# Patient Record
Sex: MALE | Race: WHITE | NOT HISPANIC OR LATINO | Employment: UNEMPLOYED | URBAN - METROPOLITAN AREA
[De-identification: names, ages, dates, MRNs, and addresses within clinical notes are randomized per-mention and may not be internally consistent; named-entity substitution may affect disease eponyms.]

---

## 2017-01-09 ENCOUNTER — ANTICOAGULATION THERAPY VISIT (OUTPATIENT)
Dept: NURSING | Facility: CLINIC | Age: 67
End: 2017-01-09
Payer: COMMERCIAL

## 2017-01-09 DIAGNOSIS — I48.91 ATRIAL FIBRILLATION, UNSPECIFIED TYPE (H): ICD-10-CM

## 2017-01-09 DIAGNOSIS — Z79.01 LONG-TERM (CURRENT) USE OF ANTICOAGULANTS: Primary | ICD-10-CM

## 2017-01-09 LAB — INR POINT OF CARE: 2.5 (ref 0.86–1.14)

## 2017-01-09 PROCEDURE — 85610 PROTHROMBIN TIME: CPT | Mod: QW

## 2017-01-09 PROCEDURE — 36416 COLLJ CAPILLARY BLOOD SPEC: CPT

## 2017-01-09 PROCEDURE — 99207 ZZC NO CHARGE NURSE ONLY: CPT

## 2017-01-09 NOTE — PROGRESS NOTES
ANTICOAGULATION FOLLOW-UP CLINIC VISIT    Patient Name:  Markie Shepard  Date:  1/9/2017  Contact Type:  Face to Face    SUBJECTIVE:     Patient Findings     Positives No Problem Findings        Patient received poor eye prognosis (no glasses suitable for eye state and laser YAG capsulotomy procedure was not very helpful). Plan is to continue with eye injections to aid in retina longevity.      OBJECTIVE    INR PROTIME   Date Value Ref Range Status   01/09/2017 2.5* 0.86 - 1.14 Final       ASSESSMENT / PLAN  INR assessment THER    Recheck INR In: 6 WEEKS    INR Location Clinic      Anticoagulation Summary as of 1/9/2017     INR goal 2.0-3.0   Selected INR 2.5 (1/9/2017)   Maintenance plan 7.5 mg (5 mg x 1.5) on Mon, Fri; 5 mg (5 mg x 1) all other days   Full instructions 7.5 mg on Mon, Fri; 5 mg all other days   Weekly total 40 mg   No change documented Chelsie Encinas RN   Plan last modified Chelsie Encinas RN (3/21/2016)   Next INR check 2/20/2017   Priority INR   Target end date     Indications   Long-term (current) use of anticoagulants [Z79.01] [Z79.01]  Atrial fibrillation (H) [I48.91]         Anticoagulation Episode Summary     INR check location     Preferred lab     Send INR reminders to [facility]    Comments       Anticoagulation Care Providers     Provider Role Specialty Phone number    Mali Patel MD [facility] Internal Medicine [phone]            See the Encounter Report to view Anticoagulation Flowsheet and Dosing Calendar (Go to Encounters tab in chart review, and find the Anticoagulation Therapy Visit)    Patient aware if signs of clotting (pain, tenderness, swelling, color change in leg or arm, SOB) and bleeding occur (blood in stool, urine, large bruising, bleeding gums, nosebleeds) to have INR check sooner. If sx severe report to ER or concerned for stroke call 911. If general questions or concerns arise, call clinic.    Chelsie Encinas RN

## 2017-02-17 ENCOUNTER — DOCUMENTATION ONLY (OUTPATIENT)
Dept: VASCULAR SURGERY | Facility: CLINIC | Age: 67
End: 2017-02-17

## 2017-02-20 ENCOUNTER — ANTICOAGULATION THERAPY VISIT (OUTPATIENT)
Dept: NURSING | Facility: CLINIC | Age: 67
End: 2017-02-20
Payer: COMMERCIAL

## 2017-02-20 DIAGNOSIS — I48.91 ATRIAL FIBRILLATION, UNSPECIFIED TYPE (H): ICD-10-CM

## 2017-02-20 DIAGNOSIS — Z79.01 LONG-TERM (CURRENT) USE OF ANTICOAGULANTS: ICD-10-CM

## 2017-02-20 LAB — INR POINT OF CARE: 2.2 (ref 0.86–1.14)

## 2017-02-20 PROCEDURE — 36416 COLLJ CAPILLARY BLOOD SPEC: CPT

## 2017-02-20 PROCEDURE — 85610 PROTHROMBIN TIME: CPT | Mod: QW

## 2017-02-20 PROCEDURE — 99207 ZZC NO CHARGE NURSE ONLY: CPT

## 2017-02-20 NOTE — PROGRESS NOTES
ANTICOAGULATION FOLLOW-UP CLINIC VISIT    Patient Name:  Markie Shepard  Date:  2/20/2017  Contact Type:  Face to Face    SUBJECTIVE:     Patient Findings     Positives Change in diet/appetite (Chinese buffet last week, increase in greens. )           OBJECTIVE    INR Protime   Date Value Ref Range Status   02/20/2017 2.2 (A) 0.86 - 1.14 Final       ASSESSMENT / PLAN  No question data found.  Anticoagulation Summary as of 2/20/2017     INR goal 2.0-3.0   Today's INR 2.2   Maintenance plan 7.5 mg (5 mg x 1.5) on Mon, Fri; 5 mg (5 mg x 1) all other days   Full instructions 7.5 mg on Mon, Fri; 5 mg all other days   Weekly total 40 mg   No change documented Chelsie Encinas RN   Plan last modified Chelsie Encinas RN (3/21/2016)   Next INR check 4/3/2017   Priority INR   Target end date     Indications   Long-term (current) use of anticoagulants [Z79.01] [Z79.01]  Atrial fibrillation (H) [I48.91]         Anticoagulation Episode Summary     INR check location     Preferred lab     Send INR reminders to [facility]    Comments       Anticoagulation Care Providers     Provider Role Specialty Phone number    Mali Patel MD [facility] Internal Medicine [phone]            See the Encounter Report to view Anticoagulation Flowsheet and Dosing Calendar (Go to Encounters tab in chart review, and find the Anticoagulation Therapy Visit)    Patient aware if signs of clotting (pain, tenderness, swelling, color change in leg or arm, SOB) and bleeding occur (blood in stool, urine, large bruising, bleeding gums, nosebleeds) to have INR check sooner. If sx severe report to ER or concerned for stroke call 911. If general questions or concerns arise, call clinic.    Chelsie Encinas RN

## 2017-03-23 DIAGNOSIS — J41.0 SIMPLE CHRONIC BRONCHITIS (H): ICD-10-CM

## 2017-03-23 RX ORDER — ALBUTEROL SULFATE 90 UG/1
2 AEROSOL, METERED RESPIRATORY (INHALATION) EVERY 4 HOURS PRN
Qty: 1 INHALER | Refills: 11 | Status: CANCELLED | OUTPATIENT
Start: 2017-03-23

## 2017-03-23 RX ORDER — LEVALBUTEROL TARTRATE 45 UG/1
2 AEROSOL, METERED ORAL EVERY 4 HOURS PRN
Qty: 1 INHALER | Refills: 11 | Status: SHIPPED | OUTPATIENT
Start: 2017-03-23 | End: 2017-10-03

## 2017-03-23 NOTE — TELEPHONE ENCOUNTER
Insurance will not cover albuterol, but will Xopenex.  Jorge notified, she prefers xopenex actually , as pt has heart condition.  Med changed. PA team notified to stop PA in process.     Naye Allen RN  March 23, 2017 12:05 PM

## 2017-04-03 ENCOUNTER — ANTICOAGULATION THERAPY VISIT (OUTPATIENT)
Dept: NURSING | Facility: CLINIC | Age: 67
End: 2017-04-03
Payer: COMMERCIAL

## 2017-04-03 ENCOUNTER — TELEPHONE (OUTPATIENT)
Dept: CARDIOLOGY | Facility: CLINIC | Age: 67
End: 2017-04-03

## 2017-04-03 DIAGNOSIS — I48.91 ATRIAL FIBRILLATION, UNSPECIFIED TYPE (H): ICD-10-CM

## 2017-04-03 DIAGNOSIS — Z79.01 LONG-TERM (CURRENT) USE OF ANTICOAGULANTS: ICD-10-CM

## 2017-04-03 LAB — INR POINT OF CARE: 2.5 (ref 0.86–1.14)

## 2017-04-03 PROCEDURE — 36416 COLLJ CAPILLARY BLOOD SPEC: CPT

## 2017-04-03 PROCEDURE — 99207 ZZC NO CHARGE NURSE ONLY: CPT

## 2017-04-03 PROCEDURE — 85610 PROTHROMBIN TIME: CPT | Mod: QW

## 2017-04-03 NOTE — TELEPHONE ENCOUNTER
"Called pt to follow-up.  Per Dr. Rucker' OV note from 10/3/16:     \"We will contact him by phone in 6 months to touch base regarding his weight activity, smoking and for symptom evaluation at that time. \"    Pt. States he continues to use a stair stepper at home daily for activity however he has not lost any weight since last OV.  He does get outside everyday for a few minutes, but admits that he is limited by his loss of vision and the weather.  He is hopeful now that the weather is nicer he will get out of the house more often.  He continues to smoke 1/2ppd and has not made attemtps to quit. The SOB remains when he bends over but he states that he can go significant periods of time without experiencing SOB. He has no concerning symptoms at this time such as chest pain or heaviness. Congratulated him and encouraged him to continue using his stair stepper daily and aim for weight loss. Also encouraged him to contact his PCP if he is ready to stop smoking and requiring any resources for this.     Reviewed that pt. Will be due for annual OV in October, he is agreeable to seeing us at that time.  Encouraged him to reach out to us in the meantime for any questions or concerns.   "

## 2017-04-03 NOTE — PROGRESS NOTES
ANTICOAGULATION FOLLOW-UP CLINIC VISIT    Patient Name:  Markie Shepard  Date:  4/3/2017  Contact Type:  Face to Face    SUBJECTIVE:     Patient Findings     Positives No Problem Findings           OBJECTIVE    INR Protime   Date Value Ref Range Status   04/03/2017 2.5 (A) 0.86 - 1.14 Final       ASSESSMENT / PLAN  INR assessment THER    Recheck INR In: 6 WEEKS    INR Location Clinic      Anticoagulation Summary as of 4/3/2017     INR goal 2.0-3.0   Today's INR 2.5   Maintenance plan 7.5 mg (5 mg x 1.5) on Mon, Fri; 5 mg (5 mg x 1) all other days   Full instructions 7.5 mg on Mon, Fri; 5 mg all other days   Weekly total 40 mg   No change documented Chelsie Encinas RN   Plan last modified Chelsie Encinas RN (3/21/2016)   Next INR check 5/15/2017   Priority INR   Target end date     Indications   Long-term (current) use of anticoagulants [Z79.01] [Z79.01]  Atrial fibrillation (H) [I48.91]         Anticoagulation Episode Summary     INR check location     Preferred lab     Send INR reminders to [facility]    Comments PREFERS MARKIE!      Anticoagulation Care Providers     Provider Role Specialty Phone number    Mali Patel MD [facility] Internal Medicine [phone]            See the Encounter Report to view Anticoagulation Flowsheet and Dosing Calendar (Go to Encounters tab in chart review, and find the Anticoagulation Therapy Visit)    Patient aware if signs of clotting (pain, tenderness, swelling, color change in leg or arm, SOB) and bleeding occur (blood in stool, urine, large bruising, bleeding gums, nosebleeds) to have INR check sooner. If sx severe report to ER or concerned for stroke call 911. If general questions or concerns arise, call clinic.    Chelsie Encinas RN

## 2017-04-03 NOTE — TELEPHONE ENCOUNTER
----- Message from Christina Betancur RN sent at 4/3/2017  8:35 AM CDT -----      ----- Message -----     From: Christina Betancur RN     Sent: 4/3/2017       To: Christina Betancur RN    Call pt in April 2017-check weight and symptoms per Dr. Barr

## 2017-05-01 ENCOUNTER — ANTICOAGULATION THERAPY VISIT (OUTPATIENT)
Dept: NURSING | Facility: CLINIC | Age: 67
End: 2017-05-01
Payer: COMMERCIAL

## 2017-05-01 DIAGNOSIS — Z72.0 CURRENT TOBACCO USE: ICD-10-CM

## 2017-05-01 DIAGNOSIS — I48.91 ATRIAL FIBRILLATION, UNSPECIFIED TYPE (H): ICD-10-CM

## 2017-05-01 DIAGNOSIS — Z79.01 LONG-TERM (CURRENT) USE OF ANTICOAGULANTS: ICD-10-CM

## 2017-05-01 DIAGNOSIS — I48.20 CHRONIC ATRIAL FIBRILLATION (H): ICD-10-CM

## 2017-05-01 LAB — INR PPP: 2.7 (ref 0.86–1.14)

## 2017-05-01 PROCEDURE — 36415 COLL VENOUS BLD VENIPUNCTURE: CPT | Performed by: FAMILY MEDICINE

## 2017-05-01 PROCEDURE — 99207 ZZC NO CHARGE NURSE ONLY: CPT | Performed by: FAMILY MEDICINE

## 2017-05-01 PROCEDURE — 85610 PROTHROMBIN TIME: CPT | Performed by: FAMILY MEDICINE

## 2017-05-01 NOTE — PROGRESS NOTES
ANTICOAGULATION FOLLOW-UP CLINIC VISIT    Patient Name:  Nilson Shepard  Date:  5/1/2017  Contact Type:  Telephone    SUBJECTIVE:     Patient Findings     Positives Change in diet/appetite (Patient is eating A LOT less than normal d/t tooth abscess. Patient advised to keep up on his daily salad intake as best he can so INR does not continue to climb pre-procedure. ), Dental/Other procedures (Planned dental extraction for Thursday 5/4. INR needs to be between 2-2.5.), Inflammation (Abscess tooth on lower left side. ), Antibiotic use or infection (Amoxicillin 500 mg TID started on Sunday 4/30. 10 day course.)    Comments Last three INR results to be faxed to Dr. Deni Plascencia's office #[phone]. ACC team will do this Wednesday after INR is checked and confirmed between 2-2.5.             OBJECTIVE    INR   Date Value Ref Range Status   05/01/2017 2.70 (H) 0.86 - 1.14 Final       ASSESSMENT / PLAN  INR assessment THER    Recheck INR In: 2 DAYS    INR Location Clinic      Anticoagulation Summary as of 5/1/2017     INR goal 2.0-3.0   Today's INR 2.70   Maintenance plan 7.5 mg (5 mg x 1.5) on Mon, Fri; 5 mg (5 mg x 1) all other days   Full instructions 5/1: 2.5 mg; Otherwise 7.5 mg on Mon, Fri; 5 mg all other days   Weekly total 40 mg   Plan last modified Chelsie Encinas RN (3/21/2016)   Next INR check 5/3/2017   Priority INR   Target end date     Indications   Long-term (current) use of anticoagulants [Z79.01] [Z79.01]  Atrial fibrillation (H) [I48.91]         Anticoagulation Episode Summary     INR check location     Preferred lab     Send INR reminders to [facility]    Comments PREFERS NILSON!      Anticoagulation Care Providers     Provider Role Specialty Phone number    Mali Patel MD [facility] Internal Medicine [phone]            See the Encounter Report to view Anticoagulation Flowsheet and Dosing Calendar (Go to Encounters tab in chart review, and find the Anticoagulation  Therapy Visit)    Patient notified that interruptions to ACC therapy need to be advised by PCP, therefore, plan advised today will be routed to provider for review. Dose can always be made up tomorrow if Dr. Patel is not in agreement.     Patient advised to take 2.5 mg only today given INR threshold for upcoming procedure (12% reduction).  Patient instructed to eat greens the best he can until procedure as well. Patient agreed to rechecking INR on Wednesday to ensure in target range for procedure.     Patient aware if signs of clotting (pain, tenderness, swelling, color change in leg or arm, SOB) and bleeding occur (blood in stool, urine, large bruising, bleeding gums, nosebleeds) to have INR check sooner. If sx severe report to ER or concerned for stroke call 911. If general questions or concerns arise, call clinic.    Chelsie Encinas RN

## 2017-05-01 NOTE — Clinical Note
Please review progress note and document whether or not you agree or disagree with plan. Patient needs INR between 2-2.5 by Thursday. Thanks!

## 2017-05-03 ENCOUNTER — ANTICOAGULATION THERAPY VISIT (OUTPATIENT)
Dept: NURSING | Facility: CLINIC | Age: 67
End: 2017-05-03
Payer: COMMERCIAL

## 2017-05-03 DIAGNOSIS — I48.91 ATRIAL FIBRILLATION, UNSPECIFIED TYPE (H): ICD-10-CM

## 2017-05-03 DIAGNOSIS — Z79.01 LONG-TERM (CURRENT) USE OF ANTICOAGULANTS: ICD-10-CM

## 2017-05-03 LAB — INR POINT OF CARE: 2.5 (ref 0.86–1.14)

## 2017-05-03 PROCEDURE — 99207 ZZC NO CHARGE NURSE ONLY: CPT

## 2017-05-03 PROCEDURE — 36416 COLLJ CAPILLARY BLOOD SPEC: CPT

## 2017-05-03 PROCEDURE — 85610 PROTHROMBIN TIME: CPT | Mod: QW

## 2017-05-03 NOTE — PROGRESS NOTES
ANTICOAGULATION FOLLOW-UP CLINIC VISIT    Patient Name:  Markie Shepard  Date:  5/3/2017  Contact Type:  Face to Face    SUBJECTIVE:     Patient Findings     Positives Dental/Other procedures (Oral surgery on 5/4 (abscess tooth)), Other complaints (Pain from abcess tooth.), Inflammation (tooth abcess.), Antibiotic use or infection (Patient on Amoxicillin until 5/10.)           OBJECTIVE    INR Protime   Date Value Ref Range Status   05/03/2017 2.5 (A) 0.86 - 1.14 Final       ASSESSMENT / PLAN  INR assessment THER    Recheck INR In: 2 WEEKS    INR Location Clinic      Anticoagulation Summary as of 5/3/2017     INR goal 2.0-3.0   Today's INR 2.5   Maintenance plan 7.5 mg (5 mg x 1.5) on Mon, Fri; 5 mg (5 mg x 1) all other days   Full instructions 5/3: 2.5 mg; 5/5: 5 mg; Otherwise 7.5 mg on Mon, Fri; 5 mg all other days   Weekly total 40 mg   Plan last modified Chelsie Encinas RN (3/21/2016)   Next INR check 5/15/2017   Priority INR   Target end date     Indications   Long-term (current) use of anticoagulants [Z79.01] [Z79.01]  Atrial fibrillation (H) [I48.91]         Anticoagulation Episode Summary     INR check location     Preferred lab     Send INR reminders to [facility]    Comments PREFERS MARKIE!      Anticoagulation Care Providers     Provider Role Specialty Phone number    Mali Patel MD [facility] Internal Medicine [phone]            See the Encounter Report to view Anticoagulation Flowsheet and Dosing Calendar (Go to Encounters tab in chart review, and find the Anticoagulation Therapy Visit)    Patient to decrease today's dose by 2.5 mg and to eat greens tonight to ensure INR in range for oral surgery tomorrow. Patient anticipates diet reduction while recovering. Therefore, patient advised to resume 5 mg through the weekend given expected changes to diet, then resume maintenance dose on Monday 5/8.  ACC team will f/u Friday to discuss post procedure plan.     Patient was  advised to return 5/8 to reassure INR is stable. Patient refused and was in agreement to 5/15 only. Patient instructed on bleeding risk and to call with any signs or symptoms.     Patient aware if signs of clotting (pain, tenderness, swelling, color change in leg or arm, SOB) and bleeding occur (blood in stool, urine, large bruising, bleeding gums, nosebleeds) to have INR check sooner. If sx severe report to ER or concerned for stroke call 911. If general questions or concerns arise, call clinic.       Dolly Grier RN (training with Chelsie Encinas RN)

## 2017-05-03 NOTE — MR AVS SNAPSHOT
Markie MERLE Shepard   5/3/2017 12:00 PM   Anticoagulation Therapy Visit    Description:  67 year old male   Provider:   INR CLINIC   Department:   Nurse           INR as of 5/3/2017     Today's INR 2.5      Anticoagulation Summary as of 5/3/2017     INR goal 2.0-3.0   Today's INR 2.5   Full instructions 5/3: 2.5 mg; 5/5: 5 mg; Otherwise 7.5 mg on Mon, Fri; 5 mg all other days   Next INR check 5/15/2017    Indications   Long-term (current) use of anticoagulants [Z79.01] [Z79.01]  Atrial fibrillation (H) [I48.91]         Your next Anticoagulation Clinic appointment(s)     May 15, 2017 10:45 AM CDT   Anticoagulation Visit with  INR CLINIC   [facility]    [address] [place] [id number] [phone]              Contact Numbers     [facility]  Please call [phone] to cancel and/or reschedule your appointment   Please call [phone] with any problems or questions regarding your therapy.        May 2017 Details    Sun Mon Tue Wed Thu Fri Sat      1               2               3      2.5 mg   See details      4      5 mg         5      5 mg         6      5 mg           7      5 mg         8      7.5 mg         9      5 mg         10      5 mg         11      5 mg         12      7.5 mg         13      5 mg           14      5 mg         15            16               17               18               19               20                 21               22               23               24               25               26               27                 28               29               30               31                   Date Details   05/03 This INR check       Date of next INR:  5/15/2017         How to take your warfarin dose     To take:  2.5 mg Take 0.5 of a 5 mg tablet.    To take:  5 mg Take 1 of the 5 mg tablets.    To take:  7.5 mg Take 1.5 of the 5 mg tablets.

## 2017-05-04 LAB — INR PPP: 2.2

## 2017-05-05 NOTE — PROGRESS NOTES
UPDATE:Patient f/u post-procedure and reported no issued with dental work.  INR 2.2 on 5/4 at dentist. Patient given Vicodin for pain control, rates at 2/10. Patient reports resuming normal diet, except carrots as they will be too hard to chew for a while. Patient plans to resume his consistent intake of salads and coleslaw. With all this information and no bleeding post-procedure, patient advised to resume 7.5 mg tonight instead of only 5 mg. Patient verbalized understanding. Chelsie Encinas, FILOMENAN, RN

## 2017-05-15 ENCOUNTER — ANTICOAGULATION THERAPY VISIT (OUTPATIENT)
Dept: NURSING | Facility: CLINIC | Age: 67
End: 2017-05-15
Payer: COMMERCIAL

## 2017-05-15 DIAGNOSIS — I48.91 ATRIAL FIBRILLATION, UNSPECIFIED TYPE (H): ICD-10-CM

## 2017-05-15 DIAGNOSIS — Z79.01 LONG-TERM (CURRENT) USE OF ANTICOAGULANTS: ICD-10-CM

## 2017-05-15 LAB — INR POINT OF CARE: 2.5 (ref 0.86–1.14)

## 2017-05-15 PROCEDURE — 36416 COLLJ CAPILLARY BLOOD SPEC: CPT

## 2017-05-15 PROCEDURE — 85610 PROTHROMBIN TIME: CPT | Mod: QW

## 2017-05-15 PROCEDURE — 99207 ZZC NO CHARGE NURSE ONLY: CPT

## 2017-05-15 NOTE — PROGRESS NOTES
ANTICOAGULATION FOLLOW-UP CLINIC VISIT    Patient Name:  Markie Shepard  Date:  5/15/2017  Contact Type:  Face to Face    SUBJECTIVE:     Patient Findings     Positives Change in diet/appetite (Briefly after tooth removal, patient's diet included only soft foods. Now back eating at baseline. Continues to try to eat better and portion control.), Activity level change (Patient is starting to workout on his stair stepper (1 mile). )           OBJECTIVE    INR Protime   Date Value Ref Range Status   05/15/2017 2.5 (A) 0.86 - 1.14 Final       ASSESSMENT / PLAN  INR assessment THER    Recheck INR In: 6 WEEKS    INR Location Clinic      Anticoagulation Summary as of 5/15/2017     INR goal 2.0-3.0   Today's INR 2.5   Maintenance plan 7.5 mg (5 mg x 1.5) on Mon, Fri; 5 mg (5 mg x 1) all other days   Full instructions 7.5 mg on Mon, Fri; 5 mg all other days   Weekly total 40 mg   No change documented Chelsie Encinas RN   Plan last modified Chelsie Encinas RN (3/21/2016)   Next INR check 6/26/2017   Priority INR   Target end date     Indications   Long-term (current) use of anticoagulants [Z79.01] [Z79.01]  Atrial fibrillation (H) [I48.91]         Anticoagulation Episode Summary     INR check location     Preferred lab     Send INR reminders to [facility]    Comments PREFERS MARKIE!      Anticoagulation Care Providers     Provider Role Specialty Phone number    Mali Patel MD [facility] Internal Medicine [phone]            See the Encounter Report to view Anticoagulation Flowsheet and Dosing Calendar (Go to Encounters tab in chart review, and find the Anticoagulation Therapy Visit)    Patient advised to return in one month. Patient refused and agreed to 6 weeks.     Patient made aware if signs of clotting (pain, tenderness, swelling, or color change in any extremity) AND/OR bleeding occur (nosebleeds, bleeding gums, bruising, or blood in stool or urine) to notify provider & seek medical  attention. If severe symptoms develop, such as major bleeding, chest pain, shortness of breath, fall, trauma or s/s of stroke, patient to call 911 immediately.     With any general questions or concerns, patient to contact primary office or FNA ([phone]) if outside of clinic hours.     Chelsie Encinas RN

## 2017-06-26 ENCOUNTER — ANTICOAGULATION THERAPY VISIT (OUTPATIENT)
Dept: NURSING | Facility: CLINIC | Age: 67
End: 2017-06-26
Payer: COMMERCIAL

## 2017-06-26 DIAGNOSIS — Z79.01 LONG-TERM (CURRENT) USE OF ANTICOAGULANTS: ICD-10-CM

## 2017-06-26 DIAGNOSIS — I48.91 ATRIAL FIBRILLATION, UNSPECIFIED TYPE (H): ICD-10-CM

## 2017-06-26 LAB — INR POINT OF CARE: 1.6 (ref 0.86–1.14)

## 2017-06-26 PROCEDURE — 36416 COLLJ CAPILLARY BLOOD SPEC: CPT

## 2017-06-26 PROCEDURE — 99207 ZZC NO CHARGE NURSE ONLY: CPT

## 2017-06-26 PROCEDURE — 85610 PROTHROMBIN TIME: CPT | Mod: QW

## 2017-06-26 NOTE — PROGRESS NOTES
ANTICOAGULATION FOLLOW-UP CLINIC VISIT    Patient Name:  Markie Shepard  Date:  6/26/2017  Contact Type:  Face to Face    SUBJECTIVE:     Patient Findings     Positives Change in diet/appetite (Spinach and stir velasquez over the last two days, which is a slight increase in greens for patient.), Activity level change (Using a resistent band along with walking. No major changes to level.), Missed doses (Suspected. Patient does not believe he missed any doses.)           OBJECTIVE    INR Protime   Date Value Ref Range Status   06/26/2017 1.6 (A) 0.86 - 1.14 Final       ASSESSMENT / PLAN  INR assessment SUB    Recheck INR In: 4 WEEKS    INR Location Clinic      Anticoagulation Summary as of 6/26/2017     INR goal 2.0-3.0   Today's INR 1.6!   Maintenance plan 7.5 mg (5 mg x 1.5) on Mon, Fri; 5 mg (5 mg x 1) all other days   Full instructions 6/26: 10 mg; 6/27: 7.5 mg; Otherwise 7.5 mg on Mon, Fri; 5 mg all other days   Weekly total 40 mg   Plan last modified Chelsie Encinas, RN (3/21/2016)   Next INR check 7/24/2017   Priority INR   Target end date     Indications   Long-term (current) use of anticoagulants [Z79.01] [Z79.01]  Atrial fibrillation (H) [I48.91]         Anticoagulation Episode Summary     INR check location     Preferred lab     Send INR reminders to [facility]    Comments PREFERS TUSHAR      Anticoagulation Care Providers     Provider Role Specialty Phone number    Mali Patel MD [facility] Internal Medicine [phone]            See the Encounter Report to view Anticoagulation Flowsheet and Dosing Calendar (Go to Encounters tab in chart review, and find the Anticoagulation Therapy Visit)    Per protocol, patient advised to take 10 mg today and then 7.5 mg tomorrow to account for sub-therapeutic INR level (12% increase in weekly dose). Patient instructed to hold green intake today and tomorrow as well. Recheck within 1-2 weeks to ensure stability. Patient refused, but agreed to  recheck INR in one month. Reviewed all s/s of clotting and stroke to be watchful for and what to do in the event they occur.    Patient made aware if signs of clotting (pain, tenderness, swelling, or color change in any extremity) AND/OR bleeding occur (nosebleeds, bleeding gums, bruising, or blood in stool or urine) to notify provider & seek medical attention. If severe symptoms develop, such as major bleeding, chest pain, shortness of breath, fall, trauma or s/s of stroke, patient to call 911 immediately.     Chelsie Encinas RN

## 2017-07-24 ENCOUNTER — ANTICOAGULATION THERAPY VISIT (OUTPATIENT)
Dept: NURSING | Facility: CLINIC | Age: 67
End: 2017-07-24
Payer: COMMERCIAL

## 2017-07-24 DIAGNOSIS — Z79.01 LONG-TERM (CURRENT) USE OF ANTICOAGULANTS: ICD-10-CM

## 2017-07-24 DIAGNOSIS — I48.91 ATRIAL FIBRILLATION, UNSPECIFIED TYPE (H): ICD-10-CM

## 2017-07-24 LAB — INR POINT OF CARE: 2.3 (ref 0.86–1.14)

## 2017-07-24 PROCEDURE — 36416 COLLJ CAPILLARY BLOOD SPEC: CPT

## 2017-07-24 PROCEDURE — 85610 PROTHROMBIN TIME: CPT | Mod: QW

## 2017-07-24 PROCEDURE — 99207 ZZC NO CHARGE NURSE ONLY: CPT

## 2017-07-24 NOTE — PROGRESS NOTES
ANTICOAGULATION FOLLOW-UP CLINIC VISIT    Patient Name:  Markie Shepard  Date:  7/24/2017  Contact Type:  Face to Face    SUBJECTIVE:     Patient Findings     Positives No Problem Findings           OBJECTIVE    INR Protime   Date Value Ref Range Status   07/24/2017 2.3 (A) 0.86 - 1.14 Final       ASSESSMENT / PLAN  INR assessment THER    Recheck INR In: 5 WEEKS    INR Location Clinic      Anticoagulation Summary as of 7/24/2017     INR goal 2.0-3.0   Today's INR 2.3   Maintenance plan 7.5 mg (5 mg x 1.5) on Mon, Fri; 5 mg (5 mg x 1) all other days   Full instructions 7.5 mg on Mon, Fri; 5 mg all other days   Weekly total 40 mg   No change documented Chelsie Encinas RN   Plan last modified Chelsie Encinas RN (3/21/2016)   Next INR check 8/28/2017   Priority INR   Target end date     Indications   Long-term (current) use of anticoagulants [Z79.01] [Z79.01]  Atrial fibrillation (H) [I48.91]         Anticoagulation Episode Summary     INR check location     Preferred lab     Send INR reminders to [facility]    Comments PREFERS MARKIE!      Anticoagulation Care Providers     Provider Role Specialty Phone number    Mali Patel MD [facility] Internal Medicine [phone]            See the Encounter Report to view Anticoagulation Flowsheet and Dosing Calendar (Go to Encounters tab in chart review, and find the Anticoagulation Therapy Visit)    Patient made aware if signs of clotting (pain, tenderness, swelling, or color change in any extremity) AND/OR bleeding occur (nosebleeds, bleeding gums, bruising, or blood in stool or urine) to notify provider & seek medical attention. If severe symptoms develop, such as major bleeding, chest pain, shortness of breath, fall, trauma or s/s of stroke, patient to call 911 immediately.       Chelsie Encinas RN

## 2017-08-28 ENCOUNTER — ANTICOAGULATION THERAPY VISIT (OUTPATIENT)
Dept: NURSING | Facility: CLINIC | Age: 67
End: 2017-08-28
Payer: COMMERCIAL

## 2017-08-28 DIAGNOSIS — I48.91 ATRIAL FIBRILLATION, UNSPECIFIED TYPE (H): ICD-10-CM

## 2017-08-28 DIAGNOSIS — Z79.01 LONG-TERM (CURRENT) USE OF ANTICOAGULANTS: ICD-10-CM

## 2017-08-28 LAB — INR POINT OF CARE: 2.2 (ref 0.86–1.14)

## 2017-08-28 PROCEDURE — 36416 COLLJ CAPILLARY BLOOD SPEC: CPT

## 2017-08-28 PROCEDURE — 85610 PROTHROMBIN TIME: CPT | Mod: QW

## 2017-08-28 PROCEDURE — 99207 ZZC NO CHARGE NURSE ONLY: CPT

## 2017-08-28 NOTE — PROGRESS NOTES
ANTICOAGULATION FOLLOW-UP CLINIC VISIT    Patient Name:  Markie Shepard  Date:  8/28/2017  Contact Type:  Face to Face    SUBJECTIVE:     Patient Findings     Positives Change in diet/appetite (Pt reports having more greens in the last few weeks.)           OBJECTIVE    INR Protime   Date Value Ref Range Status   08/28/2017 2.2 (A) 0.86 - 1.14 Final       ASSESSMENT / PLAN  INR assessment THER    Recheck INR In: 6 WEEKS    INR Location Clinic      Anticoagulation Summary as of 8/28/2017     INR goal 2.0-3.0   Today's INR 2.2   Maintenance plan 7.5 mg (5 mg x 1.5) on Mon, Fri; 5 mg (5 mg x 1) all other days   Full instructions 7.5 mg on Mon, Fri; 5 mg all other days   Weekly total 40 mg   No change documented Chelsie Encinas RN   Plan last modified Chelsie Encinas RN (3/21/2016)   Next INR check 10/9/2017   Priority INR   Target end date     Indications   Long-term (current) use of anticoagulants [Z79.01] [Z79.01]  Atrial fibrillation (H) [I48.91]         Anticoagulation Episode Summary     INR check location     Preferred lab     Send INR reminders to [facility]    Comments PREFERS MARKIE!      Anticoagulation Care Providers     Provider Role Specialty Phone number    Mali Patel MD [facility] Internal Medicine [phone]            See the Encounter Report to view Anticoagulation Flowsheet and Dosing Calendar (Go to Encounters tab in chart review, and find the Anticoagulation Therapy Visit)    Patient made aware if signs of clotting (pain, tenderness, swelling, or color change in any extremity) AND/OR bleeding occur (nosebleeds, bleeding gums, bruising, or blood in stool or urine) to notify provider & seek medical attention. If severe symptoms develop, such as major bleeding, chest pain, shortness of breath, fall, trauma or s/s of stroke, patient to call 911 immediately.     Chelsie Encinas RN

## 2017-10-03 ENCOUNTER — OFFICE VISIT (OUTPATIENT)
Dept: CARDIOLOGY | Facility: CLINIC | Age: 67
End: 2017-10-03
Attending: INTERNAL MEDICINE
Payer: COMMERCIAL

## 2017-10-03 VITALS
HEIGHT: 71 IN | HEART RATE: 76 BPM | BODY MASS INDEX: 44.1 KG/M2 | SYSTOLIC BLOOD PRESSURE: 146 MMHG | DIASTOLIC BLOOD PRESSURE: 86 MMHG | WEIGHT: 315 LBS

## 2017-10-03 DIAGNOSIS — I48.91 ATRIAL FIBRILLATION, UNSPECIFIED TYPE (H): ICD-10-CM

## 2017-10-03 LAB
ALBUMIN SERPL-MCNC: 3.7 G/DL (ref 3.4–5)
ALP SERPL-CCNC: 97 U/L (ref 40–150)
ALT SERPL W P-5'-P-CCNC: 30 U/L (ref 0–70)
ANION GAP SERPL CALCULATED.3IONS-SCNC: 8 MMOL/L (ref 3–14)
AST SERPL W P-5'-P-CCNC: 20 U/L (ref 0–45)
BILIRUB SERPL-MCNC: 0.8 MG/DL (ref 0.2–1.3)
BUN SERPL-MCNC: 14 MG/DL (ref 7–30)
CALCIUM SERPL-MCNC: 9.8 MG/DL (ref 8.5–10.1)
CHLORIDE SERPL-SCNC: 105 MMOL/L (ref 94–109)
CHOLEST SERPL-MCNC: 143 MG/DL
CO2 SERPL-SCNC: 25 MMOL/L (ref 20–32)
CREAT SERPL-MCNC: 0.82 MG/DL (ref 0.66–1.25)
ERYTHROCYTE [DISTWIDTH] IN BLOOD BY AUTOMATED COUNT: 13.5 % (ref 10–15)
GFR SERPL CREATININE-BSD FRML MDRD: >90 ML/MIN/1.7M2
GLUCOSE SERPL-MCNC: 111 MG/DL (ref 70–99)
HCT VFR BLD AUTO: 50.8 % (ref 40–53)
HDLC SERPL-MCNC: 39 MG/DL
HGB BLD-MCNC: 17.6 G/DL (ref 13.3–17.7)
LDLC SERPL CALC-MCNC: 72 MG/DL
MCH RBC QN AUTO: 33.4 PG (ref 26.5–33)
MCHC RBC AUTO-ENTMCNC: 34.6 G/DL (ref 31.5–36.5)
MCV RBC AUTO: 96 FL (ref 78–100)
NONHDLC SERPL-MCNC: 104 MG/DL
PLATELET # BLD AUTO: 179 10E9/L (ref 150–450)
POTASSIUM SERPL-SCNC: 4.2 MMOL/L (ref 3.4–5.3)
PROT SERPL-MCNC: 7.4 G/DL (ref 6.8–8.8)
RBC # BLD AUTO: 5.27 10E12/L (ref 4.4–5.9)
SODIUM SERPL-SCNC: 138 MMOL/L (ref 133–144)
TRIGL SERPL-MCNC: 162 MG/DL
TSH SERPL DL<=0.005 MIU/L-ACNC: 1.27 MU/L (ref 0.4–4)
WBC # BLD AUTO: 12 10E9/L (ref 4–11)

## 2017-10-03 PROCEDURE — 80053 COMPREHEN METABOLIC PANEL: CPT | Performed by: INTERNAL MEDICINE

## 2017-10-03 PROCEDURE — 99214 OFFICE O/P EST MOD 30 MIN: CPT | Performed by: INTERNAL MEDICINE

## 2017-10-03 PROCEDURE — 84443 ASSAY THYROID STIM HORMONE: CPT | Performed by: INTERNAL MEDICINE

## 2017-10-03 PROCEDURE — 85027 COMPLETE CBC AUTOMATED: CPT | Performed by: INTERNAL MEDICINE

## 2017-10-03 PROCEDURE — 36415 COLL VENOUS BLD VENIPUNCTURE: CPT | Performed by: INTERNAL MEDICINE

## 2017-10-03 PROCEDURE — 80061 LIPID PANEL: CPT | Performed by: INTERNAL MEDICINE

## 2017-10-03 RX ORDER — ALBUTEROL SULFATE 90 UG/1
2 AEROSOL, METERED RESPIRATORY (INHALATION) EVERY 6 HOURS
COMMUNITY
End: 2017-11-28

## 2017-10-03 NOTE — LETTER
"10/3/2017    Mali Patel MD  [address] [facility]  [facility] [address]    RE: Markie BLOOM Devyn       Dear Colleague,    I had the pleasure of seeing Markie Shepard in the [facility].    REASON FOR VISIT:  Followup visit.        Mr. Shepard is a pleasant, 67-year-old gentleman who comes in routine followup.  He has morbid obesity with a BMI, unfortunately, of 52; obstructive coronary artery disease with RCA occlusion on prior cardiac catheterization; chronic atrial fibrillation, well rate controlled and on systemic anticoagulation; sleep apnea and polycythemia felt to be secondary.  He does have a CPAP device.  He, unfortunately, does have a substantial tobacco abuse history, continuing to smoke.  He has significant visual loss, which limits his efforts at lifestyle modification.  He had a normalized ejection fraction in 2015 after a previous echo in 2013 demonstrated an ischemic cardiomyopathy.      Mr. Shepard denies chest pain, shortness of breath, lightheadedness, presyncope, syncope, PND, orthopnea, palpitations or pedal edema.  He states that he is doing exercises daily on his stepper as well as a resistance band in the evening and stretching.  He states he has \"minor aches and pains,\" which go away.      He has a bit of pedal edema today, but has not taken his Lasix, which he takes on Sunday, Tuesdays and Fridays.  This is because it is difficult because of the profound diuresis that he has with it.      He has not been able to get in to see his Primary physician, Dr. Patel, and cannot do so until December.  He has not had his yearly labs because of this.      Outpatient Encounter Prescriptions as of 10/3/2017   Medication Sig Dispense Refill     albuterol (PROAIR HFA/PROVENTIL HFA/VENTOLIN HFA) 108 (90 BASE) MCG/ACT Inhaler Inhale 2 puffs into the lungs every 6 hours       fluticasone (FLONASE) 50 MCG/ACT nasal spray Spray 2 sprays into both nostrils daily 3 Bottle 3 "     atenolol (TENORMIN) 100 MG tablet Take 1 tablet (100 mg) by mouth daily along with 50 mg tablet for total of 150 mg daily. 90 tablet 3     FLUoxetine (PROZAC) 40 MG capsule Take 1 capsule (40 mg) by mouth daily 90 capsule 3     warfarin (COUMADIN) 5 MG tablet Take 7.5 mg on Mondays and Fridays, and 5 mg all other days, or as directed by Coumadin nurse 100 tablet 3     furosemide (LASIX) 20 MG tablet Take one half tablet by mouth up to 3 times a week as needed for leg edema. 36 tablet 3     amLODIPine (NORVASC) 10 MG tablet Take 1 tablet (10 mg) by mouth daily 90 tablet 3     atenolol (TENORMIN) 50 MG tablet Take 1 tablet (50 mg) by mouth daily Along with 100 mg tablet for total of 150 mg daily 90 tablet 3     losartan (COZAAR) 100 MG tablet Take 1 tablet (100 mg) by mouth daily Hold rx in pharmacy until pt calls for Rx 90 tablet 3     simvastatin (ZOCOR) 20 MG tablet Take 1 tablet (20 mg) by mouth At Bedtime Hold rx in pharmacy until pt calls for Rx (Patient taking differently: Take 20 mg by mouth At Bedtime ) 90 tablet 3     traZODone (DESYREL) 100 MG tablet Take 1 tablet (100 mg) by mouth nightly as needed Pt due for visit for further refills 90 tablet 3     acetaminophen (TYLENOL) 500 MG tablet Take 500-1,000 mg by mouth every 6 hours as needed for mild pain       oxymetazoline (AFRIN 12 HOUR) 0.05 % nasal spray Spray 2 sprays into both nostrils At Bedtime       omega-3 fatty acids (FISH OIL) 1200 MG capsule Take 1 capsule by mouth daily       CENTRUM SILVER OR TABS ONE DAILY 3 MONTHS 1 YEAR     [DISCONTINUED] levalbuterol (XOPENEX HFA) 45 MCG/ACT Inhaler Inhale 2 puffs into the lungs every 4 hours as needed for shortness of breath / dyspnea or wheezing 1 Inhaler 11     No facility-administered encounter medications on file as of 10/3/2017.      ASSESSMENT AND PLAN:  A pleasant, 67-year-old gentleman with coronary artery disease and multiple comorbidities as outlined above.  He has no cardiorespiratory  complaints.  We will go ahead and follow up labs at this point and continue him on his current medical regimen.  He will see Dr. Patel later this year for risk-factor modification.      His blood pressure is elevated at today's measurement, but he states that normally it is in the range of 120s/70s, and he does check it regularly.      Of note, to put in context of his labs, he has eaten only a little bit today, including a banana, some blueberries and yogurt as well as 2 coffees.  We will check lipids nonetheless.      Total time is 30 minutes, 25 in coordination of care and counseling.     Again, thank you for allowing me to participate in the care of your patient.      Sincerely,    Elena Rucker MD     [facility]

## 2017-10-03 NOTE — MR AVS SNAPSHOT
After Visit Summary   10/3/2017    Markie Shepard    MRN: [id number]           Patient Information     Date Of Birth          1950        Visit Information        Provider Department      10/3/2017 1:45 PM Elena Rucker MD [facility]        Today's Diagnoses     Atrial fibrillation, unspecified type (H)           Follow-ups after your visit        Additional Services     CARDIOLOGY EVAL ADULT REFERRAL                 Your next 10 appointments already scheduled     Oct 03, 2017  2:40 PM CDT   LAB with [facility]   [facility] ([facility])    [id number]  [facility] [id number] [phone]           Patient must bring picture ID. Patient should be prepared to give a urine specimen  Please do not eat 10-12 hours before your appointment if you are coming in fasting for labs on lipids, cholesterol, or glucose (sugar). Pregnant women should follow their Care Team instructions. Water with medications is okay. Do not drink coffee or other fluids. If you have concerns about taking  your medications, please ask at office or if scheduling via [employer], send a message by clicking on Secure Messaging, Message Your Care Team.            Oct 09, 2017 10:45 AM CDT   [facility]  [facility]    [facility] [address] [place] [id number] [phone]            Dec 18, 2017  1:40 PM CST   PHYSICAL with Mali Patel MD   [facility] ([facility])    [address], [address]  [facility] [address]   [phone]              Future tests that were ordered for you today     Open Future Orders        Priority Expected Expires Ordered    CARDIOLOGY EVAL ADULT REFERRAL Routine 10/3/2018 10/3/2018 10/3/2017            Who to contact     If you have questions or need  "follow up information about today's clinic visit or your schedule please contact [facility] [place] directly at [phone].  Normal or non-critical lab and imaging results will be communicated to you by MyChart, letter or phone within 4 business days after the clinic has received the results. If you do not hear from us within 7 days, please contact the clinic through [employer] or phone. If you have a critical or abnormal lab result, we will notify you by phone as soon as possible.  Submit refill requests through "[employer] or call your pharmacy and they will forward the refill request to us. Please allow 3 business days for your refill to be completed.          Additional Information About Your Visit        [employer] Information     "[employer] lets you send messages to your doctor, view your test results, renew your prescriptions, schedule appointments and more. To sign up, go to [email or web address] . Click on \"Log in\" on the left side of the screen, which will take you to the Welcome page. Then click on \"Sign up Now\" on the right side of the page.     You will be asked to enter the access code listed below, as well as some personal information. Please follow the directions to create your username and password.     Your access code is: [email or web address]  Expires: 2018  2:29 PM     Your access code will  in 90 days. If you need help or a new code, please call your [place] clinic or [phone].        Care EveryWhere ID     This is your Care EveryWhere ID. This could be used by other organizations to access your [place] medical records  [id number]        Your Vitals Were     Pulse Height BMI (Body Mass Index)             76 1.803 m (5' 11\") 52.02 kg/m2          Blood Pressure from Last 3 Encounters:   10/03/17 146/86   16 112/68   10/17/16 137/86    Weight from Last 3 Encounters:   10/03/17 (!) 169.2 kg (373 lb)   16 (!) 167.2 kg (368 lb 8 oz)   10/17/16 (!) 166.9 " kg (368 lb)              We Performed the Following     CARDIOLOGY EVAL ADULT REFERRAL          Today's Medication Changes          These changes are accurate as of: 10/3/17  2:29 PM.  If you have any questions, ask your nurse or doctor.               These medicines have changed or have updated prescriptions.        Dose/Directions    simvastatin 20 MG tablet   Commonly known as:  ZOCOR   This may have changed:  additional instructions   Used for:  Hyperlipidemia LDL goal <70, Essential hypertension with goal blood pressure less than 140/90        Dose:  20 mg   Take 1 tablet (20 mg) by mouth At Bedtime Hold rx in pharmacy until pt calls for Rx   Quantity:  90 tablet   Refills:  3         Stop taking these medicines if you haven't already. Please contact your care team if you have questions.     levalbuterol 45 MCG/ACT Inhaler   Commonly known as:  XOPENEX HFA   Stopped by:  Elena Rucker MD                    Primary Care Provider Office Phone # Fax #    Mali Marie Patel [phone]       [address] [place] [id number]        Equal Access to Services     [facility]: Hadii aad ku hadasho Soomaali, waaxda luqadaha, qaybta kaalmada adeegyada, waxay idiin hayaan jose gentile . So [facility] [phone].    ATENCIÓN: Si habla español, tiene a storm disposición servicios gratuitos de asistencia lingüística. [facility] [phone].    We comply with applicable federal civil rights laws and [place] laws. We do not discriminate on the basis of race, color, national origin, age, disability, sex, sexual orientation, or gender identity.            Thank you!     Thank you for choosing [facility] [place]  for your care. Our goal is always to provide you with excellent care. Hearing back from our patients is one way we can continue to improve our services. Please take a few minutes to complete the written survey that you may receive in the mail  after your visit with us. Thank you!             Your Updated Medication List - Protect others around you: Learn how to safely use, store and throw away your medicines at www.disposemymeds.org.          This list is accurate as of: 10/3/17  2:29 PM.  Always use your most recent med list.                   Brand Name Dispense Instructions for use Diagnosis    acetaminophen 500 MG tablet    TYLENOL     Take 500-1,000 mg by mouth every 6 hours as needed for mild pain        AFRIN 12 HOUR 0.05 % spray   Generic drug:  oxymetazoline      Spray 2 sprays into both nostrils At Bedtime        albuterol 108 (90 BASE) MCG/ACT Inhaler    PROAIR HFA/PROVENTIL HFA/VENTOLIN HFA     Inhale 2 puffs into the lungs every 6 hours        amLODIPine 10 MG tablet    NORVASC    90 tablet    Take 1 tablet (10 mg) by mouth daily    Essential hypertension with goal blood pressure less than 140/90, Hyperlipidemia LDL goal <70       * atenolol 100 MG tablet    TENORMIN    90 tablet    Take 1 tablet (100 mg) by mouth daily along with 50 mg tablet for total of 150 mg daily.    Essential hypertension with goal blood pressure less than 140/90       * atenolol 50 MG tablet    TENORMIN    90 tablet    Take 1 tablet (50 mg) by mouth daily Along with 100 mg tablet for total of 150 mg daily    Essential hypertension with goal blood pressure less than 140/90, Hyperlipidemia LDL goal <70       CENTRUM SILVER per tablet     3 MONTHS    ONE DAILY        FLUoxetine 40 MG capsule    PROzac    90 capsule    Take 1 capsule (40 mg) by mouth daily    Major depression, chronic       fluticasone 50 MCG/ACT spray    FLONASE    3 Bottle    Spray 2 sprays into both nostrils daily    Other sinusitis, unspecified chronicity       furosemide 20 MG tablet    LASIX    36 tablet    Take one half tablet by mouth up to 3 times a week as needed for leg edema.    Edema of lower extremity, unspecified laterality       losartan 100 MG tablet    COZAAR    90 tablet    Take 1  tablet (100 mg) by mouth daily Hold rx in pharmacy until pt calls for Rx    Essential hypertension with goal blood pressure less than 140/90, Hyperlipidemia LDL goal <70       omega-3 fatty acids 1200 MG capsule      Take 1 capsule by mouth daily        simvastatin 20 MG tablet    ZOCOR    90 tablet    Take 1 tablet (20 mg) by mouth At Bedtime Hold rx in pharmacy until pt calls for Rx    Hyperlipidemia LDL goal <70, Essential hypertension with goal blood pressure less than 140/90       traZODone 100 MG tablet    DESYREL    90 tablet    Take 1 tablet (100 mg) by mouth nightly as needed Pt due for visit for further refills    Insomnia, unspecified type       warfarin 5 MG tablet    COUMADIN    100 tablet    Take 7.5 mg on Mondays and Fridays, and 5 mg all other days, or as directed by Coumadin nurse    Atrial fibrillation, unspecified type (H)       * Notice:  This list has 2 medication(s) that are the same as other medications prescribed for you. Read the directions carefully, and ask your doctor or other care provider to review them with you.

## 2017-10-03 NOTE — PROGRESS NOTES
DIAGNOSES:      Encounter Diagnosis   Name Primary?     Atrial fibrillation, unspecified type (H)          HPI:  See [id number]        MEDICATIONS:    Current Outpatient Prescriptions   Medication Sig Dispense Refill     albuterol (PROAIR HFA/PROVENTIL HFA/VENTOLIN HFA) 108 (90 BASE) MCG/ACT Inhaler Inhale 2 puffs into the lungs every 6 hours       fluticasone (FLONASE) 50 MCG/ACT nasal spray Spray 2 sprays into both nostrils daily 3 Bottle 3     atenolol (TENORMIN) 100 MG tablet Take 1 tablet (100 mg) by mouth daily along with 50 mg tablet for total of 150 mg daily. 90 tablet 3     FLUoxetine (PROZAC) 40 MG capsule Take 1 capsule (40 mg) by mouth daily 90 capsule 3     warfarin (COUMADIN) 5 MG tablet Take 7.5 mg on Mondays and Fridays, and 5 mg all other days, or as directed by Coumadin nurse 100 tablet 3     furosemide (LASIX) 20 MG tablet Take one half tablet by mouth up to 3 times a week as needed for leg edema. 36 tablet 3     amLODIPine (NORVASC) 10 MG tablet Take 1 tablet (10 mg) by mouth daily 90 tablet 3     atenolol (TENORMIN) 50 MG tablet Take 1 tablet (50 mg) by mouth daily Along with 100 mg tablet for total of 150 mg daily 90 tablet 3     losartan (COZAAR) 100 MG tablet Take 1 tablet (100 mg) by mouth daily Hold rx in pharmacy until pt calls for Rx 90 tablet 3     simvastatin (ZOCOR) 20 MG tablet Take 1 tablet (20 mg) by mouth At Bedtime Hold rx in pharmacy until pt calls for Rx (Patient taking differently: Take 20 mg by mouth At Bedtime ) 90 tablet 3     traZODone (DESYREL) 100 MG tablet Take 1 tablet (100 mg) by mouth nightly as needed Pt due for visit for further refills 90 tablet 3     acetaminophen (TYLENOL) 500 MG tablet Take 500-1,000 mg by mouth every 6 hours as needed for mild pain       oxymetazoline (AFRIN 12 HOUR) 0.05 % nasal spray Spray 2 sprays into both nostrils At Bedtime       omega-3 fatty acids (FISH OIL) 1200 MG capsule Take 1 capsule by mouth daily       CENTRUM SILVER OR TABS  ONE DAILY 3 MONTHS 1 YEAR         ALLERGIES     Allergies   Allergen Reactions     Ambien Other (See Comments)     Parasomnia with sleep walking, injuries, delusions.  May have been in DTs at the same time.     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 On Cellcept, steroid taper     Coronary atherosclerosis of unspecified type of vessel, native or graft      Diabetes mellitus type 2 in obese (H)      Essential hypertension, benign      Mixed hyperlipidemia 10/5/2007     Polycythemia      Sleep apnea      Tobacco use disorder 10/5/2007    Smoker - 1/2 ppd. Started at 16 years     Total retinal detachment of left eye 2009/2010       PAST SURGICAL HISTORY:  Past Surgical History:   Procedure Laterality Date     C NONSPECIFIC PROCEDURE  1987    Right Epididymis Removed     C OPEN RX ANKLE DISLOCATN+FIXATN  12/2008    Right Ankle     CARDIAC CATHERIZATION  7/03/2012    totally occluded RCA w/ mild left coronary disease     HEART CATH CORONARY ANGIOGRAM AND RIGHT HEART CATH  7/2012    RCA occlusion, IMI, no stent     PHACOEMULSIFICATION CLEAR CORNEA WITH STANDARD INTRAOCULAR LENS IMPLANT Right 6/4/2015    Procedure: PHACOEMULSIFICATION CLEAR CORNEA WITH STANDARD INTRAOCULAR LENS IMPLANT;  Surgeon: Tal Trinidad MD;  Location: [facility]     RETINAL REATTACHMENT  11/09, 7/10    Left     TONSILLECTOMY         FAMILY HISTORY:  Family History   Problem Relation Age of Onset     Arthritis Mother      Circulatory Mother      varicose veins     Eye Disorder Mother      detached retina     Thyroid Disease Mother      C.A.D. Father      Quadruple bypass     GASTROINTESTINAL DISEASE Father      diverticulitis     Alzheimer Disease Father      demntia     Eye Disorder  Father      cataract     Alcohol/Drug Sister      alcohol     Depression Sister      post partum depression     Eye Disorder Brother      detached retina     Neurologic Disorder Sister      brain aneurysm     Thyroid Disease Sister        SOCIAL HISTORY:  Social History     Social History     Marital status:      Spouse name: N/A     Number of children: 2     Years of education: 19     Occupational History     actor Unemployed      [employer]           Social History Main Topics     Smoking status: Current Every Day Smoker     Packs/day: 0.50     Years: 46.00     Types: Cigarettes     Smokeless tobacco: Never Used      Comment: 1/2 ppd      Alcohol use No      Comment: quit drinking heavily 2013     Drug use: No     Sexual activity: No     Other Topics Concern     Caffeine Concern Yes     Special Diet No     Exercise Not Asked     1 miles on stepper      Social History Narrative    Balanced Diet - Yes    Osteoporosis Preventative measures-  Dairy servings per day: 1-3    Regular Exercise -  Yes Describe walk every day    Dental Exam up - YES - Date: 1/2/07    Eye Exam - YES - Date: 2 yrs    Self Testicular Exam -  One testicle removed yrs ago due to fallDo you have any concerns about STD's -  No    Abuse: Current or Past (Physical, Sexual or Emotional)- No    Do you feel safe in your environment - Yes    Guns stored in the home - No    Sunscreen used - Yes    Seatbelts used - Yes    Lipids - ?    Glucose -  ?    Colon Cancer Screening - No    Hemoccults - hemorrhoids    PSA - NO    Digital Rectal Exam - YES - Date: 1993    Immunizations reviewed and up to date - No    Tory NAILS           Review of Systems:  Skin:  Negative     Eyes:    glasses  ENT:  Negative    Respiratory:  Positive for sleep apnea;CPAP;dyspnea on exertion  Cardiovascular:    edema;Positive for;fatigue  Gastroenterology: Negative for    Genitourinary:  Positive for urinary frequency  Musculoskeletal:  Positive for  "joint pain  Neurologic:  Positive for    Psychiatric:  Negative for    Heme/Lymph/Imm:  Positive for allergies  Endocrine:  Negative for      Physical Exam:  Vitals: /86  Pulse 76  Ht 1.803 m (5' 11\")  Wt (!) 169.2 kg (373 lb)  BMI 52.02 kg/m2    Constitutional:  cooperative, alert and oriented, well developed, well nourished, in no acute distress morbidly obese      Skin:  warm and dry to the touch, no apparent skin lesions or masses noted        Head:  normocephalic, no masses or lesions        Eyes:  conjunctivae and lids unremarkable;sclera white;EOMS intact   glass eye OS    ENT:  no pallor or cyanosis        Neck:  carotid pulses are full and equal bilaterally;no carotid bruit   JVP cannot be well visualized    Chest:  normal breath sounds, clear to auscultation, normal A-P diameter, normal symmetry, normal respiratory excursion, no use of accessory muscles   prolonged expiratory phase    Cardiac: regular rhythm, normal S1/S2, no S3 or S4, apical impulse not displaced, no murmurs, gallops or rubs             distant heart sounds    Abdomen:  abdomen soft;BS normoactive        Vascular: not assessed this visit                                        Extremities and Back:  no deformities, clubbing, cyanosis, erythema observed   bilateral LE edema;pitting;1+          Neurological:  affect appropriate, oriented to time, person and place;no gross motor deficits          ASSESSMENT AND PLAN:    See dictation    ORDERS AT TODAY'S VISIT:    Orders Placed This Encounter   Procedures     TSH with free T4 reflex     Comprehensive metabolic panel     CBC with platelets     Lipid Profile     CARDIOLOGY EVAL ADULT REFERRAL           CC  Elena Rucker MD  [address]  [place] [address]            "

## 2017-10-03 NOTE — PROGRESS NOTES
"REASON FOR VISIT:  Followup visit.        HISTORY OF PRESENT ILLNESS:  Mr. Shepard is a pleasant, 67-year-old gentleman who comes in routine followup.  He has morbid obesity with a BMI, unfortunately, of 52; obstructive coronary artery disease with RCA occlusion on prior cardiac catheterization; chronic atrial fibrillation, well rate controlled and on systemic anticoagulation; sleep apnea and polycythemia felt to be secondary.  He does have a CPAP device.  He, unfortunately, does have a substantial tobacco abuse history, continuing to smoke.  He has significant visual loss, which limits his efforts at lifestyle modification.  He had a normalized ejection fraction in 2015 after a previous echo in 2013 demonstrated an ischemic cardiomyopathy.      Mr. Shepard denies chest pain, shortness of breath, lightheadedness, presyncope, syncope, PND, orthopnea, palpitations or pedal edema.  He states that he is doing exercises daily on his stepper as well as a resistance band in the evening and stretching.  He states he has \"minor aches and pains,\" which go away.      He has a bit of pedal edema today, but has not taken his Lasix, which he takes on Sunday, Tuesdays and Fridays.  This is because it is difficult because of the profound diuresis that he has with it.      He has not been able to get in to see his Primary physician, Dr. Patel, and cannot do so until December.  He has not had his yearly labs because of this.      ASSESSMENT AND PLAN:  A pleasant, 67-year-old gentleman with coronary artery disease and multiple comorbidities as outlined above.  He has no cardiorespiratory complaints.  We will go ahead and follow up labs at this point and continue him on his current medical regimen.  He will see Dr. Patel later this year for risk-factor modification.      His blood pressure is elevated at today's measurement, but he states that normally it is in the range of 120s/70s, and he does check it regularly.      Of note, to " put in context of his labs, he has eaten only a little bit today, including a banana, some blueberries and yogurt as well as 2 coffees.  We will check lipids nonetheless.      Total time is 30 minutes, 25 in coordination of care and counseling.      cc:   Mali Patel MD   [address] [address]         JENNYFER [place]ARC[place] MD             D: 10/03/2017 14:24   T: 10/03/2017 14:51   MT: [email or web address]      Name:     NILSON CASTILLO   MRN:      [id number]        Account:      [id number]   :      1950           Service Date: 10/03/2017      Document: [id number]

## 2017-10-09 ENCOUNTER — ANTICOAGULATION THERAPY VISIT (OUTPATIENT)
Dept: NURSING | Facility: CLINIC | Age: 67
End: 2017-10-09
Payer: COMMERCIAL

## 2017-10-09 DIAGNOSIS — Z79.01 LONG-TERM (CURRENT) USE OF ANTICOAGULANTS: ICD-10-CM

## 2017-10-09 DIAGNOSIS — I48.91 ATRIAL FIBRILLATION, UNSPECIFIED TYPE (H): ICD-10-CM

## 2017-10-09 LAB — INR POINT OF CARE: 2.8 (ref 0.86–1.14)

## 2017-10-09 PROCEDURE — 99207 ZZC NO CHARGE NURSE ONLY: CPT

## 2017-10-09 PROCEDURE — 36416 COLLJ CAPILLARY BLOOD SPEC: CPT

## 2017-10-09 PROCEDURE — 85610 PROTHROMBIN TIME: CPT | Mod: QW

## 2017-10-09 NOTE — PROGRESS NOTES
ANTICOAGULATION FOLLOW-UP CLINIC VISIT    Patient Name:  Markie Shepard  Date:  10/9/2017  Contact Type:  Face to Face    SUBJECTIVE:     Patient Findings     Positives No Problem Findings           OBJECTIVE    INR Protime   Date Value Ref Range Status   10/09/2017 2.8 (A) 0.86 - 1.14 Final       ASSESSMENT / PLAN  INR assessment THER    Recheck INR In: 6 WEEKS    INR Location Clinic      Anticoagulation Summary as of 10/9/2017     INR goal 2.0-3.0   Today's INR 2.8   Maintenance plan 7.5 mg (5 mg x 1.5) on Mon, Fri; 5 mg (5 mg x 1) all other days   Full instructions 7.5 mg on Mon, Fri; 5 mg all other days   Weekly total 40 mg   No change documented Chelsie Encinas RN   Plan last modified Chelsie Encinas RN (3/21/2016)   Next INR check 11/20/2017   Priority INR   Target end date     Indications   Long-term (current) use of anticoagulants [Z79.01] [Z79.01]  Atrial fibrillation (H) [I48.91]         Anticoagulation Episode Summary     INR check location     Preferred lab     Send INR reminders to [facility]    Comments PREFERS MARKIE!      Anticoagulation Care Providers     Provider Role Specialty Phone number    Mali Patel MD [facility] [phone]            See the Encounter Report to view Anticoagulation Flowsheet and Dosing Calendar (Go to Encounters tab in chart review, and find the Anticoagulation Therapy Visit)    Patient made aware if signs of clotting (pain, tenderness, swelling, or color change in any extremity) AND/OR bleeding occur (nosebleeds, bleeding gums, bruising, or blood in stool or urine) to notify provider & seek medical attention. If severe symptoms develop, such as major bleeding, chest pain, shortness of breath, fall, trauma or s/s of stroke, patient to call 911 immediately.      Chelsie Encinas RN

## 2017-11-07 DIAGNOSIS — I10 ESSENTIAL HYPERTENSION WITH GOAL BLOOD PRESSURE LESS THAN 140/90: ICD-10-CM

## 2017-11-07 DIAGNOSIS — E78.5 HYPERLIPIDEMIA LDL GOAL <70: ICD-10-CM

## 2017-11-07 DIAGNOSIS — G47.00 INSOMNIA, UNSPECIFIED TYPE: ICD-10-CM

## 2017-11-07 DIAGNOSIS — I48.91 ATRIAL FIBRILLATION, UNSPECIFIED TYPE (H): ICD-10-CM

## 2017-11-09 RX ORDER — LOSARTAN POTASSIUM 100 MG/1
100 TABLET ORAL DAILY
Qty: 90 TABLET | Refills: 0 | Status: SHIPPED | OUTPATIENT
Start: 2017-11-09 | End: 2017-12-18

## 2017-11-09 RX ORDER — SIMVASTATIN 20 MG
20 TABLET ORAL AT BEDTIME
Qty: 90 TABLET | Refills: 0 | Status: SHIPPED | OUTPATIENT
Start: 2017-11-09 | End: 2017-12-18

## 2017-11-09 RX ORDER — TRAZODONE HYDROCHLORIDE 100 MG/1
100 TABLET ORAL
Qty: 90 TABLET | Refills: 0 | Status: SHIPPED | OUTPATIENT
Start: 2017-11-09 | End: 2017-12-18

## 2017-11-09 RX ORDER — WARFARIN SODIUM 5 MG/1
TABLET ORAL
Qty: 100 TABLET | Refills: 0 | Status: SHIPPED | OUTPATIENT
Start: 2017-11-09 | End: 2017-12-18

## 2017-11-16 DIAGNOSIS — E78.5 HYPERLIPIDEMIA LDL GOAL <70: ICD-10-CM

## 2017-11-16 DIAGNOSIS — I10 ESSENTIAL HYPERTENSION WITH GOAL BLOOD PRESSURE LESS THAN 140/90: ICD-10-CM

## 2017-11-16 DIAGNOSIS — F32.9 MAJOR DEPRESSION, CHRONIC: ICD-10-CM

## 2017-11-17 RX ORDER — ATENOLOL 50 MG/1
50 TABLET ORAL DAILY
Qty: 30 TABLET | Refills: 0 | Status: SHIPPED | OUTPATIENT
Start: 2017-11-17 | End: 2017-12-18

## 2017-11-17 RX ORDER — ATENOLOL 100 MG/1
100 TABLET ORAL DAILY
Qty: 30 TABLET | Refills: 0 | Status: SHIPPED | OUTPATIENT
Start: 2017-11-17 | End: 2017-12-18

## 2017-11-17 RX ORDER — FLUOXETINE 40 MG/1
40 CAPSULE ORAL DAILY
Qty: 30 CAPSULE | Refills: 0 | Status: SHIPPED | OUTPATIENT
Start: 2017-11-17 | End: 2017-12-18

## 2017-11-17 RX ORDER — AMLODIPINE BESYLATE 10 MG/1
10 TABLET ORAL DAILY
Qty: 30 TABLET | Refills: 0 | Status: SHIPPED | OUTPATIENT
Start: 2017-11-17 | End: 2017-12-18

## 2017-11-17 NOTE — TELEPHONE ENCOUNTER
Medication is being filled for 1 time refill only due to:  Patient needs to be seen because it has been more than one year since last visit.   Also needs updated PHQ9 assessment

## 2017-11-28 DIAGNOSIS — R06.02 SOB (SHORTNESS OF BREATH): ICD-10-CM

## 2017-11-28 DIAGNOSIS — R60.9 EDEMA: Primary | ICD-10-CM

## 2017-11-29 RX ORDER — FUROSEMIDE 20 MG
TABLET ORAL
Qty: 36 TABLET | Refills: 0 | Status: SHIPPED | OUTPATIENT
Start: 2017-11-29 | End: 2017-12-18

## 2017-11-29 RX ORDER — ALBUTEROL SULFATE 90 UG/1
2 AEROSOL, METERED RESPIRATORY (INHALATION) EVERY 6 HOURS
Qty: 1 INHALER | Refills: 0 | Status: SHIPPED | OUTPATIENT
Start: 2017-11-29 | End: 2017-12-18

## 2017-11-29 NOTE — TELEPHONE ENCOUNTER
Medication is being filled for 1 time refill only due to:  Patient needs to be seen because BP check - due for visit in Dec.

## 2017-12-12 NOTE — PROGRESS NOTES
"Markie Shepard is here for a general check up.  He is not fasting. He is up to date on eye exams and dental visits. Wears seat belt.--yes Wears bike helmet--na.  Concerns today:    HCM  Markie is a 68 yo male here for a check up . He is legally blind and follows with a retinal specialist. He is up to date on colonoscopy (done 2016).  He had negative Hep C screening. . He is up to date on vaccinations.     Overweight, obese  Markie eats  3x per week, but otherwise eats salads and sandwiches. Limits some greens due to Warfarin use. No longer purchasing sweets for the past 2 wks and he has lost 5 pounds since October.     Coronary Artery disease/ Afib  He is on life long anticoagulation. He just met with his cardiologist.  He has obstructive coronary artery disease with RCA occlusion on prior cardiac catheterization; chronic atrial fibrillation, well rate controlled and on systemic anticoagulation; sleep apnea and polycythemia felt to be secondary.  He does have a CPAP device.  He, unfortunately, does have a substantial tobacco abuse history, continuing to smoke.   He had a normalized ejection fraction in 2015 after a previous echo in 2013 demonstrated an ischemic cardiomyopathy.     Mr. Shepard denies chest pain, shortness of breath, lightheadedness, presyncope, syncope, PND, orthopnea, palpitations or pedal edema.  He states that he is doing exercises daily on his stepper as well as a resistance band in the evening and stretching.  He states he has \"minor aches and pains,\" which go away. He denies PND / orthopnea. Sleeps flat in bed, on his right side. He has chronic lower extremity edema and uses furosemide 3x per week.     Tob: 1/2 ppd,  not interested in quitting, no chest pain or fluttering.     Smoker  Markie smokes 1/2 ppd. He has some occasional SOB. He uses albuterol inhaler 2 puffs once or twice daily. No current URI symptoms.     Glaucoma  He follows with eye doctor, following every 3 mos, left eye vsion is " "gone so injection of right eye    CPAP at night, consistent, 7-8 hr a night  Jan 8, new equipment, sleep clinic at the [facility]    Advance directive: on file  Hearing concerns: no issues  Fall Risk: using a cane, no falls  Independent at home: yes, zoom master to see bills  Family (brother and sister in law) will write checks  Grandson takes him grocery shopping  Safe : yes  Memory concerns: \"slipping\", can't remember names of actors.   No dangers    Six Item Cognitive Impairment Test   (6CIT):      What year is it?                               Correct - 0 points    What month is it?                               Correct - 0 points      Give the patient an address to remember with five components:   Jurgen Zhou ( first and last name - 2 components)   [address]  (number and name of street - 2 components)   [place] ( city - 1 component)      About what time is it (within the hour)? Correct - 0 points    Count backwards from 20 to 1:   Correct - 0 points    Say the months of the year in reverse: Correct - 0 points    Repeat the address phrase:   Correct - 0 points    Total 6CIT Score:      0/28    Interpretation: The 6CIT uses an inverse score and questions are weighted to produce a total out of 28. Scores of 0-7 are considered normal and 8 or more significant.    Advantages The test has high sensitivity without compromising specificity even in mild dementia. It is easy to translate linguistically and culturally.  Disadvantages The main disadvantage is in the scoring and weighting of the test, which is initially confusing, however computer models have simplified this greatly.    Probability Statistics: At the 7/8 cut off: Overall figures sensitivity 90% specificity 100%, in mild dementia sensitivity = 78% , specificity = 100%    Copyright 2000 [facility], [facility], [place]. Courtesy of Dr. Maik Turner        Health Maintenance   Topic Date Due     OP ANNUAL INR REFERRAL  03/27/2016     PHQ-9 " Q3 MONTHS  01/17/2017     FALL RISK ASSESSMENT  10/17/2017     PNEUMOCOCCAL (2 of 2 - PPSV23) 08/23/2018     LIPID MONITORING Q1 YEAR  10/03/2018     DEPRESSION ACTION PLAN Q1 YR  12/18/2018     TETANUS IMMUNIZATION (SYSTEM ASSIGNED)  09/17/2020     ADVANCE DIRECTIVE PLANNING Q5 YRS  10/17/2021     COLON CANCER SCREEN (SYSTEM ASSIGNED)  10/17/2026     INFLUENZA VACCINE (SYSTEM ASSIGNED)  Completed     AORTIC ANEURYSM SCREENING (SYSTEM ASSIGNED)  Completed     HEPATITIS C SCREENING  Completed         Patient Active Problem List   Diagnosis     CAD (coronary artery disease)     Hyperlipidemia LDL goal <70     Smoker     Hypertension     Atrial fibrillation (H)     Obstructive sleep apnea     Mild major depression (H)     Thiamine deficiency: possible wernicke     Low back pain     Mixed hyperlipidemia     Essential hypertension, benign     Coronary atherosclerosis     Long-term (current) use of anticoagulants [Z79.01]       Past Surgical History:   Procedure Laterality Date     C NONSPECIFIC PROCEDURE  1987    Right Epididymis Removed     C OPEN RX ANKLE DISLOCATN+FIXATN  12/2008    Right Ankle     CARDIAC CATHERIZATION  7/03/2012    totally occluded RCA w/ mild left coronary disease     HEART CATH CORONARY ANGIOGRAM AND RIGHT HEART CATH  7/2012    RCA occlusion, IMI, no stent     PHACOEMULSIFICATION CLEAR CORNEA WITH STANDARD INTRAOCULAR LENS IMPLANT Right 6/4/2015    Procedure: PHACOEMULSIFICATION CLEAR CORNEA WITH STANDARD INTRAOCULAR LENS IMPLANT;  Surgeon: Tal Trinidad MD;  Location:  [facility]     RETINAL REATTACHMENT  11/09, 7/10    Left     TONSILLECTOMY         Family History   Problem Relation Age of Onset     Arthritis Mother      Circulatory Mother      varicose veins     Eye Disorder Mother      detached retina     Thyroid Disease Mother      C.A.D. Father      Quadruple bypass     GASTROINTESTINAL DISEASE Father      diverticulitis     Alzheimer Disease Father      demntia     Eye Disorder Father       cataract     Alcohol/Drug Sister      alcohol     Depression Sister      post partum depression     Eye Disorder Brother      detached retina     Neurologic Disorder Sister      brain aneurysm     Thyroid Disease Sister        Social  Single, no partner  Son  2015, drug and alcohol, 3 children  Daughter in [place]      HABITS:  Tob: 1/2 ppd  ETOH: none  Calcium: skim milk 2 gal per week  Caffeine: 2 per day  Exercise: stepper 15 min in am,  resistance band in the evening.     MALE ROS  Partner: none, he is   ED: none  Contraception: na  STD concerns: none  BPH: no symptoms      Current Outpatient Prescriptions   Medication Sig Dispense Refill     furosemide (LASIX) 20 MG tablet Take one half tablet by mouth up to 3 times a week as needed for leg edema. 36 tablet 0     albuterol (PROAIR HFA/PROVENTIL HFA/VENTOLIN HFA) 108 (90 BASE) MCG/ACT Inhaler Inhale 2 puffs into the lungs every 6 hours 1 Inhaler 0     FLUoxetine (PROZAC) 40 MG capsule Take 1 capsule (40 mg) by mouth daily Needs visit and PHQ9 for further refills 30 capsule 0     atenolol (TENORMIN) 50 MG tablet Take 1 tablet (50 mg) by mouth daily Along with 100 mg tablet for total of 150 mg daily 30 tablet 0     atenolol (TENORMIN) 100 MG tablet Take 1 tablet (100 mg) by mouth daily along with 50 mg tablet for total of 150 mg daily. 30 tablet 0     amLODIPine (NORVASC) 10 MG tablet Take 1 tablet (10 mg) by mouth daily Needs visit for further refills 30 tablet 0     warfarin (COUMADIN) 5 MG tablet Take 7.5 mg on  and , and 5 mg all other days, or as directed by Coumadin nurse 100 tablet 0     traZODone (DESYREL) 100 MG tablet Take 1 tablet (100 mg) by mouth nightly as needed Pt due for visit for further refills 90 tablet 0     simvastatin (ZOCOR) 20 MG tablet Take 1 tablet (20 mg) by mouth At Bedtime Hold rx in pharmacy until pt calls for Rx 90 tablet 0     losartan (COZAAR) 100 MG tablet Take 1 tablet (100 mg) by mouth daily Hold rx in  pharmacy until pt calls for Rx 90 tablet 0     fluticasone (FLONASE) 50 MCG/ACT nasal spray Spray 2 sprays into both nostrils daily 3 Bottle 3     acetaminophen (TYLENOL) 500 MG tablet Take 500-1,000 mg by mouth every 6 hours as needed for mild pain       oxymetazoline (AFRIN 12 HOUR) 0.05 % nasal spray Spray 2 sprays into both nostrils At Bedtime       omega-3 fatty acids (FISH OIL) 1200 MG capsule Take 1 capsule by mouth daily       CENTRUM SILVER OR TABS ONE DAILY 3 MONTHS 1 YEAR     Allergies   Allergen Reactions     Ambien Other (See Comments)     Parasomnia with sleep walking, injuries, delusions.  May have been in DTs at the same time.     Lisinopril Cough     Pt had cough     Seasonal Allergies          ROS  CONSTITUTIONAL:NEGATIVE for fever, chills, Overweight with 5 pounds weight loss in 2 months.   INTEGUMENTARY/SKIN: NEGATIVE for worrisome rashes, moles or lesions. Some skin tags at axillae. Dry patch of scaly itchy skin at right forearm, near elbow.   EYES: legally blind in his left eye, right eye with glaucoma  ENT/MOUTH: NEGATIVE for ear, mouth and throat problems  RESP:NEGATIVE for significant cough or SOB, hx of sleep apnea, uses CPAP  CV: NEGATIVE for chest pain, palpitations, LENZ, orthopnea, PND  . Positive chronic Afib and previous CAD. Chronic  peripheral edema  GI: NEGATIVE for nausea, abdominal pain, heartburn, or change in bowel habits  :NEGATIVE for frequency, dysuria, or hematuria  MUSCULOSKELETAL: positive right hip pain, posterior with walking, declines ortho referral  NEURO: NEGATIVE for weakness, dizziness or paresthesias  ENDOCRINE: NEGATIVE for polyuria/dipsia,  temperature intolerance, skin/hair changes  HEME/ALLERGY/IMMUNE: NEGATIVE for bleeding problems  PSYCHIATRIC: NEGATIVE for changes in mood or affect, on  Fluoxetine, 40mg daily doing well, no current counseling.    PHQ-9 SCORE 12/18/2017   Total Score -   Total Score 6     YAAKOV-7 SCORE 4/9/2014 12/18/2017   Total Score 3 -  "  Total Score - 2     EXAM  /81 (BP Location: Left arm, Patient Position: Sitting, Cuff Size: Adult Large)  Pulse 64  Temp 98.2  F (36.8  C) (Oral)  Ht 5' 10\" (177.8 cm)  Wt (!) 368 lb 8 oz (167.2 kg)  SpO2 98%  BMI 52.87 kg/m2    GENERAL APPEARANCE: Alert, pleasant, NAD, Overweight  EYES: PERRL, EOMI, conjunctiva clear  HENT: TM normal bilaterally. Nose and mouth without lesions  NECK: no adenopathy, thyroid normal to palpation  RESP: lungs clear to auscultation bilaterally  Axillae: no palpable axillary masses or adenopathy, skin tags are present , noninflamed  CV: Irreg Irreg, S1 S2, no murmur, no carotid bruits  ABDOMEN: obese, soft, nontender, without HSM or masses. Bowel sounds normal  : not examined  Rectal exam: deferred  MS: Mild tenderness with palpation over right gluteal m groups, no pain over bony T or LS spinous processes  SKIN: no suspicious lesions or rashes, scattered hemangiomas on abdomen. Dry scaly patch on right forearm  NEURO: Normal strength and tone, sensory exam grossly normal, DTR normoreflexive in upper and lower extremities  PSYCH: mentation appears normal. and affect normal/bright.  EXT: +2 peripheral edema, at ankles, +1 on shins, hemosiderin pigmentation bilaterally    Assessment:  (Z00.00) Routine general medical examination at a health care facility  (primary encounter diagnosis)  Comment: 66 yo male with complex cardiovascular history, stable. He is up to date on colonoscopy, and up to date on his vaccines.   Plan: INR ([facility])      Anticipatory guidance given today regarding diet, exercise, calcium intake.      (R06.02) SOB (shortness of breath)  Comment: he is an active smoker, uses albuterol MDI  Plan: albuterol (PROAIR HFA/PROVENTIL HFA/VENTOLIN         HFA) 108 (90 BASE) MCG/ACT Inhaler        Refilled med prn, he declines smoking cessation    (F32.9) Major depression, chronic  Comment: mood is stable  Plan: FLUoxetine (PROZAC) 40 MG capsule        Refill is " given    (I10) Essential hypertension with goal blood pressure less than 140/90  Comment: doing very well  Plan: atenolol (TENORMIN) 50 MG tablet, atenolol         (TENORMIN) 100 MG tablet, amLODIPine (NORVASC)         10 MG tablet, simvastatin (ZOCOR) 20 MG tablet,        losartan (COZAAR) 100 MG tablet        Refilled medication    (E78.5) Hyperlipidemia LDL goal <70  Comment: recently lipid profile showed he was in target range  Plan: atenolol (TENORMIN) 50 MG tablet, amLODIPine         (NORVASC) 10 MG tablet, simvastatin (ZOCOR) 20         MG tablet, losartan (COZAAR) 100 MG tablet        Refilled medication    (I48.91) Atrial fibrillation, unspecified type (H)  Comment: chronic, on lifelong coumadin  Plan: warfarin (COUMADIN) 5 MG tablet        Refilled med    (G47.00) Insomnia, unspecified type  Comment: he is in need of medication refill  Plan: traZODone (DESYREL) 100 MG tablet        Refill given    (J32.9) Other sinusitis, unspecified chronicity  Comment: seasonal allergies  Plan: fluticasone (FLONASE) 50 MCG/ACT spray        Refilled med    (R60.0) Bilateral leg edema  Comment: chronic issue  Plan: refilled furosemide for 3x per week use    Mali Patel MD  Internal Medicine/Pediatrics

## 2017-12-18 ENCOUNTER — OFFICE VISIT (OUTPATIENT)
Dept: FAMILY MEDICINE | Facility: CLINIC | Age: 67
End: 2017-12-18
Payer: COMMERCIAL

## 2017-12-18 VITALS
OXYGEN SATURATION: 98 % | HEIGHT: 70 IN | DIASTOLIC BLOOD PRESSURE: 81 MMHG | BODY MASS INDEX: 45.1 KG/M2 | HEART RATE: 64 BPM | WEIGHT: 315 LBS | TEMPERATURE: 98.2 F | SYSTOLIC BLOOD PRESSURE: 114 MMHG

## 2017-12-18 DIAGNOSIS — Z00.00 ROUTINE GENERAL MEDICAL EXAMINATION AT A HEALTH CARE FACILITY: Primary | ICD-10-CM

## 2017-12-18 DIAGNOSIS — I48.91 ATRIAL FIBRILLATION, UNSPECIFIED TYPE (H): ICD-10-CM

## 2017-12-18 DIAGNOSIS — R06.02 SOB (SHORTNESS OF BREATH): ICD-10-CM

## 2017-12-18 DIAGNOSIS — R60.0 BILATERAL LEG EDEMA: ICD-10-CM

## 2017-12-18 DIAGNOSIS — E78.5 HYPERLIPIDEMIA LDL GOAL <70: ICD-10-CM

## 2017-12-18 DIAGNOSIS — G47.00 INSOMNIA, UNSPECIFIED TYPE: ICD-10-CM

## 2017-12-18 DIAGNOSIS — F32.9 MAJOR DEPRESSION, CHRONIC: ICD-10-CM

## 2017-12-18 DIAGNOSIS — J32.9 OTHER SINUSITIS, UNSPECIFIED CHRONICITY: ICD-10-CM

## 2017-12-18 DIAGNOSIS — I10 ESSENTIAL HYPERTENSION WITH GOAL BLOOD PRESSURE LESS THAN 140/90: ICD-10-CM

## 2017-12-18 LAB — INR PPP: 2.3

## 2017-12-18 RX ORDER — ALBUTEROL SULFATE 90 UG/1
2 AEROSOL, METERED RESPIRATORY (INHALATION) EVERY 6 HOURS
Qty: 3 INHALER | Refills: 3 | Status: SHIPPED | OUTPATIENT
Start: 2017-12-18 | End: 2019-02-04

## 2017-12-18 RX ORDER — TRAZODONE HYDROCHLORIDE 100 MG/1
100 TABLET ORAL
Qty: 90 TABLET | Refills: 3 | Status: SHIPPED | OUTPATIENT
Start: 2017-12-18 | End: 2019-02-04

## 2017-12-18 RX ORDER — WARFARIN SODIUM 5 MG/1
TABLET ORAL
Qty: 100 TABLET | Refills: 3 | Status: SHIPPED | OUTPATIENT
Start: 2017-12-18 | End: 2017-12-19

## 2017-12-18 RX ORDER — LOSARTAN POTASSIUM 100 MG/1
100 TABLET ORAL DAILY
Qty: 90 TABLET | Refills: 3 | Status: SHIPPED | OUTPATIENT
Start: 2017-12-18 | End: 2019-01-22

## 2017-12-18 RX ORDER — ATENOLOL 100 MG/1
100 TABLET ORAL DAILY
Qty: 90 TABLET | Refills: 3 | Status: SHIPPED | OUTPATIENT
Start: 2017-12-18 | End: 2018-12-17

## 2017-12-18 RX ORDER — ATENOLOL 50 MG/1
50 TABLET ORAL DAILY
Qty: 90 TABLET | Refills: 3 | Status: SHIPPED | OUTPATIENT
Start: 2017-12-18 | End: 2017-12-19

## 2017-12-18 RX ORDER — AMLODIPINE BESYLATE 10 MG/1
10 TABLET ORAL DAILY
Qty: 90 TABLET | Refills: 3 | Status: SHIPPED | OUTPATIENT
Start: 2017-12-18 | End: 2018-12-14

## 2017-12-18 RX ORDER — FLUOXETINE 40 MG/1
40 CAPSULE ORAL DAILY
Qty: 90 CAPSULE | Refills: 3 | Status: SHIPPED | OUTPATIENT
Start: 2017-12-18 | End: 2018-12-14

## 2017-12-18 RX ORDER — FLUTICASONE PROPIONATE 50 MCG
2 SPRAY, SUSPENSION (ML) NASAL DAILY
Qty: 3 BOTTLE | Refills: 3 | Status: SHIPPED | OUTPATIENT
Start: 2017-12-18 | End: 2019-02-25

## 2017-12-18 RX ORDER — FUROSEMIDE 20 MG
TABLET ORAL
Qty: 36 TABLET | Refills: 3 | Status: SHIPPED | OUTPATIENT
Start: 2017-12-18 | End: 2017-12-19

## 2017-12-18 RX ORDER — SIMVASTATIN 20 MG
20 TABLET ORAL AT BEDTIME
Qty: 90 TABLET | Refills: 3 | Status: SHIPPED | OUTPATIENT
Start: 2017-12-18 | End: 2019-01-22

## 2017-12-18 ASSESSMENT — PATIENT HEALTH QUESTIONNAIRE - PHQ9
5. POOR APPETITE OR OVEREATING: NOT AT ALL
SUM OF ALL RESPONSES TO PHQ QUESTIONS 1-9: 6

## 2017-12-18 ASSESSMENT — ANXIETY QUESTIONNAIRES
GAD7 TOTAL SCORE: 2
5. BEING SO RESTLESS THAT IT IS HARD TO SIT STILL: NOT AT ALL
3. WORRYING TOO MUCH ABOUT DIFFERENT THINGS: NOT AT ALL
7. FEELING AFRAID AS IF SOMETHING AWFUL MIGHT HAPPEN: NOT AT ALL
IF YOU CHECKED OFF ANY PROBLEMS ON THIS QUESTIONNAIRE, HOW DIFFICULT HAVE THESE PROBLEMS MADE IT FOR YOU TO DO YOUR WORK, TAKE CARE OF THINGS AT HOME, OR GET ALONG WITH OTHER PEOPLE: SOMEWHAT DIFFICULT
6. BECOMING EASILY ANNOYED OR IRRITABLE: SEVERAL DAYS
1. FEELING NERVOUS, ANXIOUS, OR ON EDGE: SEVERAL DAYS
2. NOT BEING ABLE TO STOP OR CONTROL WORRYING: NOT AT ALL

## 2017-12-18 ASSESSMENT — PAIN SCALES - GENERAL: PAINLEVEL: MILD PAIN (3)

## 2017-12-18 NOTE — MR AVS SNAPSHOT
After Visit Summary   12/18/2017    Markie Shepard    MRN: [id number]           Patient Information     Date Of Birth          1950        Visit Information        Provider Department      12/18/2017 1:40 PM Mali Patel MD [facility]        Today's Diagnoses     Routine general medical examination at a health care facility    -  1    Depression, unspecified depression type        Edema        SOB (shortness of breath)        Major depression, chronic        Essential hypertension with goal blood pressure less than 140/90        Hyperlipidemia LDL goal <70        Atrial fibrillation, unspecified type (H)        Insomnia, unspecified type        Other sinusitis, unspecified chronicity          Care Instructions      Preventive Health Recommendations:   Male Ages 65 and over    Yearly exam:             See your health care provider every year in order to  o   Review health changes.   o   Discuss preventive care.    o   Review your medicines if your doctor has prescribed any.    Talk with your health care provider about whether you should have a test to screen for prostate cancer (PSA).    Every 3 years, have a diabetes test (fasting glucose). If you are at risk for diabetes, you should have this test more often.    Every 5 years, have a cholesterol test. Have this test more often if you are at risk for high cholesterol or heart disease.     Every 10 years, have a colonoscopy. Or, have a yearly FIT test (stool test). These exams will check for colon cancer.    Talk to with your health care provider about screening for Abdominal Aortic Aneurysm if you have a family history of AAA or have a history of smoking.    Shots:     Get a flu shot each year.     Get a tetanus shot every 10 years.     Talk to your doctor about your pneumonia vaccines. There are now two you should receive - Pneumovax (PPSV 23) and Prevnar (PCV 13).     Talk to your doctor about a shingles vaccine.     Talk to  "your doctor about the hepatitis B vaccine.  Nutrition:     Eat at least 5 servings of fruits and vegetables each day.     Eat whole-grain bread, whole-wheat pasta and brown rice instead of white grains and rice.     Talk to your provider about Calcium and Vitamin D.   Lifestyle    Exercise for at least 150 minutes a week (30 minutes a day, 5 days a week). This will help you control your weight and prevent disease.     Limit alcohol to one drink per day.     No smoking.     Wear sunscreen to prevent skin cancer.     See your dentist every six months for an exam and cleaning.     See your eye doctor every 1 to 2 years to screen for conditions such as glaucoma, macular degeneration, cataracts, etc           Follow-ups after your visit        Your next 10 appointments already scheduled     Jan 08, 2018  2:00 PM CST   New Sleep Patient with Val Jim MD   [facility] Study ([facility])    [address] [place] [id number] [phone]              Who to contact     Please call your clinic at [phone] to:    Ask questions about your health    Make or cancel appointments    Discuss your medicines    Learn about your test results    Speak to your doctor   If you have compliments or concerns about an experience at your clinic, or if you wish to file a complaint, please contact [facility] Patient Relations at [phone] or email us at [email or web address]         Additional Information About Your Visit        Care EveryWhere ID     This is your Care EveryWhere ID. This could be used by other organizations to access your [facility] medical records  [id number]        Your Vitals Were     Pulse Temperature Height Pulse Oximetry BMI (Body Mass Index)       64 98.2  F (36.8  C) (Oral) 5' 10\" (177.8 cm) 98% 52.87 kg/m2        Blood Pressure from Last 3 Encounters:   12/18/17 114/81   10/03/17 " 146/86   12/12/16 112/68    Weight from Last 3 Encounters:   12/18/17 (!) 368 lb 8 oz (167.2 kg)   10/03/17 (!) 373 lb (169.2 kg)   12/12/16 (!) 368 lb 8 oz (167.2 kg)              Today, you had the following     No orders found for display       Primary Care Provider Office Phone # Fax #    Mali Patel [phone]       [address] [place] [id number]        Equal Access to Services     [facility]: Hadii sky keen hadasho Soprateek, waaxda luqadaha, qaybta kaalmada dorian, radha gentile . So [facility] [phone].    ATENCIÓN: Si habla español, tiene a storm disposición servicios gratuitos de asistencia lingüística. [facility] [phone].    We comply with applicable federal civil rights laws and [place] laws. We do not discriminate on the basis of race, color, national origin, age, disability, sex, sexual orientation, or gender identity.            Thank you!     Thank you for choosing [facility]  for your care. Our goal is always to provide you with excellent care. Hearing back from our patients is one way we can continue to improve our services. Please take a few minutes to complete the written survey that you may receive in the mail after your visit with us. Thank you!             Your Updated Medication List - Protect others around you: Learn how to safely use, store and throw away your medicines at www.disposemymeds.org.          This list is accurate as of: 12/18/17  2:43 PM.  Always use your most recent med list.                   Brand Name Dispense Instructions for use Diagnosis    acetaminophen 500 MG tablet    TYLENOL     Take 500-1,000 mg by mouth every 6 hours as needed for mild pain        AFRIN 12 HOUR 0.05 % spray   Generic drug:  oxymetazoline      Spray 2 sprays into both nostrils At Bedtime        albuterol 108 (90 BASE) MCG/ACT Inhaler    PROAIR HFA/PROVENTIL HFA/VENTOLIN HFA    1 Inhaler    Inhale 2 puffs into the lungs every  6 hours    SOB (shortness of breath)       amLODIPine 10 MG tablet    NORVASC    30 tablet    Take 1 tablet (10 mg) by mouth daily Needs visit for further refills    Essential hypertension with goal blood pressure less than 140/90, Hyperlipidemia LDL goal <70       * atenolol 50 MG tablet    TENORMIN    30 tablet    Take 1 tablet (50 mg) by mouth daily Along with 100 mg tablet for total of 150 mg daily    Essential hypertension with goal blood pressure less than 140/90, Hyperlipidemia LDL goal <70       * atenolol 100 MG tablet    TENORMIN    30 tablet    Take 1 tablet (100 mg) by mouth daily along with 50 mg tablet for total of 150 mg daily.    Essential hypertension with goal blood pressure less than 140/90       CENTRUM SILVER per tablet     3 MONTHS    ONE DAILY        FLUoxetine 40 MG capsule    PROzac    30 capsule    Take 1 capsule (40 mg) by mouth daily Needs visit and PHQ9 for further refills    Major depression, chronic       fluticasone 50 MCG/ACT spray    FLONASE    3 Bottle    Spray 2 sprays into both nostrils daily    Other sinusitis, unspecified chronicity       furosemide 20 MG tablet    LASIX    36 tablet    Take one half tablet by mouth up to 3 times a week as needed for leg edema.    Edema       losartan 100 MG tablet    COZAAR    90 tablet    Take 1 tablet (100 mg) by mouth daily Hold rx in pharmacy until pt calls for Rx    Essential hypertension with goal blood pressure less than 140/90, Hyperlipidemia LDL goal <70       omega-3 fatty acids 1200 MG capsule      Take 1 capsule by mouth daily        simvastatin 20 MG tablet    ZOCOR    90 tablet    Take 1 tablet (20 mg) by mouth At Bedtime Hold rx in pharmacy until pt calls for Rx    Hyperlipidemia LDL goal <70, Essential hypertension with goal blood pressure less than 140/90       traZODone 100 MG tablet    DESYREL    90 tablet    Take 1 tablet (100 mg) by mouth nightly as needed Pt due for visit for further refills    Insomnia, unspecified type        warfarin 5 MG tablet    COUMADIN    100 tablet    Take 7.5 mg on Mondays and Fridays, and 5 mg all other days, or as directed by Coumadin nurse    Atrial fibrillation, unspecified type (H)       * Notice:  This list has 2 medication(s) that are the same as other medications prescribed for you. Read the directions carefully, and ask your doctor or other care provider to review them with you.

## 2017-12-18 NOTE — NURSING NOTE
"67 year old  Chief Complaint   Patient presents with     Physical     Annual physical and labs.patient is not fasting       Blood pressure 114/81, pulse 64, temperature 98.2  F (36.8  C), temperature source Oral, height 5' 10\" (177.8 cm), weight (!) 368 lb 8 oz (167.2 kg), SpO2 98 %. Body mass index is 52.87 kg/(m^2).  Patient Active Problem List   Diagnosis     CAD (coronary artery disease)     Hyperlipidemia LDL goal <70     Smoker     Hypertension     Atrial fibrillation (H)     Obstructive sleep apnea     Mild major depression (H)     Thiamine deficiency: possible wernicke     Low back pain     Mixed hyperlipidemia     Essential hypertension, benign     Coronary atherosclerosis     Long-term (current) use of anticoagulants [Z79.01]       Wt Readings from Last 2 Encounters:   12/18/17 (!) 368 lb 8 oz (167.2 kg)   10/03/17 (!) 373 lb (169.2 kg)     BP Readings from Last 3 Encounters:   12/18/17 114/81   10/03/17 146/86   12/12/16 112/68         Current Outpatient Prescriptions   Medication     furosemide (LASIX) 20 MG tablet     albuterol (PROAIR HFA/PROVENTIL HFA/VENTOLIN HFA) 108 (90 BASE) MCG/ACT Inhaler     FLUoxetine (PROZAC) 40 MG capsule     atenolol (TENORMIN) 50 MG tablet     atenolol (TENORMIN) 100 MG tablet     amLODIPine (NORVASC) 10 MG tablet     warfarin (COUMADIN) 5 MG tablet     traZODone (DESYREL) 100 MG tablet     simvastatin (ZOCOR) 20 MG tablet     losartan (COZAAR) 100 MG tablet     fluticasone (FLONASE) 50 MCG/ACT nasal spray     acetaminophen (TYLENOL) 500 MG tablet     oxymetazoline (AFRIN 12 HOUR) 0.05 % nasal spray     omega-3 fatty acids (FISH OIL) 1200 MG capsule     CENTRUM SILVER OR TABS     No current facility-administered medications for this visit.        Social History   Substance Use Topics     Smoking status: Current Every Day Smoker     Packs/day: 0.50     Years: 46.00     Types: Cigarettes     Smokeless tobacco: Never Used      Comment: 1/2 ppd      Alcohol use No      " Comment: quit drinking heavily 2013       Health Maintenance Due   Topic Date Due     OP ANNUAL INR REFERRAL  03/27/2016     PHQ-9 Q3 MONTHS  01/17/2017     FALL RISK ASSESSMENT  10/17/2017       No results found for: GARRISON [email or web address]  December 18, 2017 1:45 PM

## 2017-12-18 NOTE — LETTER
My Depression Action Plan  Name: Markie Shepard   Date of Birth 1950  Date: 12/12/2017    My doctor: Mali Patel   My clinic: [address]  [facility] [address]  [phone]          GREEN    ZONE   Good Control    What it looks like:     Things are going generally well. You have normal up s and down s. You may even feel depressed from time to time, but bad moods usually last less than a day.   What you need to do:  1. Continue to care for yourself (see self care plan)  2. Check your depression survival kit and update it as needed  3. Follow your physician s recommendations including any medication.  4. Do not stop taking medication unless you consult with your physician first.           YELLOW         ZONE Getting Worse    What it looks like:     Depression is starting to interfere with your life.     It may be hard to get out of bed; you may be starting to isolate yourself from others.    Symptoms of depression are starting to last most all day and this has happened for several days.     You may have suicidal thoughts but they are not constant.   What you need to do:     1. Call your care team, your response to treatment will improve if you keep your care team informed of your progress. Yellow periods are signs an adjustment may need to be made.     2. Continue your self-care, even if you have to fake it!    3. Talk to someone in your support network    4. Open up your depression survival kit           RED    ZONE Medical Alert - Get Help    What it looks like:     Depression is seriously interfering with your life.     You may experience these or other symptoms: You can t get out of bed most days, can t work or engage in other necessary activities, you have trouble taking care of basic hygiene, or basic responsibilities, thoughts of suicide or death that will not go away, self-injurious behavior.     What you need to  do:  1. Call your care team and request a same-day appointment. If they are not available (weekends or after hours) call your local crisis line, emergency room or 911.      Electronically signed by: Elina Hilliard, December 12, 2017    Depression Self Care Plan / Survival Kit    Self-Care for Depression  Here s the deal. Your body and mind are really not as separate as most people think.  What you do and think affects how you feel and how you feel influences what you do and think. This means if you do things that people who feel good do, it will help you feel better.  Sometimes this is all it takes.  There is also a place for medication and therapy depending on how severe your depression is, so be sure to consult with your medical provider and/ or Behavioral Health Consultant if your symptoms are worsening or not improving.     In order to better manage my stress, I will:    Exercise  Get some form of exercise, every day. This will help reduce pain and release endorphins, the  feel good  chemicals in your brain. This is almost as good as taking antidepressants!  This is not the same as joining a gym and then never going! (they count on that by the way ) It can be as simple as just going for a walk or doing some gardening, anything that will get you moving.      Hygiene   Maintain good hygiene (Get out of bed in the morning, Make your bed, Brush your teeth, Take a shower, and Get dressed like you were going to work, even if you are unemployed).  If your clothes don't fit try to get ones that do.    Diet  I will strive to eat foods that are good for me, drink plenty of water, and avoid excessive sugar, caffeine, alcohol, and other mood-altering substances.  Some foods that are helpful in depression are: complex carbohydrates, B vitamins, flaxseed, fish or fish oil, fresh fruits and vegetables.    Psychotherapy  I agree to participate in Individual Therapy (if recommended).    Medication  If prescribed medications,  I agree to take them.  Missing doses can result in serious side effects.  I understand that drinking alcohol, or other illicit drug use, may cause potential side effects.  I will not stop my medication abruptly without first discussing it with my provider.    Staying Connected With Others  I will stay in touch with my friends, family members, and my primary care provider/team.    Use your imagination  Be creative.  We all have a creative side; it doesn t matter if it s oil painting, sand castles, or mud pies! This will also kick up the endorphins.    Witness Beauty  (AKA stop and smell the roses) Take a look outside, even in mid-winter. Notice colors, textures. Watch the squirrels and birds.     Service to others  Be of service to others.  There is always someone else in need.  By helping others we can  get out of ourselves  and remember the really important things.  This also provides opportunities for practicing all the other parts of the program.    Humor  Laugh and be silly!  Adjust your TV habits for less news and crime-drama and more comedy.    Control your stress  Try breathing deep, massage therapy, biofeedback, and meditation. Find time to relax each day.     My support system    Clinic Contact:  Phone number:    Contact 1:  Phone number:    Contact 2:  Phone number:    Gnosticism/:  Phone number:    Therapist:  Phone number:    Local crisis center:    Phone number:    Other community support:  Phone number:

## 2017-12-19 DIAGNOSIS — I48.91 ATRIAL FIBRILLATION, UNSPECIFIED TYPE (H): ICD-10-CM

## 2017-12-19 DIAGNOSIS — E78.5 HYPERLIPIDEMIA LDL GOAL <70: ICD-10-CM

## 2017-12-19 DIAGNOSIS — R60.0 BILATERAL LEG EDEMA: ICD-10-CM

## 2017-12-19 DIAGNOSIS — I10 ESSENTIAL HYPERTENSION WITH GOAL BLOOD PRESSURE LESS THAN 140/90: ICD-10-CM

## 2017-12-19 RX ORDER — WARFARIN SODIUM 5 MG/1
TABLET ORAL
Qty: 100 TABLET | Refills: 3 | Status: SHIPPED | OUTPATIENT
Start: 2017-12-19 | End: 2019-01-22

## 2017-12-19 RX ORDER — ATENOLOL 50 MG/1
50 TABLET ORAL DAILY
Qty: 90 TABLET | Refills: 3 | Status: SHIPPED | OUTPATIENT
Start: 2017-12-19 | End: 2018-12-14

## 2017-12-19 RX ORDER — FUROSEMIDE 20 MG
TABLET ORAL
Qty: 36 TABLET | Refills: 3 | Status: SHIPPED | OUTPATIENT
Start: 2017-12-19 | End: 2019-02-04

## 2017-12-19 ASSESSMENT — ANXIETY QUESTIONNAIRES: GAD7 TOTAL SCORE: 2

## 2018-01-08 ENCOUNTER — OFFICE VISIT (OUTPATIENT)
Dept: SLEEP MEDICINE | Facility: CLINIC | Age: 68
End: 2018-01-08
Payer: COMMERCIAL

## 2018-01-08 VITALS
HEIGHT: 70 IN | HEART RATE: 81 BPM | OXYGEN SATURATION: 94 % | BODY MASS INDEX: 45.1 KG/M2 | WEIGHT: 315 LBS | SYSTOLIC BLOOD PRESSURE: 126 MMHG | RESPIRATION RATE: 18 BRPM | DIASTOLIC BLOOD PRESSURE: 81 MMHG

## 2018-01-08 DIAGNOSIS — G47.33 OSA ON CPAP: Primary | ICD-10-CM

## 2018-01-08 PROCEDURE — 99204 OFFICE O/P NEW MOD 45 MIN: CPT | Performed by: INTERNAL MEDICINE

## 2018-01-08 NOTE — NURSING NOTE
"No chief complaint on file.      Initial /81  Pulse 81  Resp 18  Ht 1.778 m (5' 10\")  Wt (!) 168.7 kg (372 lb)  SpO2 94%  BMI 53.38 kg/m2 Estimated body mass index is 53.38 kg/(m^2) as calculated from the following:    Height as of this encounter: 1.778 m (5' 10\").    Weight as of this encounter: 168.7 kg (372 lb).  Medication Reconciliation: complete   Dolly Jack [facility] ~[place]      "

## 2018-01-08 NOTE — PROGRESS NOTES
"Sleep Consultation Note:    Date on this visit: 1/8/2018    Markie Shepard is Self referred for a sleep consultation seeking new supplies and an updated prescription for CPAP device    Primary Physician: Mali Patel     CC:  \"new supplies and an updated prescription for CPAP device\".    Markie Shepard 67 year old with PMH of obstructive sleep apnea who presents to the sleep clinic today seeking new supplies and an updated prescription for CPAP device.  He underwent an overnight diagnostic sleep study on September 18, 2012, at [facility].  At that time he weighed 308 pounds and was noted to have an AHI of 8.4/h and during that sleep study REM sleep was not observed.  He underwent a repeat sleep study a month later, October 2012 which was an all-night titration using CPAP device and was prescribed CPAP at pressure setting of 9 cm of water.    Sleep Disordered Breathing  Markie has been using the same CPAP device which he obtained following the sleep study in 2012.  He has not replaced the CPAP supplies in a long time.  He reports using the CPAP device regularly during sleep.  He uses a fullface mask that does not fit well, he has been having air leaks and dry mouth.  Even the straps have gotten looser.  He has also noticed that the water chamber gets completely empty almost every night.  He sleeps alone hence there is not anyone to report whether or not he snores with the CPAP device.  He denies any concerns about air hunger or awakenings due to choking/gasping for air while using the CPAP device.  He reports feeling comfortable with the current pressure settings on the device.  He denies morning headaches.  He reports almost always feeling refreshed upon awakening in the morning and denies daytime sleepiness/fatigue.  Markie has gained 64 pounds since he underwent the sleep study in 2012.  Compliance download could not be retrieved  today.  His CPAP is set at fixed pressure " setting at 9 cm of water.    Sleep Schedule/Sleep Complaints  He does not complain of difficulty with falling asleep.  During weekdays and weekends, Markie goes to bed at 1-2AM, and it usually takes 10 minutes to fall asleep. He wakes up at 830-930 AM, with an alarm.    Markie does not complain of restlessness feelings in the legs.  He does not complain of spontaneous leg movements/jerks in the middle of the night.    Patient does not read or eat or watch TV or use electronics in bed.    Patient does not have a regular bed partner.    He does not complain of difficulty with staying asleep.  He wakes up once during the night.  Night time awakenings occurs due to use bathroom.  After awakening, he is able to fall back asleep after 5-10 minutes.    Patient is a night person.  He would naturally to go to sleep at 1-2 AM and wake up at 8:30-9:30 AM.    Sleep Behaviors  He denies any cataplexy, sleep paralysis, sleep hallucinations.    He denies any night time behaviors - sleep walking, sleep talking, sleep eating.    He does not complain acting out dreams.      Daytime Functioning  Markie naps once a week for 1.5 hours, and does feel better after waking up from the nap.  He does not doze off during the day.    He is legally blind and does not drive.    Patient's [place] Sleepiness score 2/24.    Social History  Markie is not currently working.  Lives alone.  He does drink 2 large cups of coffee in the morning sometimes with lunch when he goes out but never has caffeine past 3 PM. Last caffeine intake is not within 6 hours of bed time.  He has not had alcohol > 4 years.  Patient is a smoker for almost 52 years.  He was previously smoking 1- 1 1/2 packs per day and for the past 6-8 years , he has reduced to 1/2 pack per day but is not ready and willing to quit smoking.    Patient does  not use drugs.    Allergies:    Allergies   Allergen Reactions     Ambien Other (See Comments)     Parasomnia with sleep walking, injuries,  delusions.  May have been in DTs at the same time.     Lisinopril Cough     Pt had cough     Seasonal Allergies        Medications:    Current Outpatient Prescriptions   Medication Sig Dispense Refill     atenolol (TENORMIN) 50 MG tablet Take 1 tablet (50 mg) by mouth daily Along with 100 mg tablet for total of 150 mg daily. Hold rx in pharmacy until pt calls for Rx 90 tablet 3     furosemide (LASIX) 20 MG tablet Take one half tablet by mouth up to 3 times a week as needed for leg edema. Hold rx in pharmacy until pt calls for Rx 36 tablet 3     warfarin (COUMADIN) 5 MG tablet Take 7.5 mg on Mondays and Fridays, and 5 mg all other days, or as directed by Coumadin nurse. Hold rx in pharmacy until pt calls for Rx 100 tablet 3     albuterol (PROAIR HFA/PROVENTIL HFA/VENTOLIN HFA) 108 (90 BASE) MCG/ACT Inhaler Inhale 2 puffs into the lungs every 6 hours Hold rx in pharmacy until pt calls for Rx 3 Inhaler 3     FLUoxetine (PROZAC) 40 MG capsule Take 1 capsule (40 mg) by mouth daily Hold rx in pharmacy until pt calls for Rx 90 capsule 3     atenolol (TENORMIN) 100 MG tablet Take 1 tablet (100 mg) by mouth daily along with 50 mg tablet for total of 150 mg daily. 90 tablet 3     amLODIPine (NORVASC) 10 MG tablet Take 1 tablet (10 mg) by mouth daily Hold rx in pharmacy until pt calls for Rx 90 tablet 3     traZODone (DESYREL) 100 MG tablet Take 1 tablet (100 mg) by mouth nightly as needed Hold rx in pharmacy until pt calls for Rx 90 tablet 3     simvastatin (ZOCOR) 20 MG tablet Take 1 tablet (20 mg) by mouth At Bedtime Hold rx in pharmacy until pt calls for Rx 90 tablet 3     losartan (COZAAR) 100 MG tablet Take 1 tablet (100 mg) by mouth daily Hold rx in pharmacy until pt calls for Rx 90 tablet 3     fluticasone (FLONASE) 50 MCG/ACT spray Spray 2 sprays into both nostrils daily Hold rx in pharmacy until pt calls for Rx 3 Bottle 3     acetaminophen (TYLENOL) 500 MG tablet Take 500-1,000 mg by mouth every 6 hours as needed for  mild pain       oxymetazoline (AFRIN 12 HOUR) 0.05 % nasal spray Spray 2 sprays into both nostrils At Bedtime       omega-3 fatty acids (FISH OIL) 1200 MG capsule Take 1 capsule by mouth daily       CENTRUM SILVER OR TABS ONE DAILY 3 MONTHS 1 YEAR       Problem List:  Patient Active Problem List    Diagnosis Date Noted     Long-term (current) use of anticoagulants [Z79.01] 03/21/2016     Priority: Medium     Essential hypertension, benign 04/21/2015     Priority: Medium     Coronary atherosclerosis 04/21/2015     Priority: Medium     Problem list name updated by automated process. Provider to review       Low back pain 09/24/2013     Priority: Medium     Thiamine deficiency: possible wernicke 08/06/2013     Priority: Medium     Mild major depression (H) 06/18/2013     Priority: Medium     Obstructive sleep apnea 11/18/2012     Priority: Medium     Wears CPAP, has associated polycythemia  Problem list name updated by automated process. Provider to review       Smoker 09/26/2012     Priority: Medium     1/2 ppd       Hypertension 09/26/2012     Priority: Medium     Atrial fibrillation (H) 07/26/2012     Priority: Medium     Started on coumadin, goes to [facility] clinic for INR checks       CAD (coronary artery disease) 07/03/2012     Priority: Medium     Inferior MI, RCA totally occluded on angiogram 7/3/2012  EF 40-45% on angiogram  Medically managed       Hyperlipidemia LDL goal <70 07/03/2012     Priority: Medium     Mixed hyperlipidemia 10/05/2007     Priority: Medium        Past Medical/Surgical History:  Past Medical History:   Diagnosis Date     Adjustment disorder with depressed mood 6/17/12     ALCOHOL ABUSE-UNSPEC 10/5/2007     Atrial fibrillation (H)      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 On Cellcept, steroid taper     Coronary atherosclerosis of unspecified type of vessel, native or graft      Diabetes mellitus type 2 in obese (H)      Essential hypertension, benign       Mixed hyperlipidemia 10/5/2007     Polycythemia      Sleep apnea      Tobacco use disorder 10/5/2007    Smoker - 1/2 ppd. Started at 16 years     Total retinal detachment of left eye 2009/2010     Past Surgical History:   Procedure Laterality Date     C NONSPECIFIC PROCEDURE  1987    Right Epididymis Removed     C OPEN RX ANKLE DISLOCATN+FIXATN  12/2008    Right Ankle     CARDIAC CATHERIZATION  7/03/2012    totally occluded RCA w/ mild left coronary disease     HEART CATH CORONARY ANGIOGRAM AND RIGHT HEART CATH  7/2012    RCA occlusion, IMI, no stent     PHACOEMULSIFICATION CLEAR CORNEA WITH STANDARD INTRAOCULAR LENS IMPLANT Right 6/4/2015    Procedure: PHACOEMULSIFICATION CLEAR CORNEA WITH STANDARD INTRAOCULAR LENS IMPLANT;  Surgeon: Tal Trinidad MD;  Location:  [facility]     RETINAL REATTACHMENT  11/09, 7/10    Left     TONSILLECTOMY         Social History:  Social History     Social History     Marital status:      Spouse name: N/A     Number of children: 2     Years of education: 19     Occupational History     actor Unemployed      [employer]           Social History Main Topics     Smoking status: Current Every Day Smoker     Packs/day: 0.50     Years: 46.00     Types: Cigarettes     Smokeless tobacco: Never Used      Comment: 1/2 ppd      Alcohol use No      Comment: quit drinking heavily 2013     Drug use: No     Sexual activity: No     Other Topics Concern     Caffeine Concern Yes     Special Diet No     Exercise Not Asked     1 miles on stepper      Social History Narrative    Balanced Diet - Yes    Osteoporosis Preventative measures-  Dairy servings per day: 1-3    Regular Exercise -  Yes Describe walk every day    Dental Exam up - YES - Date: 1/2/07    Eye Exam - YES - Date: 2 yrs    Self Testicular Exam -  One testicle removed yrs ago due to fallDo you have any concerns about STD's -  No    Abuse: Current or Past (Physical, Sexual or Emotional)- No    Do you feel safe in your  "environment - Yes    Guns stored in the home - No    Sunscreen used - Yes    Seatbelts used - Yes    Lipids - ?    Glucose -  ?    Colon Cancer Screening - No    Hemoccults - hemorrhoids    PSA - NO    Digital Rectal Exam - YES - Date: 1993    Immunizations reviewed and up to date - No    Tory RN           Family History:  Family History   Problem Relation Age of Onset     Arthritis Mother      Circulatory Mother      varicose veins     Eye Disorder Mother      detached retina     Thyroid Disease Mother      C.A.D. Father      Quadruple bypass     GASTROINTESTINAL DISEASE Father      diverticulitis     Alzheimer Disease Father      demntia     Eye Disorder Father      cataract     Alcohol/Drug Sister      alcohol     Depression Sister      post partum depression     Eye Disorder Brother      detached retina     Neurologic Disorder Sister      brain aneurysm     Thyroid Disease Sister    Family history pertinent to sleep disorders: unknown    Review of Systems:  The 14 point ROS was completed. In addition to symptoms listed under HPI, and the symptoms listed below, the rest of the ROS is negative:  Legally blind but still has some vision in his right eye and follows up with ophthalmology regularly, hayfever; swelling of the legs and feet ; high blood pressure; shortness of breath with severe exertion; productive cough sometimes; increased frequency of urination; occasional depression and anxiety but not currently.    Physical Examination:  Vitals: /81  Pulse 81  Resp 18  Ht 1.778 m (5' 10\")  Wt (!) 168.7 kg (372 lb)  SpO2 94%  BMI 53.38 kg/m2  BMI= Body mass index is 53.38 kg/(m^2).         [place] Total Score 1/8/2018   Total score - [place] 2       General: No apparent distress, appropriately groomed  Head: Normocephalic, atraumatic  Nose: Slightly hypertrophied inferior nasal turbinate in the right nostril but both nares patent. No exudate/erythema.  Mild septal deviation noted.  Mouth: " Oropharynx and tongue:  pink and moist  Orophraynx: Opening is narrowed,           Mallampati Class: IV           Tonsillar Stage: 0 (surgically removed)  Neck: Supple, Circumference: 19.5 inches  Cardiac: Regular rate and rhythm  Chest: Symmetric air movement, lungs clear to auscultation bilaterally  GI: Abdomen soft, non-tender, non-distended  Extremities: Bilateral LE  edema noted  Skin: Warm, dry, intact  Psych: Mood pleasant, affect congruent  Neuro/psych:Mental status: Awake, alert, attentive, oriented. No focal neurological deficit      Impression/Plan:    Previously diagnosed obstructive sleep apnea with medical comorbidities including HTN, atrial fibrillation, CAD, hyperlipidemia.  (Mild EMORY  based on the PSG that was obtained in 9/2012, but REM sleep was not observed during the study which may have underestimated the overall severity of the sleep apnea).   Patient reports good  compliance with his CPAP device and continues to report positive benefits from the CPAP treatment.  He denies fatigue or excessive daytime sleepiness or air hunger.  Prescription was provided for renewal of all the CPAP supplies.  Patient wants the supplies mailed to his home address.  Patient was not interested in getting his CPAP device replaced.  Since there has been significant weight gain since his polysomnography in 2012, recommended obtaining an overnight oximetry with the CPAP device at the current pressure setting at 9 cm of water after he gets  new CPAP supplies along with a parallel compliance download. Will request [facility] to provide pt with loaner CPAP device since we could not retrieve the DL today. Will request staff at the [facility]  to arrange for a home visit in order to facilitate the oximetry.  The results of the overnight oximetry will be communicated with him via virtual visit in 1 week after the completion of the test.  And if the compliance download shows increased AHI and/ oximetry is abnormal, we will  consider  "obtaining an overnight sleep study for further evaluation.   Diagnosis and treatment for EMORY have been discussed. Complications of untreated EMORY have also been discussed.    Patient  has been encouraged to quit smoking.    Encouraged weight loss, healthy diet, and exercise.    Patient was strongly advised to avoid driving, operating any heavy machinery or other hazardous situations while drowsy or sleepy.  Patient was counseled on the importance of driving while alert, to pull over if drowsy, or nap before getting into the vehicle if sleepy.      The above note was dictated using voice recognition software. Although reviewed after completion, some word and grammatical error may remain . Please contact the author for any clarifications.    \"I spent a total of 45  minutes face to face with Markie Shepard during today's office visit. Over 50% of this time was spent counseling the patient and  coordinating care regarding sleep apnea, oximetry,.\"       Val Jim MD   of Medicine,  Division of Pulmonary/Sleep Medicine  [facility].              "

## 2018-01-08 NOTE — PATIENT INSTRUCTIONS
Your BMI is Body mass index is 53.38 kg/(m^2).  Weight management is a personal decision.  If you are interested in exploring weight loss strategies, the following discussion covers the approaches that may be successful. Body mass index (BMI) is one way to tell whether you are at a healthy weight, overweight, or obese. It measures your weight in relation to your height.  A BMI of 18.5 to 24.9 is in the healthy range. A person with a BMI of 25 to 29.9 is considered overweight, and someone with a BMI of 30 or greater is considered obese. More than two-thirds of American adults are considered overweight or obese.  Being overweight or obese increases the risk for further weight gain. Excess weight may lead to heart disease and diabetes.  Creating and following plans for healthy eating and physical activity may help you improve your health.  Weight control is part of healthy lifestyle and includes exercise, emotional health, and healthy eating habits. Careful eating habits lifelong are the mainstay of weight control. Though there are significant health benefits from weight loss, long-term weight loss with diet alone may be very difficult to achieve- studies show long-term success with dietary management in less than 10% of people. Attaining a healthy weight may be especially difficult to achieve in those with severe obesity. In some cases, medications, devices and surgical management might be considered.  What can you do?  If you are overweight or obese and are interested in methods for weight loss, you should discuss this with your provider.     Consider reducing daily calorie intake by 500 calories.     Keep a food journal.     Avoiding skipping meals, consider cutting portions instead.    Diet combined with exercise helps maintain muscle while optimizing fat loss. Strength training is particularly important for building and maintaining muscle mass. Exercise helps reduce stress, increase energy, and improves fitness.  Increasing exercise without diet control, however, may not burn enough calories to loose weight.       Start walking three days a week 10-20 minutes at a time    Work towards walking thirty minutes five days a week     Eventually, increase the speed of your walking for 1-2 minutes at time    In addition, we recommend that you review healthy lifestyles and methods for weight loss available through the National Institutes of Health patient information sites:  http://win.niddk.nih.gov/publications/index.htm    And look into health and wellness programs that may be available through your health insurance provider, employer, local community center, or [employer]drigo club.    Weight management plan: Patient was referred to their PCP to discuss a diet and exercise plan.     Your blood pressure was checked while you were in clinic today.  Please read the guidelines below about what these numbers mean and what you should do about them.  Your systolic blood pressure is the top number.  This is the pressure when the heart is pumping.  Your diastolic blood pressure is the bottom number.  This is the pressure in between beats.  If your systolic blood pressure is less than 120 and your diastolic blood pressure is less than 80, then your blood pressure is normal. There is nothing more that you need to do about it  If your systolic blood pressure is 120-139 or your diastolic blood pressure is 80-89, your blood pressure may be higher than it should be.  You should have your blood pressure re-checked within a year by a primary care provider.  If your systolic blood pressure is 140 or greater or your diastolic blood pressure is 90 or greater, you may have high blood pressure.  High blood pressure is treatable, but if left untreated over time it can put you at risk for heart attack, stroke, or kidney failure.  You should have your blood pressure re-checked by a primary care provider within the next four weeks if your blood pressure is 140/90 or  higher today.

## 2018-01-08 NOTE — MR AVS SNAPSHOT
After Visit Summary   1/8/2018    Markie Shepard    MRN: [id number]           Patient Information     Date Of Birth          1950        Visit Information        Provider Department      1/8/2018 2:00 PM Val Jim MD [facility], Sleep Study        Today's Diagnoses     EMORY on CPAP    -  1      Care Instructions    Your BMI is Body mass index is 53.38 kg/(m^2).  Weight management is a personal decision.  If you are interested in exploring weight loss strategies, the following discussion covers the approaches that may be successful. Body mass index (BMI) is one way to tell whether you are at a healthy weight, overweight, or obese. It measures your weight in relation to your height.  A BMI of 18.5 to 24.9 is in the healthy range. A person with a BMI of 25 to 29.9 is considered overweight, and someone with a BMI of 30 or greater is considered obese. More than two-thirds of American adults are considered overweight or obese.  Being overweight or obese increases the risk for further weight gain. Excess weight may lead to heart disease and diabetes.  Creating and following plans for healthy eating and physical activity may help you improve your health.  Weight control is part of healthy lifestyle and includes exercise, emotional health, and healthy eating habits. Careful eating habits lifelong are the mainstay of weight control. Though there are significant health benefits from weight loss, long-term weight loss with diet alone may be very difficult to achieve- studies show long-term success with dietary management in less than 10% of people. Attaining a healthy weight may be especially difficult to achieve in those with severe obesity. In some cases, medications, devices and surgical management might be considered.  What can you do?  If you are overweight or obese and are interested in methods for weight loss, you should discuss this with your provider.     Consider  reducing daily calorie intake by 500 calories.     Keep a food journal.     Avoiding skipping meals, consider cutting portions instead.    Diet combined with exercise helps maintain muscle while optimizing fat loss. Strength training is particularly important for building and maintaining muscle mass. Exercise helps reduce stress, increase energy, and improves fitness. Increasing exercise without diet control, however, may not burn enough calories to loose weight.       Start walking three days a week 10-20 minutes at a time    Work towards walking thirty minutes five days a week     Eventually, increase the speed of your walking for 1-2 minutes at time    In addition, we recommend that you review healthy lifestyles and methods for weight loss available through the National Institutes of Health patient information sites:  http://win.niddk.nih.gov/publications/index.htm    And look into health and wellness programs that may be available through your health insurance provider, employer, local community center, or [employer] club.    Weight management plan: Patient was referred to their PCP to discuss a diet and exercise plan.     Your blood pressure was checked while you were in clinic today.  Please read the guidelines below about what these numbers mean and what you should do about them.  Your systolic blood pressure is the top number.  This is the pressure when the heart is pumping.  Your diastolic blood pressure is the bottom number.  This is the pressure in between beats.  If your systolic blood pressure is less than 120 and your diastolic blood pressure is less than 80, then your blood pressure is normal. There is nothing more that you need to do about it  If your systolic blood pressure is 120-139 or your diastolic blood pressure is 80-89, your blood pressure may be higher than it should be.  You should have your blood pressure re-checked within a year by a primary care provider.  If your systolic blood pressure is  140 or greater or your diastolic blood pressure is 90 or greater, you may have high blood pressure.  High blood pressure is treatable, but if left untreated over time it can put you at risk for heart attack, stroke, or kidney failure.  You should have your blood pressure re-checked by a primary care provider within the next four weeks if your blood pressure is 140/90 or higher today.                Follow-ups after your visit        Follow-up notes from your care team     Return in about 1 week (around 1/15/2018).      Your next 10 appointments already scheduled     Jan 29, 2018 11:00 AM CST   Anticoagulation Visit with  INR CLINIC   [facility] ([facility])    [address] [place] [id number] [phone]              Future tests that were ordered for you today     Open Future Orders        Priority Expected Expires Ordered    Overnight oximetry study Routine  7/7/2018 1/8/2018            Who to contact     If you have questions or need follow up information about today's clinic visit or your schedule please contact [facility] directly at [phone].  Normal or non-critical lab and imaging results will be communicated to you by MyChart, letter or phone within 4 business days after the clinic has received the results. If you do not hear from us within 7 days, please contact the clinic through MyChart or phone. If you have a critical or abnormal lab result, we will notify you by phone as soon as possible.  Submit refill requests through [employer] or call your pharmacy and they will forward the refill request to us. Please allow 3 business days for your refill to be completed.          Additional Information About Your Visit        Care EveryWhere ID     This is your Care EveryWhere ID. This could be used by other organizations to access your [facility] medical records  [id number]        Your Vitals Were     Pulse Respirations Height Pulse Oximetry  "BMI (Body Mass Index)       81 18 1.778 m (5' 10\") 94% 53.38 kg/m2        Blood Pressure from Last 3 Encounters:   01/08/18 126/81   12/18/17 114/81   10/03/17 146/86    Weight from Last 3 Encounters:   01/08/18 (!) 168.7 kg (372 lb)   12/18/17 (!) 167.2 kg (368 lb 8 oz)   10/03/17 (!) 169.2 kg (373 lb)              We Performed the Following     Comprehensive DME        Primary Care Provider Office Phone # Fax #    Mali Marie Patel [phone]       [address] [place] [address]        Equal Access to Services     BOY MAR : Cosme Cochran, waaxda luqadaha, qaybta kaalmada adeshinyada, radha gentile . So [facility] [phone].    ATENCIÓN: Si habla español, tiene a storm disposición servicios gratuitos de asistencia lingüística. [facility] [phone].    We comply with applicable federal civil rights laws and [place] laws. We do not discriminate on the basis of race, color, national origin, age, disability, sex, sexual orientation, or gender identity.            Thank you!     Thank you for choosing [facility]  for your care. Our goal is always to provide you with excellent care. Hearing back from our patients is one way we can continue to improve our services. Please take a few minutes to complete the written survey that you may receive in the mail after your visit with us. Thank you!             Your Updated Medication List - Protect others around you: Learn how to safely use, store and throw away your medicines at www.disposemymeds.org.          This list is accurate as of: 1/8/18  3:24 PM.  Always use your most recent med list.                   Brand Name Dispense Instructions for use Diagnosis    acetaminophen 500 MG tablet    TYLENOL     Take 500-1,000 mg by mouth every 6 hours as needed for mild pain        AFRIN 12 HOUR 0.05 % spray   Generic drug:  oxymetazoline      Spray 2 sprays into both nostrils At Bedtime     "    albuterol 108 (90 BASE) MCG/ACT Inhaler    PROAIR HFA/PROVENTIL HFA/VENTOLIN HFA    3 Inhaler    Inhale 2 puffs into the lungs every 6 hours Hold rx in pharmacy until pt calls for Rx    SOB (shortness of breath)       amLODIPine 10 MG tablet    NORVASC    90 tablet    Take 1 tablet (10 mg) by mouth daily Hold rx in pharmacy until pt calls for Rx    Essential hypertension with goal blood pressure less than 140/90, Hyperlipidemia LDL goal <70       * atenolol 100 MG tablet    TENORMIN    90 tablet    Take 1 tablet (100 mg) by mouth daily along with 50 mg tablet for total of 150 mg daily.    Essential hypertension with goal blood pressure less than 140/90       * atenolol 50 MG tablet    TENORMIN    90 tablet    Take 1 tablet (50 mg) by mouth daily Along with 100 mg tablet for total of 150 mg daily. Hold rx in pharmacy until pt calls for Rx    Essential hypertension with goal blood pressure less than 140/90, Hyperlipidemia LDL goal <70       CENTRUM SILVER per tablet     3 MONTHS    ONE DAILY        FLUoxetine 40 MG capsule    PROzac    90 capsule    Take 1 capsule (40 mg) by mouth daily Hold rx in pharmacy until pt calls for Rx    Major depression, chronic       fluticasone 50 MCG/ACT spray    FLONASE    3 Bottle    Spray 2 sprays into both nostrils daily Hold rx in pharmacy until pt calls for Rx    Other sinusitis, unspecified chronicity       furosemide 20 MG tablet    LASIX    36 tablet    Take one half tablet by mouth up to 3 times a week as needed for leg edema. Hold rx in pharmacy until pt calls for Rx    Bilateral leg edema       losartan 100 MG tablet    COZAAR    90 tablet    Take 1 tablet (100 mg) by mouth daily Hold rx in pharmacy until pt calls for Rx    Essential hypertension with goal blood pressure less than 140/90, Hyperlipidemia LDL goal <70       omega-3 fatty acids 1200 MG capsule      Take 1 capsule by mouth daily        simvastatin 20 MG tablet    ZOCOR    90 tablet    Take 1 tablet (20 mg) by  mouth At Bedtime Hold rx in pharmacy until pt calls for Rx    Hyperlipidemia LDL goal <70, Essential hypertension with goal blood pressure less than 140/90       traZODone 100 MG tablet    DESYREL    90 tablet    Take 1 tablet (100 mg) by mouth nightly as needed Hold rx in pharmacy until pt calls for Rx    Insomnia, unspecified type       warfarin 5 MG tablet    COUMADIN    100 tablet    Take 7.5 mg on Mondays and Fridays, and 5 mg all other days, or as directed by Coumadin nurse. Hold rx in pharmacy until pt calls for Rx    Atrial fibrillation, unspecified type (H)       * Notice:  This list has 2 medication(s) that are the same as other medications prescribed for you. Read the directions carefully, and ask your doctor or other care provider to review them with you.

## 2018-01-10 ENCOUNTER — TELEPHONE (OUTPATIENT)
Dept: SLEEP MEDICINE | Facility: CLINIC | Age: 68
End: 2018-01-10

## 2018-01-10 NOTE — TELEPHONE ENCOUNTER
Scheduled DANIEL device delivery for Tuesday 1/16/18 between 10 am - 12 pm. Pt has placed order for new PAP supplies. Will perform test once Pt receives his new supplies.

## 2018-01-17 ENCOUNTER — DOCUMENTATION ONLY (OUTPATIENT)
Dept: SLEEP MEDICINE | Facility: CLINIC | Age: 68
End: 2018-01-17

## 2018-01-18 ENCOUNTER — DOCUMENTATION ONLY (OUTPATIENT)
Dept: SLEEP MEDICINE | Facility: CLINIC | Age: 68
End: 2018-01-18
Payer: COMMERCIAL

## 2018-01-18 DIAGNOSIS — G47.33 OSA ON CPAP: ICD-10-CM

## 2018-01-18 NOTE — TELEPHONE ENCOUNTER
DANIEL device returned to [facility] and downloaded on 1/17/18. Results faxed to Dr Champagne at [facility].

## 2018-01-18 NOTE — PROGRESS NOTES
Results received from [facility] for overnight oximetry that was picked up on 01/16/2018.     Results labeled and scanned into chart.     Copy given to provider for review. Encounter also forwarded to provider.     Recording Date: 01/17/2018    Duration: 7:33:24    Note: Completed on Current PAP Settings.

## 2018-01-29 ENCOUNTER — ANTICOAGULATION THERAPY VISIT (OUTPATIENT)
Dept: NURSING | Facility: CLINIC | Age: 68
End: 2018-01-29
Payer: COMMERCIAL

## 2018-01-29 DIAGNOSIS — I48.91 ATRIAL FIBRILLATION, UNSPECIFIED TYPE (H): ICD-10-CM

## 2018-01-29 DIAGNOSIS — Z79.01 LONG-TERM (CURRENT) USE OF ANTICOAGULANTS: ICD-10-CM

## 2018-01-29 LAB — INR POINT OF CARE: 2.3 (ref 0.86–1.14)

## 2018-01-29 PROCEDURE — 99207 ZZC NO CHARGE NURSE ONLY: CPT

## 2018-01-29 PROCEDURE — 85610 PROTHROMBIN TIME: CPT | Mod: QW

## 2018-01-29 PROCEDURE — 36416 COLLJ CAPILLARY BLOOD SPEC: CPT

## 2018-01-29 NOTE — MR AVS SNAPSHOT
Markie MERLE Shepard   1/29/2018 11:00 AM   Anticoagulation Therapy Visit    Description:  67 year old male   Provider:   INR CLINIC   Department:   Nurse           INR as of 1/29/2018     Today's INR 2.3      Anticoagulation Summary as of 1/29/2018     INR goal 2.0-3.0   Today's INR 2.3   Full instructions 7.5 mg on Mon, Fri; 5 mg all other days   Next INR check 3/12/2018    Indications   Long-term (current) use of anticoagulants [Z79.01] [Z79.01]  Atrial fibrillation (H) [I48.91]         Your next Anticoagulation Clinic appointment(s)     Mar 12, 2018 10:45 AM CDT   Anticoagulation Visit with  INR CLINIC   [facility]    [address] [place] [address] [phone]              Contact Numbers     [facility]  Please call [phone] to cancel and/or reschedule your appointment   Please call [phone] with any problems or questions regarding your therapy.        January 2018 Details    Sun Mon Tue Wed Thu Fri Sat      1               2               3               4               5               6                 7               8               9               10               11               12               13                 14               15               16               17               18               19               20                 21               22               23               24               25               26               27                 28               29      7.5 mg   See details      30      5 mg         31      5 mg             Date Details   01/29 This INR check               How to take your warfarin dose     To take:  5 mg Take 1 of the 5 mg tablets.    To take:  7.5 mg Take 1.5 of the 5 mg tablets.           February 2018 Details    Sun Mon Tue Wed Thu Fri Sat         1      5 mg         2      7.5 mg         3      5 mg           4      5 mg         5      7.5 mg         6      5 mg         7      5  mg         8      5 mg         9      7.5 mg         10      5 mg           11      5 mg         12      7.5 mg         13      5 mg         14      5 mg         15      5 mg         16      7.5 mg         17      5 mg           18      5 mg         19      7.5 mg         20      5 mg         21      5 mg         22      5 mg         23      7.5 mg         24      5 mg           25      5 mg         26      7.5 mg         27      5 mg         28      5 mg             Date Details   No additional details            How to take your warfarin dose     To take:  5 mg Take 1 of the 5 mg tablets.    To take:  7.5 mg Take 1.5 of the 5 mg tablets.           March 2018 Details    Sun Mon Tue Wed Thu Fri Sat         1      5 mg         2      7.5 mg         3      5 mg           4      5 mg         5      7.5 mg         6      5 mg         7      5 mg         8      5 mg         9      7.5 mg         10      5 mg           11      5 mg         12            13               14               15               16               17                 18               19               20               21               22               23               24                 25               26               27               28               29               30               31                Date Details   No additional details    Date of next INR:  3/12/2018         How to take your warfarin dose     To take:  5 mg Take 1 of the 5 mg tablets.    To take:  7.5 mg Take 1.5 of the 5 mg tablets.

## 2018-03-12 ENCOUNTER — ANTICOAGULATION THERAPY VISIT (OUTPATIENT)
Dept: NURSING | Facility: CLINIC | Age: 68
End: 2018-03-12
Payer: COMMERCIAL

## 2018-03-12 DIAGNOSIS — Z79.01 LONG-TERM (CURRENT) USE OF ANTICOAGULANTS: ICD-10-CM

## 2018-03-12 DIAGNOSIS — I48.91 ATRIAL FIBRILLATION, UNSPECIFIED TYPE (H): ICD-10-CM

## 2018-03-12 LAB — INR POINT OF CARE: 2.6 (ref 0.86–1.14)

## 2018-03-12 PROCEDURE — 36416 COLLJ CAPILLARY BLOOD SPEC: CPT

## 2018-03-12 PROCEDURE — 85610 PROTHROMBIN TIME: CPT | Mod: QW

## 2018-03-12 PROCEDURE — 99207 ZZC NO CHARGE NURSE ONLY: CPT

## 2018-03-12 NOTE — MR AVS SNAPSHOT
Markie BLOOM Devyn   3/12/2018 10:45 AM   Anticoagulation Therapy Visit    Description:  68 year old male   Provider:   INR CLINIC   Department:   Nurse           INR as of 3/12/2018     Today's INR 2.6      Anticoagulation Summary as of 3/12/2018     INR goal 2.0-3.0   Today's INR 2.6   Full instructions 7.5 mg on Mon, Fri; 5 mg all other days   Next INR check 4/23/2018    Indications   Long-term (current) use of anticoagulants [Z79.01] [Z79.01]  Atrial fibrillation (H) [I48.91]         Your next Anticoagulation Clinic appointment(s)     Apr 23, 2018 10:45 AM CDT   Anticoagulation Visit with  INR CLINIC   [facility] ([facility])    [address] [place] [id number] [phone]              Contact Numbers     [facility]  Please call [phone] to cancel and/or reschedule your appointment   Please call [phone] with any problems or questions regarding your therapy.        March 2018 Details    Sun Mon Tue Wed Thu Fri Sat         1               2               3                 4               5               6               7               8               9               10                 11               12      7.5 mg   See details      13      5 mg         14      5 mg         15      5 mg         16      7.5 mg         17      5 mg           18      5 mg         19      7.5 mg         20      5 mg         21      5 mg         22      5 mg         23      7.5 mg         24      5 mg           25      5 mg         26      7.5 mg         27      5 mg         28      5 mg         29      5 mg         30      7.5 mg         31      5 mg          Date Details   03/12 This INR check               How to take your warfarin dose     To take:  5 mg Take 1 of the 5 mg tablets.    To take:  7.5 mg Take 1.5 of the 5 mg tablets.           April 2018 Details    Sun Mon Tue Wed Thu Fri Sat     1      5 mg         2      7.5 mg         3      5 mg         4       5 mg         5      5 mg         6      7.5 mg         7      5 mg           8      5 mg         9      7.5 mg         10      5 mg         11      5 mg         12      5 mg         13      7.5 mg         14      5 mg           15      5 mg         16      7.5 mg         17      5 mg         18      5 mg         19      5 mg         20      7.5 mg         21      5 mg           22      5 mg         23            24               25               26               27               28                 29               30                     Date Details   No additional details    Date of next INR:  4/23/2018         How to take your warfarin dose     To take:  5 mg Take 1 of the 5 mg tablets.    To take:  7.5 mg Take 1.5 of the 5 mg tablets.

## 2018-03-12 NOTE — PROGRESS NOTES
ANTICOAGULATION FOLLOW-UP CLINIC VISIT    Patient Name:  Markie Shepard  Date:  3/12/2018  Contact Type:  Face to Face    SUBJECTIVE:     Patient Findings     Positives No Problem Findings           OBJECTIVE    INR Protime   Date Value Ref Range Status   03/12/2018 2.6 (A) 0.86 - 1.14 Final       ASSESSMENT / PLAN  INR assessment THER    Recheck INR In: 6 WEEKS    INR Location Clinic      Anticoagulation Summary as of 3/12/2018     INR goal 2.0-3.0   Today's INR 2.6   Maintenance plan 7.5 mg (5 mg x 1.5) on Mon, Fri; 5 mg (5 mg x 1) all other days   Full instructions 7.5 mg on Mon, Fri; 5 mg all other days   Weekly total 40 mg   No change documented Chelsie Encinas RN   Plan last modified Chelsie Encinas RN (3/21/2016)   Next INR check 4/23/2018   Priority INR   Target end date     Indications   Long-term (current) use of anticoagulants [Z79.01] [Z79.01]  Atrial fibrillation (H) [I48.91]         Anticoagulation Episode Summary     INR check location     Preferred lab     Send INR reminders to [facility]    Comments PREFERS MARKIE!      Anticoagulation Care Providers     Provider Role Specialty Phone number    Mali Patel MD [facility] [phone]            See the Encounter Report to view Anticoagulation Flowsheet and Dosing Calendar (Go to Encounters tab in chart review, and find the Anticoagulation Therapy Visit)    Patient made aware if signs of clotting (pain, tenderness, swelling, or color change in any extremity) AND/OR bleeding occur (nosebleeds, bleeding gums, bruising, or blood in stool or urine) to notify provider & seek medical attention. If severe symptoms develop, such as major bleeding, chest pain, shortness of breath, fall, trauma or s/s of stroke, patient to call 911 immediately.       Chelsie Encinas RN

## 2018-04-23 ENCOUNTER — ANTICOAGULATION THERAPY VISIT (OUTPATIENT)
Dept: NURSING | Facility: CLINIC | Age: 68
End: 2018-04-23
Payer: COMMERCIAL

## 2018-04-23 ENCOUNTER — TELEPHONE (OUTPATIENT)
Dept: NURSING | Facility: CLINIC | Age: 68
End: 2018-04-23

## 2018-04-23 DIAGNOSIS — I48.91 ATRIAL FIBRILLATION, UNSPECIFIED TYPE (H): ICD-10-CM

## 2018-04-23 DIAGNOSIS — Z79.01 LONG-TERM (CURRENT) USE OF ANTICOAGULANTS: ICD-10-CM

## 2018-04-23 DIAGNOSIS — I48.91 ATRIAL FIBRILLATION, UNSPECIFIED TYPE (H): Primary | ICD-10-CM

## 2018-04-23 LAB — INR POINT OF CARE: 2.5 (ref 0.86–1.14)

## 2018-04-23 PROCEDURE — 85610 PROTHROMBIN TIME: CPT | Mod: QW

## 2018-04-23 PROCEDURE — 99207 ZZC NO CHARGE NURSE ONLY: CPT

## 2018-04-23 PROCEDURE — 36416 COLLJ CAPILLARY BLOOD SPEC: CPT

## 2018-04-23 NOTE — PROGRESS NOTES
ANTICOAGULATION FOLLOW-UP CLINIC VISIT    Patient Name:  Markie Shepard  Date:  4/23/2018  Contact Type:  Face to Face    SUBJECTIVE:     Patient Findings     Positives No Problem Findings           OBJECTIVE    INR Protime   Date Value Ref Range Status   04/23/2018 2.5 (A) 0.86 - 1.14 Final       ASSESSMENT / PLAN  INR assessment THER    Recheck INR In: 6 WEEKS    INR Location Clinic      Anticoagulation Summary as of 4/23/2018     INR goal 2.0-3.0   Today's INR 2.5   Maintenance plan 7.5 mg (5 mg x 1.5) on Mon, Fri; 5 mg (5 mg x 1) all other days   Full instructions 7.5 mg on Mon, Fri; 5 mg all other days   Weekly total 40 mg   No change documented Chelsie Encinas RN   Plan last modified Chelsie Encinas RN (3/21/2016)   Next INR check 6/4/2018   Priority INR   Target end date     Indications   Long-term (current) use of anticoagulants [Z79.01] [Z79.01]  Atrial fibrillation (H) [I48.91]         Anticoagulation Episode Summary     INR check location     Preferred lab     Send INR reminders to [facility]    Comments PREFERS MARKIE!      Anticoagulation Care Providers     Provider Role Specialty Phone number    Mali Patel MD [facility] Internal Medicine [phone]            See the Encounter Report to view Anticoagulation Flowsheet and Dosing Calendar (Go to Encounters tab in chart review, and find the Anticoagulation Therapy Visit)    Patient made aware if signs of clotting (pain, tenderness, swelling, or color change in any extremity) AND/OR bleeding occur (nosebleeds, bleeding gums, bruising, or blood in stool or urine) to notify provider & seek medical attention. If severe symptoms develop, such as major bleeding, chest pain, shortness of breath, fall, trauma or s/s of stroke, patient to call 911 immediately.       Chelsie Encinas RN

## 2018-04-23 NOTE — MR AVS SNAPSHOT
Markie MERLE Shepard   4/23/2018 10:45 AM   Anticoagulation Therapy Visit    Description:  68 year old male   Provider:   INR CLINIC   Department:   Nurse           INR as of 4/23/2018     Today's INR 2.5      Anticoagulation Summary as of 4/23/2018     INR goal 2.0-3.0   Today's INR 2.5   Full instructions 7.5 mg on Mon, Fri; 5 mg all other days   Next INR check 6/4/2018    Indications   Long-term (current) use of anticoagulants [Z79.01] [Z79.01]  Atrial fibrillation (H) [I48.91]         Your next Anticoagulation Clinic appointment(s)     Jun 04, 2018 10:45 AM CDT   Anticoagulation Visit with  INR CLINIC   [facility] ([facility])    [address] [place] [id number] [phone]              Contact Numbers     [facility]  Please call [phone] to cancel and/or reschedule your appointment   Please call [phone] with any problems or questions regarding your therapy.        April 2018 Details    Sun Mon Tue Wed Thu Fri Sat     1               2               3               4               5               6               7                 8               9               10               11               12               13               14                 15               16               17               18               19               20               21                 22               23      7.5 mg   See details      24      5 mg         25      5 mg         26      5 mg         27      7.5 mg         28      5 mg           29      5 mg         30      7.5 mg               Date Details   04/23 This INR check               How to take your warfarin dose     To take:  5 mg Take 1 of the 5 mg tablets.    To take:  7.5 mg Take 1.5 of the 5 mg tablets.           May 2018 Details    Sun Mon Tue Wed Thu Fri Sat       1      5 mg         2      5 mg         3      5 mg         4      7.5 mg         5      5 mg           6      5 mg         7      7.5 mg          8      5 mg         9      5 mg         10      5 mg         11      7.5 mg         12      5 mg           13      5 mg         14      7.5 mg         15      5 mg         16      5 mg         17      5 mg         18      7.5 mg         19      5 mg           20      5 mg         21      7.5 mg         22      5 mg         23      5 mg         24      5 mg         25      7.5 mg         26      5 mg           27      5 mg         28      7.5 mg         29      5 mg         30      5 mg         31      5 mg            Date Details   No additional details            How to take your warfarin dose     To take:  5 mg Take 1 of the 5 mg tablets.    To take:  7.5 mg Take 1.5 of the 5 mg tablets.           June 2018 Details    Sun Mon Tue Wed Thu Fri Sat          1      7.5 mg         2      5 mg           3      5 mg         4            5               6               7               8               9                 10               11               12               13               14               15               16                 17               18               19               20               21               22               23                 24               25               26               27               28               29               30                Date Details   No additional details    Date of next INR:  6/4/2018         How to take your warfarin dose     To take:  5 mg Take 1 of the 5 mg tablets.    To take:  7.5 mg Take 1.5 of the 5 mg tablets.

## 2018-05-02 NOTE — TELEPHONE ENCOUNTER
Dr Patel,   Annual order for INR clinic is due. Please complete order or advise.   Goal has been 2-3 for Atrial Fibrillation. Compliance: Looks ok  INR: stable   Ok to close encounter when complete, unless changes to therapy are advised.    Order angel luis'd up. Please sign if agree.  Thanks! Dolly Grier RN

## 2018-06-04 ENCOUNTER — ANTICOAGULATION THERAPY VISIT (OUTPATIENT)
Dept: NURSING | Facility: CLINIC | Age: 68
End: 2018-06-04
Payer: COMMERCIAL

## 2018-06-04 DIAGNOSIS — Z79.01 LONG-TERM (CURRENT) USE OF ANTICOAGULANTS: ICD-10-CM

## 2018-06-04 DIAGNOSIS — I48.91 ATRIAL FIBRILLATION, UNSPECIFIED TYPE (H): ICD-10-CM

## 2018-06-04 LAB — INR POINT OF CARE: 2.3 (ref 0.86–1.14)

## 2018-06-04 PROCEDURE — 36416 COLLJ CAPILLARY BLOOD SPEC: CPT

## 2018-06-04 PROCEDURE — 85610 PROTHROMBIN TIME: CPT | Mod: QW

## 2018-06-04 PROCEDURE — 99207 ZZC NO CHARGE NURSE ONLY: CPT

## 2018-06-04 NOTE — MR AVS SNAPSHOT
Markie BLOOM Devyn   6/4/2018 10:45 AM   Anticoagulation Therapy Visit    Description:  68 year old male   Provider:   INR CLINIC   Department:   Nurse           INR as of 6/4/2018     Today's INR 2.3      Anticoagulation Summary as of 6/4/2018     INR goal 2.0-3.0   Today's INR 2.3   Full warfarin instructions 7.5 mg on Mon, Fri; 5 mg all other days   Next INR check 7/16/2018    Indications   Long-term (current) use of anticoagulants [Z79.01] [Z79.01]  Atrial fibrillation (H) [I48.91]         Your next Anticoagulation Clinic appointment(s)     Jul 16, 2018 10:45 AM CDT   Anticoagulation Visit with  INR CLINIC   [facility] ([facility])    [address] [place] [id number] [phone]              Contact Numbers     [facility]  Please call [phone] to cancel and/or reschedule your appointment   Please call [phone] with any problems or questions regarding your therapy.        June 2018 Details    Sun Mon Tue Wed Thu Fri Sat          1               2                 3               4      7.5 mg   See details      5      5 mg         6      5 mg         7      5 mg         8      7.5 mg         9      5 mg           10      5 mg         11      7.5 mg         12      5 mg         13      5 mg         14      5 mg         15      7.5 mg         16      5 mg           17      5 mg         18      7.5 mg         19      5 mg         20      5 mg         21      5 mg         22      7.5 mg         23      5 mg           24      5 mg         25      7.5 mg         26      5 mg         27      5 mg         28      5 mg         29      7.5 mg         30      5 mg          Date Details   06/04 This INR check               How to take your warfarin dose     To take:  5 mg Take 1 of the 5 mg tablets.    To take:  7.5 mg Take 1.5 of the 5 mg tablets.           July 2018 Details    Sun Mon Tue Wed Thu Fri Sat     1      5 mg         2      7.5 mg         3       5 mg         4      5 mg         5      5 mg         6      7.5 mg         7      5 mg           8      5 mg         9      7.5 mg         10      5 mg         11      5 mg         12      5 mg         13      7.5 mg         14      5 mg           15      5 mg         16            17               18               19               20               21                 22               23               24               25               26               27               28                 29               30               31                    Date Details   No additional details    Date of next INR:  7/16/2018         How to take your warfarin dose     To take:  5 mg Take 1 of the 5 mg tablets.    To take:  7.5 mg Take 1.5 of the 5 mg tablets.

## 2018-06-04 NOTE — PROGRESS NOTES
ANTICOAGULATION FOLLOW-UP CLINIC VISIT    Patient Name:  Markie Shepard  Date:  6/4/2018  Contact Type:  Face to Face    SUBJECTIVE:     Patient Findings     Positives No Problem Findings           OBJECTIVE    INR Protime   Date Value Ref Range Status   06/04/2018 2.3 (A) 0.86 - 1.14 Final       ASSESSMENT / PLAN  INR assessment THER    Recheck INR In: 6 WEEKS    INR Location Clinic      Anticoagulation Summary as of 6/4/2018     INR goal 2.0-3.0   Today's INR 2.3   Warfarin maintenance plan 7.5 mg (5 mg x 1.5) on Mon, Fri; 5 mg (5 mg x 1) all other days   Full warfarin instructions 7.5 mg on Mon, Fri; 5 mg all other days   Weekly warfarin total 40 mg   No change documented Chelsie Encinas RN   Plan last modified Chelsie Encinas RN (3/21/2016)   Next INR check 7/16/2018   Priority INR   Target end date     Indications   Long-term (current) use of anticoagulants [Z79.01] [Z79.01]  Atrial fibrillation (H) [I48.91]         Anticoagulation Episode Summary     INR check location     Preferred lab     Send INR reminders to [facility]    Comments PREFERS MARKIE!      Anticoagulation Care Providers     Provider Role Specialty Phone number    Mali Patel MD [facility] Internal Medicine [phone]            See the Encounter Report to view Anticoagulation Flowsheet and Dosing Calendar (Go to Encounters tab in chart review, and find the Anticoagulation Therapy Visit)    Patient made aware if signs of clotting (pain, tenderness, swelling, or color change in any extremity) AND/OR bleeding occur (nosebleeds, bleeding gums, bruising, or blood in stool or urine) to notify provider & seek medical attention. If severe symptoms develop, such as major bleeding, chest pain, shortness of breath, fall, trauma or s/s of stroke, patient to call 911 immediately.       Chelsie Encinas RN

## 2018-07-16 ENCOUNTER — ANTICOAGULATION THERAPY VISIT (OUTPATIENT)
Dept: NURSING | Facility: CLINIC | Age: 68
End: 2018-07-16
Payer: COMMERCIAL

## 2018-07-16 DIAGNOSIS — I48.91 ATRIAL FIBRILLATION, UNSPECIFIED TYPE (H): ICD-10-CM

## 2018-07-16 DIAGNOSIS — Z79.01 LONG-TERM (CURRENT) USE OF ANTICOAGULANTS: ICD-10-CM

## 2018-07-16 LAB — INR POINT OF CARE: 2.5 (ref 0.86–1.14)

## 2018-07-16 PROCEDURE — 85610 PROTHROMBIN TIME: CPT | Mod: QW

## 2018-07-16 PROCEDURE — 36416 COLLJ CAPILLARY BLOOD SPEC: CPT

## 2018-07-16 PROCEDURE — 99207 ZZC NO CHARGE NURSE ONLY: CPT

## 2018-07-16 NOTE — PROGRESS NOTES
ANTICOAGULATION FOLLOW-UP CLINIC VISIT    Patient Name:  Markie Shepard  Date:  7/16/2018  Contact Type:  Face to Face    SUBJECTIVE:     Patient Findings     Positives No Problem Findings           OBJECTIVE    INR Protime   Date Value Ref Range Status   07/16/2018 2.5 (A) 0.86 - 1.14 Final       ASSESSMENT / PLAN  INR assessment THER    Recheck INR In: 6 WEEKS    INR Location Clinic      Anticoagulation Summary as of 7/16/2018     INR goal 2.0-3.0   Today's INR 2.5   Warfarin maintenance plan 7.5 mg (5 mg x 1.5) on Mon, Fri; 5 mg (5 mg x 1) all other days   Full warfarin instructions 7.5 mg on Mon, Fri; 5 mg all other days   Weekly warfarin total 40 mg   No change documented Chelsie Encinas RN   Plan last modified Chelsie Encinas RN (3/21/2016)   Next INR check 8/27/2018   Priority INR   Target end date     Indications   Long-term (current) use of anticoagulants [Z79.01] [Z79.01]  Atrial fibrillation (H) [I48.91]         Anticoagulation Episode Summary     INR check location     Preferred lab     Send INR reminders to [facility]    Comments PREFERS MARKIE!      Anticoagulation Care Providers     Provider Role Specialty Phone number    Mali Patel MD [facility] Internal Medicine [phone]            See the Encounter Report to view Anticoagulation Flowsheet and Dosing Calendar (Go to Encounters tab in chart review, and find the Anticoagulation Therapy Visit)    Patient made aware if signs of clotting (pain, tenderness, swelling, or color change in any extremity) AND/OR bleeding occur (nosebleeds, bleeding gums, bruising, or blood in stool or urine) to notify provider & seek medical attention. If severe symptoms develop, such as major bleeding, chest pain, shortness of breath, fall, trauma or s/s of stroke, patient to call 911 immediately.       Chelsie Encinas RN

## 2018-08-13 ENCOUNTER — TRANSFERRED RECORDS (OUTPATIENT)
Dept: HEALTH INFORMATION MANAGEMENT | Facility: CLINIC | Age: 68
End: 2018-08-13

## 2018-08-27 ENCOUNTER — ANTICOAGULATION THERAPY VISIT (OUTPATIENT)
Dept: NURSING | Facility: CLINIC | Age: 68
End: 2018-08-27
Payer: COMMERCIAL

## 2018-08-27 DIAGNOSIS — Z79.01 LONG-TERM (CURRENT) USE OF ANTICOAGULANTS: ICD-10-CM

## 2018-08-27 DIAGNOSIS — I48.91 ATRIAL FIBRILLATION, UNSPECIFIED TYPE (H): ICD-10-CM

## 2018-08-27 LAB — INR POINT OF CARE: 2.7 (ref 0.86–1.14)

## 2018-08-27 PROCEDURE — 36416 COLLJ CAPILLARY BLOOD SPEC: CPT

## 2018-08-27 PROCEDURE — 99207 ZZC NO CHARGE NURSE ONLY: CPT

## 2018-08-27 PROCEDURE — 85610 PROTHROMBIN TIME: CPT | Mod: QW

## 2018-08-27 NOTE — MR AVS SNAPSHOT
Markie BLOOM Devyn   8/27/2018 10:45 AM   Anticoagulation Therapy Visit    Description:  68 year old male   Provider:   INR CLINIC   Department:   Nurse           INR as of 8/27/2018     Today's INR 2.7      Anticoagulation Summary as of 8/27/2018     INR goal 2.0-3.0   Today's INR 2.7   Full warfarin instructions 7.5 mg on Mon, Fri; 5 mg all other days   Next INR check 10/8/2018    Indications   Long-term (current) use of anticoagulants [Z79.01] [Z79.01]  Atrial fibrillation (H) [I48.91]         Your next Anticoagulation Clinic appointment(s)     Oct 08, 2018 10:45 AM CDT   Anticoagulation Visit with  INR CLINIC   [facility] ([facility])    [address] [place] [address] [phone]              Contact Numbers     [facility]  Please call [phone] to cancel and/or reschedule your appointment   Please call [phone] with any problems or questions regarding your therapy.        August 2018 Details    Sun Mon Tue Wed Thu Fri Sat        1               2               3               4                 5               6               7               8               9               10               11                 12               13               14               15               16               17               18                 19               20               21               22               23               24               25                 26               27      7.5 mg   See details      28      5 mg         29      5 mg         30      5 mg         31      7.5 mg           Date Details   08/27 This INR check               How to take your warfarin dose     To take:  5 mg Take 1 of the 5 mg tablets.    To take:  7.5 mg Take 1.5 of the 5 mg tablets.           September 2018 Details    Sun Mon Tue Wed Thu Fri Sat           1      5 mg           2      5 mg         3      7.5 mg         4      5 mg         5      5 mg         6      5  mg         7      7.5 mg         8      5 mg           9      5 mg         10      7.5 mg         11      5 mg         12      5 mg         13      5 mg         14      7.5 mg         15      5 mg           16      5 mg         17      7.5 mg         18      5 mg         19      5 mg         20      5 mg         21      7.5 mg         22      5 mg           23      5 mg         24      7.5 mg         25      5 mg         26      5 mg         27      5 mg         28      7.5 mg         29      5 mg           30      5 mg                Date Details   No additional details            How to take your warfarin dose     To take:  5 mg Take 1 of the 5 mg tablets.    To take:  7.5 mg Take 1.5 of the 5 mg tablets.           October 2018 Details    Sun Mon Tue Wed Thu Fri Sat      1      7.5 mg         2      5 mg         3      5 mg         4      5 mg         5      7.5 mg         6      5 mg           7      5 mg         8            9               10               11               12               13                 14               15               16               17               18               19               20                 21               22               23               24               25               26               27                 28               29               30               31                   Date Details   No additional details    Date of next INR:  10/8/2018         How to take your warfarin dose     To take:  5 mg Take 1 of the 5 mg tablets.    To take:  7.5 mg Take 1.5 of the 5 mg tablets.

## 2018-08-27 NOTE — PROGRESS NOTES
ANTICOAGULATION FOLLOW-UP CLINIC VISIT    Patient Name:  Markie Shepard  Date:  8/27/2018  Contact Type:  Face to Face    SUBJECTIVE:     Patient Findings     Positives No Problem Findings           OBJECTIVE    INR Protime   Date Value Ref Range Status   08/27/2018 2.7 (A) 0.86 - 1.14 Final       ASSESSMENT / PLAN  INR assessment THER    Recheck INR In: 6 WEEKS    INR Location Clinic      Anticoagulation Summary as of 8/27/2018     INR goal 2.0-3.0   Today's INR 2.7   Warfarin maintenance plan 7.5 mg (5 mg x 1.5) on Mon, Fri; 5 mg (5 mg x 1) all other days   Full warfarin instructions 7.5 mg on Mon, Fri; 5 mg all other days   Weekly warfarin total 40 mg   No change documented Dolly Grier   Plan last modified Chelsie Encinas RN (3/21/2016)   Next INR check 10/8/2018   Priority INR   Target end date     Indications   Long-term (current) use of anticoagulants [Z79.01] [Z79.01]  Atrial fibrillation (H) [I48.91]         Anticoagulation Episode Summary     INR check location     Preferred lab     Send INR reminders to [facility]    Comments PREFERS TUSHAR      Anticoagulation Care Providers     Provider Role Specialty Phone number    Mali Patel MD [facility] Internal Medicine [phone]            See the Encounter Report to view Anticoagulation Flowsheet and Dosing Calendar (Go to Encounters tab in chart review, and find the Anticoagulation Therapy Visit)    Patient aware if signs of clotting (pain, tenderness, swelling, color change in leg or arm, SOB) and bleeding occur (blood in stool, urine, large bruising, bleeding gums, nosebleeds) to have INR check sooner. If sx severe report to ER or concerned for stroke call 911. If general questions or concerns arise, call clinic.         Dolly Grier RN

## 2018-10-08 ENCOUNTER — ANTICOAGULATION THERAPY VISIT (OUTPATIENT)
Dept: NURSING | Facility: CLINIC | Age: 68
End: 2018-10-08
Payer: COMMERCIAL

## 2018-10-08 DIAGNOSIS — I48.91 ATRIAL FIBRILLATION, UNSPECIFIED TYPE (H): ICD-10-CM

## 2018-10-08 LAB — INR POINT OF CARE: 2.3 (ref 0.86–1.14)

## 2018-10-08 PROCEDURE — 99207 ZZC NO CHARGE NURSE ONLY: CPT

## 2018-10-08 PROCEDURE — 36416 COLLJ CAPILLARY BLOOD SPEC: CPT

## 2018-10-08 PROCEDURE — 85610 PROTHROMBIN TIME: CPT | Mod: QW

## 2018-10-08 NOTE — MR AVS SNAPSHOT
Markie BLOOM Devyn   10/8/2018 10:45 AM   Anticoagulation Therapy Visit    Description:  68 year old male   Provider:   INR CLINIC   Department:   Nurse           INR as of 10/8/2018     Today's INR 2.3      Anticoagulation Summary as of 10/8/2018     INR goal 2.0-3.0   Today's INR 2.3   Full warfarin instructions 7.5 mg on Mon, Fri; 5 mg all other days   Next INR check 11/19/2018    Indications   Long-term (current) use of anticoagulants [Z79.01] [Z79.01]  Atrial fibrillation (H) [I48.91]         Your next Anticoagulation Clinic appointment(s)     Nov 19, 2018 10:45 AM CST   Anticoagulation Visit with  INR CLINIC   [facility] ([facility])    [address] [place] [id number] [phone]              Contact Numbers     [facility]  Please call [phone] to cancel and/or reschedule your appointment   Please call [phone] with any problems or questions regarding your therapy.        October 2018 Details    Sun Mon Tue Wed Thu Fri Sat      1               2               3               4               5               6                 7               8      7.5 mg   See details      9      5 mg         10      5 mg         11      5 mg         12      7.5 mg         13      5 mg           14      5 mg         15      7.5 mg         16      5 mg         17      5 mg         18      5 mg         19      7.5 mg         20      5 mg           21      5 mg         22      7.5 mg         23      5 mg         24      5 mg         25      5 mg         26      7.5 mg         27      5 mg           28      5 mg         29      7.5 mg         30      5 mg         31      5 mg             Date Details   10/08 This INR check               How to take your warfarin dose     To take:  5 mg Take 1 of the 5 mg tablets.    To take:  7.5 mg Take 1.5 of the 5 mg tablets.           November 2018 Details    Sun Mon Tue Wed Thu Fri Sat         1      5 mg         2       7.5 mg         3      5 mg           4      5 mg         5      7.5 mg         6      5 mg         7      5 mg         8      5 mg         9      7.5 mg         10      5 mg           11      5 mg         12      7.5 mg         13      5 mg         14      5 mg         15      5 mg         16      7.5 mg         17      5 mg           18      5 mg         19            20               21               22               23               24                 25               26               27               28               29               30                 Date Details   No additional details    Date of next INR:  11/19/2018         How to take your warfarin dose     To take:  5 mg Take 1 of the 5 mg tablets.    To take:  7.5 mg Take 1.5 of the 5 mg tablets.

## 2018-10-08 NOTE — PROGRESS NOTES
ANTICOAGULATION FOLLOW-UP CLINIC VISIT    Patient Name:  Markie Shepard  Date:  10/8/2018  Contact Type:  Face to Face    SUBJECTIVE:     Patient Findings     Positives No Problem Findings           OBJECTIVE    INR Protime   Date Value Ref Range Status   10/08/2018 2.3 (A) 0.86 - 1.14 Final       ASSESSMENT / PLAN  INR assessment THER    Recheck INR In: 6 WEEKS    INR Location Clinic      Anticoagulation Summary as of 10/8/2018     INR goal 2.0-3.0   Today's INR 2.3   Warfarin maintenance plan 7.5 mg (5 mg x 1.5) on Mon, Fri; 5 mg (5 mg x 1) all other days   Full warfarin instructions 7.5 mg on Mon, Fri; 5 mg all other days   Weekly warfarin total 40 mg   No change documented Chelsie Encinas RN   Plan last modified Chelsie Encinas RN (3/21/2016)   Next INR check 11/19/2018   Priority INR   Target end date     Indications   Long-term (current) use of anticoagulants [Z79.01] [Z79.01]  Atrial fibrillation (H) [I48.91]         Anticoagulation Episode Summary     INR check location     Preferred lab     Send INR reminders to [facility]    Comments PREFERS MARKIE!      Anticoagulation Care Providers     Provider Role Specialty Phone number    Mali Patel MD [facility] Internal Medicine [phone]            See the Encounter Report to view Anticoagulation Flowsheet and Dosing Calendar (Go to Encounters tab in chart review, and find the Anticoagulation Therapy Visit)    Patient made aware if signs of clotting (pain, tenderness, swelling, or color change in any extremity) AND/OR bleeding occur (nosebleeds, bleeding gums, bruising, or blood in stool or urine) to notify provider & seek medical attention. If severe symptoms develop, such as major bleeding, chest pain, shortness of breath, fall, trauma or s/s of stroke, patient to call 911 immediately.       Chelsie Encinas RN

## 2018-11-19 ENCOUNTER — ANTICOAGULATION THERAPY VISIT (OUTPATIENT)
Dept: NURSING | Facility: CLINIC | Age: 68
End: 2018-11-19
Payer: COMMERCIAL

## 2018-11-19 DIAGNOSIS — I48.91 ATRIAL FIBRILLATION, UNSPECIFIED TYPE (H): ICD-10-CM

## 2018-11-19 LAB — INR POINT OF CARE: 2.3 (ref 0.86–1.14)

## 2018-11-19 PROCEDURE — 85610 PROTHROMBIN TIME: CPT | Mod: QW

## 2018-11-19 PROCEDURE — 36416 COLLJ CAPILLARY BLOOD SPEC: CPT

## 2018-11-19 NOTE — PROGRESS NOTES
ANTICOAGULATION FOLLOW-UP CLINIC VISIT    Patient Name:  Markie Shepard  Date:  11/19/2018  Contact Type:  Face to Face    SUBJECTIVE:     Patient Findings     Positives No Problem Findings           OBJECTIVE    INR Protime   Date Value Ref Range Status   11/19/2018 2.3 (A) 0.86 - 1.14 Final       ASSESSMENT / PLAN  INR assessment THER    Recheck INR In: 6 WEEKS    INR Location Clinic      Anticoagulation Summary as of 11/19/2018     INR goal 2.0-3.0   Today's INR 2.3   Warfarin maintenance plan 7.5 mg (5 mg x 1.5) on Mon, Fri; 5 mg (5 mg x 1) all other days   Full warfarin instructions 7.5 mg on Mon, Fri; 5 mg all other days   Weekly warfarin total 40 mg   No change documented Chelsie Encinas RN   Plan last modified Chelsie Encinas RN (3/21/2016)   Next INR check 1/7/2019   Priority INR   Target end date     Indications   Long-term (current) use of anticoagulants [Z79.01] [Z79.01]  Atrial fibrillation (H) [I48.91]         Anticoagulation Episode Summary     INR check location     Preferred lab     Send INR reminders to [facility]    Comments PREFERS MARKIE!      Anticoagulation Care Providers     Provider Role Specialty Phone number    Mali Patel MD [facility] Internal Medicine [phone]            See the Encounter Report to view Anticoagulation Flowsheet and Dosing Calendar (Go to Encounters tab in chart review, and find the Anticoagulation Therapy Visit)    Patient made aware if signs of clotting (pain, tenderness, swelling, or color change in any extremity) AND/OR bleeding occur (nosebleeds, bleeding gums, bruising, or blood in stool or urine) to notify provider & seek medical attention. If severe symptoms develop, such as major bleeding, chest pain, shortness of breath, fall, trauma or s/s of stroke, patient to call 911 immediately.       Chelsie Encinas RN

## 2018-11-19 NOTE — MR AVS SNAPSHOT
Markie BLOOM Devyn   11/19/2018 10:45 AM   Anticoagulation Therapy Visit    Description:  68 year old male   Provider:   INR CLINIC   Department:   Nurse           INR as of 11/19/2018     Today's INR 2.3      Anticoagulation Summary as of 11/19/2018     INR goal 2.0-3.0   Today's INR 2.3   Full warfarin instructions 7.5 mg on Mon, Fri; 5 mg all other days   Next INR check 1/7/2019    Indications   Long-term (current) use of anticoagulants [Z79.01] [Z79.01]  Atrial fibrillation (H) [I48.91]         Your next Anticoagulation Clinic appointment(s)     Jan 07, 2019 10:45 AM CST   Anticoagulation Visit with  INR CLINIC   [facility]    [address] [place] [id number] [phone]              Contact Numbers     [facility]  Please call [phone] to cancel and/or reschedule your appointment   Please call [phone] with any problems or questions regarding your therapy.        November 2018 Details    Sun Mon Tue Wed Thu Fri Sat         1               2               3                 4               5               6               7               8               9               10                 11               12               13               14               15               16               17                 18               19      7.5 mg   See details      20      5 mg         21      5 mg         22      5 mg         23      7.5 mg         24      5 mg           25      5 mg         26      7.5 mg         27      5 mg         28      5 mg         29      5 mg         30      7.5 mg           Date Details   11/19 This INR check               How to take your warfarin dose     To take:  5 mg Take 1 of the 5 mg tablets.    To take:  7.5 mg Take 1.5 of the 5 mg tablets.           December 2018 Details    Sun Mon Tue Wed Thu Fri Sat           1      5 mg           2      5 mg         3      7.5 mg         4      5 mg         5      5 mg          6      5 mg         7      7.5 mg         8      5 mg           9      5 mg         10      7.5 mg         11      5 mg         12      5 mg         13      5 mg         14      7.5 mg         15      5 mg           16      5 mg         17      7.5 mg         18      5 mg         19      5 mg         20      5 mg         21      7.5 mg         22      5 mg           23      5 mg         24      7.5 mg         25      5 mg         26      5 mg         27      5 mg         28      7.5 mg         29      5 mg           30      5 mg         31      7.5 mg               Date Details   No additional details            How to take your warfarin dose     To take:  5 mg Take 1 of the 5 mg tablets.    To take:  7.5 mg Take 1.5 of the 5 mg tablets.           January 2019 Details    Sun Mon Tue Wed Thu Fri Sat       1      5 mg         2      5 mg         3      5 mg         4      7.5 mg         5      5 mg           6      5 mg         7            8               9               10               11               12                 13               14               15               16               17               18               19                 20               21               22               23               24               25               26                 27               28               29               30               31                  Date Details   No additional details    Date of next INR:  1/7/2019         How to take your warfarin dose     To take:  5 mg Take 1 of the 5 mg tablets.    To take:  7.5 mg Take 1.5 of the 5 mg tablets.

## 2018-12-14 DIAGNOSIS — I10 ESSENTIAL HYPERTENSION WITH GOAL BLOOD PRESSURE LESS THAN 140/90: ICD-10-CM

## 2018-12-14 DIAGNOSIS — F32.9 MAJOR DEPRESSION, CHRONIC: ICD-10-CM

## 2018-12-14 DIAGNOSIS — E78.5 HYPERLIPIDEMIA LDL GOAL <70: ICD-10-CM

## 2018-12-14 RX ORDER — AMLODIPINE BESYLATE 10 MG/1
10 TABLET ORAL DAILY
Qty: 90 TABLET | Refills: 0 | Status: SHIPPED | OUTPATIENT
Start: 2018-12-14 | End: 2019-02-11

## 2018-12-14 RX ORDER — FLUOXETINE 40 MG/1
40 CAPSULE ORAL DAILY
Qty: 90 CAPSULE | Refills: 0 | Status: SHIPPED | OUTPATIENT
Start: 2018-12-14 | End: 2019-02-11

## 2018-12-14 RX ORDER — ATENOLOL 50 MG/1
50 TABLET ORAL DAILY
Qty: 90 TABLET | Refills: 0 | Status: SHIPPED | OUTPATIENT
Start: 2018-12-14 | End: 2019-02-11

## 2018-12-14 NOTE — TELEPHONE ENCOUNTER
Last office visit was on 12/18/2017, next appointment is scheduled for 02/11/2019 with Dr. Patel.    Medication is being filled for 1 time refill only due to:  Patient needs labs bmp. Future labs ordered yes. Patient needs to be seen because needs phq9 and will be a year since seen this month  . Has future appt in 2 months     Naye Allen RN  December 14, 2018 8:32 PM

## 2018-12-17 DIAGNOSIS — I10 ESSENTIAL HYPERTENSION WITH GOAL BLOOD PRESSURE LESS THAN 140/90: ICD-10-CM

## 2018-12-17 RX ORDER — ATENOLOL 100 MG/1
100 TABLET ORAL DAILY
Qty: 90 TABLET | Refills: 0 | Status: SHIPPED | OUTPATIENT
Start: 2018-12-17 | End: 2019-02-11

## 2018-12-17 NOTE — TELEPHONE ENCOUNTER
Last office visit was on 12/18/2017, next visit is scheduled for 02/11/2019 with Dr. Patel.    Prescription approved per [facility] Refill Protocol.   Pt to keep upcoming appt for further refills, as will be due for visit annual in one month's time.   just giving one refill. To get to appt     Naye Allen RN  December 17, 2018 8:48 PM      '

## 2019-01-07 ENCOUNTER — ANTICOAGULATION THERAPY VISIT (OUTPATIENT)
Dept: NURSING | Facility: CLINIC | Age: 69
End: 2019-01-07
Payer: COMMERCIAL

## 2019-01-07 DIAGNOSIS — I48.91 ATRIAL FIBRILLATION, UNSPECIFIED TYPE (H): ICD-10-CM

## 2019-01-07 LAB — INR POINT OF CARE: 2.1 (ref 0.86–1.14)

## 2019-01-07 PROCEDURE — 36416 COLLJ CAPILLARY BLOOD SPEC: CPT

## 2019-01-07 PROCEDURE — 99207 ZZC NO CHARGE NURSE ONLY: CPT

## 2019-01-07 PROCEDURE — 85610 PROTHROMBIN TIME: CPT | Mod: QW

## 2019-01-07 NOTE — PROGRESS NOTES
ANTICOAGULATION FOLLOW-UP CLINIC VISIT    Patient Name:  Markie Shepard  Date:  2019  Contact Type:  Face to Face    SUBJECTIVE:     Patient Findings     Positives:   Change in diet/appetite (Increase in vitamin K, as pt has started replacing coleslaw for fries when dining out. )           OBJECTIVE    INR Protime   Date Value Ref Range Status   2019 2.1 (A) 0.86 - 1.14 Final       ASSESSMENT / PLAN  INR assessment THER    Recheck INR In: 4 WEEKS    INR Location Clinic      Anticoagulation Summary  As of 2019    INR goal:   2.0-3.0   TTR:   95.0 % (2.8 y)   INR used for dosin.1 (2019)   Warfarin maintenance plan:   7.5 mg (5 mg x 1.5) every Mon, Fri; 5 mg (5 mg x 1) all other days   Full warfarin instructions:   7.5 mg every Mon, Fri; 5 mg all other days   Weekly warfarin total:   40 mg   No change documented:   Chelsie Encinas RN   Plan last modified:   Chelsie Encinas RN (3/21/2016)   Next INR check:   2019   Priority:   INR   Target end date:       Indications    Long-term (current) use of anticoagulants [Z79.01] [Z79.01]  Atrial fibrillation (H) [I48.91]             Anticoagulation Episode Summary     INR check location:       Preferred lab:       Send INR reminders to:   [facility]    Comments:   PREFERS PETER!      Anticoagulation Care Providers     Provider Role Specialty Phone number    Mali Patel MD [facility] Internal Medicine [phone]            See the Encounter Report to view Anticoagulation Flowsheet and Dosing Calendar (Go to Encounters tab in chart review, and find the Anticoagulation Therapy Visit)    Given diet change, writer advised patient to trial increasing weekly dose by 2.5 mg every Wednesday and returning in two weeks. Patient refused to be seen that soon, but compromised on 4 weeks. Plan discussed further and agreed that patient would ONLY increase Wednesday's dose if diet includes more vitamin K (aka patient dines out more that  week). Encouraged patient to trial new dose of 40 mg one week prior to being seen on 2/4, so that ACC can assess if higher dose is safe.     Patient made aware if signs of clotting (pain, tenderness, swelling, or color change in any extremity) AND/OR bleeding occur (nosebleeds, bleeding gums, bruising, or blood in stool or urine) to notify provider & seek medical attention. If severe symptoms develop, such as major bleeding, chest pain, shortness of breath, fall, trauma or s/s of stroke, patient to call 911 immediately.     Dosage adjustment made based on physician directed care plan.    Chelsie Encinas RN

## 2019-01-22 DIAGNOSIS — I10 ESSENTIAL HYPERTENSION WITH GOAL BLOOD PRESSURE LESS THAN 140/90: ICD-10-CM

## 2019-01-22 DIAGNOSIS — E78.5 HYPERLIPIDEMIA LDL GOAL <70: ICD-10-CM

## 2019-01-22 DIAGNOSIS — I48.91 ATRIAL FIBRILLATION, UNSPECIFIED TYPE (H): ICD-10-CM

## 2019-01-22 RX ORDER — WARFARIN SODIUM 5 MG/1
TABLET ORAL
Qty: 100 TABLET | Refills: 0 | Status: SHIPPED | OUTPATIENT
Start: 2019-01-22 | End: 2019-02-11

## 2019-01-22 RX ORDER — SIMVASTATIN 20 MG
20 TABLET ORAL AT BEDTIME
Qty: 90 TABLET | Refills: 0 | Status: SHIPPED | OUTPATIENT
Start: 2019-01-22 | End: 2019-02-11

## 2019-01-22 RX ORDER — LOSARTAN POTASSIUM 100 MG/1
100 TABLET ORAL DAILY
Qty: 90 TABLET | Refills: 0 | Status: SHIPPED | OUTPATIENT
Start: 2019-01-22 | End: 2019-02-11

## 2019-01-22 NOTE — TELEPHONE ENCOUNTER
Last visit 12/18/17, future visit 2/11/19    Medication is being filled for 1 time refill only due to:  Patient needs labs BMP,lipidplus. Future labs ordered BMP, lipid plus. Patient needs to be seen because it has been more than one year since last visit.   Usha Do RN  [facility]

## 2019-02-04 ENCOUNTER — ANTICOAGULATION THERAPY VISIT (OUTPATIENT)
Dept: NURSING | Facility: CLINIC | Age: 69
End: 2019-02-04
Payer: COMMERCIAL

## 2019-02-04 DIAGNOSIS — I48.91 ATRIAL FIBRILLATION, UNSPECIFIED TYPE (H): ICD-10-CM

## 2019-02-04 DIAGNOSIS — G47.00 INSOMNIA, UNSPECIFIED TYPE: ICD-10-CM

## 2019-02-04 DIAGNOSIS — R60.0 BILATERAL LEG EDEMA: ICD-10-CM

## 2019-02-04 DIAGNOSIS — R06.02 SOB (SHORTNESS OF BREATH): ICD-10-CM

## 2019-02-04 LAB — INR POINT OF CARE: 2.5 (ref 0.86–1.14)

## 2019-02-04 PROCEDURE — 36416 COLLJ CAPILLARY BLOOD SPEC: CPT

## 2019-02-04 PROCEDURE — 99207 ZZC NO CHARGE NURSE ONLY: CPT

## 2019-02-04 PROCEDURE — 85610 PROTHROMBIN TIME: CPT | Mod: QW

## 2019-02-04 RX ORDER — FUROSEMIDE 20 MG
TABLET ORAL
Qty: 36 TABLET | Refills: 0 | Status: SHIPPED | OUTPATIENT
Start: 2019-02-04 | End: 2019-02-11

## 2019-02-04 RX ORDER — TRAZODONE HYDROCHLORIDE 100 MG/1
100 TABLET ORAL
Qty: 90 TABLET | Refills: 0 | Status: SHIPPED | OUTPATIENT
Start: 2019-02-04 | End: 2019-02-11

## 2019-02-04 RX ORDER — ALBUTEROL SULFATE 90 UG/1
2 AEROSOL, METERED RESPIRATORY (INHALATION) EVERY 6 HOURS
Qty: 3 INHALER | Refills: 0 | Status: SHIPPED | OUTPATIENT
Start: 2019-02-04 | End: 2019-02-11

## 2019-02-04 NOTE — PROGRESS NOTES
ANTICOAGULATION FOLLOW-UP CLINIC VISIT    Patient Name:  Markie Shepard  Date:  2019  Contact Type:  Face to Face    SUBJECTIVE:     Patient Findings     Positives:   Extra doses (Pt took 7.5 mg last Wednesday as advised at previous appt. ), Change in diet/appetite (Increase in consistent greens. )           OBJECTIVE    INR Protime   Date Value Ref Range Status   2019 2.5 (A) 0.86 - 1.14 Final       ASSESSMENT / PLAN  INR assessment THER    Recheck INR In: 4 WEEKS    INR Location Clinic      Anticoagulation Summary  As of 2019    INR goal:   2.0-3.0   TTR:   95.1 % (2.8 y)   INR used for dosin.5 (2019)   Warfarin maintenance plan:   7.5 mg (5 mg x 1.5) every Mon, Wed, Fri; 5 mg (5 mg x 1) all other days   Full warfarin instructions:   7.5 mg every Mon, Wed, Fri; 5 mg all other days   Weekly warfarin total:   42.5 mg   Plan last modified:   Chelsie Encinas RN (2019)   Next INR check:   3/4/2019   Priority:   INR   Target end date:       Indications    Long-term (current) use of anticoagulants [Z79.01] [Z79.01]  Atrial fibrillation (H) [I48.91]             Anticoagulation Episode Summary     INR check location:       Preferred lab:       Send INR reminders to:   [facility]    Comments:   PREFERS PETER!      Anticoagulation Care Providers     Provider Role Specialty Phone number    Mali Patel MD [facility] Internal Medicine [phone]            See the Encounter Report to view Anticoagulation Flowsheet and Dosing Calendar (Go to Encounters tab in chart review, and find the Anticoagulation Therapy Visit)    Writer advised patient to continue new weekly dose of 42.5 mg and recheck in 3-4 weeks to ensure stability.     Patient made aware if signs of clotting (pain, tenderness, swelling, or color change in any extremity) AND/OR bleeding occur (nosebleeds, bleeding gums, bruising, or blood in stool or urine) to notify provider & seek medical attention. If severe  symptoms develop, such as major bleeding, chest pain, shortness of breath, fall, trauma or s/s of stroke, patient to call 911 immediately.       Chelsie Encinas RN

## 2019-02-04 NOTE — TELEPHONE ENCOUNTER
Last time prescribed:   Trazodone 12/18/17 , 90 tabs/caps x 3 refills  Ventolin 12/1817 , 3 inhalers/tabs/caps x 3 refills  Furosemide 12/19/17 , 36 tabs/caps x 3 refills  Last office visit: 12/18/17  Next appointment: 2/11/19    Medication is being filled for 1 time refill only due to:  Patient needs labs BMP - order already in chart. Patient needs to be seen because it has been more than one year since last visit.   Usha Do RN  [facility]

## 2019-02-11 ENCOUNTER — OFFICE VISIT (OUTPATIENT)
Dept: FAMILY MEDICINE | Facility: CLINIC | Age: 69
End: 2019-02-11
Payer: COMMERCIAL

## 2019-02-11 VITALS
SYSTOLIC BLOOD PRESSURE: 128 MMHG | TEMPERATURE: 98.5 F | OXYGEN SATURATION: 93 % | WEIGHT: 315 LBS | BODY MASS INDEX: 50.79 KG/M2 | HEART RATE: 77 BPM | DIASTOLIC BLOOD PRESSURE: 90 MMHG

## 2019-02-11 DIAGNOSIS — R60.0 BILATERAL LEG EDEMA: ICD-10-CM

## 2019-02-11 DIAGNOSIS — E78.5 HYPERLIPIDEMIA LDL GOAL <70: ICD-10-CM

## 2019-02-11 DIAGNOSIS — I48.91 ATRIAL FIBRILLATION, UNSPECIFIED TYPE (H): ICD-10-CM

## 2019-02-11 DIAGNOSIS — I10 ESSENTIAL HYPERTENSION WITH GOAL BLOOD PRESSURE LESS THAN 140/90: ICD-10-CM

## 2019-02-11 DIAGNOSIS — F32.9 MAJOR DEPRESSION, CHRONIC: ICD-10-CM

## 2019-02-11 DIAGNOSIS — I25.10 CORONARY ARTERY DISEASE INVOLVING NATIVE HEART WITHOUT ANGINA PECTORIS, UNSPECIFIED VESSEL OR LESION TYPE: ICD-10-CM

## 2019-02-11 DIAGNOSIS — R73.09 ELEVATED GLUCOSE: ICD-10-CM

## 2019-02-11 DIAGNOSIS — Z00.00 ANNUAL PHYSICAL EXAM: Primary | ICD-10-CM

## 2019-02-11 DIAGNOSIS — R06.02 SOB (SHORTNESS OF BREATH): ICD-10-CM

## 2019-02-11 DIAGNOSIS — Z23 NEED FOR PNEUMOCOCCAL VACCINATION: ICD-10-CM

## 2019-02-11 DIAGNOSIS — G47.00 INSOMNIA, UNSPECIFIED TYPE: ICD-10-CM

## 2019-02-11 DIAGNOSIS — Z12.5 SCREENING FOR PROSTATE CANCER: ICD-10-CM

## 2019-02-11 LAB
ALBUMIN SERPL-MCNC: 3.4 G/DL (ref 3.2–4.5)
ALBUMIN SERPL-MCNC: 3.7 G/DL (ref 3.4–5)
ALP SERPL-CCNC: 102 U/L (ref 40–150)
ALP SERPL-CCNC: 105 U/L (ref 40–150)
ALT SERPL W P-5'-P-CCNC: 33 U/L (ref 0–70)
ALT SERPL-CCNC: 32 U/L (ref 0–70)
ANION GAP SERPL CALCULATED.3IONS-SCNC: 8 MMOL/L (ref 3–14)
AST SERPL W P-5'-P-CCNC: 23 U/L (ref 0–45)
AST SERPL-CCNC: 34 U/L (ref 0–55)
BILIRUB SERPL-MCNC: 0.8 MG/DL (ref 0.2–1.3)
BILIRUB SERPL-MCNC: 1.1 MG/DL (ref 0.2–1.3)
BUN SERPL-MCNC: 16 MG/DL (ref 7–30)
BUN SERPL-MCNC: 18 MG/DL (ref 7–30)
CALCIUM SERPL-MCNC: 9.3 MG/DL (ref 8.5–10.1)
CALCIUM SERPL-MCNC: 9.6 MG/DL (ref 8.5–10.4)
CHLORIDE SERPL-SCNC: 105 MMOL/L (ref 94–109)
CHLORIDE SERPLBLD-SCNC: 106 MMOL/L (ref 94–109)
CHOLEST SERPL-MCNC: 137 MG/DL
CO2 SERPL-SCNC: 27 MMOL/L (ref 20–32)
CO2 SERPL-SCNC: 29 MMOL/L (ref 20–32)
CREAT SERPL-MCNC: 0.9 MG/DL (ref 0.66–1.25)
CREAT SERPL-MCNC: 1 MG/DL (ref 0.8–1.5)
EGFR CALCULATED (BLACK REFERENCE): 95.9
EGFR CALCULATED (NON BLACK REFERENCE): 79.2
ERYTHROCYTE [DISTWIDTH] IN BLOOD BY AUTOMATED COUNT: 13.2 %
GFR SERPL CREATININE-BSD FRML MDRD: 88 ML/MIN/{1.73_M2}
GLUCOSE SERPL-MCNC: 113 MG/DL (ref 70–99)
GLUCOSE SERPL-MCNC: 128 MG/DL (ref 60–109)
HBA1C MFR BLD: 6.1 % (ref 4.1–5.7)
HCT VFR BLD AUTO: 52.4 % (ref 40–53)
HDLC SERPL-MCNC: 40 MG/DL
HEMOGLOBIN: 17.4 G/DL (ref 13.3–17.7)
LDLC SERPL CALC-MCNC: 64 MG/DL
MCH RBC QN AUTO: 32.1 PG (ref 26.5–35)
MCHC RBC AUTO-ENTMCNC: 33.2 G/DL (ref 32–36)
MCV RBC AUTO: 96.7 FL (ref 78–100)
NONHDLC SERPL-MCNC: 97 MG/DL
PLATELET # BLD AUTO: 184 K/UL (ref 150–450)
POTASSIUM SERPL-SCNC: 4.5 MMOL/L (ref 3.4–5.3)
POTASSIUM SERPL-SCNC: 4.6 MMOL/L (ref 3.4–5.3)
PROT SERPL-MCNC: 7.2 G/DL (ref 6.8–8.8)
PROT SERPL-MCNC: 7.4 G/DL (ref 6.8–8.8)
PSA SERPL-ACNC: 0.77 UG/L (ref 0–4)
RBC # BLD AUTO: 5.42 M/UL (ref 4.4–5.9)
SODIUM SERPL-SCNC: 139 MMOL/L (ref 133–144)
SODIUM SERPL-SCNC: 150 MMOL/L (ref 136–145)
TRIGL SERPL-MCNC: 165 MG/DL
WBC # BLD AUTO: 10.9 K/UL (ref 4–11)

## 2019-02-11 RX ORDER — LOSARTAN POTASSIUM 100 MG/1
100 TABLET ORAL DAILY
Qty: 90 TABLET | Refills: 3 | Status: SHIPPED | OUTPATIENT
Start: 2019-02-11 | End: 2020-02-17

## 2019-02-11 RX ORDER — TRAZODONE HYDROCHLORIDE 100 MG/1
100 TABLET ORAL
Qty: 90 TABLET | Refills: 3 | Status: SHIPPED | OUTPATIENT
Start: 2019-02-11 | End: 2020-02-18

## 2019-02-11 RX ORDER — ATENOLOL 50 MG/1
50 TABLET ORAL DAILY
Qty: 90 TABLET | Refills: 3 | Status: SHIPPED | OUTPATIENT
Start: 2019-02-11 | End: 2020-02-17

## 2019-02-11 RX ORDER — WARFARIN SODIUM 5 MG/1
TABLET ORAL
Qty: 100 TABLET | Refills: 3 | Status: SHIPPED | OUTPATIENT
Start: 2019-02-11 | End: 2020-02-17

## 2019-02-11 RX ORDER — ATENOLOL 100 MG/1
100 TABLET ORAL DAILY
Qty: 90 TABLET | Refills: 3 | Status: SHIPPED | OUTPATIENT
Start: 2019-02-11 | End: 2020-02-17

## 2019-02-11 RX ORDER — FUROSEMIDE 20 MG
TABLET ORAL
Qty: 10 TABLET | Refills: 3 | Status: SHIPPED | OUTPATIENT
Start: 2019-02-11 | End: 2020-01-29

## 2019-02-11 RX ORDER — SIMVASTATIN 20 MG
20 TABLET ORAL AT BEDTIME
Qty: 90 TABLET | Refills: 3 | Status: SHIPPED | OUTPATIENT
Start: 2019-02-11 | End: 2020-02-18

## 2019-02-11 RX ORDER — AMLODIPINE BESYLATE 10 MG/1
10 TABLET ORAL DAILY
Qty: 90 TABLET | Refills: 3 | Status: SHIPPED | OUTPATIENT
Start: 2019-02-11 | End: 2020-02-17

## 2019-02-11 RX ORDER — FLUOXETINE 40 MG/1
40 CAPSULE ORAL DAILY
Qty: 90 CAPSULE | Refills: 3 | Status: SHIPPED | OUTPATIENT
Start: 2019-02-11 | End: 2020-02-17

## 2019-02-11 RX ORDER — ALBUTEROL SULFATE 90 UG/1
2 AEROSOL, METERED RESPIRATORY (INHALATION) EVERY 6 HOURS
Qty: 1 INHALER | Refills: 11 | Status: SHIPPED | OUTPATIENT
Start: 2019-02-11 | End: 2020-02-17

## 2019-02-11 NOTE — TELEPHONE ENCOUNTER
Pt was in appt today with Dr. Patel, and she agrees to all of the following refills for pt for a year.   MD notes pt only using  About 3 tabs of lasix per month.   Naye Allen RN  February 11, 2019 1:46 PM

## 2019-02-11 NOTE — PROGRESS NOTES
"Markie Shepard is here for a general check up.  He is not fasting. He is up to date on eye exams and dental visits. Wears seat belt.--yes Wears bike helmet--na.  Concerns today:    HCM  Markie is a 69 yo male who is here for check up. He is up to date on colonoscopy. Is due for Pneumovax 23 vaccine and PSA level.     Diet: Markie is overweight. He has made concerted effort to lose 20 pounds in the past year. 20 min a day of stepping. elimitated butter, sierra, ketchup, cut down on sweets. Eats meat/fish    Advance directive: not on file  Hearing concerns: none  Fall Risk: none  Independent at home: yes  Safe : yes  Memory concerns: no concerns    COGNITIVE SCREEN  1) Repeat 3 items (Banana, Sunrise, Chair)    2) Animal count: name as many animals that you can in 15 seconds:  13  3) 3 item recall: Recalls 3 objects  Results: 3 items recalled: No COGNITIVE IMPAIRMENT    Mini-CogTM Copyright VALERIE Church. Licensed by the author for use in [facility]; reprinted with permission ([email or web address]). All rights reserved.      Legally blind   Markie is legally blind. He has no vision in the left eye but sees an ophthalmologist for Ilyea injections of his right eye every 3-4 months.     COPD  Markie smokes 1/2 ppd since age 16.  He uses Albuterol inhaler once or twice daily with good relief. He uses a CPAP at night and awakens with \"lots of phlegm\". Albuterol opens his airways and allows him to cough up mucous.   Triggers for coughing/ tightness are exposure to cold air, exercise. He declines referral for screening CT of his chest to check for nodules, lung cancer.    Afib, ASCVD  Markie has history of Afib and ASCVD. He has obstructive coronary artery disease with RCA occlusion on prior cardiac catheterization; chronic atrial fibrillation, well rate controlled and on systemic anticoagulation; sleep apnea and polycythemia felt to be secondary.  He does have a CPAP device.  He, unfortunately, does have a substantial tobacco " abuse history, continuing to smoke.   He had a normalized ejection fraction in 2015 after a previous echo in 2013 demonstrated an ischemic cardiomyopathy. He is doing very well. No PND or orthopnea. No palpitations or chest pain. He is on chronic anticoagulation.     Depression  Markie is on Prozac 40mg daily. He is doing well with that and wishes to continue this medication. Gave up drinking Etoh about 5 yr ago. No current counseling but he has a very good support system.       Health Maintenance   Topic Date Due     ZOSTER IMMUNIZATION (1 of 2) 03/11/2001     OP ANNUAL INR REFERRAL  03/27/2016     PHQ-9 Q3 MONTHS  03/18/2018     PNEUMOCOCCAL IMMUNIZATION 65+ LOW/MEDIUM RISK (2 of 2 - PPSV23) 08/23/2018     INFLUENZA VACCINE (1) 09/01/2018     LIPID MONITORING Q1 YEAR  10/03/2018     FALL RISK ASSESSMENT  12/18/2018     DTAP/TDAP/TD IMMUNIZATION (2 - Td) 09/17/2020     ADVANCE DIRECTIVE PLANNING Q5 YRS  10/17/2021     COLON CANCER SCREEN (SYSTEM ASSIGNED)  10/17/2026     DEPRESSION ACTION PLAN  Completed     AORTIC ANEURYSM SCREENING (SYSTEM ASSIGNED)  Completed     HEPATITIS C SCREENING  Completed     IPV IMMUNIZATION  Aged Out     MENINGITIS IMMUNIZATION  Aged Out         Patient Active Problem List   Diagnosis     CAD (coronary artery disease)     Hyperlipidemia LDL goal <70     Smoker     Hypertension     Atrial fibrillation (H)     Obstructive sleep apnea     Mild major depression (H)     Thiamine deficiency: possible wernicke     Low back pain     Mixed hyperlipidemia     Essential hypertension, benign     Coronary atherosclerosis     Long-term (current) use of anticoagulants [Z79.01]       Past Surgical History:   Procedure Laterality Date     C NONSPECIFIC PROCEDURE  1987    Right Epididymis Removed     C OPEN RX ANKLE DISLOCATN+FIXATN  12/2008    Right Ankle     CARDIAC CATHERIZATION  7/03/2012    totally occluded RCA w/ mild left coronary disease     HEART CATH CORONARY ANGIOGRAM AND RIGHT HEART CATH   2012    RCA occlusion, IMI, no stent     PHACOEMULSIFICATION CLEAR CORNEA WITH STANDARD INTRAOCULAR LENS IMPLANT Right 2015    Procedure: PHACOEMULSIFICATION CLEAR CORNEA WITH STANDARD INTRAOCULAR LENS IMPLANT;  Surgeon: Tal Trinidad MD;  Location:  [facility]     RETINAL REATTACHMENT  , 7/10    Left     TONSILLECTOMY         Family History   Problem Relation Age of Onset     Arthritis Mother      Circulatory Mother         varicose veins     Eye Disorder Mother         detached retina     Thyroid Disease Mother      C.A.D. Father         Quadruple bypass     Gastrointestinal Disease Father         diverticulitis     Alzheimer Disease Father         demntia     Eye Disorder Father         cataract     Alcohol/Drug Sister         alcohol     Depression Sister         post partum depression     Eye Disorder Brother         detached retina     Neurologic Disorder Sister         brain aneurysm     Thyroid Disease Sister        Social  Single, no partner  Son  2015, drug and alcohol, 3 children  Daughter in [place]        HABITS:  Tob: 1/2 ppd since age 16 yo, declines screening CT scan for lung cancer  ETOH: none, stopped 5 yr ago  Calcium: skim milk 2 gal per week  Caffeine: 2 per day  Exercise: stepper 15 min in am,  resistance band in the evening.      MALE ROS  Partner: none, he is   ED: none  Contraception: na  STD concerns: none  BPH: no symptoms    Current Outpatient Medications   Medication Sig Dispense Refill     acetaminophen (TYLENOL) 500 MG tablet Take 500-1,000 mg by mouth every 6 hours as needed for mild pain       albuterol (PROAIR HFA/PROVENTIL HFA/VENTOLIN HFA) 108 (90 Base) MCG/ACT inhaler Inhale 2 puffs into the lungs every 6 hours 3 Inhaler 0     amLODIPine (NORVASC) 10 MG tablet Take 1 tablet (10 mg) by mouth daily Needs visit and labs for further refills 90 tablet 0     atenolol (TENORMIN) 100 MG tablet Take 1 tablet (100 mg) by mouth daily along with 50 mg tablet for total of  150 mg daily. 90 tablet 0     atenolol (TENORMIN) 50 MG tablet Take 1 tablet (50 mg) by mouth daily Along with 100 mg tablet for total of 150 mg daily.  Due for visit for further refills. 90 tablet 0     CENTRUM SILVER OR TABS ONE DAILY 3 MONTHS 1 YEAR     FLUoxetine (PROZAC) 40 MG capsule Take 1 capsule (40 mg) by mouth daily Needs visit and PHQ9 for further refills. 90 capsule 0     fluticasone (FLONASE) 50 MCG/ACT spray Spray 2 sprays into both nostrils daily Hold rx in pharmacy until pt calls for Rx 3 Bottle 3     furosemide (LASIX) 20 MG tablet Take one half tablet by mouth up to 3 times a week as needed for leg edema. 36 tablet 0     losartan (COZAAR) 100 MG tablet Take 1 tablet (100 mg) by mouth daily Hold rx in pharmacy until pt calls for Rx 90 tablet 0     omega-3 fatty acids (FISH OIL) 1200 MG capsule Take 1 capsule by mouth daily       oxymetazoline (AFRIN 12 HOUR) 0.05 % nasal spray Spray 2 sprays into both nostrils At Bedtime       simvastatin (ZOCOR) 20 MG tablet Take 1 tablet (20 mg) by mouth At Bedtime Hold rx in pharmacy until pt calls for Rx 90 tablet 0     traZODone (DESYREL) 100 MG tablet Take 1 tablet (100 mg) by mouth nightly as needed 90 tablet 0     warfarin (COUMADIN) 5 MG tablet Take 7.5 mg on Mondays and Fridays, and 5 mg all other days, or as directed by Coumadin nurse. Hold rx in pharmacy until pt calls for Rx 100 tablet 0     Allergies   Allergen Reactions     Ambien Other (See Comments)     Parasomnia with sleep walking, injuries, delusions.  May have been in DTs at the same time.     Lisinopril Cough     Pt had cough     Seasonal Allergies          ROS  CONSTITUTIONAL:NEGATIVE for fever, chills, Positive concerted 20 pound weight loss in the past year.   INTEGUMENTARY/SKIN: NEGATIVE for worrisome rashes, moles or lesions  EYES: NEGATIVE for vision changes or irritation  Completely blind left eye, legally blind in right  ENT/MOUTH: NEGATIVE for ear, mouth and throat  problems  RESP:NEGATIVE for significant cough or SOB hx of sleep apnea, uses CPAP  CV: NEGATIVE for chest pain, palpitations, LENZ, orthopnea, PND.   GI: NEGATIVE for nausea, abdominal pain, heartburn, or change in bowel habits  :NEGATIVE for frequency, dysuria, or hematuria. Mild BPH symptoms.   MUSCULOSKELETAL:NEGATIVE for significant arthralgias or myalgia  NEURO: NEGATIVE for weakness, dizziness or paresthesias  ENDOCRINE: NEGATIVE for polyuria/dipsia,  temperature intolerance, skin/hair changes  HEME/ALLERGY/IMMUNE: NEGATIVE for bleeding problems, on chronic anticoagulation  PSYCHIATRIC: NEGATIVE for changes in mood or affect, doing well with fluoxetine    EXAM  /90   Pulse 77   Temp 98.5  F (36.9  C) (Oral)   Wt (!) 160.6 kg (354 lb)   SpO2 93%   BMI 50.79 kg/m    GENERAL APPEARANCE: Alert, pleasant, NAD, Morbidly obese  EYES: PERRL, EOMI, conjunctiva clear. HENT: TM normal bilaterally. Nose and mouth without lesions  NECK: no adenopathy, thyroid normal to palpation  RESP: lungs clear to auscultation bilaterally,   Axillae: no palpable axillary masses or adenopathy  CV: Irreg iregular rate and rhythm,  no murmur, no carotid bruits  ABDOMEN: obese, soft, nontender, without HSM or masses. Bowel sounds normal  : not examined  MS: extremities normal- no gross deformities noted, no tender, hot or swollen joints.    SKIN: no suspicious lesions or rashes  NEURO: Normal strength and tone, sensory exam grossly normal, DTR normoreflexive in upper and lower extremities  PSYCH: mentation appears normal. and affect normal/bright.  EXT: no peripheral edema, pedal pulses palpable, +1 pitting edema    Assessment:  (Z00.00) Annual physical exam  (primary encounter diagnosis)  Comment: 67 yo male with legal blindness, hx of cardiovascular issues, smoker, obesity  Plan: CBC with Plt ([facility])        Anticipatory guidance given today regarding diet, exercise and calcium intake, safety. Continue with effort for weight  loss. Recommend smoking cessation.  He declines low dose CT of chest to screen for lung cancer.     (Z12.5) Screening for prostate cancer  Comment: PSA remains low  PSA   Date Value Ref Range Status   02/11/2019 0.77 0 - 4 ug/L Final     Comment:     Assay Method:  Chemiluminescence using Siemens Vista analyzer   Plan: Prostate spec antigen screen            (Z23) Need for pneumococcal vaccination  Comment: due  Plan: Pneumococcal vaccine 23 valent PPSV23          (Pneumovax) [95226], ADMIN: Vaccine, Initial         (61748)        given    (I25.10) Coronary artery disease involving native heart without angina pectoris, unspecified vessel or lesion type  Comment: no current chest pain or palpitations.   Plan: Lipid Profile, Comprehensive Metabolic Panel         ([place]), Comprehensive metabolic panel        Continue current medication. Recommend smoking cessation, he is not ready to quit    (R73.09) Elevated glucose  Comment: Prediabetic  Lab Results   Component Value Date    A1C 6.1 02/11/2019    A1C 5.8 10/17/2016    A1C 5.9 03/18/2015    A1C 5.7 09/17/2014    A1C 5.7 04/09/2014     Plan: Hemoglobin A1c ([place])        Continue effort at weight loss, exercise.    Mali Patel MD  Internal Medicine/Pediatrics

## 2019-02-11 NOTE — NURSING NOTE
68 year old  Chief Complaint   Patient presents with     Physical     no concerns       Blood pressure 128/90, pulse 77, temperature 98.5  F (36.9  C), temperature source Oral, weight (!) 160.6 kg (354 lb), SpO2 93 %. Body mass index is 50.79 kg/m .  Patient Active Problem List   Diagnosis     CAD (coronary artery disease)     Hyperlipidemia LDL goal <70     Smoker     Hypertension     Atrial fibrillation (H)     Obstructive sleep apnea     Mild major depression (H)     Thiamine deficiency: possible wernicke     Low back pain     Mixed hyperlipidemia     Essential hypertension, benign     Coronary atherosclerosis     Long-term (current) use of anticoagulants [Z79.01]       Wt Readings from Last 2 Encounters:   02/11/19 (!) 160.6 kg (354 lb)   01/08/18 (!) 168.7 kg (372 lb)     BP Readings from Last 3 Encounters:   02/11/19 128/90   01/08/18 126/81   12/18/17 114/81         Current Outpatient Medications   Medication     acetaminophen (TYLENOL) 500 MG tablet     albuterol (PROAIR HFA/PROVENTIL HFA/VENTOLIN HFA) 108 (90 Base) MCG/ACT inhaler     amLODIPine (NORVASC) 10 MG tablet     atenolol (TENORMIN) 100 MG tablet     atenolol (TENORMIN) 50 MG tablet     CENTRUM SILVER OR TABS     FLUoxetine (PROZAC) 40 MG capsule     fluticasone (FLONASE) 50 MCG/ACT spray     furosemide (LASIX) 20 MG tablet     losartan (COZAAR) 100 MG tablet     omega-3 fatty acids (FISH OIL) 1200 MG capsule     oxymetazoline (AFRIN 12 HOUR) 0.05 % nasal spray     simvastatin (ZOCOR) 20 MG tablet     traZODone (DESYREL) 100 MG tablet     warfarin (COUMADIN) 5 MG tablet     No current facility-administered medications for this visit.        Social History     Tobacco Use     Smoking status: Current Every Day Smoker     Packs/day: 0.50     Years: 46.00     Pack years: 23.00     Types: Cigarettes     Smokeless tobacco: Never Used     Tobacco comment: 1/2 ppd    Substance Use Topics     Alcohol use: No     Comment: quit drinking heavily 2013     Drug  use: No       Health Maintenance Due   Topic Date Due     ZOSTER IMMUNIZATION (1 of 2) 03/11/2001     OP ANNUAL INR REFERRAL  03/27/2016     PHQ-9 Q3 MONTHS  03/18/2018     PNEUMOCOCCAL IMMUNIZATION 65+ LOW/MEDIUM RISK (2 of 2 - PPSV23) 08/23/2018     LIPID MONITORING Q1 YEAR  10/03/2018     FALL RISK ASSESSMENT  12/18/2018       No results found for: PAP      February 11, 2019 12:54 PM    Screening Questionnaire for Adult Immunization    Are you sick today?   No   Do you have allergies to medications, food, a vaccine component or latex?   No   Have you ever had a serious reaction after receiving a vaccination?   No   Do you have a long-term health problem with heart disease, lung disease, asthma, kidney disease, metabolic disease (e.g. diabetes), anemia, or other blood disorder?   No   Do you have cancer, leukemia, HIV/AIDS, or any other immune system problem?   No   In the past 3 months, have you taken medications that affect  your immune system, such as prednisone, other steroids, or anticancer drugs; drugs for the treatment of rheumatoid arthritis, Crohn s disease, or psoriasis; or have you had radiation treatments?   No   Have you had a seizure, or a brain or other nervous system problem?   No   During the past year, have you received a transfusion of blood or blood     products, or been given immune (gamma) globulin or antiviral drug?   No   For women: Are you pregnant or is there a chance you could become        pregnant during the next month?   No   Have you received any vaccinations in the past 4 weeks?   No     Immunization questionnaire answers were all negative.        Per orders of Dr. Patel, injection of Pneumococcal 23 given by Darlene Thomason. Patient instructed to remain in clinic for 15 minutes afterwards, and to report any adverse reaction to me immediately.       Screening performed by Darlene Thomason on 2/11/2019 at 1:47 PM.

## 2019-02-11 NOTE — LETTER
[facility]  [address]  [facility] [address]  Phone: [phone]  Fax: [phone]       Date: February 12, 2019      Markie Shepard  [address]  [place] [address]        Dear Markie,    Enclosed are your test results.    Your glucose is slightly elevated but you were not fasting. Your kidney and liver tests are normal.     Your test for diabetes, the A1c is stable at 6.1%. This is prediabetes. Continue your efforts at exercise and weight loss.    Your total and LDL cholesterol levels are in target range. I have refilled your medications for a year.    Your PSA level is low, no concerns for prostate cancer.    Please contact me for any concerns or questions.    Sincerely,    Mali Patel MD  Internal Medicine/Pediatrics    Resulted Orders   Prostate spec antigen screen   Result Value Ref Range    PSA 0.77 0 - 4 ug/L      Comment:      Assay Method:  Chemiluminescence using Siemens Vista analyzer   CBC with Plt ([facility])   Result Value Ref Range    WBC 10.9 4.0 - 11.0 K/uL    RBC 5.42 4.40 - 5.90 M/uL    Hemoglobin 17.4 13.3 - 17.7 g/dL    Hematocrit 52.4 40.0 - 53.0 %    MCV 96.7 78.0 - 100.0 fL    MCH 32.1 26.5 - 35.0 pg    MCHC 33.2 32.0 - 36.0 g/dL    Platelets 184.0 150.0 - 450.0 K/uL    RDW 13.2 %   Lipid Profile   Result Value Ref Range    Cholesterol 137 <200 mg/dL    Triglycerides 165 (H) <150 mg/dL      Comment:      Borderline high:  150-199 mg/dl  High:             200-499 mg/dl  Very high:       >499 mg/dl      HDL Cholesterol 40 >39 mg/dL    LDL Cholesterol Calculated 64 <100 mg/dL      Comment:      Desirable:       <100 mg/dl    Non HDL Cholesterol 97 <130 mg/dL   Comprehensive Metabolic Panel ([place])   Result Value Ref Range    Glucose 128.0 (H) 60.0 - 109.0 mg/dL    Urea Nitrogen 16.0 7.0 - 30.0 mg/dL    Calcium 9.6 8.5 - 10.4 mg/dL    Creatinine 1.0 0.8 - 1.5 mg/dL    eGFR Calculated (Non  Black Reference) 79.2 >60.0    eGFR Calculated (Black Reference) 95.9 >60.0    Sodium 150.0 (H) 136.0 - 145.0 mmol/L    Potassium 4.5 3.4 - 5.3 mmol/L    Chloride 106.0 94.0 - 109.0 mmol/L    Carbon Dioxide 29.0 20.0 - 32.0 mmol/L    Albumin 3.4 3.2 - 4.5 g/dL    Alkaline Phosphatase 105.0 40.0 - 150.0 U/L    ALT 32.0 0.0 - 70.0 U/L    AST 34.0 0.0 - 55.0 U/L    Bilirubin Total 1.1 0.2 - 1.3 mg/dL    Protein Total 7.2 6.8 - 8.8 g/dL   Hemoglobin A1c ([place])   Result Value Ref Range    Hemoglobin A1C 6.1 (H) 4.1 - 5.7 %   Comprehensive metabolic panel   Result Value Ref Range    Sodium 139 133 - 144 mmol/L    Potassium 4.6 3.4 - 5.3 mmol/L    Chloride 105 94 - 109 mmol/L    Carbon Dioxide 27 20 - 32 mmol/L    Anion Gap 8 3 - 14 mmol/L    Glucose 113 (H) 70 - 99 mg/dL    Urea Nitrogen 18 7 - 30 mg/dL    Creatinine 0.90 0.66 - 1.25 mg/dL    GFR Estimate 88 >60 mL/min/[1.73_m2]      Comment:      Non  GFR Calc  Starting 12/18/2018, serum creatinine based estimated GFR (eGFR) will be   calculated using the Chronic Kidney Disease Epidemiology Collaboration   (CKD-EPI) equation.      GFR Estimate If Black >90 >60 mL/min/[1.73_m2]      Comment:       GFR Calc  Starting 12/18/2018, serum creatinine based estimated GFR (eGFR) will be   calculated using the Chronic Kidney Disease Epidemiology Collaboration   (CKD-EPI) equation.      Calcium 9.3 8.5 - 10.1 mg/dL    Bilirubin Total 0.8 0.2 - 1.3 mg/dL    Albumin 3.7 3.4 - 5.0 g/dL    Protein Total 7.4 6.8 - 8.8 g/dL    Alkaline Phosphatase 102 40 - 150 U/L    ALT 33 0 - 70 U/L    AST 23 0 - 45 U/L

## 2019-02-25 DIAGNOSIS — J32.9 OTHER SINUSITIS, UNSPECIFIED CHRONICITY: ICD-10-CM

## 2019-02-25 RX ORDER — FLUTICASONE PROPIONATE 50 MCG
2 SPRAY, SUSPENSION (ML) NASAL DAILY
Qty: 3 BOTTLE | Refills: 3 | Status: SHIPPED | OUTPATIENT
Start: 2019-02-25 | End: 2020-04-14

## 2019-02-25 NOTE — TELEPHONE ENCOUNTER
Last Office Visit: 2/11/19  [place] [facility] [place]: none  Medication last refilled: 12/18/17 with 1 year supply    Pres[place] per [facility] [place].  Christina Mckeon RN  02/25/19  4:22 PM

## 2019-03-04 ENCOUNTER — ANTICOAGULATION THERAPY VISIT (OUTPATIENT)
Dept: NURSING | Facility: CLINIC | Age: 69
End: 2019-03-04
Payer: COMMERCIAL

## 2019-03-04 DIAGNOSIS — I48.91 ATRIAL FIBRILLATION, UNSPECIFIED TYPE (H): ICD-10-CM

## 2019-03-04 LAB — INR POINT OF CARE: 2.5 (ref 0.86–1.14)

## 2019-03-04 PROCEDURE — 36416 COLLJ CAPILLARY BLOOD SPEC: CPT

## 2019-03-04 PROCEDURE — 85610 PROTHROMBIN TIME: CPT | Mod: QW

## 2019-03-04 NOTE — PROGRESS NOTES
ANTICOAGULATION FOLLOW-UP CLINIC VISIT    Patient Name:  Markie Shepard  Date:  3/4/2019  Contact Type:  Face to Face    SUBJECTIVE:     Patient Findings     Positives:   Change in diet/appetite (No change in greens, however, pt had been eating quite a bit of organic granola. Pt plans to no longer eat this granola. ), Other complaints (Sleeping patterns have changed, pt is no longer able to stay asleep after bathroom break (about 6 hours into sleep). Takes Trazodone. Naps are helping. F/u with PCP. ), Inflammation (Erratic bowels, intermittent diarrhea. Likely d/t granola (flaxseed? ingredients?). Writer encouraged pt to try a probiotic, declined, plans to continue w/ daily yogurt and banana. )           OBJECTIVE    INR Protime   Date Value Ref Range Status   2019 2.5 (A) 0.86 - 1.14 Final       ASSESSMENT / PLAN  INR assessment THER    Recheck INR In: 6 WEEKS    INR Location Clinic      Anticoagulation Summary  As of 3/4/2019    INR goal:   2.0-3.0   TTR:   95.2 % (2.9 y)   INR used for dosin.5 (3/4/2019)   Warfarin maintenance plan:   7.5 mg (5 mg x 1.5) every Mon, Wed, Fri; 5 mg (5 mg x 1) all other days   Full warfarin instructions:   7.5 mg every Mon, Wed, Fri; 5 mg all other days   Weekly warfarin total:   42.5 mg   No change documented:   Chelsie Encinas RN   Plan last modified:   Chelsie Encinas RN (2019)   Next INR check:   4/15/2019   Priority:   INR   Target end date:       Indications    Long-term (current) use of anticoagulants [Z79.01] [Z79.01]  Atrial fibrillation (H) [I48.91]             Anticoagulation Episode Summary     INR check location:       Preferred lab:       Send INR reminders to:   [facility]    Comments:   PREFERS PETER!      Anticoagulation Care Providers     Provider Role Specialty Phone number    Mali Patel MD [facility] Internal Medicine [phone]            See the Encounter Report to view Anticoagulation Flowsheet and Dosing  Calendar (Go to Encounters tab in chart review, and find the Anticoagulation Therapy Visit)    Patient made aware if signs of clotting (pain, tenderness, swelling, or color change in any extremity) AND/OR bleeding occur (nosebleeds, bleeding gums, bruising, or blood in stool or urine) to notify provider & seek medical attention. If severe symptoms develop, such as major bleeding, chest pain, shortness of breath, fall, trauma or s/s of stroke, patient to call 911 immediately.     Dosage adjustment made based on physician directed care plan.    Chelsie Encinas RN

## 2019-04-15 ENCOUNTER — ANTICOAGULATION THERAPY VISIT (OUTPATIENT)
Dept: NURSING | Facility: CLINIC | Age: 69
End: 2019-04-15
Payer: COMMERCIAL

## 2019-04-15 DIAGNOSIS — I48.91 ATRIAL FIBRILLATION, UNSPECIFIED TYPE (H): ICD-10-CM

## 2019-04-15 LAB — INR POINT OF CARE: 2.6 (ref 0.86–1.14)

## 2019-04-15 PROCEDURE — 36416 COLLJ CAPILLARY BLOOD SPEC: CPT

## 2019-04-15 PROCEDURE — 99207 ZZC NO CHARGE NURSE ONLY: CPT

## 2019-04-15 PROCEDURE — 85610 PROTHROMBIN TIME: CPT | Mod: QW

## 2019-04-15 NOTE — PROGRESS NOTES
"ANTICOAGULATION FOLLOW-UP CLINIC VISIT    Patient Name:  Markie Shepard  Date:  4/15/2019  Contact Type:  Face to Face    SUBJECTIVE:     Patient Findings     Positives:   Change in health (Tooth infection. ), Change in medications (10 day course of Amoxicillin started . Per up-to-date, \"Penicillins may enhance the anticoagulant effect of Vitamin K Antagonists.\")    Comments:   Patient reports improved sleeping habits d/t the use of a leg pillow.            OBJECTIVE    INR Protime   Date Value Ref Range Status   04/15/2019 2.6 (A) 0.86 - 1.14 Final       ASSESSMENT / PLAN  INR assessment THER    Recheck INR In: 5 WEEKS    INR Location Clinic      Anticoagulation Summary  As of 4/15/2019    INR goal:   2.0-3.0   TTR:   95.4 % (3 y)   INR used for dosin.6 (4/15/2019)   Warfarin maintenance plan:   7.5 mg (5 mg x 1.5) every Mon, Wed, Fri; 5 mg (5 mg x 1) all other days   Full warfarin instructions:   7.5 mg every Mon, Wed, Fri; 5 mg all other days   Weekly warfarin total:   42.5 mg   No change documented:   Chelsie Encinas RN   Plan last modified:   Chelsie Encinas RN (2019)   Next INR check:   2019   Priority:   INR   Target end date:       Indications    Long-term (current) use of anticoagulants [Z79.01] [Z79.01]  Atrial fibrillation (H) [I48.91]             Anticoagulation Episode Summary     INR check location:       Preferred lab:       Send INR reminders to:   [facility]    Comments:   PREFERS PETER!      Anticoagulation Care Providers     Provider Role Specialty Phone number    Mali Patel MD [facility] Internal Medicine [phone]            See the Encounter Report to view Anticoagulation Flowsheet and Dosing Calendar (Go to Encounters tab in chart review, and find the Anticoagulation Therapy Visit)    Writer advised patient to increase greens during abx use and monitor closely for any s/s of bleeding.     Patient made aware if signs of clotting (pain, " tenderness, swelling, or color change in any extremity) AND/OR bleeding occur (nosebleeds, bleeding gums, bruising, or blood in stool or urine) to notify provider & seek medical attention. If severe symptoms develop, such as major bleeding, chest pain, shortness of breath, fall, trauma or s/s of stroke, patient to call 911 immediately.     Dosage adjustment made based on physician directed care plan.    Chelsie Encinas RN

## 2019-04-16 DIAGNOSIS — G47.33 OBSTRUCTIVE SLEEP APNEA (ADULT) (PEDIATRIC): Primary | ICD-10-CM

## 2019-05-20 ENCOUNTER — ANTICOAGULATION THERAPY VISIT (OUTPATIENT)
Dept: NURSING | Facility: CLINIC | Age: 69
End: 2019-05-20
Payer: COMMERCIAL

## 2019-05-20 DIAGNOSIS — I48.91 ATRIAL FIBRILLATION, UNSPECIFIED TYPE (H): ICD-10-CM

## 2019-05-20 LAB — INR POINT OF CARE: 2.1 (ref 0.86–1.14)

## 2019-05-20 PROCEDURE — 99207 ZZC NO CHARGE NURSE ONLY: CPT

## 2019-05-20 PROCEDURE — 36416 COLLJ CAPILLARY BLOOD SPEC: CPT

## 2019-05-20 PROCEDURE — 85610 PROTHROMBIN TIME: CPT | Mod: QW

## 2019-05-20 NOTE — PROGRESS NOTES
ANTICOAGULATION FOLLOW-UP CLINIC VISIT    Patient Name:  Markie Shepard  Date:  2019  Contact Type:  Face to Face    SUBJECTIVE:  Patient Findings     Comments:   The patient was assessed for diet, medication, and activity level changes, missed or extra doses, bruising or bleeding, with no problem findings.          OBJECTIVE    INR Protime   Date Value Ref Range Status   2019 2.1 (A) 0.86 - 1.14 Final       ASSESSMENT / PLAN  INR assessment THER    Recheck INR In: 6 WEEKS    INR Location Clinic      Anticoagulation Summary  As of 2019    INR goal:   2.0-3.0   TTR:   95.6 % (3.1 y)   INR used for dosin.1 (2019)   Warfarin maintenance plan:   7.5 mg (5 mg x 1.5) every Mon, Wed, Fri; 5 mg (5 mg x 1) all other days   Full warfarin instructions:   7.5 mg every Mon, Wed, Fri; 5 mg all other days   Weekly warfarin total:   42.5 mg   No change documented:   Chelsie Encinas RN   Plan last modified:   Chelsie Encinas RN (2019)   Next INR check:   2019   Priority:   INR   Target end date:       Indications    Long-term (current) use of anticoagulants [Z79.01] [Z79.01]  Atrial fibrillation (H) [I48.91]             Anticoagulation Episode Summary     INR check location:       Preferred lab:       Send INR reminders to:   [facility]    Comments:   PREFERS PETER!      Anticoagulation Care Providers     Provider Role Specialty Phone number    Mali Patel MD [facility] Internal Medicine [phone]            See the Encounter Report to view Anticoagulation Flowsheet and Dosing Calendar (Go to Encounters tab in chart review, and find the Anticoagulation Therapy Visit)    Patient made aware if signs of clotting (pain, tenderness, swelling, or color change in any extremity) AND/OR bleeding occur (nosebleeds, bleeding gums, bruising, or blood in stool or urine) to notify provider & seek medical attention. If severe symptoms develop, such as major bleeding, chest pain,  shortness of breath, fall, trauma or s/s of stroke, patient to call 911 immediately.     Dosage adjustment made based on physician directed care plan.    Chelsie Encinas RN

## 2019-07-01 ENCOUNTER — ANTICOAGULATION THERAPY VISIT (OUTPATIENT)
Dept: NURSING | Facility: CLINIC | Age: 69
End: 2019-07-01
Payer: COMMERCIAL

## 2019-07-01 DIAGNOSIS — I48.91 ATRIAL FIBRILLATION, UNSPECIFIED TYPE (H): ICD-10-CM

## 2019-07-01 DIAGNOSIS — Z79.01 LONG TERM CURRENT USE OF ANTICOAGULANT THERAPY: ICD-10-CM

## 2019-07-01 LAB — INR POINT OF CARE: 2.1 (ref 0.86–1.14)

## 2019-07-01 PROCEDURE — 99207 ZZC NO CHARGE NURSE ONLY: CPT

## 2019-07-01 PROCEDURE — 36416 COLLJ CAPILLARY BLOOD SPEC: CPT

## 2019-07-01 PROCEDURE — 85610 PROTHROMBIN TIME: CPT | Mod: QW

## 2019-07-01 NOTE — PROGRESS NOTES
ANTICOAGULATION FOLLOW-UP CLINIC VISIT    Patient Name:  Markie Shepard  Date:  2019  Contact Type:  Face to Face    SUBJECTIVE:  Patient Findings     Positives:   Missed doses (?? Patient is unsure if he missed a dose in the last week. )           OBJECTIVE    INR Protime   Date Value Ref Range Status   2019 2.1 (A) 0.86 - 1.14 Final       ASSESSMENT / PLAN  INR assessment THER    Recheck INR In: 6 WEEKS    INR Location Clinic      Anticoagulation Summary  As of 2019    INR goal:   2.0-3.0   TTR:   95.7 % (3.3 y)   INR used for dosin.1 (2019)   Warfarin maintenance plan:   7.5 mg (5 mg x 1.5) every Mon, Wed, Fri; 5 mg (5 mg x 1) all other days   Full warfarin instructions:   7.5 mg every Mon, Wed, Fri; 5 mg all other days   Weekly warfarin total:   42.5 mg   No change documented:   Chelsie Encinas RN   Plan last modified:   Chelsie Encinas RN (2019)   Next INR check:   2019   Priority:   INR   Target end date:       Indications    Long-term (current) use of anticoagulants [Z79.01] [Z79.01]  Atrial fibrillation (H) [I48.91]             Anticoagulation Episode Summary     INR check location:       Preferred lab:       Send INR reminders to:   [facility]    Comments:   PREFERS PETER!      Anticoagulation Care Providers     Provider Role Specialty Phone number    Mali Patel MD [facility] Internal Medicine [phone]            See the Encounter Report to view Anticoagulation Flowsheet and Dosing Calendar (Go to Encounters tab in chart review, and find the Anticoagulation Therapy Visit)    Patient made aware if signs of clotting (pain, tenderness, swelling, or color change in any extremity) AND/OR bleeding occur (nosebleeds, bleeding gums, bruising, or blood in stool or urine) to notify provider & seek medical attention. If severe symptoms develop, such as major bleeding, chest pain, shortness of breath, fall, trauma or s/s of stroke, patient to call 911  immediately.     Dosage adjustment made based on physician directed care plan.    Chelsie Encinas RN

## 2019-07-26 ENCOUNTER — TELEPHONE (OUTPATIENT)
Dept: FAMILY MEDICINE | Facility: CLINIC | Age: 69
End: 2019-07-26

## 2019-07-26 NOTE — TELEPHONE ENCOUNTER
Panel Management Review      Patient has the following on his problem list:   Hypertension   Last three blood pressure readings:  BP Readings from Last 3 Encounters:   02/11/19 128/90   01/08/18 126/81   12/18/17 114/81     Blood pressure: MONITOR    HTN Guidelines:  Less than 140/90      Composite cancer screening  Chart review shows that this patient is due/due soon for the following None  Summary:    Patient is due/failing the following:   BP CHECK    Action needed:   bp follow up when in INR    Type of outreach:    Phone, spoke to patient.  pt state he have not seen Dr. Man, have his own PCP and his BP is undercontrol.     Questions for provider review:    None                                                                                                                                    Viji Cast MA

## 2019-08-12 ENCOUNTER — TELEPHONE (OUTPATIENT)
Dept: FAMILY MEDICINE | Facility: CLINIC | Age: 69
End: 2019-08-12

## 2019-08-12 ENCOUNTER — ANTICOAGULATION THERAPY VISIT (OUTPATIENT)
Dept: NURSING | Facility: CLINIC | Age: 69
End: 2019-08-12
Payer: COMMERCIAL

## 2019-08-12 DIAGNOSIS — Z79.01 LONG TERM CURRENT USE OF ANTICOAGULANT THERAPY: ICD-10-CM

## 2019-08-12 DIAGNOSIS — I48.91 ATRIAL FIBRILLATION, UNSPECIFIED TYPE (H): ICD-10-CM

## 2019-08-12 DIAGNOSIS — I48.91 ATRIAL FIBRILLATION, UNSPECIFIED TYPE (H): Primary | ICD-10-CM

## 2019-08-12 LAB — INR POINT OF CARE: 2.5 (ref 0.86–1.14)

## 2019-08-12 PROCEDURE — 99207 ZZC NO CHARGE NURSE ONLY: CPT

## 2019-08-12 PROCEDURE — 85610 PROTHROMBIN TIME: CPT | Mod: QW

## 2019-08-12 PROCEDURE — 36416 COLLJ CAPILLARY BLOOD SPEC: CPT

## 2019-08-12 NOTE — TELEPHONE ENCOUNTER
Dr Patel,   Annual order for INR clinic is due. Please complete order or advise.   Goal has been 2-3 for Afib. Compliance: Great. INR: Stable.  Ok to close encounter when complete, unless changes to therapy are advised.  Chelsie Encinas, BSN, RN

## 2019-08-12 NOTE — PROGRESS NOTES
ANTICOAGULATION FOLLOW-UP CLINIC VISIT    Patient Name:  Markie Shepard  Date:  2019  Contact Type:  Face to Face    SUBJECTIVE:  Patient Findings     Comments:   The patient was assessed for diet, medication, and activity level changes, missed or extra doses, bruising or bleeding, with no problem findings.          OBJECTIVE    INR Protime   Date Value Ref Range Status   2019 2.5 (A) 0.86 - 1.14 Final       ASSESSMENT / PLAN  INR assessment THER    Recheck INR In: 6 WEEKS    INR Location Clinic      Anticoagulation Summary  As of 2019    INR goal:   2.0-3.0   TTR:   95.9 % (3.4 y)   INR used for dosin.5 (2019)   Warfarin maintenance plan:   7.5 mg (5 mg x 1.5) every Mon, Wed, Fri; 5 mg (5 mg x 1) all other days   Full warfarin instructions:   7.5 mg every Mon, Wed, Fri; 5 mg all other days   Weekly warfarin total:   42.5 mg   No change documented:   Chelsie Encinas RN   Plan last modified:   Chelsie Encinas RN (2019)   Next INR check:   2019   Priority:   INR   Target end date:       Indications    Long-term (current) use of anticoagulants [Z79.01] [Z79.01]  Atrial fibrillation (H) [I48.91]             Anticoagulation Episode Summary     INR check location:       Preferred lab:       Send INR reminders to:   [facility]    Comments:   PREFERS PETER!      Anticoagulation Care Providers     Provider Role Specialty Phone number    Mali Patel MD [facility] Internal Medicine [phone]            See the Encounter Report to view Anticoagulation Flowsheet and Dosing Calendar (Go to Encounters tab in chart review, and find the Anticoagulation Therapy Visit)    Patient made aware if signs of clotting (pain, tenderness, swelling, or color change in any extremity) AND/OR bleeding occur (nosebleeds, bleeding gums, bruising, or blood in stool or urine) to notify provider & seek medical attention. If severe symptoms develop, such as major bleeding, chest pain,  shortness of breath, fall, trauma or s/s of stroke, patient to call 911 immediately.     Dosage adjustment made based on physician directed care plan.    Chelsie Encinas RN

## 2019-08-13 NOTE — TELEPHONE ENCOUNTER
Please complete yearly INR orders for Markie   Goal INR is 2-3 for A fib   Thanks     Mali     Order placed. Christina Mckeon RN

## 2019-09-23 ENCOUNTER — ANTICOAGULATION THERAPY VISIT (OUTPATIENT)
Dept: NURSING | Facility: CLINIC | Age: 69
End: 2019-09-23
Payer: COMMERCIAL

## 2019-09-23 DIAGNOSIS — Z79.01 LONG TERM CURRENT USE OF ANTICOAGULANT THERAPY: ICD-10-CM

## 2019-09-23 DIAGNOSIS — I48.91 ATRIAL FIBRILLATION, UNSPECIFIED TYPE (H): ICD-10-CM

## 2019-09-23 LAB — INR POINT OF CARE: 2.5 (ref 0.86–1.14)

## 2019-09-23 PROCEDURE — 36416 COLLJ CAPILLARY BLOOD SPEC: CPT

## 2019-09-23 PROCEDURE — 99207 ZZC NO CHARGE NURSE ONLY: CPT

## 2019-09-23 PROCEDURE — 85610 PROTHROMBIN TIME: CPT | Mod: QW

## 2019-09-23 NOTE — PROGRESS NOTES
ANTICOAGULATION FOLLOW-UP CLINIC VISIT    Patient Name:  Markie Shepard  Date:  2019  Contact Type:  Face to Face    SUBJECTIVE:  Patient Findings     Comments:   The patient was assessed for diet, medication, and activity level changes, missed or extra doses, bruising or bleeding, with no problem findings.           OBJECTIVE    INR Protime   Date Value Ref Range Status   2019 2.5 (A) 0.86 - 1.14 Final       ASSESSMENT / PLAN  INR assessment THER    Recheck INR In: 6 WEEKS    INR Location Clinic      Anticoagulation Summary  As of 2019    INR goal:   2.0-3.0   TTR:   96.0 % (3.5 y)   INR used for dosin.5 (2019)   Warfarin maintenance plan:   7.5 mg (5 mg x 1.5) every Mon, Wed, Fri; 5 mg (5 mg x 1) all other days   Full warfarin instructions:   7.5 mg every Mon, Wed, Fri; 5 mg all other days   Weekly warfarin total:   42.5 mg   No change documented:   Chelsie Encinas RN   Plan last modified:   Chelsie Encinas RN (2019)   Next INR check:   2019   Priority:   INR   Target end date:       Indications    Long-term (current) use of anticoagulants [Z79.01] [Z79.01]  Atrial fibrillation (H) [I48.91]             Anticoagulation Episode Summary     INR check location:       Preferred lab:       Send INR reminders to:   [facility]    Comments:   PREFERS PETER!      Anticoagulation Care Providers     Provider Role Specialty Phone number    Mali Patel MD [facility] Internal Medicine [phone]            See the Encounter Report to view Anticoagulation Flowsheet and Dosing Calendar (Go to Encounters tab in chart review, and find the Anticoagulation Therapy Visit)    Patient made aware if signs of clotting (pain, tenderness, swelling, or color change in any extremity) AND/OR bleeding occur (nosebleeds, bleeding gums, bruising, or blood in stool or urine) to notify provider & seek medical attention. If severe symptoms develop, such as major bleeding, chest pain,  shortness of breath, fall, trauma or s/s of stroke, patient to call 911 immediately.     Dosage adjustment made based on physician directed care plan.    Chelsie Encinas RN

## 2019-11-04 ENCOUNTER — ANTICOAGULATION THERAPY VISIT (OUTPATIENT)
Dept: NURSING | Facility: CLINIC | Age: 69
End: 2019-11-04
Payer: COMMERCIAL

## 2019-11-04 DIAGNOSIS — I48.91 ATRIAL FIBRILLATION, UNSPECIFIED TYPE (H): ICD-10-CM

## 2019-11-04 DIAGNOSIS — Z79.01 LONG TERM CURRENT USE OF ANTICOAGULANT THERAPY: ICD-10-CM

## 2019-11-04 LAB — INR POINT OF CARE: 2.9 (ref 0.86–1.14)

## 2019-11-04 PROCEDURE — 36416 COLLJ CAPILLARY BLOOD SPEC: CPT

## 2019-11-04 PROCEDURE — 99207 ZZC NO CHARGE NURSE ONLY: CPT

## 2019-11-04 PROCEDURE — 85610 PROTHROMBIN TIME: CPT | Mod: QW

## 2019-11-04 NOTE — PROGRESS NOTES
ANTICOAGULATION FOLLOW-UP CLINIC VISIT    Patient Name:  Markie Shepard  Date:  2019  Contact Type:  Face to Face    SUBJECTIVE:  Patient Findings     Comments:   Patient stopped smoking. Using patch.        Clinical Outcomes     Comments:   Patient stopped smoking. Using patch.           OBJECTIVE    INR Protime   Date Value Ref Range Status   2019 2.9 (A) 0.86 - 1.14 Final       ASSESSMENT / PLAN  INR assessment THER    Recheck INR In: 6 WEEKS    INR Location Clinic      Anticoagulation Summary  As of 2019    INR goal:   2.0-3.0   TTR:   96.1 % (3.6 y)   INR used for dosin.9 (2019)   Warfarin maintenance plan:   7.5 mg (5 mg x 1.5) every Mon, Wed, Fri; 5 mg (5 mg x 1) all other days   Full warfarin instructions:   7.5 mg every Mon, Wed, Fri; 5 mg all other days   Weekly warfarin total:   42.5 mg   Plan last modified:   Chelsie Encinas RN (2019)   Next INR check:   2019   Priority:   INR   Target end date:       Indications    Long-term (current) use of anticoagulants [Z79.01] [Z79.01]  Atrial fibrillation (H) [I48.91]             Anticoagulation Episode Summary     INR check location:       Preferred lab:       Send INR reminders to:   DAYLIN NEIL    Comments:   PREFERS PETER!      Anticoagulation Care Providers     Provider Role Specialty Phone number    Mali Patel MD [facility] [phone]            See the Encounter Report to view Anticoagulation Flowsheet and Dosing Calendar (Go to Encounters tab in chart review, and find the Anticoagulation Therapy Visit)    Patient to incorporate a extra serving of greens into diet to help keep INR closer to 2.5-especially since patient stopped smoking.     Patient aware if signs of clotting (pain, tenderness, swelling, color change in leg or arm, SOB) and bleeding occur (blood in stool, urine, large bruising, bleeding gums, nosebleeds) to have INR check sooner. If sx severe report to ER or  concerned for stroke call 911. If general questions or concerns arise, call clinic.         Dolly Grier RN

## 2019-12-09 ENCOUNTER — TELEPHONE (OUTPATIENT)
Dept: FAMILY MEDICINE | Facility: CLINIC | Age: 69
End: 2019-12-09

## 2019-12-09 ASSESSMENT — ANXIETY QUESTIONNAIRES
6. BECOMING EASILY ANNOYED OR IRRITABLE: SEVERAL DAYS
7. FEELING AFRAID AS IF SOMETHING AWFUL MIGHT HAPPEN: NOT AT ALL
3. WORRYING TOO MUCH ABOUT DIFFERENT THINGS: NOT AT ALL
5. BEING SO RESTLESS THAT IT IS HARD TO SIT STILL: NOT AT ALL
GAD7 TOTAL SCORE: 1
1. FEELING NERVOUS, ANXIOUS, OR ON EDGE: NOT AT ALL
IF YOU CHECKED OFF ANY PROBLEMS ON THIS QUESTIONNAIRE, HOW DIFFICULT HAVE THESE PROBLEMS MADE IT FOR YOU TO DO YOUR WORK, TAKE CARE OF THINGS AT HOME, OR GET ALONG WITH OTHER PEOPLE: NOT DIFFICULT AT ALL
2. NOT BEING ABLE TO STOP OR CONTROL WORRYING: NOT AT ALL

## 2019-12-09 ASSESSMENT — PATIENT HEALTH QUESTIONNAIRE - PHQ9
SUM OF ALL RESPONSES TO PHQ QUESTIONS 1-9: 1
5. POOR APPETITE OR OVEREATING: NOT AT ALL

## 2019-12-09 NOTE — TELEPHONE ENCOUNTER
Panel Management Review      Patient has the following on his problem list: None      Composite cancer screening  Chart review shows that this patient is due/due soon for the following None  Summary:    Patient is due/failing the following:   Flu shot, BP CHECK and PHQ9    Action needed:   Patient needs nurse only appointment.    Type of outreach:    Phone, left message for patient to call back.     Questions for provider review:    None                                                                                                                                    Viji Cast MA       Chart routed to Care Team .

## 2019-12-09 NOTE — TELEPHONE ENCOUNTER
Panel Management Review      Patient has the following on his problem list:     Depression / Dysthymia review    Measure:  Needs PHQ-9 score of 4 or less during index window.  Administer PHQ-9 and if score is 5 or more, send encounter to provider for next steps.    5 - 7 month window range:     PHQ-9 SCORE 3/18/2015 12/18/2017 12/9/2019   PHQ-9 Total Score 3 - -   PHQ-9 Total Score - 6 1       If PHQ-9 recheck is 5 or more, route to provider for next steps.    Patient is due for:  PHQ9    Hypertension   Last three blood pressure readings:  BP Readings from Last 3 Encounters:   02/11/19 128/90   01/08/18 126/81   12/18/17 114/81     Blood pressure: FAILED    HTN Guidelines:  Less than 140/90      Composite cancer screening  Chart review shows that this patient is due/due soon for the following None  Summary:    Patient is due/failing the following:   BP CHECK and PHQ9    Action needed:   Patient needs to do PHQ9.    Type of outreach:    Phone, spoke to patient.  did phq-9 and pt declined bp check, states he goes check at his eye dr    Questions for provider review:    None                                                                                                                                    Sofia Finley [facility]       Chart routed to Care Team .

## 2019-12-10 ASSESSMENT — ANXIETY QUESTIONNAIRES: GAD7 TOTAL SCORE: 1

## 2019-12-16 ENCOUNTER — ANTICOAGULATION THERAPY VISIT (OUTPATIENT)
Dept: NURSING | Facility: CLINIC | Age: 69
End: 2019-12-16
Payer: COMMERCIAL

## 2019-12-16 VITALS — HEART RATE: 83 BPM | DIASTOLIC BLOOD PRESSURE: 83 MMHG | SYSTOLIC BLOOD PRESSURE: 126 MMHG

## 2019-12-16 DIAGNOSIS — Z79.01 LONG TERM CURRENT USE OF ANTICOAGULANT THERAPY: ICD-10-CM

## 2019-12-16 DIAGNOSIS — I48.91 ATRIAL FIBRILLATION, UNSPECIFIED TYPE (H): ICD-10-CM

## 2019-12-16 LAB — INR POINT OF CARE: 2.6 (ref 0.86–1.14)

## 2019-12-16 PROCEDURE — 36416 COLLJ CAPILLARY BLOOD SPEC: CPT

## 2019-12-16 PROCEDURE — 99207 ZZC NO CHARGE NURSE ONLY: CPT

## 2019-12-16 PROCEDURE — 85610 PROTHROMBIN TIME: CPT | Mod: QW

## 2020-01-07 ENCOUNTER — TELEPHONE (OUTPATIENT)
Dept: FAMILY MEDICINE | Facility: CLINIC | Age: 70
End: 2020-01-07

## 2020-01-07 NOTE — TELEPHONE ENCOUNTER
Called the patient and LVM - the albuterol inhaler is listed in the order so the pharmacy can fill any brand/type that is covered by his insurance. Advised he call his pharmacy and work with them with the current Rx that is in place. Congratulations on quitting smoking!    Patient quit smoking 9/29/19, chart updated to now show he is Former Smoker.     Christina Mckeon RN  01/07/20  12:33 PM

## 2020-01-07 NOTE — TELEPHONE ENCOUNTER
Health Call Center    Phone Message    May a detailed message be left on voicemail: yes    Reason for Call: Medication Question or concern regarding medication   Prescription Clarification  Name of Medication: ventolin   Prescribing Provider: Jorge   Pharmacy: ?   What on the order needs clarification? The pt spoke with [employer] who state Ventolin is no longer approved. The pt is asking for alternative options. Then he will call [employer] back to see if they are covered.   Also, the pt quit smoking 9.29.19. Thanks          Action Taken: Message routed to:  [place] [facility]: [place]

## 2020-01-27 ENCOUNTER — ANTICOAGULATION THERAPY VISIT (OUTPATIENT)
Dept: NURSING | Facility: CLINIC | Age: 70
End: 2020-01-27
Payer: COMMERCIAL

## 2020-01-27 ENCOUNTER — ALLIED HEALTH/NURSE VISIT (OUTPATIENT)
Dept: NURSING | Facility: CLINIC | Age: 70
End: 2020-01-27
Payer: COMMERCIAL

## 2020-01-27 VITALS — DIASTOLIC BLOOD PRESSURE: 82 MMHG | SYSTOLIC BLOOD PRESSURE: 130 MMHG | HEART RATE: 86 BPM | OXYGEN SATURATION: 95 %

## 2020-01-27 DIAGNOSIS — I48.91 ATRIAL FIBRILLATION, UNSPECIFIED TYPE (H): ICD-10-CM

## 2020-01-27 DIAGNOSIS — Z79.01 LONG TERM CURRENT USE OF ANTICOAGULANT THERAPY: ICD-10-CM

## 2020-01-27 DIAGNOSIS — Z01.30 BP CHECK: Primary | ICD-10-CM

## 2020-01-27 LAB — INR POINT OF CARE: 2.6 (ref 0.86–1.14)

## 2020-01-27 PROCEDURE — 99207 ZZC NO CHARGE NURSE ONLY: CPT

## 2020-01-27 PROCEDURE — 85610 PROTHROMBIN TIME: CPT | Mod: QW

## 2020-01-27 PROCEDURE — 36416 COLLJ CAPILLARY BLOOD SPEC: CPT

## 2020-01-27 NOTE — NURSING NOTE
Markie Shepard is a 69 year old patient who comes in today for a Blood Pressure check.  Initial BP:  /82 (BP Location: Left arm, Patient Position: Sitting, Cuff Size: Adult Large)   Pulse 86   SpO2 95%      86  Disposition: follow-up as previously indicated by provider    Wen Rodriguez CMA on 1/27/2020 at 11:00 AM

## 2020-01-27 NOTE — PROGRESS NOTES
ANTICOAGULATION FOLLOW-UP CLINIC VISIT    Patient Name:  Markie Shepard  Date:  2020  Contact Type:  Face to Face    SUBJECTIVE:  Patient Findings     Comments:   The patient was assessed for diet, medication, and activity level changes, missed or extra doses, bruising or bleeding, with no problem findings.             OBJECTIVE    INR Protime   Date Value Ref Range Status   2020 2.6 (A) 0.86 - 1.14 Final       ASSESSMENT / PLAN  INR assessment THER    Recheck INR In: 6 WEEKS    INR Location Clinic      Anticoagulation Summary  As of 2020    INR goal:   2.0-3.0   TTR:   100.0 % (1 y)   INR used for dosin.6 (2020)   Warfarin maintenance plan:   7.5 mg (5 mg x 1.5) every Mon, Wed, Fri; 5 mg (5 mg x 1) all other days   Full warfarin instructions:   7.5 mg every Mon, Wed, Fri; 5 mg all other days   Weekly warfarin total:   42.5 mg   No change documented:   Chelsie Encinas RN   Plan last modified:   Chelsie Encinas RN (2019)   Next INR check:   3/9/2020   Priority:   INR   Target end date:       Indications    Long-term (current) use of anticoagulants [Z79.01] [Z79.01]  Atrial fibrillation (H) [I48.91]             Anticoagulation Episode Summary     INR check location:       Preferred lab:       Send INR reminders to:   DAYLIN NEIL    Comments:   PREFERS PETER! Quit Smoking .      Anticoagulation Care Providers     Provider Role Specialty Phone number    Mali Patel MD [facility] [phone]            See the Encounter Report to view Anticoagulation Flowsheet and Dosing Calendar (Go to Encounters tab in chart review, and find the Anticoagulation Therapy Visit)    Patient made aware if signs of clotting (pain, tenderness, swelling, or color change in any extremity) AND/OR bleeding occur (nosebleeds, bleeding gums, bruising, or blood in stool or urine) to notify provider & seek medical attention. If severe symptoms develop, such as major  bleeding, chest pain, shortness of breath, fall, trauma or s/s of stroke, patient to call 911 immediately.     Dosage adjustment made based on physician directed care plan.    Chelsie Encinas RN

## 2020-01-29 DIAGNOSIS — R60.0 BILATERAL LEG EDEMA: ICD-10-CM

## 2020-01-29 RX ORDER — FUROSEMIDE 20 MG
TABLET ORAL
Qty: 10 TABLET | Refills: 0 | Status: SHIPPED | OUTPATIENT
Start: 2020-01-29 | End: 2020-02-17

## 2020-01-29 NOTE — TELEPHONE ENCOUNTER
Last time prescribed: 2/11/19 , 10 tabs x 3 refills  Last office visit: 2/11/19  Next appointment: No future appointments    Medication is being filled for 1 time refill only due to:  Patient needs to be seen because it has been more than one year since last visit.   Usha Do RN  [facility]

## 2020-02-10 ENCOUNTER — TRANSFERRED RECORDS (OUTPATIENT)
Dept: HEALTH INFORMATION MANAGEMENT | Facility: CLINIC | Age: 70
End: 2020-02-10

## 2020-02-17 ENCOUNTER — OFFICE VISIT (OUTPATIENT)
Dept: FAMILY MEDICINE | Facility: CLINIC | Age: 70
End: 2020-02-17
Payer: COMMERCIAL

## 2020-02-17 VITALS
HEIGHT: 70 IN | BODY MASS INDEX: 45.1 KG/M2 | DIASTOLIC BLOOD PRESSURE: 85 MMHG | WEIGHT: 315 LBS | HEART RATE: 85 BPM | TEMPERATURE: 98 F | RESPIRATION RATE: 17 BRPM | SYSTOLIC BLOOD PRESSURE: 139 MMHG | OXYGEN SATURATION: 93 %

## 2020-02-17 DIAGNOSIS — R06.02 SOB (SHORTNESS OF BREATH): ICD-10-CM

## 2020-02-17 DIAGNOSIS — I48.91 ATRIAL FIBRILLATION, UNSPECIFIED TYPE (H): ICD-10-CM

## 2020-02-17 DIAGNOSIS — E78.5 HYPERLIPIDEMIA LDL GOAL <70: ICD-10-CM

## 2020-02-17 DIAGNOSIS — R60.0 BILATERAL LEG EDEMA: ICD-10-CM

## 2020-02-17 DIAGNOSIS — Z23 NEED FOR TD VACCINE: ICD-10-CM

## 2020-02-17 DIAGNOSIS — I10 ESSENTIAL HYPERTENSION WITH GOAL BLOOD PRESSURE LESS THAN 140/90: ICD-10-CM

## 2020-02-17 DIAGNOSIS — F32.9 MAJOR DEPRESSION, CHRONIC: ICD-10-CM

## 2020-02-17 DIAGNOSIS — Z00.00 ENCOUNTER FOR PREVENTATIVE ADULT HEALTH CARE EXAMINATION: Primary | ICD-10-CM

## 2020-02-17 DIAGNOSIS — R73.03 PREDIABETES: ICD-10-CM

## 2020-02-17 LAB
ALBUMIN SERPL-MCNC: 4 G/DL (ref 3.2–4.5)
ALP SERPL-CCNC: 93 U/L (ref 40–150)
ALT SERPL-CCNC: 27 U/L (ref 0–70)
AST SERPL-CCNC: 34 U/L (ref 0–55)
BILIRUB SERPL-MCNC: 1 MG/DL (ref 0.2–1.3)
BUN SERPL-MCNC: 15 MG/DL (ref 7–30)
CALCIUM SERPL-MCNC: 10.1 MG/DL (ref 8.5–10.4)
CHLORIDE SERPLBLD-SCNC: 106 MMOL/L (ref 94–109)
CHOLEST SERPL-MCNC: 152 MG/DL (ref 0–200)
CHOLEST/HDLC SERPL: 2.8 {RATIO} (ref 0–5)
CO2 SERPL-SCNC: 27 MMOL/L (ref 20–32)
CREAT SERPL-MCNC: 0.8 MG/DL (ref 0.8–1.5)
EGFR CALCULATED (BLACK REFERENCE): 123.6
EGFR CALCULATED (NON BLACK REFERENCE): 102.2
ERYTHROCYTE [DISTWIDTH] IN BLOOD BY AUTOMATED COUNT: 13.3 %
FASTING SPECIMEN: NO
GLUCOSE SERPL-MCNC: 112 MG/DL (ref 60–99)
HBA1C MFR BLD: 6.1 % (ref 4.1–5.7)
HCT VFR BLD AUTO: 51.6 % (ref 40–53)
HDLC SERPL-MCNC: 55 MG/DL
HEMOGLOBIN: 16.7 G/DL (ref 13.3–17.7)
LDLC SERPL CALC-MCNC: 74 MG/DL (ref 0–129)
MCH RBC QN AUTO: 31.3 PG (ref 26.5–35)
MCHC RBC AUTO-ENTMCNC: 32.4 G/DL (ref 32–36)
MCV RBC AUTO: 96.8 FL (ref 78–100)
PLATELET # BLD AUTO: 176 K/UL (ref 150–450)
POTASSIUM SERPL-SCNC: 4.2 MMOL/L (ref 3.4–5.3)
PROT SERPL-MCNC: 7.3 G/DL (ref 6.8–8.8)
RBC # BLD AUTO: 5.33 M/UL (ref 4.4–5.9)
SODIUM SERPL-SCNC: 141 MMOL/L (ref 137.3–146.3)
TRIGL SERPL-MCNC: 116 MG/DL (ref 0–150)
VLDL-CHOLESTEROL: 23 (ref 7–32)
WBC # BLD AUTO: 10.5 K/UL (ref 4–11)

## 2020-02-17 RX ORDER — AMLODIPINE BESYLATE 10 MG/1
10 TABLET ORAL DAILY
Qty: 90 TABLET | Refills: 3 | Status: SHIPPED | OUTPATIENT
Start: 2020-02-17 | End: 2021-02-26

## 2020-02-17 RX ORDER — LOSARTAN POTASSIUM 100 MG/1
100 TABLET ORAL DAILY
Qty: 90 TABLET | Refills: 3 | Status: SHIPPED | OUTPATIENT
Start: 2020-02-17 | End: 2021-02-26

## 2020-02-17 RX ORDER — FUROSEMIDE 20 MG
TABLET ORAL
Qty: 36 TABLET | Refills: 3 | Status: SHIPPED | OUTPATIENT
Start: 2020-02-17 | End: 2021-02-26

## 2020-02-17 RX ORDER — WARFARIN SODIUM 5 MG/1
TABLET ORAL
Qty: 100 TABLET | Refills: 3 | Status: SHIPPED | OUTPATIENT
Start: 2020-02-17 | End: 2021-02-25

## 2020-02-17 RX ORDER — FLUOXETINE 40 MG/1
40 CAPSULE ORAL DAILY
Qty: 90 CAPSULE | Refills: 3 | Status: SHIPPED | OUTPATIENT
Start: 2020-02-17 | End: 2021-02-26

## 2020-02-17 RX ORDER — ATENOLOL 100 MG/1
100 TABLET ORAL DAILY
Qty: 90 TABLET | Refills: 3 | Status: SHIPPED | OUTPATIENT
Start: 2020-02-17 | End: 2021-02-26

## 2020-02-17 RX ORDER — ATENOLOL 50 MG/1
50 TABLET ORAL DAILY
Qty: 90 TABLET | Refills: 3 | Status: SHIPPED | OUTPATIENT
Start: 2020-02-17 | End: 2021-02-26

## 2020-02-17 ASSESSMENT — MIFFLIN-ST. JEOR: SCORE: 2412.6

## 2020-02-17 NOTE — NURSING NOTE
"69 year old  Chief Complaint   Patient presents with     Physical     pt needs medication refills       Blood pressure 139/85, pulse 85, temperature 98  F (36.7  C), temperature source Oral, resp. rate 17, height 1.778 m (5' 10\"), weight (!) 163.6 kg (360 lb 12 oz), SpO2 93 %. Body mass index is 51.76 kg/m .  Patient Active Problem List   Diagnosis     CAD (coronary artery disease)     Hyperlipidemia LDL goal <70     Smoker     Hypertension     Atrial fibrillation (H)     Obstructive sleep apnea     Mild major depression (H)     Thiamine deficiency: possible wernicke     Low back pain     Mixed hyperlipidemia     Essential hypertension, benign     Coronary atherosclerosis     Long-term (current) use of anticoagulants [Z79.01]       Wt Readings from Last 2 Encounters:   20 (!) 163.6 kg (360 lb 12 oz)   19 (!) 160.6 kg (354 lb)     BP Readings from Last 3 Encounters:   20 139/85   20 130/82   19 126/83         Current Outpatient Medications   Medication     acetaminophen (TYLENOL) 500 MG tablet     albuterol (PROAIR HFA/PROVENTIL HFA/VENTOLIN HFA) 108 (90 Base) MCG/ACT inhaler     amLODIPine (NORVASC) 10 MG tablet     atenolol (TENORMIN) 100 MG tablet     atenolol (TENORMIN) 50 MG tablet     FLUoxetine (PROZAC) 40 MG capsule     furosemide (LASIX) 20 MG tablet     losartan (COZAAR) 100 MG tablet     omega-3 fatty acids (FISH OIL) 1200 MG capsule     oxymetazoline (AFRIN 12 HOUR) 0.05 % nasal spray     simvastatin (ZOCOR) 20 MG tablet     traZODone (DESYREL) 100 MG tablet     warfarin (COUMADIN) 5 MG tablet     CENTRUM SILVER OR TABS     fluticasone (FLONASE) 50 MCG/ACT nasal spray     No current facility-administered medications for this visit.        Social History     Tobacco Use     Smoking status: Former Smoker     Packs/day: 0.50     Years: 46.00     Pack years: 23.00     Types: Cigarettes     Last attempt to quit: 2019     Years since quittin.3     Smokeless tobacco: Never " Used     Tobacco comment: 1/2 ppd    Substance Use Topics     Alcohol use: No     Comment: quit drinking heavily 2013     Drug use: No       Health Maintenance Due   Topic Date Due     OP ANNUAL INR REFERRAL  03/27/2016     LIPID  02/11/2020     FALL RISK ASSESSMENT  02/11/2020     ZOSTER IMMUNIZATION (2 of 2) 08/01/2019     MEDICARE ANNUAL WELLNESS VISIT  02/11/2020     PHQ-9  03/09/2020       No results found for: PAP      February 17, 2020 3:08 PM

## 2020-02-17 NOTE — LETTER
[facility]  [address]  [facility] [address]  Phone: [phone]  Fax: [phone]       Date: February 18, 2020      Markie Shepard  [address]  [place] [address]        Dear Markie,    Enclosed are you lab results.   Your cholesterol profile looks perfect, so no changes in your medication. However, remember to schedule an appointment with the cardiologist.    Your glucose was a bit elevated at 112. The A1c has not changed. You still have prediabetes. Do the best you can at cutting back on starchy foods and sweets.     Your blood count profile looks perfect, there is no anemia or other abnormal findings.    I'd like you to return in 6 months.    Sincerely,    Mali Patel MD  Internal Medicine/Pediatrics    Resulted Orders   Hemoglobin A1c ([place])   Result Value Ref Range    Hemoglobin A1C 6.1 (H) 4.1 - 5.7 %   Lipid Panel ([place])   Result Value Ref Range    FASTING SPECIMEN NO     Cholesterol 152.0 0.0 - 200.0    HDL Cholesterol 55.0 >40.0    Triglycerides 116.0 0.0 - 150.0    Cholesterol/HDL Ratio 2.8 0.0 - 5.0    LDL Cholesterol Direct 74.0 0.0 - 129.0    VLDL-Cholesterol 23.0 7.0 - 32.0   Comprehensive Metabolic Panel ([place])   Result Value Ref Range    Glucose 112.0 (H) 60.0 - 99.0 mg/dL    Urea Nitrogen 15.0 7.0 - 30.0 mg/dL    Calcium 10.1 8.5 - 10.4 mg/dL    Creatinine 0.8 0.8 - 1.5 mg/dL    eGFR Calculated (Non Black Reference) 102.2 >60.0    eGFR Calculated (Black Reference) 123.6 >60.0    Sodium 141.0 137.3 - 146.3 mmol/L    Potassium 4.2 3.4 - 5.3 mmol/L    Chloride 106.0 94.0 - 109.0 mmol/L    Carbon Dioxide 27.0 20.0 - 32.0 mmol/L    Albumin 4.0 3.2 - 4.5 g/dL    Alkaline Phosphatase 93.0 40.0 - 150.0 U/L    ALT 27.0 0.0 - 70.0 U/L    AST 34.0 0.0 - 55.0 U/L    Bilirubin Total 1.0 0.2 - 1.3 mg/dL    Protein Total 7.3 6.8 - 8.8 g/dL   CBC with Plt ([facility])   Result Value Ref Range     WBC 10.5 4.0 - 11.0 K/uL    RBC 5.33 4.40 - 5.90 M/uL    Hemoglobin 16.7 13.3 - 17.7 g/dL    Hematocrit 51.6 40.0 - 53.0 %    MCV 96.8 78.0 - 100.0 fL    MCH 31.3 26.5 - 35.0 pg    MCHC 32.4 32.0 - 36.0 g/dL    Platelets 176.0 150.0 - 450.0 K/uL    RDW 13.3 %

## 2020-02-17 NOTE — PROGRESS NOTES
"Markie Shepard is here for a general check up.  He is not fasting. He is up to date on eye exams and dental visits. Wears seat belt.--yes Wears bike helmet--n/a. Concerns today:    HCM  Markie is a 69 year old male here for a general check up. He is due for a tetanus vaccine, otherwise immunizations are up to date.     Diet: Markie is overweight. He is working on eating a healthier diet. He will go out to eat 3 times weekly. He is trying to cut back on his sugar.     Major depression, chronic  Markie is on fluoxetine 40 mg daily for his depression. Reports some mild panic. He feels he has the \"winter blues\" currently. He has a good support network with friends. No current counseling. Denies suicidal thoughts. Stopped drinking Etoh in 2013.     Atrial fibrillation, ASCVD  Markie has a history of atrial fibrillation and ASCVD. He has not been following with cardiology regularly. His last ECHO was in 2015. Denies palpitations or chest pain. Quit smoking, see below.    Former smoker   He recently quit smoking on 09/29/2019. He used nicotine patches and nicotine lozenges. He will occasionally get urges to smoke. He smoked for 53 years and most of those years he was smoking about 1 pack per day. He declines referral for screening CT of his chest to check for nodules, lung cancer. He is doing well.    Shortness of breath  He uses albuterol inhaler 2 puffs twice daily with relief. Reports waking up with phlegm but no fevers. No pleuritic pain.     Legally blind  Markie is legally blind and has no vision in his left eye. He receives Ilyea injections in his right eye every 4 months. He also has macular degeneration that is stable. He has been using artificial tears three times daily with improvement in his vision. His grandson helps with bringing groceries, chores and reading mail.     Advance directive: no  Hearing concerns: no  Fall Risk: He had one fall last year after stepping in a hole. He uses a cane which helps with his " balance  Independent at home: yes  Safe : yes  Memory concerns: No significant concerns.     Recalls 3 words easily  Vision limits drawing clock  No significant concerns about memory.     Health Maintenance   Topic Date Due     OP ANNUAL INR REFERRAL  03/27/2016     LIPID  02/11/2020     FALL RISK ASSESSMENT  02/11/2020     ZOSTER IMMUNIZATION (2 of 2) 08/01/2019     MEDICARE ANNUAL WELLNESS VISIT  02/11/2020     PHQ-9  03/09/2020     DTAP/TDAP/TD IMMUNIZATION (2 - Td) 09/17/2020     ADVANCE CARE PLANNING  10/17/2021     COLONOSCOPY  10/17/2026     HEPATITIS C SCREENING  Completed     DEPRESSION ACTION PLAN  Completed     INFLUENZA VACCINE  Completed     PNEUMOCOCCAL IMMUNIZATION 65+ LOW/MEDIUM RISK  Completed     AORTIC ANEURYSM SCREENING (SYSTEM ASSIGNED)  Completed     IPV IMMUNIZATION  Aged Out     MENINGITIS IMMUNIZATION  Aged Out         Patient Active Problem List   Diagnosis     CAD (coronary artery disease)     Hyperlipidemia LDL goal <70     Smoker     Hypertension     Atrial fibrillation (H)     Obstructive sleep apnea     Mild major depression (H)     Thiamine deficiency: possible wernicke     Low back pain     Mixed hyperlipidemia     Essential hypertension, benign     Coronary atherosclerosis     Long-term (current) use of anticoagulants [Z79.01]       Past Surgical History:   Procedure Laterality Date     C NONSPECIFIC PROCEDURE  1987    Right Epididymis Removed     C OPEN RX ANKLE DISLOCATN+FIXATN  12/2008    Right Ankle     CARDIAC CATHERIZATION  7/03/2012    totally occluded RCA w/ mild left coronary disease     HEART CATH CORONARY ANGIOGRAM AND RIGHT HEART CATH  7/2012    RCA occlusion, IMI, no stent     PHACOEMULSIFICATION CLEAR CORNEA WITH STANDARD INTRAOCULAR LENS IMPLANT Right 6/4/2015    Procedure: PHACOEMULSIFICATION CLEAR CORNEA WITH STANDARD INTRAOCULAR LENS IMPLANT;  Surgeon: Tal Trinidad MD;  Location:  [facility]     RETINAL REATTACHMENT  11/09, 7/10    Left     TONSILLECTOMY          Family History   Problem Relation Age of Onset     Arthritis Mother      Circulatory Mother         varicose veins     Eye Disorder Mother         detached retina     Thyroid Disease Mother      C.A.D. 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      MALE ROS  Partner: none, he is   ED: none  Contraception: na  STD concerns: none  BPH: no symptoms      Current Outpatient Medications   Medication Sig Dispense Refill     acetaminophen (TYLENOL) 500 MG tablet Take 500-1,000 mg by mouth every 6 hours as needed for mild pain       albuterol (PROAIR HFA/PROVENTIL HFA/VENTOLIN HFA) 108 (90 Base) MCG/ACT inhaler Inhale 2 puffs into the lungs every 6 hours 1 Inhaler 11     amLODIPine (NORVASC) 10 MG tablet Take 1 tablet (10 mg) by mouth daily 90 tablet 3     atenolol (TENORMIN) 100 MG tablet Take 1 tablet (100 mg) by mouth daily along with 50 mg tablet for total of 150 mg daily. 90 tablet 3     atenolol (TENORMIN) 50 MG tablet Take 1 tablet (50 mg) by mouth daily Along with 100 mg tablet for total of 150 mg daily. 90 tablet 3     FLUoxetine (PROZAC) 40 MG capsule Take 1 capsule (40 mg) by mouth daily 90 capsule 3     furosemide (LASIX) 20 MG tablet Take one half tablet by  mouth up to 3 times a week as needed for leg edema. Needs clinic appt for further refills 10 tablet 0     losartan (COZAAR) 100 MG tablet Take 1 tablet (100 mg) by mouth daily 90 tablet 3     omega-3 fatty acids (FISH OIL) 1200 MG capsule Take 1 capsule by mouth daily       oxymetazoline (AFRIN 12 HOUR) 0.05 % nasal spray Spray 2 sprays into both nostrils At Bedtime       simvastatin (ZOCOR) 20 MG tablet Take 1 tablet (20 mg) by mouth At Bedtime 90 tablet 3     traZODone (DESYREL) 100 MG tablet Take 1 tablet (100 mg) by mouth nightly as needed for sleep 90 tablet 3     warfarin (COUMADIN) 5 MG tablet Take 7.5 mg on Mondays and Fridays, and 5 mg all other days, or as directed by Coumadin nurse. 100 tablet 3     CENTRUM SILVER OR TABS ONE DAILY 3 MONTHS 1 YEAR     fluticasone (FLONASE) 50 MCG/ACT nasal spray Spray 2 sprays into both nostrils daily Hold rx in pharmacy until pt calls for Rx (Patient not taking: Reported on 2/17/2020) 3 Bottle 3     Allergies   Allergen Reactions     Ambien Other (See Comments)     Parasomnia with sleep walking, injuries, delusions.  May have been in DTs at the same time.     Lisinopril Cough     Pt had cough     Seasonal Allergies          ROS  CONSTITUTIONAL:NEGATIVE for fever, chills, change in weight  INTEGUMENTARY/SKIN: NEGATIVE for worrisome rashes, moles or lesions  EYES: NEGATIVE for vision changes or irritation Completely blind in left eye, legally blind in right  ENT/MOUTH: NEGATIVE for ear, mouth and throat problems  RESP:NEGATIVE for SOB POS for cough   CV: NEGATIVE for chest pain, palpitations, LENZ, orthopnea, PND  or peripheral edema  GI: NEGATIVE for nausea, abdominal pain, heartburn, or change in bowel habits  :NEGATIVE for frequency, dysuria, or hematuria  MUSCULOSKELETAL:NEGATIVE for significant arthralgias or myalgia POS for sharp pain in right arm that comes and goes  NEURO: NEGATIVE for weakness, dizziness or paresthesias  ENDOCRINE: NEGATIVE for polyuria/dipsia,   "temperature intolerance, skin/hair changes  HEME/ALLERGY/IMMUNE: NEGATIVE for bleeding problems  PSYCHIATRIC: NEGATIVE for changes in mood or affect History of depression    EXAM  /85   Pulse 85   Temp 98  F (36.7  C) (Oral)   Resp 17   Ht 1.778 m (5' 10\")   Wt (!) 163.6 kg (360 lb 12 oz)   SpO2 93%   BMI 51.76 kg/m     GENERAL APPEARANCE: Alert, pleasant, NAD, obese  EYES: PERRL, EOMI, conjunctiva clear. Anisocoria, left pupil much smaller than right..   HENT: TM normal bilaterally. Nose and mouth without lesions  NECK: no adenopathy, thyroid normal to palpation  RESP: lungs clear to auscultation bilaterally, no rhonchi, wheezes or rales   Axillae: no palpable axillary masses or adenopathy  CV: irregularly irregular rhythm,S1 S2, no murmur, no carotid bruits  ABDOMEN: soft, nontender, without HSM or masses. Bowel sounds normal  : not examined  Rectal exam: deferred  MS: extremities normal- no gross deformities noted, no tender, hot or swollen joints.    SKIN: no suspicious lesions or rashes  NEURO: Normal strength and tone, sensory exam grossly normal, DTR normoreflexive in upper and lower extremities  PSYCH: mentation appears normal. and affect normal/bright.  EXT: +1 peripheral edema, pedal pulses palpable    Assessment:  (Z00.00) Encounter for preventative adult health care examination  (primary encounter diagnosis)  Comment: 70 yo male with legal blindness, hx of cardiovascular issues, and obesity. Recently quit smoking on 09/29/2019.  Plan: Anticipatory guidance given today regarding diet, exercise, calcium intake. Continue with effort for weight loss. He declines low dose CT of chest to screen for lung cancer.     (I10) Essential hypertension with goal blood pressure less than 140/90  Comment: Blood pressure in target range  Plan: atenolol (TENORMIN) 100 MG tablet, amLODIPine         (NORVASC) 10 MG tablet, atenolol (TENORMIN) 50         MG tablet, losartan (COZAAR) 100 MG tablet        " Refilled medication, recommend he schedule appt with cardiology.    (E78.5) Hyperlipidemia LDL goal <70  Comment: doing well, not fasting  Plan: amLODIPine (NORVASC) 10 MG tablet, atenolol         (TENORMIN) 50 MG tablet, losartan (COZAAR) 100         MG tablet, Lipid Panel ([place]),         Comprehensive Metabolic Panel ([place])        Refilled medication    (F32.9) Major depression, chronic  Comment: mood has been stable  Plan: FLUoxetine (PROZAC) 40 MG capsule        Refilled medication, declines counseling resources    (R60.0) Bilateral leg edema  Comment: Currently using furosemide 20 mg, 3 times weekly  Plan: furosemide (LASIX) 20 MG tablet        Refilled medication    (I48.91) Atrial fibrillation, unspecified type (H)  Comment: rate controlled  Plan: warfarin ANTICOAGULANT (COUMADIN ANTICOAGULANT)        5 MG tablet, CBC with Plt ([facility])        Refilled coumadin    (Z23) Need for Td vaccine  Comment: Due for Td  Plan: TD (ADULT, 7+) PRESERVE FREE        Vaccine given today    (R06.02) SOB (shortness of breath)  Comment: Currently uses albuterol 2 puffs twice daily, needs a refill  Plan: albuterol (PROAIR RESPICLICK) 108 (90 Base)         MCG/ACT inhaler        Refilled medication    (R73.03) Prediabetes  Comment: Check A1c today  Plan: Hemoglobin A1c ([place])    RTC 6 months            Mali Patel MD  Internal Medicine/Pediatrics    I, Efe Justice, am serving as a scribe to document services personally performed by Dr. Mali Patel, based on data collection and the provider's statements to me. Dr. Patel has reviewed, edited and approved the above note.

## 2020-02-17 NOTE — NURSING NOTE
Prior to immunization administration, verified patients identity using patient s name and date of birth. Please see Immunization Activity for additional information.     Screening Questionnaire for Adult Immunization    Are you sick today?   No   Do you have allergies to medications, food, a vaccine component or latex?   No   Have you ever had a serious reaction after receiving a vaccination?   No   Do you have a long-term health problem with heart, lung, kidney, or metabolic disease (e.g., diabetes), asthma, a blood disorder, no spleen, complement component deficiency, a cochlear implant, or a spinal fluid leak?  Are you on long-term aspirin therapy?   No   Do you have cancer, leukemia, HIV/AIDS, or any other immune system problem?   No   Do you have a parent, brother, or sister with an immune system problem?   No   In the past 3 months, have you taken medications that affect  your immune system, such as prednisone, other steroids, or anticancer drugs; drugs for the treatment of rheumatoid arthritis, Crohn s disease, or psoriasis; or have you had radiation treatments?   No   Have you had a seizure, or a brain or other nervous system problem?   No   During the past year, have you received a transfusion of blood or blood    products, or been given immune (gamma) globulin or antiviral drug?   No   For women: Are you pregnant or is there a chance you could become       pregnant during the next month?   No   Have you received any vaccinations in the past 4 weeks?   No     Immunization questionnaire answers were all negative.        Given by Sera Rubio MA. Patient instructed to remain in clinic for 15 minutes afterwards, and to report any adverse reaction to me immediately.       Screening performed by Sera Rubio MA on 2/17/2020 at 4:09 PM.

## 2020-02-18 DIAGNOSIS — E78.5 HYPERLIPIDEMIA LDL GOAL <70: ICD-10-CM

## 2020-02-18 DIAGNOSIS — G47.00 INSOMNIA, UNSPECIFIED TYPE: ICD-10-CM

## 2020-02-18 DIAGNOSIS — I10 ESSENTIAL HYPERTENSION WITH GOAL BLOOD PRESSURE LESS THAN 140/90: ICD-10-CM

## 2020-02-18 PROBLEM — H57.02 ANISOCORIA: Status: ACTIVE | Noted: 2020-02-18

## 2020-02-18 RX ORDER — SIMVASTATIN 20 MG
20 TABLET ORAL AT BEDTIME
Qty: 90 TABLET | Refills: 3 | Status: SHIPPED | OUTPATIENT
Start: 2020-02-18 | End: 2021-02-26

## 2020-02-18 RX ORDER — TRAZODONE HYDROCHLORIDE 100 MG/1
100 TABLET ORAL
Qty: 90 TABLET | Refills: 3 | Status: SHIPPED | OUTPATIENT
Start: 2020-02-18 | End: 2021-02-26

## 2020-03-09 ENCOUNTER — ANTICOAGULATION THERAPY VISIT (OUTPATIENT)
Dept: NURSING | Facility: CLINIC | Age: 70
End: 2020-03-09
Payer: COMMERCIAL

## 2020-03-09 DIAGNOSIS — Z79.01 LONG TERM CURRENT USE OF ANTICOAGULANT THERAPY: ICD-10-CM

## 2020-03-09 DIAGNOSIS — I48.91 ATRIAL FIBRILLATION, UNSPECIFIED TYPE (H): ICD-10-CM

## 2020-03-09 LAB — INR POINT OF CARE: 2.8 (ref 0.86–1.14)

## 2020-03-09 PROCEDURE — 36416 COLLJ CAPILLARY BLOOD SPEC: CPT

## 2020-03-09 PROCEDURE — 99207 ZZC NO CHARGE NURSE ONLY: CPT

## 2020-03-09 PROCEDURE — 85610 PROTHROMBIN TIME: CPT | Mod: QW

## 2020-03-09 NOTE — PROGRESS NOTES
ANTICOAGULATION FOLLOW-UP CLINIC VISIT    Patient Name:  Markie Shepard  Date:  3/9/2020  Contact Type:  Face to Face    SUBJECTIVE:  Patient Findings     Comments:   The patient was assessed for diet, medication, and activity level changes, missed or extra doses, bruising or bleeding, with no problem findings.            OBJECTIVE    INR Protime   Date Value Ref Range Status   2020 2.8 (A) 0.86 - 1.14 Final       ASSESSMENT / PLAN  INR assessment THER    Recheck INR In: 6 WEEKS    INR Location Clinic      Anticoagulation Summary  As of 3/9/2020    INR goal:   2.0-3.0   TTR:   100.0 % (1 y)   INR used for dosin.8 (3/9/2020)   Warfarin maintenance plan:   7.5 mg (5 mg x 1.5) every Mon, Wed, Fri; 5 mg (5 mg x 1) all other days   Full warfarin instructions:   7.5 mg every Mon, Wed, Fri; 5 mg all other days   Weekly warfarin total:   42.5 mg   No change documented:   Chelsie Encinas RN   Plan last modified:   Chelsie Encinas RN (2019)   Next INR check:   2020   Priority:   INR   Target end date:       Indications    Long-term (current) use of anticoagulants [Z79.01] [Z79.01]  Atrial fibrillation (H) [I48.91]             Anticoagulation Episode Summary     INR check location:       Preferred lab:       Send INR reminders to:   DAYLIN NEIL    Comments:   PREFERS PETER! Quit Smoking .      Anticoagulation Care Providers     Provider Role Specialty Phone number    Mali Patel MD [facility] Internal Medicine [phone]            See the Encounter Report to view Anticoagulation Flowsheet and Dosing Calendar (Go to Encounters tab in chart review, and find the Anticoagulation Therapy Visit)    Patient made aware if signs of clotting (pain, tenderness, swelling, or color change in any extremity) AND/OR bleeding occur (nosebleeds, bleeding gums, bruising, or blood in stool or urine) to notify provider & seek medical attention. If severe symptoms develop, such as major  bleeding, chest pain, shortness of breath, fall, trauma or s/s of stroke, patient to call 911 immediately.     Dosage adjustment made based on physician directed care plan.    Chelsie Encinas RN

## 2020-03-27 DIAGNOSIS — Z79.01 LONG TERM CURRENT USE OF ANTICOAGULANT THERAPY: Primary | ICD-10-CM

## 2020-03-27 DIAGNOSIS — I48.91 ATRIAL FIBRILLATION, UNSPECIFIED TYPE (H): ICD-10-CM

## 2020-04-04 NOTE — PROCEDURES
Overnight oximetry was done on 1/17/18 with CPAP(valid recording 7.39hrs) . Baseline oxygen saturation is low at 89.8%, but there is no sleep associated hypoxemia  With time spent  with oxygen saturations below 88% of 0.5  minutes.   Recommended: to continue using the CPAP regularly during sleep and get regular equipment replacement as insurance provides..     Val Jim MD    of Medicine,   Division of Pulmonary/Sleep Medicine   [facility]

## 2020-04-13 ENCOUNTER — VIRTUAL VISIT (OUTPATIENT)
Dept: CARDIOLOGY | Facility: CLINIC | Age: 70
End: 2020-04-13
Payer: COMMERCIAL

## 2020-04-13 VITALS — HEIGHT: 70 IN | BODY MASS INDEX: 51.76 KG/M2

## 2020-04-13 DIAGNOSIS — I48.21 PERMANENT ATRIAL FIBRILLATION (H): Primary | ICD-10-CM

## 2020-04-13 PROCEDURE — 99213 OFFICE O/P EST LOW 20 MIN: CPT | Mod: 95 | Performed by: INTERNAL MEDICINE

## 2020-04-13 NOTE — PROGRESS NOTES
"Markie Shepard is a 70 year old male who is being evaluated via a billable telephone visit.      The patient has been notified of following:     \"This telephone visit will be conducted via a call between you and your physician/provider. We have found that certain health care needs can be provided without the need for a physical exam.  This service lets us provide the care you need with a short phone conversation.  If a prescription is necessary we can send it directly to your pharmacy.  If lab work is needed we can place an order for that and you can then stop by our lab to have the test done at a later time.    Telephone visits are billed at different rates depending on your insurance coverage. During this emergency period, for some insurers they may be billed the same as an in-person visit.  Please reach out to your insurance provider with any questions.    If during the course of the call the physician/provider feels a telephone visit is not appropriate, you will not be charged for this service.\"    Patient has given verbal consent for Telephone visit? Yes    How would you like to obtain your AVS? Mail a copy    Bp: N/A  Pulse: N/A  Wt: N/A    Elvira DURAN    Additional provider notes:     Very pleasant 70-year-old gentleman who I am following up today for chronic coronary artery disease and permanent atrial fibrillation.  His history is as below.  He used to follow-up with my former colleague Dr. Elena Hernandez    Mr. Shepard is a pleasant, 70-year-old gentleman who comes in routine followup.  He has morbid obesity with a BMI, unfortunately, of 52; obstructive coronary artery disease with RCA occlusion on prior cardiac catheterization; chronic atrial fibrillation, well rate controlled and on warfarin, sleep apnea and secondary polycythemia He does have a CPAP device.    He does have a multiyear pack history of smoking but I am glad to hear that he gave up on September 29, 2019. He is legally " "blind, which limits his efforts at lifestyle modification.  He had a normalized ejection fraction in 2015 after a previous echo in 2013 demonstrated an ischemic cardiomyopathy.     When he was seen by his primary physician several weeks ago he felt lethargic.  This symptom has since largely resolved.  He feels in his own words, \"pretty darn well\".  He has a stepper at home and exercises for 15 to 20 minutes each day . He denies worsening shortness of breath on exertion.  He does get out of breath when he bends over and I would agree with him that this is likely related to his obesity.  He is able to do his housework without any problems.  He goes out shopping with his grandson for groceries.  He has no PND orthopnea or worsening ankle swelling.  He denies chest pains and palpitations.  All medications are well-tolerated.  I reviewed his INRs and the quite steady.  He denies bleeding.  He is not able to measure his blood pressure at home as he is legally blind.  I am happy to hear however that he is able to watch television    Impression  1.  Permanent atrial fibrillation.  No symptoms, on warfarin with no bleeding  2.  Chronic coronary artery disease, known total occlusion of the right coronary artery.  No symptoms of angina.  3.  Morbid obesity, no significant change.  4.  COPD.  On inhalers  5.  Secondary polycythemia, most recent hemoglobin 16.7  6.  Diabetes mellitus, excellent control with most recent HbA1c of 6.1  7.  Blindness    He sounds quite stable from a cardiac point of view at this time.  I look forward to seeing him in person in 1 year's time.      Phone call duration: 17 minutes    DR SHANE POWELL MD    "

## 2020-04-20 ENCOUNTER — ANTICOAGULATION THERAPY VISIT (OUTPATIENT)
Dept: FAMILY MEDICINE | Facility: CLINIC | Age: 70
End: 2020-04-20

## 2020-04-20 DIAGNOSIS — Z79.01 LONG TERM CURRENT USE OF ANTICOAGULANT THERAPY: ICD-10-CM

## 2020-04-20 DIAGNOSIS — I48.21 PERMANENT ATRIAL FIBRILLATION (H): ICD-10-CM

## 2020-04-20 DIAGNOSIS — I48.91 ATRIAL FIBRILLATION, UNSPECIFIED TYPE (H): ICD-10-CM

## 2020-04-20 LAB
CAPILLARY BLOOD COLLECTION: NORMAL
INR PPP: 3.6 (ref 0.86–1.14)

## 2020-04-20 PROCEDURE — 36416 COLLJ CAPILLARY BLOOD SPEC: CPT | Performed by: INTERNAL MEDICINE

## 2020-04-20 PROCEDURE — 85610 PROTHROMBIN TIME: CPT | Performed by: INTERNAL MEDICINE

## 2020-04-20 NOTE — PROGRESS NOTES
ANTICOAGULATION FOLLOW-UP CLINIC VISIT    Patient Name:  Markie Shepard  Date:  4/20/2020  Contact Type:  Telephone/ Patient    SUBJECTIVE:  Patient Findings     Positives:   Extra doses (Pt is unsure, but believe he may have doubled up a recent dose. )    Comments:   The patient was assessed for diet, medication, and activity level changes, bruising or bleeding, with no problem findings.             OBJECTIVE    INR   Date Value Ref Range Status   04/20/2020 3.60 (H) 0.86 - 1.14 Final     Comment:     This test is intended for monitoring Coumadin therapy.  Results are not   accurate in patients with prolonged INR due to factor deficiency.         ASSESSMENT / PLAN  INR assessment SUPRA    Recheck INR In: 6 WEEKS    INR Location Clinic      Anticoagulation Summary  As of 4/20/2020    INR goal:   2.0-3.0   TTR:   91.3 % (1 y)   INR used for dosing:   3.60! (4/20/2020)   Warfarin maintenance plan:   7.5 mg (5 mg x 1.5) every Mon, Wed, Fri; 5 mg (5 mg x 1) all other days   Full warfarin instructions:   4/20: 5 mg; Otherwise 7.5 mg every Mon, Wed, Fri; 5 mg all other days   Weekly warfarin total:   42.5 mg   Plan last modified:   Chelsie Encinas RN (2/4/2019)   Next INR check:   6/1/2020   Priority:   INR   Target end date:       Indications    Long-term (current) use of anticoagulants [Z79.01] [Z79.01]  Atrial fibrillation (H) [I48.91]             Anticoagulation Episode Summary     INR check location:       Preferred lab:       Send INR reminders to:   DAYLIN NEIL    Comments:   PREFERS PETER! Quit Smoking 9/29.      Anticoagulation Care Providers     Provider Role Specialty Phone number    Mali Patel MD [facility] Internal Medicine [phone]            See the Encounter Report to view Anticoagulation Flowsheet and Dosing Calendar (Go to Encounters tab in chart review, and find the Anticoagulation Therapy Visit)    Per protocol, patient advised to decrease today's warfarin dose by 2.5 mg to  account for supra-therapeutic INR level. Patient instructed to increase green intake today and tomorrow as well. Recheck within 4 weeks to ensure INR stability. Patient preferred to recheck in 6 weeks d/t COVID-19, this is a reasonable request, patient will monitor closely for s/s of bleeding and call with any changes.     Patient made aware if signs of clotting (pain, tenderness, swelling, or color change in any extremity) AND/OR bleeding occur (nosebleeds, bleeding gums, bruising, or blood in stool or urine) to notify provider & seek medical attention. If severe symptoms develop, such as major bleeding, chest pain, shortness of breath, fall, trauma or s/s of stroke, patient to call 911 immediately.     Dosage adjustment made based on physician directed care plan.    Chelsie Encinas RN

## 2020-06-02 ENCOUNTER — TELEPHONE (OUTPATIENT)
Dept: FAMILY MEDICINE | Facility: CLINIC | Age: 70
End: 2020-06-02

## 2020-06-02 NOTE — TELEPHONE ENCOUNTER
Reception team, please contact patient to assist in r/s missed INR appt within a week or two. Of note, [place], please be sensitive and understanding if not able to commit at this time. Thanks! Chelsie Encinas, FILOMENAN, RN

## 2020-06-15 ENCOUNTER — ANTICOAGULATION THERAPY VISIT (OUTPATIENT)
Dept: FAMILY MEDICINE | Facility: CLINIC | Age: 70
End: 2020-06-15

## 2020-06-15 DIAGNOSIS — Z79.01 LONG TERM CURRENT USE OF ANTICOAGULANT THERAPY: ICD-10-CM

## 2020-06-15 DIAGNOSIS — I48.91 ATRIAL FIBRILLATION, UNSPECIFIED TYPE (H): ICD-10-CM

## 2020-06-15 DIAGNOSIS — I48.21 PERMANENT ATRIAL FIBRILLATION (H): ICD-10-CM

## 2020-06-15 LAB
CAPILLARY BLOOD COLLECTION: NORMAL
INR PPP: 3.8 (ref 0.86–1.14)

## 2020-06-15 PROCEDURE — 36416 COLLJ CAPILLARY BLOOD SPEC: CPT | Performed by: FAMILY MEDICINE

## 2020-06-15 PROCEDURE — 85610 PROTHROMBIN TIME: CPT | Performed by: FAMILY MEDICINE

## 2020-06-15 NOTE — PROGRESS NOTES
LVM for patient to return call to ACC team to discuss today's INR level and dosing plan. Chelsie Encinas, FILOMENAN, RN

## 2020-06-15 NOTE — PROGRESS NOTES
ANTICOAGULATION FOLLOW-UP CLINIC VISIT    Patient Name:  Markie Shepard  Date:  6/15/2020  Contact Type:  Telephone/ Patient    SUBJECTIVE:  Patient Findings     Comments:   The patient was assessed for diet, medication, and activity level changes, missed or extra doses, bruising or bleeding, with no problem findings.          OBJECTIVE    Recent labs: (last 7 days)     06/15/20  1042   INR 3.80*       ASSESSMENT / PLAN  INR assessment SUPRA    Recheck INR In: 2 WEEKS    INR Location Clinic      Anticoagulation Summary  As of 6/15/2020    INR goal:   2.0-3.0   TTR:   76.0 % (1 y)   INR used for dosing:   3.80! (6/15/2020)   Warfarin maintenance plan:   7.5 mg (5 mg x 1.5) every Mon, Fri; 5 mg (5 mg x 1) all other days   Full warfarin instructions:   6/15: 5 mg; Otherwise 7.5 mg every Mon, Fri; 5 mg all other days   Weekly warfarin total:   40 mg   Plan last modified:   Chelsie Encinas RN (6/15/2020)   Next INR check:   6/29/2020   Priority:   INR   Target end date:       Indications    Long-term (current) use of anticoagulants [Z79.01] [Z79.01]  Atrial fibrillation (H) [I48.91]             Anticoagulation Episode Summary     INR check location:       Preferred lab:       Send INR reminders to:   DAYLIN NEIL    Comments:   PREFERS PETER! Quit Smoking 9/29.      Anticoagulation Care Providers     Provider Role Specialty Phone number    Mali Patel MD [facility] Internal Medicine [phone]            See the Encounter Report to view Anticoagulation Flowsheet and Dosing Calendar (Go to Encounters tab in chart review, and find the Anticoagulation Therapy Visit)    Per protocol, patient advised to decrease today's warfarin dose by 2.5 mg and increase green intake today and tomorrow to account for supra-therapeutic INR level. Patient also advised to reduce weekly dose by 5% d/t high trends. Recheck within 2 weeks to ensure INR stability.     Patient made aware if signs of clotting (pain,  tenderness, swelling, or color change in any extremity) AND/OR bleeding occur (nosebleeds, bleeding gums, bruising, or blood in stool or urine) to notify provider & seek medical attention. If severe symptoms develop, such as major bleeding, chest pain, shortness of breath, fall, trauma or s/s of stroke, patient to call 911 immediately.     Dosage adjustment made based on physician directed care plan.    Chelsie Encinas RN

## 2020-06-29 ENCOUNTER — ANTICOAGULATION THERAPY VISIT (OUTPATIENT)
Dept: FAMILY MEDICINE | Facility: CLINIC | Age: 70
End: 2020-06-29

## 2020-06-29 ENCOUNTER — OFFICE VISIT (OUTPATIENT)
Dept: PODIATRY | Facility: CLINIC | Age: 70
End: 2020-06-29
Payer: COMMERCIAL

## 2020-06-29 VITALS
SYSTOLIC BLOOD PRESSURE: 128 MMHG | HEIGHT: 70 IN | BODY MASS INDEX: 45.1 KG/M2 | DIASTOLIC BLOOD PRESSURE: 76 MMHG | WEIGHT: 315 LBS

## 2020-06-29 DIAGNOSIS — Z79.01 LONG TERM CURRENT USE OF ANTICOAGULANT THERAPY: ICD-10-CM

## 2020-06-29 DIAGNOSIS — I48.21 PERMANENT ATRIAL FIBRILLATION (H): ICD-10-CM

## 2020-06-29 DIAGNOSIS — I48.91 ATRIAL FIBRILLATION, UNSPECIFIED TYPE (H): ICD-10-CM

## 2020-06-29 DIAGNOSIS — L60.3 ONYCHODYSTROPHY: Primary | ICD-10-CM

## 2020-06-29 LAB
CAPILLARY BLOOD COLLECTION: NORMAL
INR PPP: 3 (ref 0.86–1.14)

## 2020-06-29 PROCEDURE — 36416 COLLJ CAPILLARY BLOOD SPEC: CPT | Performed by: FAMILY MEDICINE

## 2020-06-29 PROCEDURE — 85610 PROTHROMBIN TIME: CPT | Performed by: FAMILY MEDICINE

## 2020-06-29 PROCEDURE — 99203 OFFICE O/P NEW LOW 30 MIN: CPT | Performed by: PODIATRIST

## 2020-06-29 ASSESSMENT — MIFFLIN-ST. JEOR: SCORE: 2467.7

## 2020-06-29 NOTE — PROGRESS NOTES
ANTICOAGULATION FOLLOW-UP CLINIC VISIT    Patient Name:  Markie Shepard  Date:  6/29/2020  Contact Type:  Telephone/ Patient    SUBJECTIVE:  Patient Findings     Comments:   The patient was assessed for diet, medication, and activity level changes, missed or extra doses, bruising or bleeding, with no problem findings.            OBJECTIVE    Recent labs: (last 7 days)     06/29/20  1009   INR 3.00*       ASSESSMENT / PLAN  INR assessment THER    Recheck INR In: 7 WEEKS    INR Location Clinic      Anticoagulation Summary  As of 6/29/2020    INR goal:   2.0-3.0   TTR:   72.1 % (1 y)   INR used for dosing:   3.00 (6/29/2020)   Warfarin maintenance plan:   7.5 mg (5 mg x 1.5) every Mon, Fri; 5 mg (5 mg x 1) all other days   Full warfarin instructions:   7.5 mg every Mon, Fri; 5 mg all other days   Weekly warfarin total:   40 mg   No change documented:   Chelsie Encinas RN   Plan last modified:   Chelsie Encinas RN (6/15/2020)   Next INR check:   8/17/2020   Priority:   INR   Target end date:       Indications    Long-term (current) use of anticoagulants [Z79.01] [Z79.01]  Atrial fibrillation (H) [I48.91]             Anticoagulation Episode Summary     INR check location:       Preferred lab:       Send INR reminders to:   DAYLIN NEIL    Comments:   PREFERS PETER! Quit Smoking 9/29.      Anticoagulation Care Providers     Provider Role Specialty Phone number    Mali Patel MD [facility] Internal Medicine [phone]            See the Encounter Report to view Anticoagulation Flowsheet and Dosing Calendar (Go to Encounters tab in chart review, and find the Anticoagulation Therapy Visit)    No change advised, recheck within 6 weeks advised. Due to transportation options, patient requires a Monday appt. Patient opted for 7 weeks on Aug 17th, RN approved.  Patient encouraged to call with any changes from now until then.     Patient made aware if signs of clotting (pain, tenderness, swelling, or  color change in any extremity) AND/OR bleeding occur (nosebleeds, bleeding gums, bruising, or blood in stool or urine) to notify provider & seek medical attention. If severe symptoms develop, such as major bleeding, chest pain, shortness of breath, fall, trauma or s/s of stroke, patient to call 911 immediately.    Dosage adjustment made based on physician directed care plan.    Chelsie Encinas RN

## 2020-06-29 NOTE — PROGRESS NOTES
PATIENT HISTORY:  Markie Shepard is a 70 year old male who presents to clinic for L foot possible ingrowing nails.  Normally gets a pedicure.  Grandson present, who recently trimmed his nails and told the pt he has ingrowing nails.  No pain today.  Nondiabetic.    Review of Systems:  Patient denies fever, chills, rash, wound, stiffness, limping, numbness, weakness, heart burn, blood in stool, chest pain with activity, calf pain when walking, chronic cough, easy bleeding/bruising,  excessive thirst, fatigue, depression, anxiety.  Patient admits to sob with activity, blindness, swelling of ankles.     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 On Cellcept, steroid taper     Coronary atherosclerosis of unspecified type of vessel, native or graft      Diabetes mellitus type 2 in obese (H)      Essential hypertension, benign      Mixed hyperlipidemia 10/5/2007     Polycythemia      Sleep apnea      Tobacco use disorder 10/5/2007    Smoker - 1/2 ppd. Started at 16 years     Total retinal detachment of left eye 2009/2010        PAST SURGICAL HISTORY:   Past Surgical History:   Procedure Laterality Date     C NONSPECIFIC PROCEDURE  1987    Right Epididymis Removed     C OPEN RX ANKLE DISLOCATN+FIXATN  12/2008    Right Ankle     CARDIAC CATHERIZATION  7/03/2012    totally occluded RCA w/ mild left coronary disease     HEART CATH CORONARY ANGIOGRAM AND RIGHT HEART CATH  7/2012    RCA occlusion, IMI, no stent     PHACOEMULSIFICATION CLEAR CORNEA WITH STANDARD INTRAOCULAR LENS IMPLANT Right 6/4/2015    Procedure: PHACOEMULSIFICATION CLEAR CORNEA WITH STANDARD INTRAOCULAR LENS IMPLANT;  Surgeon: Tal Trinidad MD;  [facility]:  [facility]     RETINAL REATTACHMENT  11/09, 7/10    Left     TONSILLECTOMY          MEDICATIONS:    Current Outpatient Medications:      acetaminophen (TYLENOL) 500 MG tablet, Take 500-1,000 mg by mouth every 6 hours as needed for mild pain, Disp: , Rfl:      albuterol (PROAIR RESPICLICK) 108 (90 Base) MCG/ACT inhaler, Inhale 2 puffs into the lungs every 4 hours as needed for shortness of breath / dyspnea, Disp: 1 Inhaler, Rfl: 11     amLODIPine (NORVASC) 10 MG tablet, Take 1 tablet (10 mg) by mouth daily, Disp: 90 tablet, Rfl: 3     atenolol (TENORMIN) 100 MG tablet, Take 1 tablet (100 mg) by mouth daily along with 50 mg tablet for total of 150 mg daily., Disp: 90 tablet, Rfl: 3     atenolol (TENORMIN) 50 MG tablet, Take 1 tablet (50 mg) by mouth daily Along with 100 mg tablet for total of 150 mg daily., Disp: 90 tablet, Rfl: 3     CENTRUM SILVER OR TABS, ONE DAILY, Disp: 3 MONTHS, Rfl: 1 YEAR     FLUoxetine (PROZAC) 40 MG capsule, Take 1 capsule (40 mg) by mouth daily, Disp: 90 capsule, Rfl: 3     furosemide (LASIX) 20 MG tablet, Take one half tablet by mouth up to 3 times a week as needed for leg edema., Disp: 36 tablet, Rfl: 3     losartan (COZAAR) 100 MG tablet, Take 1 tablet (100 mg) by mouth daily, Disp: 90 tablet, Rfl: 3     omega-3 fatty acids (FISH OIL) 1200 MG capsule, Take 1 capsule by mouth daily, Disp: , Rfl:      oxymetazoline (AFRIN 12 HOUR) 0.05 % nasal spray, Spray 2 sprays into both nostrils At Bedtime, Disp: , Rfl:      simvastatin 20 MG PO tablet, Take 1 tablet (20 mg) by mouth At Bedtime, Disp: 90 tablet, Rfl: 3     traZODone 100 MG PO tablet, Take 1 tablet (100 mg) by mouth nightly as needed for sleep, Disp: 90 tablet, Rfl: 3     warfarin ANTICOAGULANT (COUMADIN ANTICOAGULANT) 5 MG tablet, Take 7.5 mg on Mondays and Fridays, and 5 mg all other days, or as directed by Coumadin nurse., Disp: 100 tablet, Rfl: 3     ALLERGIES:    Allergies   Allergen Reactions     Ambien Other (See Comments)     Parasomnia with sleep walking, injuries, delusions.  May have been in DTs at the same time.      Lisinopril Cough     Pt had cough     Seasonal Allergies         SOCIAL HISTORY:   Social History     Socioeconomic History     Marital status:      Spouse name: Not on file     Number of children: 2     Years of education: 19     Highest education level: Not on file   Occupational History     Occupation: actor     Employer: UNEMPLOYED     Employer: [employer]     Occupation:    Social Needs     Financial resource strain: Not on file     Food insecurity     Worry: Not on file     Inability: Not on file     Transportation needs     Medical: Not on file     Non-medical: Not on file   Tobacco Use     Smoking status: Former Smoker     Packs/day: 1.00     Years: 53.00     Pack years: 53.00     Types: Cigarettes     Last attempt to quit: 2019     Years since quittin.7     Smokeless tobacco: Never Used     Tobacco comment: 1 ppd   Substance and Sexual Activity     Alcohol use: No     Comment: quit drinking heavily      Drug use: No     Sexual activity: Never   Lifestyle     Physical activity     Days per week: Not on file     Minutes per session: Not on file     Stress: Not on file   Relationships     Social connections     Talks on phone: Not on file     Gets together: Not on file     Attends Buddhist service: Not on file     Active member of club or organization: Not on file     Attends meetings of clubs or organizations: Not on file     Relationship status: Not on file     Intimate partner violence     Fear of current or ex partner: Not on file     Emotionally abused: Not on file     Physically abused: Not on file     Forced sexual activity: Not on file   Other Topics Concern     Parent/sibling w/ CABG, MI or angioplasty before 65F 55M? Not Asked      Service Not Asked     Blood Transfusions Not Asked     Caffeine Concern Yes     Occupational Exposure Not Asked     Hobby Hazards Not Asked     Sleep Concern Not Asked     Stress Concern Not Asked     Weight Concern Not  "Asked     Special Diet No     Back Care Not Asked     Exercise Not Asked     Comment: 1 miles on stepper      Bike Helmet Not Asked     Seat Belt Not Asked     Self-Exams Not Asked   Social History Narrative    Balanced Diet - Yes    Osteoporosis Preventative measures-  Dairy servings per day: 1-3    Regular Exercise -  Yes Describe walk every day    Dental Exam up - YES - Date: 1/2/07    Eye Exam - YES - Date: 2 yrs    Self Testicular Exam -  One testicle removed yrs ago due to fallDo you have any concerns about STD's -  No    Abuse: Current or Past (Physical, Sexual or Emotional)- No    Do you feel safe in your environment - Yes    Guns stored in the home - No    Sunscreen used - Yes    Seatbelts used - Yes    Lipids - ?    Glucose -  ?    Colon Cancer Screening - No    Hemoccults - hemorrhoids    PSA - NO    Digital Rectal Exam - YES - Date: 1993    Immunizations reviewed and up to date - No    Tory NAILS        FAMILY HISTORY:   Family History   Problem Relation Age of Onset     Arthritis Mother      Circulatory Mother         varicose veins     Eye Disorder Mother         detached retina     Thyroid Disease Mother      C.A.D. Father         Quadruple bypass     Gastrointestinal Disease Father         diverticulitis     Alzheimer Disease Father         demntia     Eye Disorder Father         cataract     Alcohol/Drug Sister         alcohol     Depression Sister         post partum depression     Eye Disorder Brother         detached retina     Neurologic Disorder Sister         brain aneurysm     Thyroid Disease Sister         EXAM:Vitals: /76   Ht 1.778 m (5' 10\")   Wt (!) 169.6 kg (374 lb)   BMI 53.66 kg/m    BMI= Body mass index is 53.66 kg/m .    General appearance: Patient is alert and fully cooperative with history & exam.  No sign of distress is noted during the visit.     Psychiatric: Affect is pleasant & appropriate.  Patient appears motivated to improve health.     Respiratory: Breathing is " regular & unlabored while sitting.     HEENT: Hearing is intact to spoken word.  Speech is clear.  Pt reports he is blind.     Dermatologic: b/l toenails with some mild thickening and discoloration, some incurvation of nails, no painful ingrowing nail noted.  Skin is intact to both feet.  No paronychia or evidence of soft tissue infection is noted.     Vascular: DP & PT pulses are intact & regular bilaterally.  B/l LE edema. CFT and skin temperature are normal to both lower extremities.     Neurologic: Lower extremity sensation is intact to light touch.  No evidence of weakness or contracture in the lower extremities.  No evidence of neuropathy.     Musculoskeletal: Pes planus b/l.  Patient is ambulatory without assistive device or brace.  No gross ankle deformity noted.  No foot or ankle joint effusion is noted.     ASSESSMENT: b/l onychodystrophy     PLAN:  Reviewed patient's chart in epic.  Discussed condition and treatment options including pros and cons.    Some curving of nails, but no pain or evidence of painful ingrowing nail today.  Prevention of ingrowing nails discussed  Wider shoes advised.  List of routine foot care resources given for nail care needs.  F/u prn.     Henry Candelario DPM, FACFAS    Weight management plan: Patient was referred to their PCP to discuss a diet and exercise plan.

## 2020-06-29 NOTE — PATIENT INSTRUCTIONS
Thank you for choosing [facility] / Foot [facility] Ankle Surgery!    Follow up as needed    DR. CARDOSO'S CURRENT CLINIC LOCATIONS:     MONDAY  [place] TUESDAY  [place]   [address] [facility]   [address]   [place] [address] [place] [address] [phone]  [phone]       SCHEDULE SURGERY: [phone] TRIAGE RN:  [phone]   APPOINTMENTS: [phone]    BILLING QUESTIONS: [phone]             ROUTINE FOOT CARE (NAIL TRIMMING / CALLUSES)    Go to afcna.org ([employer]) and search for providers near you.  Otherwise, this is a list we have gathered of  recommended locations/providers in [place].    [facility]   [phone]   [facility]  [phone]  [email or web address]."[email or web address] [email or web address]   [facility]  [phone]  Formerly named [facility] 15 mile radius [facility]  [phone]  [facility].   Komal Oneal, DPM  [address] [place] [address] [phone] Jurgen Milian, DPM  [address] [place] [address] [phone]   [facility]  [phone] [address] [place] [address]  [facility]  Dr. Rinku Shin  [phone]  [place], [place]   [facility]  [phone]  [place] & [place] Locations  (does not take BCBS) FYI:  *Some providers accept insurance while others are out of pocket. Please contact them for details*         BODY WEIGHT AND YOUR FEET  The following information is included in the after visit summary for all patients. Body weight can be a sensitive issue to discuss in clinic, but we think the following information is very important. Although we focus on the feet and ankles, we do support the overall health of our patients.     Many things can cause foot and ankle problems. Foot structure, activity level, foot mechanics and injuries are common causes of pain. One very important issue that often goes unmentioned, is body weight. Extra weight can cause  increased stress on muscles, ligaments, bones and tendons. Sometimes just a few extra pounds is all it takes to put one over her/his threshold. Without reducing that stress, it can be difficult to alleviate pain. As Foot & Ankle specialists, our job is addressing the lower extremity problem and possible causes. Regarding extra body weight, we encourage patients to discuss diet and weight management plans with their primary care doctors. It is this team approach that gives you the best opportunity for pain relief and getting you back on your feet.      [facility] has a Comprehensive Weight Management Program. This program includes counseling, education, non-surgical and surgical approaches to weight loss. If you are interested in learning more either talk to you primary care provider or call [phone].

## 2020-06-29 NOTE — LETTER
6/29/2020         RE: Markie Shepard  [address]  [facility] [place] [facility] [address]        Dear Colleague,    Thank you for referring your patient, Markie Shepard, to the [facility]. Please see a copy of my visit note below.    PATIENT HISTORY:  Markie Shepard is a 70 year old male who presents to clinic for L foot possible ingrowing nails.  Normally gets a pedicure.  Grandson present, who recently trimmed his nails and told the pt he has ingrowing nails.  No pain today.  Nondiabetic.    Review of Systems:  Patient denies fever, chills, rash, wound, stiffness, limping, numbness, weakness, heart burn, blood in stool, chest pain with activity, calf pain when walking, chronic cough, easy bleeding/bruising,  excessive thirst, fatigue, depression, anxiety.  Patient admits to sob with activity, blindness, swelling of ankles.     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 On Cellcept, steroid taper     Coronary atherosclerosis of unspecified type of vessel, native or graft      Diabetes mellitus type 2 in obese (H)      Essential hypertension, benign      Mixed hyperlipidemia 10/5/2007     Polycythemia      Sleep apnea      Tobacco use disorder 10/5/2007    Smoker - 1/2 ppd. Started at 16 years     Total retinal detachment of left eye 2009/2010        PAST SURGICAL HISTORY:   Past Surgical History:   Procedure Laterality Date     C NONSPECIFIC PROCEDURE  1987    Right Epididymis Removed     C OPEN RX ANKLE DISLOCATN+FIXATN  12/2008    Right Ankle     CARDIAC CATHERIZATION  7/03/2012    totally occluded RCA w/ mild left coronary disease     HEART CATH CORONARY ANGIOGRAM AND RIGHT HEART CATH  7/2012    RCA occlusion, IMI, no stent     PHACOEMULSIFICATION CLEAR CORNEA WITH STANDARD INTRAOCULAR LENS  IMPLANT Right 6/4/2015    Procedure: PHACOEMULSIFICATION CLEAR CORNEA WITH STANDARD INTRAOCULAR LENS IMPLANT;  Surgeon: Tal Trinidad MD;  [facility]  [facility]     RETINAL REATTACHMENT  11/09, 7/10    Left     TONSILLECTOMY          MEDICATIONS:   Current Outpatient Medications:      acetaminophen (TYLENOL) 500 MG tablet, Take 500-1,000 mg by mouth every 6 hours as needed for mild pain, Disp: , Rfl:      albuterol (PROAIR RESPICLICK) 108 (90 Base) MCG/ACT inhaler, Inhale 2 puffs into the lungs every 4 hours as needed for shortness of breath / dyspnea, Disp: 1 Inhaler, Rfl: 11     amLODIPine (NORVASC) 10 MG tablet, Take 1 tablet (10 mg) by mouth daily, Disp: 90 tablet, Rfl: 3     atenolol (TENORMIN) 100 MG tablet, Take 1 tablet (100 mg) by mouth daily along with 50 mg tablet for total of 150 mg daily., Disp: 90 tablet, Rfl: 3     atenolol (TENORMIN) 50 MG tablet, Take 1 tablet (50 mg) by mouth daily Along with 100 mg tablet for total of 150 mg daily., Disp: 90 tablet, Rfl: 3     CENTRUM SILVER OR TABS, ONE DAILY, Disp: 3 MONTHS, Rfl: 1 YEAR     FLUoxetine (PROZAC) 40 MG capsule, Take 1 capsule (40 mg) by mouth daily, Disp: 90 capsule, Rfl: 3     furosemide (LASIX) 20 MG tablet, Take one half tablet by mouth up to 3 times a week as needed for leg edema., Disp: 36 tablet, Rfl: 3     losartan (COZAAR) 100 MG tablet, Take 1 tablet (100 mg) by mouth daily, Disp: 90 tablet, Rfl: 3     omega-3 fatty acids (FISH OIL) 1200 MG capsule, Take 1 capsule by mouth daily, Disp: , Rfl:      oxymetazoline (AFRIN 12 HOUR) 0.05 % nasal spray, Spray 2 sprays into both nostrils At Bedtime, Disp: , Rfl:      simvastatin 20 MG PO tablet, Take 1 tablet (20 mg) by mouth At Bedtime, Disp: 90 tablet, Rfl: 3     traZODone 100 MG PO tablet, Take 1 tablet (100 mg) by mouth nightly as needed for sleep, Disp: 90 tablet, Rfl: 3     warfarin ANTICOAGULANT (COUMADIN ANTICOAGULANT) 5 MG tablet, Take 7.5 mg on Mondays and Fridays, and 5 mg all other days,  or as directed by Coumadin nurse., Disp: 100 tablet, Rfl: 3     ALLERGIES:    Allergies   Allergen Reactions     Ambien Other (See Comments)     Parasomnia with sleep walking, injuries, delusions.  May have been in DTs at the same time.     Lisinopril Cough     Pt had cough     Seasonal Allergies         SOCIAL HISTORY:   Social History     Socioeconomic History     Marital status:      Spouse name: Not on file     Number of children: 2     Years of education: 19     Highest education level: Not on file   Occupational History     Occupation: actor     Employer: UNEMPLOYED     Employer: [employer]     Occupation:    Social Needs     Financial resource strain: Not on file     Food insecurity     Worry: Not on file     Inability: Not on file     Transportation needs     Medical: Not on file     Non-medical: Not on file   Tobacco Use     Smoking status: Former Smoker     Packs/day: 1.00     Years: 53.00     Pack years: 53.00     Types: Cigarettes     Last attempt to quit: 2019     Years since quittin.7     Smokeless tobacco: Never Used     Tobacco comment: 1 ppd   Substance and Sexual Activity     Alcohol use: No     Comment: quit drinking heavily      Drug use: No     Sexual activity: Never   Lifestyle     Physical activity     Days per week: Not on file     Minutes per session: Not on file     Stress: Not on file   Relationships     Social connections     Talks on phone: Not on file     Gets together: Not on file     Attends Restorationism service: Not on file     Active member of club or organization: Not on file     Attends meetings of clubs or organizations: Not on file     Relationship status: Not on file     Intimate partner violence     Fear of current or ex partner: Not on file     Emotionally abused: Not on file     Physically abused: Not on file     Forced sexual activity: Not on file   Other Topics Concern     Parent/sibling w/ CABG, MI or angioplasty before 65F 55M? Not  "Asked      Service Not Asked     Blood Transfusions Not Asked     Caffeine Concern Yes     Occupational Exposure Not Asked     Hobby Hazards Not Asked     Sleep Concern Not Asked     Stress Concern Not Asked     Weight Concern Not Asked     Special Diet No     Back Care Not Asked     Exercise Not Asked     Comment: 1 miles on stepper      Bike Helmet Not Asked     Seat Belt Not Asked     Self-Exams Not Asked   Social History Narrative    Balanced Diet - Yes    Osteoporosis Preventative measures-  Dairy servings per day: 1-3    Regular Exercise -  Yes Describe walk every day    Dental Exam up - YES - Date: 1/2/07    Eye Exam - YES - Date: 2 yrs    Self Testicular Exam -  One testicle removed yrs ago due to fallDo you have any concerns about STD's -  No    Abuse: Current or Past (Physical, Sexual or Emotional)- No    Do you feel safe in your environment - Yes    Guns stored in the home - No    Sunscreen used - Yes    Seatbelts used - Yes    Lipids - ?    Glucose -  ?    Colon Cancer Screening - No    Hemoccults - hemorrhoids    PSA - NO    Digital Rectal Exam - YES - Date: 1993    Immunizations reviewed and up to date - No    Tory RN        FAMILY HISTORY:   Family History   Problem Relation Age of Onset     Arthritis Mother      Circulatory Mother         varicose veins     Eye Disorder Mother         detached retina     Thyroid Disease Mother      C.A.D. Father         Quadruple bypass     Gastrointestinal Disease Father         diverticulitis     Alzheimer Disease Father         demntia     Eye Disorder Father         cataract     Alcohol/Drug Sister         alcohol     Depression Sister         post partum depression     Eye Disorder Brother         detached retina     Neurologic Disorder Sister         brain aneurysm     Thyroid Disease Sister         EXAM:Vitals: /76   Ht 1.778 m (5' 10\")   Wt (!) 169.6 kg (374 lb)   BMI 53.66 kg/m    BMI= Body mass index is 53.66 kg/m .    General " appearance: Patient is alert and fully cooperative with history & exam.  No sign of distress is noted during the visit.     Psychiatric: Affect is pleasant & appropriate.  Patient appears motivated to improve health.     Respiratory: Breathing is regular & unlabored while sitting.     HEENT: Hearing is intact to spoken word.  Speech is clear.  Pt reports he is blind.     Dermatologic: b/l toenails with some mild thickening and discoloration, some incurvation of nails, no painful ingrowing nail noted.  Skin is intact to both feet.  No paronychia or evidence of soft tissue infection is noted.     Vascular: DP & PT pulses are intact & regular bilaterally.  B/l LE edema. CFT and skin temperature are normal to both lower extremities.     Neurologic: Lower extremity sensation is intact to light touch.  No evidence of weakness or contracture in the lower extremities.  No evidence of neuropathy.     Musculoskeletal: Pes planus b/l.  Patient is ambulatory without assistive device or brace.  No gross ankle deformity noted.  No foot or ankle joint effusion is noted.     ASSESSMENT: b/l onychodystrophy     PLAN:  Reviewed patient's chart in epic.  Discussed condition and treatment options including pros and cons.    Some curving of nails, but no pain or evidence of painful ingrowing nail today.  Prevention of ingrowing nails discussed  Wider shoes advised.  List of routine foot care resources given for nail care needs.  F/u prn.     Henry Candelario DPM, FACFAS    Weight management plan: Patient was referred to their PCP to discuss a diet and exercise plan.      Again, thank you for allowing me to participate in the care of your patient.        Sincerely,        Henry Candelario DPM

## 2020-07-20 ENCOUNTER — OFFICE VISIT (OUTPATIENT)
Dept: SLEEP MEDICINE | Facility: CLINIC | Age: 70
End: 2020-07-20
Payer: COMMERCIAL

## 2020-07-20 DIAGNOSIS — G47.33 OBSTRUCTIVE SLEEP APNEA (ADULT) (PEDIATRIC): Primary | ICD-10-CM

## 2020-07-20 DIAGNOSIS — Z53.9 ERRONEOUS ENCOUNTER--DISREGARD: ICD-10-CM

## 2020-07-27 ENCOUNTER — VIRTUAL VISIT (OUTPATIENT)
Dept: SLEEP MEDICINE | Facility: CLINIC | Age: 70
End: 2020-07-27
Payer: COMMERCIAL

## 2020-07-27 DIAGNOSIS — G47.33 OSA ON CPAP: Primary | ICD-10-CM

## 2020-07-27 PROCEDURE — 99213 OFFICE O/P EST LOW 20 MIN: CPT | Mod: TEL | Performed by: INTERNAL MEDICINE

## 2020-07-27 NOTE — PROGRESS NOTES
"Markie Shepard is a 70 year old male who is being evaluated via a billable telephone visit.      The patient has been notified of following:     \"This telephone visit will be conducted via a call between you and your physician/provider. We have found that certain health care needs can be provided without the need for a physical exam.  This service lets us provide the care you need with a short phone conversation.  If a prescription is necessary we can send it directly to your pharmacy.  If lab work is needed we can place an order for that and you can then stop by our lab to have the test done at a later time.    Telephone visits are billed at different rates depending on your insurance coverage. During this emergency period, for some insurers they may be billed the same as an in-person visit.  Please reach out to your insurance provider with any questions.    If during the course of the call the physician/provider feels a telephone visit is not appropriate, you will not be charged for this service.\"    Patient has given verbal consent for Telephone visit?  Yes    What phone number would you like to be contacted at? [phone]    How would you like to obtain your AVS? Mail a copy    Tyesha Amado MA    Phone call duration: 12  minutes    Val Jim MD  [address] [address] [phone]  Dept: [phone]    SLEEP CLINIC FOLLOW UP VISIT    Date of visit: 7/27/2020    Purpose of visit: f/u EMORY, concerns about CPAP    HPI: Markie Shepard 70 year old with PMH of obstructive sleep apnea who presents for telephone visit today for f/u [facility]ORY and CPAP concerns.  He was last seen at sleep clinic in Jan 2018. He is being treated with  CPAP  set at fixed pressure setting at 9 cm of water.    He uses the CPAP device regularly during sleep.   He has been using the same CPAP device and has  Not replaced the device since he obtained " in 2012 through [facility].  He uses [facility]. He likes the mask.  For that past week there have been problems with water chamber and the unit is not getting heated and the water reservoir has been full every morning.   He is interested in obtaining prescription for replacement CPAP equipment.  He sleeps alone hence there is not anyone to report whether or not he snores with the CPAP device.   He denies awakenings due to choking/gasping for air while using the CPAP device.    He reports feeling comfortable with the current pressure settings on the device.    He denies daytime sleepiness/fatigue.    Sleep scales:  ESS: 4/24  CLARISSA:9    He does not drive (legally blind).      Previous sleep study reports:  PSG#1 diagnostic sleep study(September 18, 2012), He underwent an overnight diagnostic sleep study on September 18, 2012, at [facility].  At that time he weighed 308 pounds and was noted to have an AHI of 8.4/h and during that sleep study REM sleep was not observed.    PSG#2 all-night titration study(Oct 2012)He underwent a repeat sleep study in  October 2012 which was an all-night titration study using CPAP device and was prescribed CPAP at pressure setting of 9 cm of water.     Compliance download: unavailable.     Current meds:  Current Outpatient Medications   Medication Sig Dispense Refill     acetaminophen (TYLENOL) 500 MG tablet Take 500-1,000 mg by mouth every 6 hours as needed for mild pain       albuterol (PROAIR RESPICLICK) 108 (90 Base) MCG/ACT inhaler Inhale 2 puffs into the lungs every 4 hours as needed for shortness of breath / dyspnea 1 Inhaler 11     amLODIPine (NORVASC) 10 MG tablet Take 1 tablet (10 mg) by mouth daily 90 tablet 3     atenolol (TENORMIN) 100 MG tablet Take 1 tablet (100 mg) by mouth daily along with 50 mg tablet for total of 150 mg daily. 90 tablet 3     atenolol (TENORMIN) 50 MG tablet Take 1 tablet (50 mg) by mouth daily Along with 100 mg tablet for total of  150 mg daily. 90 tablet 3     CENTRUM SILVER OR TABS ONE DAILY 3 MONTHS 1 YEAR     FLUoxetine (PROZAC) 40 MG capsule Take 1 capsule (40 mg) by mouth daily 90 capsule 3     furosemide (LASIX) 20 MG tablet Take one half tablet by mouth up to 3 times a week as needed for leg edema. 36 tablet 3     losartan (COZAAR) 100 MG tablet Take 1 tablet (100 mg) by mouth daily 90 tablet 3     omega-3 fatty acids (FISH OIL) 1200 MG capsule Take 1 capsule by mouth daily       oxymetazoline (AFRIN 12 HOUR) 0.05 % nasal spray Spray 2 sprays into both nostrils At Bedtime       simvastatin 20 MG PO tablet Take 1 tablet (20 mg) by mouth At Bedtime 90 tablet 3     traZODone 100 MG PO tablet Take 1 tablet (100 mg) by mouth nightly as needed for sleep 90 tablet 3     warfarin ANTICOAGULANT (COUMADIN ANTICOAGULANT) 5 MG tablet Take 7.5 mg on Mondays and Fridays, and 5 mg all other days, or as directed by Coumadin nurse. 100 tablet 3     Problem list:  Patient Active Problem List   Diagnosis     CAD (coronary artery disease)     Hyperlipidemia LDL goal <70     Smoker     Hypertension     Atrial fibrillation (H)     Obstructive sleep apnea     Mild major depression (H)     Low back pain     Mixed hyperlipidemia     Essential hypertension, benign     Coronary atherosclerosis     Long-term (current) use of anticoagulants [Z79.01]     Anisocoria     Past medical history:  Past Medical History:   Diagnosis Date     Adjustment disorder with depressed mood 6/17/12     ALCOHOL ABUSE-UNSPEC 10/5/2007     Atrial fibrillation (H)      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 On Cellcept, steroid taper     Coronary atherosclerosis of unspecified type of vessel, native or graft      Diabetes mellitus type 2 in obese (H)      Essential hypertension, benign      Mixed hyperlipidemia 10/5/2007     Polycythemia      Sleep apnea      Tobacco use disorder 10/5/2007    Smoker - 1/2 ppd. Started at 16 years     Total retinal detachment  of left eye       Past surgical history:  Past Surgical History:   Procedure Laterality Date     C NONSPECIFIC PROCEDURE      Right Epididymis Removed     C OPEN RX ANKLE DISLOCATN+FIXATN  2008    Right Ankle     CARDIAC CATHERIZATION  2012    totally occluded RCA w/ mild left coronary disease     HEART CATH CORONARY ANGIOGRAM AND RIGHT HEART CATH  2012    RCA occlusion, IMI, no stent     PHACOEMULSIFICATION CLEAR CORNEA WITH STANDARD INTRAOCULAR LENS IMPLANT Right 2015    Procedure: PHACOEMULSIFICATION CLEAR CORNEA WITH STANDARD INTRAOCULAR LENS IMPLANT;  Surgeon: Tal Trinidad MD;  Location:  [facility]     RETINAL REATTACHMENT  , 7/10    Left     TONSILLECTOMY       Allergies:  Allergies   Allergen Reactions     Ambien Other (See Comments)     Parasomnia with sleep walking, injuries, delusions.  May have been in DTs at the same time.     Lisinopril Cough     Pt had cough     Seasonal Allergies       Social history:  Social History     Tobacco Use     Smoking status: Former Smoker     Packs/day: 1.00     Years: 53.00     Pack years: 53.00     Types: Cigarettes     Last attempt to quit: 2019     Years since quittin.8     Smokeless tobacco: Never Used     Tobacco comment: 1 ppd   Substance Use Topics     Alcohol use: No     Comment: quit drinking heavily      Drug use: No     Family history:   Family History   Problem Relation Age of Onset     Arthritis Mother      Circulatory Mother         varicose veins     Eye Disorder Mother         detached retina     Thyroid Disease Mother      C.A.D. Father         Quadruple bypass     Gastrointestinal Disease Father         diverticulitis     Alzheimer Disease Father         demntia     Eye Disorder Father         cataract     Alcohol/Drug Sister         alcohol     Depression Sister         post partum depression     Eye Disorder Brother         detached retina     Neurologic Disorder Sister         brain aneurysm     Thyroid  Disease Sister      Exam:  There were no vitals taken for this visit.  GENERAL APPEARANCE:in no distress  PSYCH: Alert and oriented times 3; coherent speech, normal   rate and volume, able to articulate logical thoughts, able   to abstract reason, no tangential thoughts, no hallucinations   or delusions  His affect is  Normal  RESP: No cough, no audible wheezing, able to talk in full sentences  Remainder of exam unable to be completed due to telephone visits      Other tests:  Last Comprehensive Metabolic Panel:  Sodium   Date Value Ref Range Status   02/17/2020 141.0 137.3 - 146.3 mmol/L Final     Potassium   Date Value Ref Range Status   02/17/2020 4.2 3.4 - 5.3 mmol/L Final     Chloride   Date Value Ref Range Status   02/17/2020 106.0 94.0 - 109.0 mmol/L Final     Carbon Dioxide   Date Value Ref Range Status   02/17/2020 27.0 20.0 - 32.0 mmol/L Final     Anion Gap   Date Value Ref Range Status   02/11/2019 8 3 - 14 mmol/L Final     Glucose   Date Value Ref Range Status   02/17/2020 112.0 (H) 60.0 - 99.0 mg/dL Final     Urea Nitrogen   Date Value Ref Range Status   02/17/2020 15.0 7.0 - 30.0 mg/dL Final     Creatinine   Date Value Ref Range Status   02/17/2020 0.8 0.8 - 1.5 mg/dL Final     GFR Estimate   Date Value Ref Range Status   02/11/2019 88 >60 mL/min/[1.73_m2] Final     Comment:     Non  GFR Calc  Starting 12/18/2018, serum creatinine based estimated GFR (eGFR) will be   calculated using the Chronic Kidney Disease Epidemiology Collaboration   (CKD-EPI) equation.       Calcium   Date Value Ref Range Status   02/17/2020 10.1 8.5 - 10.4 mg/dL Final     Bilirubin Total   Date Value Ref Range Status   02/17/2020 1.0 0.2 - 1.3 mg/dL Final     Alkaline Phosphatase   Date Value Ref Range Status   02/17/2020 93.0 40.0 - 150.0 U/L Final     ALT   Date Value Ref Range Status   02/17/2020 27.0 0.0 - 70.0 U/L Final     AST   Date Value Ref Range Status   02/17/2020 34.0 0.0 - 55.0 U/L Final        Cholesterol   Date Value Ref Range Status   02/17/2020 152.0 0.0 - 200.0 Final   02/11/2019 137 <200 mg/dL Final     HDL Cholesterol   Date Value Ref Range Status   02/17/2020 55.0 >40.0 Final   02/11/2019 40 >39 mg/dL Final     LDL Cholesterol Calculated   Date Value Ref Range Status   02/11/2019 64 <100 mg/dL Final     Comment:     Desirable:       <100 mg/dl   10/03/2017 72 <100 mg/dL Final     Comment:     Desirable:       <100 mg/dl     LDL Cholesterol Direct   Date Value Ref Range Status   02/17/2020 74.0 0.0 - 129.0 Final     Triglycerides   Date Value Ref Range Status   02/17/2020 116.0 0.0 - 150.0 Final   02/11/2019 165 (H) <150 mg/dL Final     Comment:     Borderline high:  150-199 mg/dl  High:             200-499 mg/dl  Very high:       >499 mg/dl         Assessment/Plan:  Obstructive sleep apnea: Patient reports good  compliance with his CPAP device and  reports positive benefits from the CPAP treatment.   He reports mechanical concerns with the equipment and  is interested in obtaining prescription for replacement CPAP equipment.  Prescription was provided for replacement  CPAP equipment with CPAP at 9 cm water.    Recommended him to use the device regularly during sleep and getting supplies replaced regularly.  Plan to  schedule a follow up viist at sleep clinic in 6-7 weeks after obtaining  the replacement  CPAP equipment, when compliance measures will be reviewed.     HTN: he is being treated with losartan, atenolol, amlodipine and lasix.    Permament Atrial fibrillation, CAD:  No symptoms. He is on long term anticoagulation. He will continue follow up with cardiology. He had a virtual visit with Cardiology  in April 2020.     Hyperlipidemia: he is being treated with simvastatin 20 mg daily.     Type 2 DM: last HbA1C checked in Feb 2020 was 6.1%.    Obesity: we discussed weight management with healthy diet, and exercise.     Patient was strongly advised to avoid driving, operating any heavy  "machinery or other hazardous situations while drowsy or sleepy.  Patient was counseled on the importance of driving while alert, to pull over if drowsy, or nap before getting into the vehicle if sleepy.       The above note was dictated using voice recognition software. Although reviewed after completion, some word and grammatical error may remain . Please contact the author for any clarifications.     \"I spent a total of 12  minutes with Markie Shepard during today's telephone visit. Over 50% of this time was spent counseling the patient and  coordinating care regarding sleep apnea, CPAP treatment .\"      Val Jim MD  [facility] [address] [address] [phone]  Dept: [phone]        "

## 2020-08-10 ENCOUNTER — DOCUMENTATION ONLY (OUTPATIENT)
Dept: SLEEP MEDICINE | Facility: CLINIC | Age: 70
End: 2020-08-10

## 2020-08-10 NOTE — PROGRESS NOTES
8/10/2020- JL- VIRTUAL INSTRUCT GIVEN TO NILSON AND HIS GRANDSON JOSE A. PER PT REQUEST I CHANGED RAMP IN AIRVIEW FOR 45 MINUTES STARTING AT PRESSURE OF 5. MACHINE [facility] [employer], PT STATES HIS AIRFIT F20 IS TO SMALL ON HIS FACE, THE CUSHION GOES OVER HIS LIP WHEN HE OPEN HIS MOUTH. PER PT REQUEST 30 DAY MASK EXCHANGE COMPLETED FOR HIS OLD QUATTRO MASK [id number]. 30 DAY MASK EXCHANGE FORM COMPLETED AND SCANNED INTO BT. FIDEL DESAI IS GOING TO SHIP OUT MASK.

## 2020-08-11 ENCOUNTER — DOCUMENTATION ONLY (OUTPATIENT)
Dept: SLEEP MEDICINE | Facility: CLINIC | Age: 70
End: 2020-08-11

## 2020-08-11 NOTE — PROGRESS NOTES
Patient was offered choice of vendor and chose [facility].  Patient Markie Shepard was set up at [place] on August 11, 2020. Patient received a Resmed AirSense 10 Auto. Pressures were set at 9 cm H2O.   Patient s ramp is 5 cm H2O for 45 min and FLEX/EPR is 2.  Patient received a Resmed Mask name: AIRFIT F20   Full Face mask LG QUATRO , heated tubing and heated humidifier.  Patient does need to meet compliance. Patient has a follow up on TBD with Dr. Jim.    Radha Strauss

## 2020-08-14 ENCOUNTER — DOCUMENTATION ONLY (OUTPATIENT)
Dept: SLEEP MEDICINE | Facility: CLINIC | Age: 70
End: 2020-08-14
Payer: COMMERCIAL

## 2020-08-14 NOTE — PROGRESS NOTES
3 DAY STM VISIT    Diagnostic AHI: 8.4  PSG    Patient contacted for 3 day STM visit.    Confirmed with patient at time of call- No Patient is still interested in STM service     Subjective measures:  Patient on CPAP for about 8 years. Patient stated everything going well with CPAP.     Replacement device: Yes  STM ordered by provider: No     Device type: Auto-CPAP  PAP settings from order::  CPAP min 9 cm  H20       CPAP max 9 cm  H20  Mask type:    Full Face Mask     Device settings from machine                                                  CPAP fixed 9.0 cm  H20      EPR level Setting: TWO      RESMED Soft response setting:  N/A  Assessment: Nightly usage, most nights over four hours   Action plan: Patient to have 14 day STM visit. Patient has a follow up visit scheduled:   yes within 31-90 days of set up.     Total time spent on accessing and  interpreting remote patient PAP therapy data  10 minutes  Total time spent counseling, coaching  and reviewing PAP therapy data with patient  5 minutes  [id number] no

## 2020-08-17 ENCOUNTER — DOCUMENTATION ONLY (OUTPATIENT)
Dept: SLEEP MEDICINE | Facility: CLINIC | Age: 70
End: 2020-08-17

## 2020-08-17 ENCOUNTER — ANTICOAGULATION THERAPY VISIT (OUTPATIENT)
Dept: FAMILY MEDICINE | Facility: CLINIC | Age: 70
End: 2020-08-17

## 2020-08-17 DIAGNOSIS — I48.21 PERMANENT ATRIAL FIBRILLATION (H): ICD-10-CM

## 2020-08-17 DIAGNOSIS — Z79.01 LONG TERM CURRENT USE OF ANTICOAGULANT THERAPY: ICD-10-CM

## 2020-08-17 DIAGNOSIS — I48.91 ATRIAL FIBRILLATION, UNSPECIFIED TYPE (H): ICD-10-CM

## 2020-08-17 LAB — INR PPP: 2.7 (ref 0.86–1.14)

## 2020-08-17 PROCEDURE — 85610 PROTHROMBIN TIME: CPT | Performed by: INTERNAL MEDICINE

## 2020-08-17 PROCEDURE — 36415 COLL VENOUS BLD VENIPUNCTURE: CPT | Performed by: INTERNAL MEDICINE

## 2020-08-17 NOTE — PROGRESS NOTES
8/17/2020- RETURNED CALL TO NILSON, HE STATED HE OVER FILLED WATER CHAMBER AND HIS GRANDSON CAME OVER AND CLEANED CPAP MACHINE.   NILSON SAID HE USED MACHINE AND IT IS WORKING WELL.  HE WILL CALL ME DIRECTLY IF HE HAS ANY QUESTIONS OR CONCERNS TOMORROW MORNING AFTER USING IT TONIGHT.

## 2020-08-20 ENCOUNTER — TELEPHONE (OUTPATIENT)
Dept: SLEEP MEDICINE | Facility: CLINIC | Age: 70
End: 2020-08-20

## 2020-08-20 NOTE — TELEPHONE ENCOUNTER
RETURN PT CALL AND DISCUSS WHAT HE WAS EXPERIENCING WITH HIS MACHINE. THE PT STATED THAT HIS MACHINE WOULD NOT TURN ON FOR HIM WHEN HE GOT UP TO USE THE BATHROOM AROUND 7AM. PT DISCOVERED AFTER CHECKING THE CONNECTIONS THAT THE POWER CORD FOR THE BACK ON THE CPAP MACHINE WAS LOOSE. ONCE PLUGGED BACK IN WORKS PERFECT. ALSO THE PT STATED THAT HIS HUMIDIFIER IS NOT USING MUCH WATER. EXPLAIN TO THE PT THAT THE MACHINE IS ON A AUTO CLIMATE SETTING WHICH MEANS THE CPAP MACHINE WILL SENSE THE AIR IN THE ENVIRONMENT AND DETERMINE WHAT IS NEEDED FOR THE HUMIDITY IN THE ROOM. WHEN CHECKING AIRVIEW THE PT AMBIENT ROOM HUMIDITY IS SHOWING 14MG/L WHICH IS MODERATE. LET THE PT KNOW THAT IF HE WANTS TO BE IN CONTROL OF THE CLIMATE CONTROLS HE WILL NEED TO SWITCH THE MODE TO MANUAL. PT UNDERSTOOD AND WILL CALL BACK WITH ANY QUESTIONS OR CONCERNS IN THE FUTURE.

## 2020-08-26 ENCOUNTER — DOCUMENTATION ONLY (OUTPATIENT)
Dept: SLEEP MEDICINE | Facility: CLINIC | Age: 70
End: 2020-08-26
Payer: COMMERCIAL

## 2020-08-31 ENCOUNTER — TELEPHONE (OUTPATIENT)
Dept: FAMILY MEDICINE | Facility: CLINIC | Age: 70
End: 2020-08-31

## 2020-08-31 DIAGNOSIS — M47.897 OTHER OSTEOARTHRITIS OF SPINE, LUMBOSACRAL REGION: Primary | ICD-10-CM

## 2020-08-31 DIAGNOSIS — Z74.09 REDUCED MOBILITY: ICD-10-CM

## 2020-08-31 DIAGNOSIS — M54.9 BACK PAIN: ICD-10-CM

## 2020-08-31 NOTE — TELEPHONE ENCOUNTER
Patient is requesting a TOILET SEAT RISER.  Rx to be sent to pt in the mail, he will take it to [facility].

## 2020-09-18 NOTE — PROGRESS NOTES
"Markie Shepard is a 70 year old male who is being evaluated via a billable telephone visit.      The patient has been notified of following:     \"This telephone visit will be conducted via a call between you and your physician/provider. We have found that certain health care needs can be provided without the need for a physical exam.  This service lets us provide the care you need with a short phone conversation.  If a prescription is necessary we can send it directly to your pharmacy.  If lab work is needed we can place an order for that and you can then stop by our lab to have the test done at a later time.    Telephone visits are billed at different rates depending on your insurance coverage. During this emergency period, for some insurers they may be billed the same as an in-person visit.  Please reach out to your insurance provider with any questions.    If during the course of the call the physician/provider feels a telephone visit is not appropriate, you will not be charged for this service.\"    Patient has given verbal consent for Telephone visit?  Yes    What phone number would you like to be contacted at? [phone]        How would you like to obtain your AVS? Mail a copy    Phone call duration: 15 minutes    Val Jim MD  [address] [address] [phone]  Dept: [phone]     SLEEP CLINIC FOLLOW UP VISIT    Date of visit:9/21/2020    Purpose of visit: f/u EMORY, review CPAP compliance.    HPI: Markie Shepard is a 70 tr old male with EMORY. He was set up at [facility] on August 11, 2020. Patient received a Resmed AirSense 10 Auto. Pressures were set at 9 cm H2O. Patient s ramp is 5 cm H2O for 45 min and FLEX/EPR is 2.  Patient received a Resmed Mask name: AIRFIT F20   Full Face mask LG QUATRO , heated tubing and heated humidifier.  Patient does need to meet compliance.    He reports using the device " regularly during sleep.  He sleeps alone hence there is not anyone to report whether or not he snores with the CPAP device.   He denies awakenings due to choking/gasping for air while using the CPAP device.    He reports feeling comfortable with the current pressure settings on the device.    He denies daytime sleepiness/fatigue.  He keeps adjusting his mask frequently. He reports air leaks. He sleeps on right side and the mask has been getting dislodged with positional changes.  Ragweed pollen has been bothering him. He recently overfilled the water chamber and his grandson came over and helped him clean up.   He is blind which makes it hard for him.  He doesn't drive since he is legally blind.    ResMed   CPAP 9.0 cmH2O 30 day usage data:  73% of days with > 4 hours of use. 0/30 days with no use.   Average use 319 minutes per day.   95%ile Leak 51.45 L/min.   AHI 1.33 events per hour.    Previous sleep study reports:  PSG#1 diagnostic sleep study(September 18, 2012), He underwent an overnight diagnostic sleep study on September 18, 2012, at [facility].  At that time he weighed 308 pounds and was noted to have an AHI of 8.4/h and during that sleep study REM sleep was not observed.     PSG#2 all-night titration study(Oct 2012)He underwent a repeat sleep study in  October 2012 which was an all-night titration study using CPAP device and was prescribed CPAP at pressure setting of 9 cm of water.    Current meds:  Current Outpatient Medications   Medication Sig Dispense Refill     acetaminophen (TYLENOL) 500 MG tablet Take 500-1,000 mg by mouth every 6 hours as needed for mild pain       albuterol (PROAIR RESPICLICK) 108 (90 Base) MCG/ACT inhaler Inhale 2 puffs into the lungs every 4 hours as needed for shortness of breath / dyspnea 1 Inhaler 11     amLODIPine (NORVASC) 10 MG tablet Take 1 tablet (10 mg) by mouth daily 90 tablet 3     atenolol (TENORMIN) 100 MG tablet Take 1 tablet (100  mg) by mouth daily along with 50 mg tablet for total of 150 mg daily. 90 tablet 3     atenolol (TENORMIN) 50 MG tablet Take 1 tablet (50 mg) by mouth daily Along with 100 mg tablet for total of 150 mg daily. 90 tablet 3     CENTRUM SILVER OR TABS ONE DAILY 3 MONTHS 1 YEAR     FLUoxetine (PROZAC) 40 MG capsule Take 1 capsule (40 mg) by mouth daily 90 capsule 3     furosemide (LASIX) 20 MG tablet Take one half tablet by mouth up to 3 times a week as needed for leg edema. 36 tablet 3     losartan (COZAAR) 100 MG tablet Take 1 tablet (100 mg) by mouth daily 90 tablet 3     omega-3 fatty acids (FISH OIL) 1200 MG capsule Take 1 capsule by mouth daily       oxymetazoline (AFRIN 12 HOUR) 0.05 % nasal spray Spray 2 sprays into both nostrils At Bedtime       simvastatin 20 MG PO tablet Take 1 tablet (20 mg) by mouth At Bedtime 90 tablet 3     traZODone 100 MG PO tablet Take 1 tablet (100 mg) by mouth nightly as needed for sleep 90 tablet 3     warfarin ANTICOAGULANT (COUMADIN ANTICOAGULANT) 5 MG tablet Take 7.5 mg on Mondays and Fridays, and 5 mg all other days, or as directed by Coumadin nurse. 100 tablet 3     Problem list:  Patient Active Problem List   Diagnosis     CAD (coronary artery disease)     Hyperlipidemia LDL goal <70     Smoker     Hypertension     Atrial fibrillation (H)     Obstructive sleep apnea     Mild major depression (H)     Low back pain     Mixed hyperlipidemia     Essential hypertension, benign     Coronary atherosclerosis     Long-term (current) use of anticoagulants [Z79.01]     Anisocoria     Past medical history, Past surgical history, Allergies, Social history, Family history: reviewed, per EMR    Exam:  There were no vitals taken for this visit.  GENERAL: in no distress  PSYCH: Alert and oriented times 3; coherent speech, normal   rate and volume, able to articulate logical thoughts, able   to abstract reason, no tangential thoughts, no hallucinations   or delusions  His affect is normal  RESP:  No cough, no audible wheezing, able to talk in full sentences  Remainder of exam unable to be completed due to telephone visit    OTHER  TESTS:  Last Comprehensive Metabolic Panel:  Sodium   Date Value Ref Range Status   02/17/2020 141.0 137.3 - 146.3 mmol/L Final     Potassium   Date Value Ref Range Status   02/17/2020 4.2 3.4 - 5.3 mmol/L Final     Chloride   Date Value Ref Range Status   02/17/2020 106.0 94.0 - 109.0 mmol/L Final     Carbon Dioxide   Date Value Ref Range Status   02/17/2020 27.0 20.0 - 32.0 mmol/L Final     Anion Gap   Date Value Ref Range Status   02/11/2019 8 3 - 14 mmol/L Final     Glucose   Date Value Ref Range Status   02/17/2020 112.0 (H) 60.0 - 99.0 mg/dL Final     Urea Nitrogen   Date Value Ref Range Status   02/17/2020 15.0 7.0 - 30.0 mg/dL Final     Creatinine   Date Value Ref Range Status   02/17/2020 0.8 0.8 - 1.5 mg/dL Final     GFR Estimate   Date Value Ref Range Status   02/11/2019 88 >60 mL/min/[1.73_m2] Final     Comment:     Non  GFR Calc  Starting 12/18/2018, serum creatinine based estimated GFR (eGFR) will be   calculated using the Chronic Kidney Disease Epidemiology Collaboration   (CKD-EPI) equation.       Calcium   Date Value Ref Range Status   02/17/2020 10.1 8.5 - 10.4 mg/dL Final     Bilirubin Total   Date Value Ref Range Status   02/17/2020 1.0 0.2 - 1.3 mg/dL Final     Alkaline Phosphatase   Date Value Ref Range Status   02/17/2020 93.0 40.0 - 150.0 U/L Final     ALT   Date Value Ref Range Status   02/17/2020 27.0 0.0 - 70.0 U/L Final     AST   Date Value Ref Range Status   02/17/2020 34.0 0.0 - 55.0 U/L Final     CBC RESULTS:   Lab Test 02/17/20  1601   WBC  --    RBC  --    HGB 16.7   HCT 51.6   MCV 96.8   MCH 31.3   MCHC 32.4   RDW 13.3   PLT  --        ASSESSMENT/PLAN:  EMORY: Pt reports adequate compliance with CPAP and reports positive benefits from continued CPAP usage .  EMORY is adequately controlled with CPAP at the current settings.    Interface  "concerns: mask dislodgement with positional changes and air leaks. We discussed using  CPAP pillow to alleviate the mask dislodgement.  He was instructed to bring CPAP with him if he were hospitalized and If anticipating surgery be sure to discuss with his surgeon that you he has sleep apnea and uses PAP therapy.   Recommended him to continue using the CPAP regularly during sleep and getting the supplies for the CPAP replaced regularly.     Encouraged healthy diet and exercise.    F/U in 1 year or  seen sooner if there are any concerns.     \"I spent a total of 15 minutes minutes  with  Markie Lara during today's office visit, all  of this time was spent counseling the patient and/or coordinating care regarding sleep apnea treatment, reviewing PAP compliance download and interface concerns. \"    Val Jim MD  [facility] [address] [address] [phone]  Dept: [phone]   "

## 2020-09-21 ENCOUNTER — VIRTUAL VISIT (OUTPATIENT)
Dept: SLEEP MEDICINE | Facility: CLINIC | Age: 70
End: 2020-09-21
Payer: COMMERCIAL

## 2020-09-21 DIAGNOSIS — G47.33 OSA ON CPAP: Primary | ICD-10-CM

## 2020-09-21 PROCEDURE — 99213 OFFICE O/P EST LOW 20 MIN: CPT | Mod: TEL | Performed by: INTERNAL MEDICINE

## 2020-09-21 NOTE — Clinical Note
Luis Miguel Tovar, I had recommend the patient  CPAP pillow since he reported mask dislodgment with positional changes during sleep with air leaks. Plz provide him with CPAP pillow.    Thanks,  Lorena

## 2020-09-26 NOTE — PATIENT INSTRUCTIONS
How Does a CPAP Pillow Work?  Often times, CPAP users have to deal with mask shifting or leaking due to facial pressure from a regular bed pillow. The cutouts on either side of a CPAP pillow allow the mask exhalation ports to stay open. As a result, rather than being pushed out of place, your seal will stay intact throughout the night even if you move around at night.    The CPAP pillow allows for comfortable sleeping with a CPAP mask and tube.  With the use of particular materials--ie, memory foam--CPAP pillows are designed to offer additional support to the head and neck that regular pillows provide. These pillows often come in adjustable heights to best suit your needs. Plus, the sturdy support maintains better alignment, thus reducing the instances of neck pain, spinal issues, and reduces apneas.

## 2020-09-28 ENCOUNTER — ANTICOAGULATION THERAPY VISIT (OUTPATIENT)
Dept: FAMILY MEDICINE | Facility: CLINIC | Age: 70
End: 2020-09-28

## 2020-09-28 DIAGNOSIS — I48.91 ATRIAL FIBRILLATION, UNSPECIFIED TYPE (H): ICD-10-CM

## 2020-09-28 DIAGNOSIS — I48.21 PERMANENT ATRIAL FIBRILLATION (H): ICD-10-CM

## 2020-09-28 DIAGNOSIS — Z79.01 LONG TERM CURRENT USE OF ANTICOAGULANT THERAPY: ICD-10-CM

## 2020-09-28 LAB
CAPILLARY BLOOD COLLECTION: NORMAL
INR PPP: 3.1 (ref 0.86–1.14)

## 2020-09-28 PROCEDURE — 85610 PROTHROMBIN TIME: CPT | Performed by: FAMILY MEDICINE

## 2020-09-28 PROCEDURE — 36416 COLLJ CAPILLARY BLOOD SPEC: CPT | Performed by: FAMILY MEDICINE

## 2020-09-28 NOTE — PROGRESS NOTES
ANTICOAGULATION FOLLOW-UP CLINIC VISIT    Patient Name:  Markie Shepard  Date:  9/28/2020  Contact Type:  Telephone/ LVM for pt    SUBJECTIVE:  Patient Findings     Comments:   Unable to reach patient, lvm with dosing and recheck plan (within 6 weeks). INR fairly stable. Encouraged callback to report any changes to health, diet, meds, activity or any s/s of bleeding or clotting.           OBJECTIVE    Recent labs: (last 7 days)     09/28/20  1102   INR 3.10*       ASSESSMENT / PLAN  INR assessment THER    Recheck INR In: 6 WEEKS    INR Location Clinic      Anticoagulation Summary  As of 9/28/2020    INR goal:   2.0-3.0   TTR:   69.3 % (1 y)   INR used for dosing:   3.10! (9/28/2020)   Warfarin maintenance plan:   7.5 mg (5 mg x 1.5) every Mon, Fri; 5 mg (5 mg x 1) all other days   Full warfarin instructions:   7.5 mg every Mon, Fri; 5 mg all other days   Weekly warfarin total:   40 mg   No change documented:   Chelsie Encinas RN   Plan last modified:   Chelsie Encinas RN (6/15/2020)   Next INR check:   11/9/2020   Priority:   INR   Target end date:       Indications    Long-term (current) use of anticoagulants [Z79.01] [Z79.01]  Atrial fibrillation (H) [I48.91]             Anticoagulation Episode Summary     INR check location:       Preferred lab:       Send INR reminders to:   DAYLIN NEIL    Comments:   PREFERS PETER! Quit Smoking 9/29.      Anticoagulation Care Providers     Provider Role Specialty Phone number    Mali Patel MD [facility] Internal Medicine [phone]            See the Encounter Report to view Anticoagulation Flowsheet and Dosing Calendar (Go to Encounters tab in chart review, and find the Anticoagulation Therapy Visit)    Dosage adjustment made based on physician directed care plan.    Chelsie Encinas RN

## 2020-11-09 ENCOUNTER — ANTICOAGULATION THERAPY VISIT (OUTPATIENT)
Dept: FAMILY MEDICINE | Facility: CLINIC | Age: 70
End: 2020-11-09

## 2020-11-09 DIAGNOSIS — I48.21 PERMANENT ATRIAL FIBRILLATION (H): ICD-10-CM

## 2020-11-09 DIAGNOSIS — Z79.01 LONG TERM CURRENT USE OF ANTICOAGULANT THERAPY: ICD-10-CM

## 2020-11-09 DIAGNOSIS — I48.91 ATRIAL FIBRILLATION, UNSPECIFIED TYPE (H): ICD-10-CM

## 2020-11-09 LAB — INR PPP: 4.1 (ref 0.86–1.14)

## 2020-11-09 PROCEDURE — 85610 PROTHROMBIN TIME: CPT | Performed by: INTERNAL MEDICINE

## 2020-11-09 PROCEDURE — 36416 COLLJ CAPILLARY BLOOD SPEC: CPT | Performed by: INTERNAL MEDICINE

## 2020-11-10 NOTE — PROGRESS NOTES
ANTICOAGULATION FOLLOW-UP CLINIC VISIT    Patient Name:  Markie Shepard  Date:  2020  Contact Type:  Telephone/ Patient    SUBJECTIVE:  Patient Findings     Positives:  Change in health (Allergies have been pretty bad.), Change in medications (Pt took more Tylenol yesterday and today than usual. Took his normal HS dose (1000 mg), but couldn't sleep and took Nyquil (totalling in 1650 mg). Pt also took Dayquil this morning. Known interactions.), Change in diet/appetite (Pt increase vitamin k as directed, ate cauliflower and broccoli consistently over the last month. Pt plans to continue this.), Other complaints (Pt report ongoing SOB with walking stairs or bending over. However, pt continues to do 15-20 min of the stair stepper w/o issues. No change in baseline.)    Comments:  Patient denies any s/s of bleeding or bruising.           OBJECTIVE    Recent labs: (last 7 days)     20  1054   INR 4.10*       ASSESSMENT / PLAN  INR assessment SUPRA    Recheck INR In: 5 WEEKS    INR Location Clinic      Anticoagulation Summary  As of 2020    INR goal:  2.0-3.0   TTR:  57.8 % (1 y)   INR used for dosin.10 (2020)   Warfarin maintenance plan:  7.5 mg (5 mg x 1.5) every Mon, Fri; 5 mg (5 mg x 1) all other days   Full warfarin instructions:  : Hold; Otherwise 7.5 mg every Mon, Fri; 5 mg all other days   Weekly warfarin total:  40 mg   Plan last modified:  Chelsie Encinas RN (6/15/2020)   Next INR check:  2020   Priority:  INR   Target end date:      Indications    Long-term (current) use of anticoagulants [Z79.01] [Z79.01]  Atrial fibrillation (H) [I48.91]             Anticoagulation Episode Summary     INR check location:      Preferred lab:      Send INR reminders to:  DAYLIN NEIL    Comments:  PREFERS PETER! Quit Smoking .      Anticoagulation Care Providers     Provider Role Specialty Phone number    Mali Patel MD [facility] Internal Medicine [phone]             See the Encounter Report to view Anticoagulation Flowsheet and Dosing Calendar (Go to Encounters tab in chart review, and find the Anticoagulation Therapy Visit)    Per protocol, patient advised to hold today's warfarin dose to account for supra-therapeutic INR level. Patient instructed to increase green intake today as well. Recheck within 2-3 weeks to ensure INR stability. Soonest patient would agreed to a recheck was 12/14 d/t appt time. Patient is adamant about appts at 1045 am or 11 am. Writer advised patient to monitor closely for any s/s of bleeding as this far of a recheck is not ideal.    Patient made aware if signs of clotting (pain, tenderness, swelling, or color change in any extremity) AND/OR bleeding occur (nosebleeds, bleeding gums, bruising, or blood in stool or urine) to notify provider & seek medical attention. If severe symptoms develop, such as major bleeding, chest pain, shortness of breath, fall, trauma or s/s of stroke, patient to call 911 immediately.     Dosage adjustment made based on physician directed care plan.    Chelsie Encinas RN

## 2020-11-24 ENCOUNTER — TELEPHONE (OUTPATIENT)
Dept: FAMILY MEDICINE | Facility: CLINIC | Age: 70
End: 2020-11-24

## 2020-11-24 DIAGNOSIS — Z79.01 LONG TERM CURRENT USE OF ANTICOAGULANT THERAPY: ICD-10-CM

## 2020-11-24 DIAGNOSIS — I48.21 PERMANENT ATRIAL FIBRILLATION (H): Primary | ICD-10-CM

## 2020-11-29 PROBLEM — I48.21 PERMANENT ATRIAL FIBRILLATION (H): Status: ACTIVE | Noted: 2020-11-29

## 2020-12-14 ENCOUNTER — ANTICOAGULATION THERAPY VISIT (OUTPATIENT)
Dept: FAMILY MEDICINE | Facility: CLINIC | Age: 70
End: 2020-12-14

## 2020-12-14 DIAGNOSIS — I48.21 PERMANENT ATRIAL FIBRILLATION (H): ICD-10-CM

## 2020-12-14 DIAGNOSIS — Z79.01 LONG TERM CURRENT USE OF ANTICOAGULANT THERAPY: ICD-10-CM

## 2020-12-14 DIAGNOSIS — I48.91 ATRIAL FIBRILLATION, UNSPECIFIED TYPE (H): ICD-10-CM

## 2020-12-14 LAB
CAPILLARY BLOOD COLLECTION: NORMAL
INR PPP: 3.4 (ref 0.86–1.14)

## 2020-12-14 PROCEDURE — 36416 COLLJ CAPILLARY BLOOD SPEC: CPT | Performed by: INTERNAL MEDICINE

## 2020-12-14 PROCEDURE — 85610 PROTHROMBIN TIME: CPT | Performed by: INTERNAL MEDICINE

## 2020-12-14 NOTE — PROGRESS NOTES
ANTICOAGULATION FOLLOW-UP CLINIC VISIT    Patient Name:  Markie Shepard  Date:  12/14/2020  Contact Type:  Telephone/ Patient    SUBJECTIVE:  Patient Findings     Positives:  Change in diet/appetite (Pt has continued his cauliflower and broccoli intake as mentioned previously. Pt willing to increase broccoli intake d/t ongoing higher INR levels.)    Comments:  The patient was assessed for medication and activity level changes, missed or extra doses, bruising or bleeding, with no problem findings.             OBJECTIVE    Recent labs: (last 7 days)     12/14/20  1054   INR 3.40*       ASSESSMENT / PLAN  INR assessment SUPRA    Recheck INR In: 4 WEEKS    INR Location Clinic      Anticoagulation Summary  As of 12/14/2020    INR goal:  2.0-3.0   TTR:  48.2 % (1 y)   INR used for dosing:  3.40 (12/14/2020)   Warfarin maintenance plan:  7.5 mg (5 mg x 1.5) every Mon, Fri; 5 mg (5 mg x 1) all other days   Full warfarin instructions:  7.5 mg every Mon, Fri; 5 mg all other days   Weekly warfarin total:  40 mg   No change documented:  Chelsie Encinas RN   Plan last modified:  Chelsie Encinas RN (6/15/2020)   Next INR check:  1/4/2021   Priority:  INR   Target end date:  Indefinite    Indications    Long-term (current) use of anticoagulants [Z79.01] [Z79.01]  Atrial fibrillation (H) [I48.91]  Permanent atrial fibrillation (H) [I48.21]             Anticoagulation Episode Summary     INR check location:      Preferred lab:      Send INR reminders to:  DAYLIN NEIL    Comments:  PREFERS PETER! Quit Smoking 9/29.      Anticoagulation Care Providers     Provider Role Specialty Phone number    Mali Patel MD Referring Internal Medicine [phone]            See the Encounter Report to view Anticoagulation Flowsheet and Dosing Calendar (Go to Encounters tab in chart review, and find the Anticoagulation Therapy Visit)    Writer advised recheck within one month d/t continued supra-therapeutic, patient  declined suggestion and requested recheck in 6 weeks. Writer agreed to patient's request reminded him to keep greens very consistent and to call [facility] with ANY updates or s/s of bleeding/clotting.     Patient made aware if signs of clotting (pain, tenderness, swelling, or color change in any extremity) AND/OR bleeding occur (nosebleeds, bleeding gums, bruising, or blood in stool or urine) to notify provider & seek medical attention. If severe symptoms develop, such as major bleeding, chest pain, shortness of breath, fall, trauma or s/s of stroke, patient to call 911 immediately.     Dosage adjustment made based on physician directed care plan.    Chelsie Encinas RN

## 2020-12-27 ENCOUNTER — NURSE TRIAGE (OUTPATIENT)
Dept: NURSING | Facility: CLINIC | Age: 70
End: 2020-12-27

## 2020-12-27 NOTE — TELEPHONE ENCOUNTER
Dr RAMÍREZ Deleon calling in   Wanting to know - who Jennifer Ding is ?    See the following note >    Chery Dingca         Telephone Encounter   Signed   Encounter Date:  12/26/2020                    []Hide copied text    []Skye for details  Reason for Call:  Other prescription     Detailed comments: Patients daughter had called stating patients edema is getting worse, legs are leaking fluid. Patient has been taking 1/2 tab of furosemide (LASIX) 20 MG tablet 3 times a week and would like to know If that can be increased.  Patient does not have any shortness of breath     Phone Number Patient can be reached at: Home number on file [phone] (home)     Best Time: Anytime     Can we leave a detailed message on this number? unknown     Call taken on 12/26/2020 at 10:53 AM by Jennifer Deleon concerned pt was NOT triaged correctly   Looking up Jennifer [place] -- it looks like she may be a Central Scheduler     Dr Deleon also ask that I attempt to contact pt to see how he is doing today     Attempt made x 2 > goes to Voice mail     Dr Deleon called back - informed of the above- as well -note sent to Glenna Tapia supervisor    Protocol and care advice reviewed  Caller states understanding of the recommended disposition  Advised to call back if further questions or concerns    Grant Cabral RN / [facility]                                      Additional Information    General information question, no triage required and triager able to answer question    Protocols used: INFORMATION ONLY CALL-A-

## 2020-12-28 ENCOUNTER — TELEPHONE (OUTPATIENT)
Dept: FAMILY MEDICINE | Facility: CLINIC | Age: 70
End: 2020-12-28

## 2020-12-28 NOTE — TELEPHONE ENCOUNTER
Called the pt this am after request from Dr Patel to check up on him regarding his call over the weekend about leg swelling/weeping edema that his daughter in law called about.  Wanting us to check on his breathing status andheart, along with the edema.         Today., Markie states issue all gone, no longer any leg edema and he never had any SOB/orthopnea, never any chest pain or fatigue. He had been taking his regularly prescribed lasix right along   Pt notes he figured out what was the problem .  He got gift of mixed nuts from grandson, and he usually buys himself  Unsalted nuts , but gift was salted with sea salt.  He also for the last few weeks was starting to drink flavored dobbs ( which he loved) but they too are laden with sodium.     He has since stopped both, and he states the edema is now all gone.       Jorge notified.     Pt told if he notes leg edema returns to let us know , and if he ever gets any trouble breathing along with it , increased weakness, or chest pain, he would need to be seen asap to r/o heart failure or other issues    Pt agrees to plan    Naye Allen RN  December 28, 2020 10:39 AM

## 2021-01-25 ENCOUNTER — ANTICOAGULATION THERAPY VISIT (OUTPATIENT)
Dept: FAMILY MEDICINE | Facility: CLINIC | Age: 71
End: 2021-01-25

## 2021-01-25 DIAGNOSIS — Z79.01 LONG TERM CURRENT USE OF ANTICOAGULANT THERAPY: ICD-10-CM

## 2021-01-25 DIAGNOSIS — I48.21 PERMANENT ATRIAL FIBRILLATION (H): ICD-10-CM

## 2021-01-25 DIAGNOSIS — I48.91 ATRIAL FIBRILLATION, UNSPECIFIED TYPE (H): ICD-10-CM

## 2021-01-25 LAB — INR PPP: 2.9 (ref 0.86–1.14)

## 2021-01-25 PROCEDURE — 36416 COLLJ CAPILLARY BLOOD SPEC: CPT | Performed by: INTERNAL MEDICINE

## 2021-01-25 PROCEDURE — 85610 PROTHROMBIN TIME: CPT | Performed by: INTERNAL MEDICINE

## 2021-01-25 NOTE — PROGRESS NOTES
ANTICOAGULATION MANAGEMENT     Patient Name:  Markie Shepard  Date:  2021    ASSESSMENT /SUBJECTIVE:    Today's INR result of 2.9 is therapeutic. Goal INR of 2.0-3.0      Warfarin dose taken: Warfarin taken as instructed    Diet: Pt requested to reduce green intake by one day. Feels overwhelmed with the amt of greens. Pt currently eating a salad with carrots, broccoli and tomatoes 6 x week. Plans to reduce to 5/week.    Medication changes/ interactions: No new medications/supplements affecting INR    Previous INR: Supratherapeutic     S/S of bleeding or thromboembolism: No    New injury or illness: No    Upcoming surgery, procedure or cardioversion: No    Additional findings: None      PLAN:    Telephone call with Markie regarding INR result and instructed:     Warfarin Dosing Instructions: Change your warfarin dose to 7.5 mg  + 5 mg  to account for slight reduction in greens. (6.3% change)    Instructed patient to follow up no later than: 5 weeks  Lab visit scheduled    Education provided: Please call back if any changes to your diet, medications or how you've been taking warfarin, Importance of consistent vitamin K intake, Impact of vitamin K foods on INR, Monitoring for bleeding signs and symptoms, Monitoring for clotting signs and symptoms and Importance of notifying clinic for changes in medications; a sooner lab recheck maybe needed.      Patient verbalizes understanding and agrees to warfarin dosing plan.    Instructed to call the Anticoagulation Clinic for any changes, questions or concerns. (#[phone])        Chelsie Encinas RN      OBJECTIVE:  Recent labs: (last 7 days)     21  1105   INR 2.90*         INR assessment THER    Recheck INR In: 5 WEEKS    INR Location Clinic      Anticoagulation Summary  As of 2021    INR goal:  2.0-3.0   TTR:  39.0 % (1 y)   INR used for dosin.90 (2021)   Warfarin maintenance plan:  7.5 mg (5 mg x 1.5) every Mon; 5 mg (5 mg x 1) all  other days   Full warfarin instructions:  7.5 mg every Mon; 5 mg all other days   Weekly warfarin total:  37.5 mg   Plan last modified:  Chelsie Encinas RN (1/25/2021)   Next INR check:  3/1/2021   Priority:  INR   Target end date:  Indefinite    Indications    Long-term (current) use of anticoagulants [Z79.01] [Z79.01]  Atrial fibrillation (H) [I48.91]  Permanent atrial fibrillation (H) [I48.21]             Anticoagulation Episode Summary     INR check location:      Preferred lab:      Send INR reminders to:  DAYLIN NEIL    Comments:  PREFERS PETER! Quit Smoking 9/29.      Anticoagulation Care Providers     Provider Role Specialty Phone number    Mali Patel MD Referring Internal Medicine [phone]

## 2021-01-25 NOTE — PROGRESS NOTES
Anticoagulation Management    Unable to reach Markie today.    Today's INR result of 2.9 is therapeutic (goal INR of 2.0-3.0).  Result received from: Clinic Lab    Follow up required to discuss dosing instructions and confirm understanding of instructions    Left message to continue current dose of warfarin 7.5 mg tonight.      Anticoagulation clinic to follow up    Chelsie Encinas RN

## 2021-01-25 NOTE — PROGRESS NOTES
Attempted #2:    Anticoagulation Management    Unable to reach Markie today.    Today's INR result of 2.9 is therapeutic (goal INR of 2.0-3.0).  Result received from: Clinic Lab    Follow up required to discuss dosing instructions and confirm understanding of instructions    Left message to continue current dose of warfarin 7.5 mg tonight.      Anticoagulation clinic to follow up    Chelsie Encinas RN

## 2021-01-28 ENCOUNTER — TELEPHONE (OUTPATIENT)
Dept: FAMILY MEDICINE | Facility: CLINIC | Age: 71
End: 2021-01-28

## 2021-01-28 DIAGNOSIS — R06.02 SOB (SHORTNESS OF BREATH): ICD-10-CM

## 2021-01-28 RX ORDER — ALBUTEROL SULFATE 90 UG/1
2 POWDER, METERED RESPIRATORY (INHALATION) EVERY 4 HOURS PRN
Qty: 1 EACH | Refills: 0 | Status: SHIPPED | OUTPATIENT
Start: 2021-01-28 | End: 2021-02-26

## 2021-01-28 NOTE — TELEPHONE ENCOUNTER
Last office visit was on 02/17/2020, no future visits scheduled.  Medication last filled 2/17/20 #1 inhaler + 11 refills    Medication is being filled for 1 time refill only due to:  Due for an appointment with Dr. Patel for future fills.  staff to assist with scheduling an in-clinic visit. Will be due for labs at that time too.  Christina Mckeon RN  01/28/21  3:27 PM

## 2021-01-28 NOTE — TELEPHONE ENCOUNTER
I tried calling 3x and not getting through (only busy tone) He is not on mychart so I will try to call again tomorrow.     Sindy

## 2021-01-28 NOTE — TELEPHONE ENCOUNTER
----- Message from Christina Mckeon RN sent at 1/28/2021  3:27 PM CST -----  Pt due for appointment with Dr. Patel - due in February sometime - last was Feb 2020. Medication refill appointment and due for labs then too.     Thanks  Christina

## 2021-02-01 ENCOUNTER — TELEPHONE (OUTPATIENT)
Dept: FAMILY MEDICINE | Facility: CLINIC | Age: 71
End: 2021-02-01

## 2021-02-01 NOTE — TELEPHONE ENCOUNTER
Prior Authorization Retail Medication Request    Medication/Dose: albuterol 90 mcg inhaler  ICD code (if different than what is on RX):  same  Previously Tried and Failed:    Rationale:      Insurance Name:  same  Insurance ID:  same      Pharmacy Information (if different than what is on RX)  Name:  same  Phone:  Same  Usha Do RN  [facility]

## 2021-02-02 NOTE — TELEPHONE ENCOUNTER
PA Initiation    Medication: albuterol (PROAIR RESPICLICK) 108 (90 Base) MCG/ACT inhaler  Insurance Company: [employer] - Phone [phone] Fax [phone]  Pharmacy Filling the Rx: [id number] - [facility] [address]  Filling Pharmacy Phone: [phone]  Filling Pharmacy Fax: [phone]  Start Date: 2/2/2021

## 2021-02-02 NOTE — TELEPHONE ENCOUNTER
Prior Authorization Approval    Authorization Effective Date: 1/3/2021  Authorization Expiration Date: 2/2/2022  Medication: albuterol (PROAIR RESPICLICK) 108 (90 Base) MCG/ACT inhaler--APPROVED  Approved Dose/Quantity:   Reference #:     Insurance Company: [employer] - Phone [phone] Fax [phone]  Expected CoPay:       CoPay Card Available:      Foundation Assistance Needed:    Which Pharmacy is filling the prescription (Not needed for infusion/clinic administered): [id number] - [place], [place] - [address]  Pharmacy Notified: Yes  Patient Notified: Yes **Instructed pharmacy to notify patient when script is ready to /ship.**

## 2021-02-24 ENCOUNTER — TELEPHONE (OUTPATIENT)
Dept: FAMILY MEDICINE | Facility: CLINIC | Age: 71
End: 2021-02-24

## 2021-02-24 DIAGNOSIS — I48.91 ATRIAL FIBRILLATION, UNSPECIFIED TYPE (H): ICD-10-CM

## 2021-02-24 NOTE — TELEPHONE ENCOUNTER
Last time prescribed: 2/17/20 , 100 tabs/caps x 3 refills  Last office visit: 2/17/20  Next appointment: 4/12/21 with Pingel     Pt is followed by coumadin clinic, UTD with them,  Has future appt with Pingel in April, as above     Medication is being filled for 1 time refill only due to:  Patient needs to be seen because it has been more than one year since last visit. appt as above, soonest can get in.     Naye Allen RN  February 25, 2021 11:16 AM

## 2021-02-24 NOTE — TELEPHONE ENCOUNTER
Pt calling  staff for a physical exam appt - first available with Dr Patel 4/12.   Pt will be needing refills of many of his meds before that, and does not want to come in for eval for those refills in a separate visit.   Forwarding to Dr Patel for review.  Usha Do RN  [facility]

## 2021-02-24 NOTE — TELEPHONE ENCOUNTER
LVM for patient let him know there was an error on Dr. yoo schedule and she is not available on April 5th at 1:40, I did put April 12th on hold for the patient 2 different time slots. When patient calls back please schedule.

## 2021-02-25 RX ORDER — WARFARIN SODIUM 5 MG/1
TABLET ORAL
Qty: 100 TABLET | Refills: 0 | Status: SHIPPED | OUTPATIENT
Start: 2021-02-25 | End: 2021-04-12

## 2021-02-26 DIAGNOSIS — G47.00 INSOMNIA, UNSPECIFIED TYPE: ICD-10-CM

## 2021-02-26 DIAGNOSIS — F32.9 MAJOR DEPRESSION, CHRONIC: ICD-10-CM

## 2021-02-26 DIAGNOSIS — I10 ESSENTIAL HYPERTENSION WITH GOAL BLOOD PRESSURE LESS THAN 140/90: ICD-10-CM

## 2021-02-26 DIAGNOSIS — R60.0 BILATERAL LEG EDEMA: ICD-10-CM

## 2021-02-26 DIAGNOSIS — E78.5 HYPERLIPIDEMIA LDL GOAL <70: ICD-10-CM

## 2021-02-26 DIAGNOSIS — R06.02 SOB (SHORTNESS OF BREATH): ICD-10-CM

## 2021-02-26 RX ORDER — FLUOXETINE 40 MG/1
40 CAPSULE ORAL DAILY
Qty: 90 CAPSULE | Refills: 0 | Status: SHIPPED | OUTPATIENT
Start: 2021-02-26 | End: 2021-10-05 | Stop reason: DRUGHIGH

## 2021-02-26 RX ORDER — AMLODIPINE BESYLATE 10 MG/1
10 TABLET ORAL DAILY
Qty: 90 TABLET | Refills: 0 | Status: SHIPPED | OUTPATIENT
Start: 2021-02-26 | End: 2021-04-12

## 2021-02-26 RX ORDER — ALBUTEROL SULFATE 90 UG/1
2 POWDER, METERED RESPIRATORY (INHALATION) EVERY 4 HOURS PRN
Qty: 1 EACH | Refills: 0 | Status: SHIPPED | OUTPATIENT
Start: 2021-02-26 | End: 2021-04-12

## 2021-02-26 RX ORDER — SIMVASTATIN 20 MG
20 TABLET ORAL AT BEDTIME
Qty: 90 TABLET | Refills: 0 | Status: SHIPPED | OUTPATIENT
Start: 2021-02-26 | End: 2021-04-12

## 2021-02-26 RX ORDER — ATENOLOL 50 MG/1
50 TABLET ORAL DAILY
Qty: 90 TABLET | Refills: 0 | Status: SHIPPED | OUTPATIENT
Start: 2021-02-26 | End: 2021-04-12

## 2021-02-26 RX ORDER — ATENOLOL 100 MG/1
100 TABLET ORAL DAILY
Qty: 90 TABLET | Refills: 0 | Status: SHIPPED | OUTPATIENT
Start: 2021-02-26 | End: 2021-04-12

## 2021-02-26 RX ORDER — FUROSEMIDE 20 MG
TABLET ORAL
Qty: 36 TABLET | Refills: 0 | Status: SHIPPED | OUTPATIENT
Start: 2021-02-26 | End: 2021-04-12

## 2021-02-26 RX ORDER — LOSARTAN POTASSIUM 100 MG/1
100 TABLET ORAL DAILY
Qty: 90 TABLET | Refills: 0 | Status: SHIPPED | OUTPATIENT
Start: 2021-02-26 | End: 2021-04-12

## 2021-02-26 RX ORDER — TRAZODONE HYDROCHLORIDE 100 MG/1
100 TABLET ORAL
Qty: 90 TABLET | Refills: 0 | Status: SHIPPED | OUTPATIENT
Start: 2021-02-26 | End: 2021-04-12

## 2021-02-26 NOTE — PROGRESS NOTES
Markie has preventive exam scheduled 4/12/21. He needs medication refills to get him to his appt.    He could not take an earlier appointment as he is dependent on a ride from his grandson.    All medications filled x 90 days    Mali Patel MD  Internal Medicine/Pediatrics

## 2021-03-01 ENCOUNTER — ANTICOAGULATION THERAPY VISIT (OUTPATIENT)
Dept: FAMILY MEDICINE | Facility: CLINIC | Age: 71
End: 2021-03-01

## 2021-03-01 DIAGNOSIS — Z79.01 LONG TERM CURRENT USE OF ANTICOAGULANT THERAPY: ICD-10-CM

## 2021-03-01 DIAGNOSIS — I48.91 ATRIAL FIBRILLATION, UNSPECIFIED TYPE (H): ICD-10-CM

## 2021-03-01 DIAGNOSIS — I48.21 PERMANENT ATRIAL FIBRILLATION (H): ICD-10-CM

## 2021-03-01 LAB
CAPILLARY BLOOD COLLECTION: NORMAL
INR PPP: 2.1 (ref 0.86–1.14)

## 2021-03-01 PROCEDURE — 36416 COLLJ CAPILLARY BLOOD SPEC: CPT | Performed by: INTERNAL MEDICINE

## 2021-03-01 PROCEDURE — 85610 PROTHROMBIN TIME: CPT | Performed by: INTERNAL MEDICINE

## 2021-03-01 NOTE — PROGRESS NOTES
ANTICOAGULATION MANAGEMENT     Patient Name:  Markie Shepard  Date:  3/1/2021    ASSESSMENT /SUBJECTIVE:    Today's INR result of 2.1 is therapeutic. Goal INR of 2.0-3.0      Warfarin dose taken: Warfarin taken as instructed    Diet: Increase in vitamin k intake. Patient swapped out mixed greens for spinach. Plans to scale back a bit.    Medication changes/ interactions: No new medications/supplements affecting INR. Inhaler changing from albuterol to proair, same medication.    Previous INR: Therapeutic     S/S of bleeding or thromboembolism: No    New injury or illness: Yes: neck strain d/t exercise. Sx have since resolved.    Upcoming surgery, procedure or cardioversion: No    Additional findings: None      PLAN:    Telephone call with Markie regarding INR result and instructed:     Warfarin Dosing Instructions: Continue your current warfarin dose 7.5 mg every Mon; 5 mg all other days    Instructed patient to follow up no later than: 6 weeks  Lab visit scheduled    Education provided: Please call back if any changes to your diet, medications or how you've been taking warfarin, Monitoring for bleeding signs and symptoms, Monitoring for clotting signs and symptoms and Importance of notifying clinic for changes in medications; a sooner lab recheck maybe needed.      Patient verbalizes understanding and agrees to warfarin dosing plan.    Instructed to call the Anticoagulation Clinic for any changes, questions or concerns. (#[phone])        Chelsie Encinas RN      OBJECTIVE:  Recent labs: (last 7 days)     21  1101   INR 2.10*         INR assessment THER    Recheck INR In: 6 WEEKS    INR Location Clinic      Anticoagulation Summary  As of 3/1/2021    INR goal:  2.0-3.0   TTR:  39.0 % (1 y)   INR used for dosin.10 (3/1/2021)   Warfarin maintenance plan:  7.5 mg (5 mg x 1.5) every Mon; 5 mg (5 mg x 1) all other days   Full warfarin instructions:  7.5 mg every Mon; 5 mg all other days   Weekly warfarin  total:  37.5 mg   No change documented:  Chelsie Encinas RN   Plan last modified:  Chelsie Encinas RN (1/25/2021)   Next INR check:  4/12/2021   Priority:  INR   Target end date:  Indefinite    Indications    Long-term (current) use of anticoagulants [Z79.01] [Z79.01]  Atrial fibrillation (H) [I48.91]  Permanent atrial fibrillation (H) [I48.21]             Anticoagulation Episode Summary     INR check location:      Preferred lab:      Send INR reminders to:  DAYLIN NEIL    Comments:  PREFERS PETER! Quit Smoking 9/29.      Anticoagulation Care Providers     Provider Role Specialty Phone number    Mali Patel MD Referring Internal Medicine [phone]

## 2021-03-01 NOTE — PROGRESS NOTES
Anticoagulation Management    Unable to reach Markie today.    Today's INR result of 2.1 is therapeutic (goal INR of 2.0-3.0).  Result received from: Clinic Lab    Follow up required to confirm warfarin dose taken and assess for changes    No answer. Left general vm requesting callback to [facility] at patient's earliest convenience.       Anticoagulation clinic to follow up    Chelsie Encinas RN
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
## 2021-04-06 NOTE — PATIENT INSTRUCTIONS
Skin rash  Eucarin daily  1% hydrocortisone ointment daily as needed for dry scaly skin    Patient Education   Personalized Prevention Plan  You are due for the preventive services outlined below.  Your care team is available to assist you in scheduling these services.  If you have already completed any of these items, please share that information with your care team to update in your medical record.  Health Maintenance Due   Topic Date Due     COVID-19 Vaccine (1) Never done     Depression Assessment  03/09/2020     Cholesterol Lab  02/17/2021     FALL RISK ASSESSMENT  02/17/2021

## 2021-04-12 ENCOUNTER — OFFICE VISIT (OUTPATIENT)
Dept: FAMILY MEDICINE | Facility: CLINIC | Age: 71
End: 2021-04-12
Payer: COMMERCIAL

## 2021-04-12 VITALS
TEMPERATURE: 96.9 F | RESPIRATION RATE: 16 BRPM | HEIGHT: 70 IN | OXYGEN SATURATION: 94 % | HEART RATE: 79 BPM | BODY MASS INDEX: 45.1 KG/M2 | DIASTOLIC BLOOD PRESSURE: 85 MMHG | SYSTOLIC BLOOD PRESSURE: 136 MMHG | WEIGHT: 315 LBS

## 2021-04-12 DIAGNOSIS — Z12.5 SCREENING FOR PROSTATE CANCER: ICD-10-CM

## 2021-04-12 DIAGNOSIS — I10 ESSENTIAL HYPERTENSION WITH GOAL BLOOD PRESSURE LESS THAN 140/90: ICD-10-CM

## 2021-04-12 DIAGNOSIS — Z79.01 LONG TERM CURRENT USE OF ANTICOAGULANT THERAPY: ICD-10-CM

## 2021-04-12 DIAGNOSIS — I48.20 CHRONIC ATRIAL FIBRILLATION (H): ICD-10-CM

## 2021-04-12 DIAGNOSIS — R73.03 PREDIABETES: ICD-10-CM

## 2021-04-12 DIAGNOSIS — G47.00 INSOMNIA, UNSPECIFIED TYPE: ICD-10-CM

## 2021-04-12 DIAGNOSIS — E66.01 MORBID OBESITY (H): ICD-10-CM

## 2021-04-12 DIAGNOSIS — J44.9 CHRONIC OBSTRUCTIVE PULMONARY DISEASE, UNSPECIFIED COPD TYPE (H): ICD-10-CM

## 2021-04-12 DIAGNOSIS — F33.9 CHRONIC RECURRENT MAJOR DEPRESSIVE DISORDER (H): ICD-10-CM

## 2021-04-12 DIAGNOSIS — E78.5 HYPERLIPIDEMIA LDL GOAL <70: ICD-10-CM

## 2021-04-12 DIAGNOSIS — R60.0 BILATERAL LEG EDEMA: ICD-10-CM

## 2021-04-12 DIAGNOSIS — Z00.00 ENCOUNTER FOR MEDICARE ANNUAL WELLNESS EXAM: Primary | ICD-10-CM

## 2021-04-12 LAB
ALBUMIN SERPL-MCNC: 3.8 G/DL (ref 3.4–5)
ALP SERPL-CCNC: 84 U/L (ref 40–150)
ALT SERPL W P-5'-P-CCNC: 29 U/L (ref 0–70)
ANION GAP SERPL CALCULATED.3IONS-SCNC: 3 MMOL/L (ref 3–14)
AST SERPL W P-5'-P-CCNC: 23 U/L (ref 0–45)
BILIRUB SERPL-MCNC: 0.9 MG/DL (ref 0.2–1.3)
BUN SERPL-MCNC: 10 MG/DL (ref 7–30)
CALCIUM SERPL-MCNC: 10 MG/DL (ref 8.5–10.1)
CHLORIDE SERPL-SCNC: 104 MMOL/L (ref 94–109)
CHOLEST SERPL-MCNC: 146 MG/DL
CO2 SERPL-SCNC: 30 MMOL/L (ref 20–32)
CREAT SERPL-MCNC: 0.82 MG/DL (ref 0.66–1.25)
ERYTHROCYTE [DISTWIDTH] IN BLOOD BY AUTOMATED COUNT: 12.9 % (ref 10–15)
GFR SERPL CREATININE-BSD FRML MDRD: 90 ML/MIN/{1.73_M2}
GLUCOSE SERPL-MCNC: 124 MG/DL (ref 70–99)
HBA1C MFR BLD: 6.4 % (ref 4.1–5.7)
HCT VFR BLD AUTO: 53.2 % (ref 40–53)
HDLC SERPL-MCNC: 54 MG/DL
HGB BLD-MCNC: 17.8 G/DL (ref 13.3–17.7)
INR PPP: 1.99 (ref 0.86–1.14)
LDLC SERPL CALC-MCNC: 73 MG/DL
MCH RBC QN AUTO: 32.9 PG (ref 26.5–33)
MCHC RBC AUTO-ENTMCNC: 33.5 G/DL (ref 31.5–36.5)
MCV RBC AUTO: 98 FL (ref 78–100)
NONHDLC SERPL-MCNC: 92 MG/DL
PLATELET # BLD AUTO: 190 10E9/L (ref 150–450)
POTASSIUM SERPL-SCNC: 3.9 MMOL/L (ref 3.4–5.3)
PROT SERPL-MCNC: 7.3 G/DL (ref 6.8–8.8)
PSA SERPL-ACNC: 0.78 UG/L (ref 0–4)
RBC # BLD AUTO: 5.41 10E12/L (ref 4.4–5.9)
SODIUM SERPL-SCNC: 137 MMOL/L (ref 133–144)
TRIGL SERPL-MCNC: 98 MG/DL
WBC # BLD AUTO: 8.3 10E9/L (ref 4–11)

## 2021-04-12 RX ORDER — FLUOXETINE 40 MG/1
40 CAPSULE ORAL DAILY
Qty: 90 CAPSULE | Refills: 1 | Status: CANCELLED | OUTPATIENT
Start: 2021-04-12

## 2021-04-12 RX ORDER — TRAZODONE HYDROCHLORIDE 100 MG/1
100 TABLET ORAL
Qty: 90 TABLET | Refills: 1 | Status: SHIPPED | OUTPATIENT
Start: 2021-04-12 | End: 2022-01-14

## 2021-04-12 RX ORDER — ALBUTEROL SULFATE 90 UG/1
2 POWDER, METERED RESPIRATORY (INHALATION) EVERY 4 HOURS PRN
Qty: 1 EACH | Refills: 5 | Status: SHIPPED | OUTPATIENT
Start: 2021-04-12 | End: 2022-04-07

## 2021-04-12 RX ORDER — FUROSEMIDE 20 MG
TABLET ORAL
Qty: 36 TABLET | Refills: 1 | Status: SHIPPED | OUTPATIENT
Start: 2021-04-12 | End: 2021-06-16

## 2021-04-12 RX ORDER — SIMVASTATIN 20 MG
20 TABLET ORAL AT BEDTIME
Qty: 90 TABLET | Refills: 1 | Status: SHIPPED | OUTPATIENT
Start: 2021-04-12 | End: 2021-06-16

## 2021-04-12 RX ORDER — ATENOLOL 50 MG/1
50 TABLET ORAL DAILY
Qty: 90 TABLET | Refills: 1 | Status: SHIPPED | OUTPATIENT
Start: 2021-04-12 | End: 2022-01-10

## 2021-04-12 RX ORDER — AMLODIPINE BESYLATE 10 MG/1
10 TABLET ORAL DAILY
Qty: 90 TABLET | Refills: 1 | Status: SHIPPED | OUTPATIENT
Start: 2021-04-12 | End: 2021-10-05

## 2021-04-12 RX ORDER — LOSARTAN POTASSIUM 100 MG/1
100 TABLET ORAL DAILY
Qty: 90 TABLET | Refills: 1 | Status: SHIPPED | OUTPATIENT
Start: 2021-04-12 | End: 2021-10-05

## 2021-04-12 RX ORDER — ATENOLOL 100 MG/1
100 TABLET ORAL DAILY
Qty: 90 TABLET | Refills: 1 | Status: SHIPPED | OUTPATIENT
Start: 2021-04-12 | End: 2021-12-02

## 2021-04-12 RX ORDER — WARFARIN SODIUM 5 MG/1
TABLET ORAL
Qty: 100 TABLET | Refills: 3 | Status: SHIPPED | OUTPATIENT
Start: 2021-04-12 | End: 2021-10-25

## 2021-04-12 ASSESSMENT — PATIENT HEALTH QUESTIONNAIRE - PHQ9
5. POOR APPETITE OR OVEREATING: NEARLY EVERY DAY
SUM OF ALL RESPONSES TO PHQ QUESTIONS 1-9: 7

## 2021-04-12 ASSESSMENT — ANXIETY QUESTIONNAIRES
1. FEELING NERVOUS, ANXIOUS, OR ON EDGE: NEARLY EVERY DAY
IF YOU CHECKED OFF ANY PROBLEMS ON THIS QUESTIONNAIRE, HOW DIFFICULT HAVE THESE PROBLEMS MADE IT FOR YOU TO DO YOUR WORK, TAKE CARE OF THINGS AT HOME, OR GET ALONG WITH OTHER PEOPLE: EXTREMELY DIFFICULT
GAD7 TOTAL SCORE: 20
7. FEELING AFRAID AS IF SOMETHING AWFUL MIGHT HAPPEN: NEARLY EVERY DAY
6. BECOMING EASILY ANNOYED OR IRRITABLE: NEARLY EVERY DAY
2. NOT BEING ABLE TO STOP OR CONTROL WORRYING: NEARLY EVERY DAY
5. BEING SO RESTLESS THAT IT IS HARD TO SIT STILL: MORE THAN HALF THE DAYS
3. WORRYING TOO MUCH ABOUT DIFFERENT THINGS: NEARLY EVERY DAY

## 2021-04-12 ASSESSMENT — MIFFLIN-ST. JEOR: SCORE: 2430.49

## 2021-04-12 NOTE — NURSING NOTE
"71 year old  Chief Complaint   Patient presents with     Physical     Anxiety     Breathing Problem     x several years only happends when bending over.        Blood pressure 136/85, pulse 79, temperature 96.9  F (36.1  C), temperature source Skin, resp. rate 16, height 1.77 m (5' 9.69\"), weight (!) 166.9 kg (368 lb), SpO2 94 %. Body mass index is 53.28 kg/m .  Patient Active Problem List   Diagnosis     CAD (coronary artery disease)     Hyperlipidemia LDL goal <70     Smoker     Hypertension     Obstructive sleep apnea     Mild major depression (H)     Low back pain     Mixed hyperlipidemia     Essential hypertension, benign     Long-term (current) use of anticoagulants [Z79.01]     Anisocoria     Permanent atrial fibrillation (H)     Prediabetes     Chronic obstructive pulmonary disease, unspecified COPD type (H)     Morbid obesity (H)       Wt Readings from Last 2 Encounters:   04/12/21 (!) 166.9 kg (368 lb)   06/29/20 (!) 169.6 kg (374 lb)     BP Readings from Last 3 Encounters:   04/12/21 136/85   06/29/20 128/76   02/17/20 139/85         Current Outpatient Medications   Medication     acetaminophen (TYLENOL) 500 MG tablet     albuterol (PROAIR RESPICLICK) 108 (90 Base) MCG/ACT inhaler     amLODIPine (NORVASC) 10 MG tablet     atenolol (TENORMIN) 100 MG tablet     atenolol (TENORMIN) 50 MG tablet     CENTRUM SILVER OR TABS     FLUoxetine (PROZAC) 40 MG capsule     furosemide (LASIX) 20 MG tablet     losartan (COZAAR) 100 MG tablet     omega-3 fatty acids (FISH OIL) 1200 MG capsule     oxymetazoline (AFRIN 12 HOUR) 0.05 % nasal spray     simvastatin (ZOCOR) 20 MG tablet     traZODone (DESYREL) 100 MG tablet     warfarin ANTICOAGULANT (COUMADIN ANTICOAGULANT) 5 MG tablet     No current facility-administered medications for this visit.        Social History     Tobacco Use     Smoking status: Former Smoker     Packs/day: 1.00     Years: 53.00     Pack years: 53.00     Types: Cigarettes     Quit date: 9/29/2019     " Years since quittin.5     Smokeless tobacco: Never Used     Tobacco comment: 1 ppd   Substance Use Topics     Alcohol use: No     Comment: quit drinking heavily      Drug use: No       Health Maintenance Due   Topic Date Due     SPIROMETRY  Never done     COPD ACTION PLAN  Never done     LIPID  2021     FALL RISK ASSESSMENT  2021       No results found for: PAP      2021 1:47 PM

## 2021-04-12 NOTE — LETTER
April 14, 2021      Markie Shepard  [address]  [facility] [address]        Dear Markie,     Enclosed are you lab results.     Your cholesterol is in perfect range, continue taking Simvastatin, 20mg daily dose.     Your PSA level is low, no concern for prostate cancer.     Your glucose is elevated at 124.  A1c has risen to 6.4%. Criteria for diabetes is >6.5%. Continue to make efforts with your diet, to avoid the need to take medication. I would like to recheck your numbers again in 3 months.  Your kidney and liver tests are normal.     Your white blood count and platelet counts are normal. Your hemoglobin level is elevated. This is probably from your history of sleep apnea. Continue taking warfarin as you are doing.   I would recheck this again in 3 months.     Your INR is borderline at 1.99.     Sincerely,     Mali Patel MD   Internal Medicine/Pediatrics     Resulted Orders   Comprehensive metabolic panel   Result Value Ref Range    Sodium 137 133 - 144 mmol/L    Potassium 3.9 3.4 - 5.3 mmol/L    Chloride 104 94 - 109 mmol/L    Carbon Dioxide 30 20 - 32 mmol/L    Anion Gap 3 3 - 14 mmol/L    Glucose 124 (H) 70 - 99 mg/dL    Urea Nitrogen 10 7 - 30 mg/dL    Creatinine 0.82 0.66 - 1.25 mg/dL    GFR Estimate 90 >60 mL/min/[1.73_m2]      Comment:      Non  GFR Calc  Starting 12/18/2018, serum creatinine based estimated GFR (eGFR) will be   calculated using the Chronic Kidney Disease Epidemiology Collaboration   (CKD-EPI) equation.      GFR Estimate If Black >90 >60 mL/min/[1.73_m2]      Comment:       GFR Calc  Starting 12/18/2018, serum creatinine based estimated GFR (eGFR) will be   calculated using the Chronic Kidney Disease Epidemiology Collaboration   (CKD-EPI) equation.      Calcium 10.0 8.5 - 10.1 mg/dL    Bilirubin Total 0.9 0.2 - 1.3 mg/dL    Albumin 3.8 3.4 - 5.0 g/dL    Protein Total 7.3 6.8 - 8.8 g/dL    Alkaline Phosphatase 84 40 - 150 U/L    ALT 29 0 - 70 U/L     AST 23 0 - 45 U/L   Lipid Profile   Result Value Ref Range    Cholesterol 146 <200 mg/dL    Triglycerides 98 <150 mg/dL    HDL Cholesterol 54 >39 mg/dL    LDL Cholesterol Calculated 73 <100 mg/dL      Comment:      Desirable:       <100 mg/dl    Non HDL Cholesterol 92 <130 mg/dL   CBC with Platelets   Result Value Ref Range    WBC 8.3 4.0 - 11.0 10e9/L    RBC Count 5.41 4.4 - 5.9 10e12/L    Hemoglobin 17.8 (H) 13.3 - 17.7 g/dL    Hematocrit 53.2 (H) 40.0 - 53.0 %    MCV 98 78 - 100 fl    MCH 32.9 26.5 - 33.0 pg    MCHC 33.5 31.5 - 36.5 g/dL    RDW 12.9 10.0 - 15.0 %    Platelet Count 190 150 - 450 10e9/L   Hemoglobin A1c (LabDAQ)   Result Value Ref Range    Hemoglobin A1C 6.4 (H) 4.1 - 5.7 %   Prostate spec antigen screen   Result Value Ref Range    PSA 0.78 0 - 4 ug/L      Comment:      Assay Method:  Chemiluminescence using Siemens Vista analyzer   INR   Result Value Ref Range    INR 1.99 (H) 0.86 - 1.14

## 2021-04-12 NOTE — LETTER
My COPD Action Plan     Name: Markie Shepard    YOB: 1950   Date: 4/12/2021    My doctor: Mali Patel MD   My clinic: [address]  [facility] [address]  [phone]  My Controller Medicine: Vilanterol/fluticasone (Breo Ellipta)   Dose: 1 puff daily     My Rescue Medicine: Albuterol (Proair/Ventolin/Proventil) inhaler   Dose: 2 puffs every 4-6 hr     My Flare Up Medicine: Amoxicillin-clavulanate (Augmentin or Augmentin    Dose: 875mg bid x 7 days     My COPD Severity: unknown, awaiting PFTs      Use of Oxygen: Oxygen Not Prescribed      Make sure you've had your pneumonia   vaccines.          GREEN ZONE       Doing well today      Usual level of activity and exercise    Usual amount of cough and mucus    No shortness of breath    Usual level of health (thinking clearly, sleeping well, feel like eating) Actions:      Take daily medicines    Use oxygen as prescribed    Follow regular exercise and diet plan    Avoid cigarette smoke and other irritants that harm the lungs           YELLOW ZONE          Having a bad day or flare up      Short of breath more than usual    A lot more sputum (mucus) than usual    Sputum looks yellow, green, tan, brown or bloody    More coughing or wheezing    Fever or chills    Less energy; trouble completing activities    Trouble thinking or focusing    Using quick relief inhaler or nebulizer more often    Poor sleep; symptoms wake me up    Do not feel like eating Actions:      Get plenty of rest    Take daily medicines    Use quick relief inhaler every 2-4 hours    If you use oxygen, call you doctor to see if you should adjust your oxygen    Do breathing exercises or other things to help you relax    Let a loved one, friend or neighbor know you are feeling worse    Call your care team if you have 2 or more symptoms.  Start taking steroids or antibiotics if directed by your care team            RED ZONE       Need medical care now      Severe shortness of breath (feel you can't breathe)    Fever, chills    Not enough breath to do any activity    Trouble coughing up mucus, walking or talking    Blood in mucus    Frequent coughing   Rescue medicines are not working    Not able to sleep because of breathing    Feel confused or drowsy    Chest pain    Actions:      Call your health care team.  If you cannot reach your care team, call 911 or go to the emergency room.        Annual Reminders:  Meet with Care Team, Flu Shot every Fall  Pharmacy:    [id number] - [place], [place] - [address]  [facility] - [place], [place] - [address]  WRITTEN PRESCRIPTION REQUESTED

## 2021-04-13 ENCOUNTER — ANTICOAGULATION THERAPY VISIT (OUTPATIENT)
Dept: FAMILY MEDICINE | Facility: CLINIC | Age: 71
End: 2021-04-13

## 2021-04-13 DIAGNOSIS — Z79.01 LONG TERM CURRENT USE OF ANTICOAGULANT THERAPY: ICD-10-CM

## 2021-04-13 DIAGNOSIS — I48.21 PERMANENT ATRIAL FIBRILLATION (H): ICD-10-CM

## 2021-04-13 DIAGNOSIS — I48.20 CHRONIC ATRIAL FIBRILLATION (H): ICD-10-CM

## 2021-04-13 ASSESSMENT — ANXIETY QUESTIONNAIRES: GAD7 TOTAL SCORE: 20

## 2021-04-13 NOTE — PROGRESS NOTES
ANTICOAGULATION FOLLOW-UP CLINIC VISIT    Patient Name:  Markie Shepard  Date:  4/13/2021  Contact Type:  Telephone    SUBJECTIVE:  Patient Findings     Positives:  Change in medications    Comments:  Called patient to discuss today's INR results: Though this result is venous, he states the  also took INR POCT and it resulted at 2.1. The patient was assessed for diet, medication, and activity level changes, missed or extra doses, bruising or bleeding, with no problem findings.However, he will have some med changes starting later this week after picking up new prescriptions. He uses ProAir inhaler several times a day and Dr. Patel wants him to go to a once daily steroid inhaler (Breo Ellipta) along with his rescue inhaler. And he's going from 40 mg to 60 mg fluoxetine. Concurrent use of FLUOXETINE and WARFARIN may result in an increased risk of bleeding. Advised patient to watch for an increase in bruising or bleeding. Advised patient to continue with maintenance dosing and recheck INR in 2 weeks. Patient scheduled in 3 weeks.  Tierra BLOOM RN  Anticoagulation Team          Clinical Outcomes     Negatives:  Major bleeding event, Thromboembolic event, Anticoagulation-related hospital admission, Anticoagulation-related ED visit, Anticoagulation-related fatality    Comments:  Called patient to discuss today's INR results: Though this result is venous, he states the  also took INR POCT and it resulted at 2.1. The patient was assessed for diet, medication, and activity level changes, missed or extra doses, bruising or bleeding, with no problem findings.However, he will have some med changes starting later this week after picking up new prescriptions. He uses ProAir inhaler several times a day and Dr. Patel wants him to go to a once daily steroid inhaler (Breo Ellipta) along with his rescue inhaler. And he's going from 40 mg to 60 mg fluoxetine. Concurrent use of FLUOXETINE and WARFARIN may result in an  increased risk of bleeding. Advised patient to watch for an increase in bruising or bleeding. Advised patient to continue with maintenance dosing and recheck INR in 2 weeks. Patient scheduled in 3 weeks.  Tierra BLOOM RN  Anticoagulation Team             OBJECTIVE    Recent labs: (last 7 days)     21  1457   INR 1.99*       ASSESSMENT / PLAN  INR assessment THER    Recheck INR In: 2 WEEKS    INR Location Clinic      Anticoagulation Summary  As of 2021    INR goal:  2.0-3.0   TTR:  44.5 % (1 y)   INR used for dosin.99 (2021)   Warfarin maintenance plan:  7.5 mg (5 mg x 1.5) every Mon; 5 mg (5 mg x 1) all other days   Full warfarin instructions:  7.5 mg every Mon; 5 mg all other days   Weekly warfarin total:  37.5 mg   No change documented:  Tierra Bass RN   Plan last modified:  Chelsie Encinas RN (2021)   Next INR check:  5/3/2021   Priority:  Maintenance   Target end date:  Indefinite    Indications    Long-term (current) use of anticoagulants [Z79.01] [Z79.01]  Chronic atrial fibrillation (H) [I48.20]  Permanent atrial fibrillation (H) [I48.21]             Anticoagulation Episode Summary     INR check location:      Preferred lab:      Send INR reminders to:  DAYLIN NEIL    Comments:  PREFERS PETER! Quit Smoking .      Anticoagulation Care Providers     Provider Role Specialty Phone number    Mali Patel MD Referring Internal Medicine [phone]            See the Encounter Report to view Anticoagulation Flowsheet and Dosing Calendar (Go to Encounters tab in chart review, and find the Anticoagulation Therapy Visit)    Tierra Bass, JAQUI

## 2021-05-03 ENCOUNTER — ANTICOAGULATION THERAPY VISIT (OUTPATIENT)
Dept: FAMILY MEDICINE | Facility: CLINIC | Age: 71
End: 2021-05-03

## 2021-05-03 DIAGNOSIS — I48.21 PERMANENT ATRIAL FIBRILLATION (H): ICD-10-CM

## 2021-05-03 DIAGNOSIS — I48.20 CHRONIC ATRIAL FIBRILLATION (H): ICD-10-CM

## 2021-05-03 DIAGNOSIS — Z79.01 LONG TERM CURRENT USE OF ANTICOAGULANT THERAPY: ICD-10-CM

## 2021-05-03 LAB
CAPILLARY BLOOD COLLECTION: NORMAL
INR PPP: 2.7 (ref 0.86–1.14)

## 2021-05-03 PROCEDURE — 85610 PROTHROMBIN TIME: CPT | Performed by: INTERNAL MEDICINE

## 2021-05-03 PROCEDURE — 36416 COLLJ CAPILLARY BLOOD SPEC: CPT | Performed by: INTERNAL MEDICINE

## 2021-05-03 NOTE — PROGRESS NOTES
Anticoagulation Management    Unable to reach Markie today.    Today's INR result of 2.7 is therapeutic (goal INR of 2.0-3.0).  Result received from: Clinic Lab    Follow up required to confirm warfarin dose taken and assess for changes    No answer. Left general vm requesting callback to [facility] at patient's earliest convenience.       Anticoagulation clinic to follow up    Chelsie Encinas RN

## 2021-05-03 NOTE — PROGRESS NOTES
ANTICOAGULATION FOLLOW-UP CLINIC VISIT    Patient Name:  Markie Shepard  Date:  5/3/2021  Contact Type:  Telephone    SUBJECTIVE:  Patient Findings         Clinical Outcomes     Negatives:  Major bleeding event, Thromboembolic event, Anticoagulation-related hospital admission, Anticoagulation-related ED visit, Anticoagulation-related fatality           OBJECTIVE    Recent labs: (last 7 days)     21  1111   INR 2.70*       ASSESSMENT / PLAN  INR assessment THER    Recheck INR In: 6 WEEKS    INR Location Clinic      Anticoagulation Summary  As of 5/3/2021    INR goal:  2.0-3.0   TTR:  50.0 % (1 y)   INR used for dosin.70 (5/3/2021)   Warfarin maintenance plan:  7.5 mg (5 mg x 1.5) every Mon; 5 mg (5 mg x 1) all other days   Full warfarin instructions:  7.5 mg every Mon; 5 mg all other days   Weekly warfarin total:  37.5 mg   No change documented:  Chelsie Encinas RN   Plan last modified:  Chelsie Encinas RN (2021)   Next INR check:  2021   Priority:  Maintenance   Target end date:  Indefinite    Indications    Long-term (current) use of anticoagulants [Z79.01] [Z79.01]  Chronic atrial fibrillation (H) [I48.20]  Permanent atrial fibrillation (H) [I48.21]             Anticoagulation Episode Summary     INR check location:      Preferred lab:      Send INR reminders to:  DAYLIN NEIL    Comments:  PREFERS PETER! Quit Smoking .      Anticoagulation Care Providers     Provider Role Specialty Phone number    Mali Patel MD Referring Internal Medicine [phone]            See the Encounter Report to view Anticoagulation Flowsheet and Dosing Calendar (Go to Encounters tab in chart review, and find the Anticoagulation Therapy Visit)        Kaley Corona RN

## 2021-06-14 ENCOUNTER — ANTICOAGULATION THERAPY VISIT (OUTPATIENT)
Dept: FAMILY MEDICINE | Facility: CLINIC | Age: 71
End: 2021-06-14

## 2021-06-14 DIAGNOSIS — Z79.01 LONG TERM CURRENT USE OF ANTICOAGULANT THERAPY: ICD-10-CM

## 2021-06-14 DIAGNOSIS — I48.21 PERMANENT ATRIAL FIBRILLATION (H): ICD-10-CM

## 2021-06-14 DIAGNOSIS — I48.20 CHRONIC ATRIAL FIBRILLATION (H): ICD-10-CM

## 2021-06-14 LAB
CAPILLARY BLOOD COLLECTION: NORMAL
INR PPP: 2.9 (ref 0.86–1.14)

## 2021-06-14 PROCEDURE — 85610 PROTHROMBIN TIME: CPT | Performed by: INTERNAL MEDICINE

## 2021-06-14 PROCEDURE — 36416 COLLJ CAPILLARY BLOOD SPEC: CPT | Performed by: INTERNAL MEDICINE

## 2021-06-14 NOTE — PROGRESS NOTES
"ANTICOAGULATION MANAGEMENT     Patient Name:  Markie Shepard  Date:  2021    ASSESSMENT /SUBJECTIVE:    Today's INR result of 2.9 is therapeutic. Goal INR of 2.0-3.0      Warfarin dose taken: Warfarin taken as instructed    Diet: No new diet changes affecting INR    Medication changes/ interactions: Interaction between Fluoxetine dose increased on 21 (from 40 mg to 60 mg QD) and warfarin may be affecting INR. Per up-to-date, \"Selective Serotonin Reuptake Inhibitors may enhance the anticoagulant effect of Vitamin K Antagonists.\" Patient also started Breo Ellipta inhaler, no interaction with warfarin.    Previous INR: Therapeutic     S/S of bleeding or thromboembolism: No    New injury or illness: No    Upcoming surgery, procedure or cardioversion: No    Additional findings: Patient has routine f/u with Cardiology on .      PLAN:    Warfarin Dosing Instructions: Continue your current warfarin dose 7.5 mg every Mon; 5 mg all other days    Instructed patient to follow up no later than: 4 weeks (d/t possible Fluoxetine interaction)  Patient elected to schedule next visit in 6 weeks on 21.    Education provided: Please call back if any changes to your diet, medications or how you've been taking warfarin, Potential interaction between warfarin and Fluoxetine, Monitoring for bleeding signs and symptoms, Monitoring for clotting signs and symptoms and Importance of notifying clinic for changes in medications; a sooner lab recheck maybe needed.    Telephone call with Markie whom verbalizes understanding and agrees to plan    Instructed to call the Anticoagulation Clinic for any changes, questions or concerns. (#[phone])        Chelsie Encinas RN      OBJECTIVE:  Recent labs: (last 7 days)     21  1119   INR 2.90*         No question data found.  Anticoagulation Summary  As of 2021    INR goal:  2.0-3.0   TTR:  61.5 % (1 y)   INR used for dosin.90 (2021)   Warfarin maintenance " plan:  7.5 mg (5 mg x 1.5) every Mon; 5 mg (5 mg x 1) all other days   Full warfarin instructions:  7.5 mg every Mon; 5 mg all other days   Weekly warfarin total:  37.5 mg   Plan last modified:  Chelsie Encinas RN (1/25/2021)   Next INR check:     Priority:  Maintenance   Target end date:  Indefinite    Indications    Long-term (current) use of anticoagulants [Z79.01] [Z79.01]  Chronic atrial fibrillation (H) [I48.20]  Permanent atrial fibrillation (H) [I48.21]             Anticoagulation Episode Summary     INR check location:      Preferred lab:      Send INR reminders to:  DAYLIN DALY    Comments:  PREFERS PETER! Quit Smoking 9/29.      Anticoagulation Care Providers     Provider Role Specialty Phone number    Mali Patel MD Referring Internal Medicine [phone]

## 2021-06-14 NOTE — PROGRESS NOTES
Patient's home number continues to be busy, will try again later today. Chelsie Encinas, FILOMENAN, RN

## 2021-06-14 NOTE — PROGRESS NOTES
Attempted to reach patient, phone line is busy, will try again this afternoon. Chelsie Encinas, FILOMENAN, RN

## 2021-06-16 ENCOUNTER — HOSPITAL ENCOUNTER (OUTPATIENT)
Dept: CARDIAC REHAB | Facility: CLINIC | Age: 71
Setting detail: THERAPIES SERIES
End: 2021-06-16
Attending: INTERNAL MEDICINE
Payer: COMMERCIAL

## 2021-06-16 ENCOUNTER — HOSPITAL ENCOUNTER (OUTPATIENT)
Dept: CARDIOLOGY | Facility: CLINIC | Age: 71
Discharge: HOME | End: 2021-06-16
Attending: INTERNAL MEDICINE | Admitting: INTERNAL MEDICINE
Payer: COMMERCIAL

## 2021-06-16 ENCOUNTER — OFFICE VISIT (OUTPATIENT)
Dept: CARDIOLOGY | Facility: CLINIC | Age: 71
End: 2021-06-16
Attending: INTERNAL MEDICINE
Payer: COMMERCIAL

## 2021-06-16 VITALS
HEART RATE: 71 BPM | BODY MASS INDEX: 45.1 KG/M2 | WEIGHT: 315 LBS | DIASTOLIC BLOOD PRESSURE: 79 MMHG | SYSTOLIC BLOOD PRESSURE: 123 MMHG | OXYGEN SATURATION: 94 % | HEIGHT: 70 IN

## 2021-06-16 DIAGNOSIS — I25.10 CAD (CORONARY ARTERY DISEASE): Primary | ICD-10-CM

## 2021-06-16 DIAGNOSIS — J44.9 CHRONIC OBSTRUCTIVE PULMONARY DISEASE, UNSPECIFIED COPD TYPE (H): ICD-10-CM

## 2021-06-16 DIAGNOSIS — E78.5 HYPERLIPIDEMIA LDL GOAL <70: ICD-10-CM

## 2021-06-16 DIAGNOSIS — I48.20 CHRONIC ATRIAL FIBRILLATION (H): ICD-10-CM

## 2021-06-16 DIAGNOSIS — I50.30 HEART FAILURE WITH PRESERVED EJECTION FRACTION, NYHA CLASS II (H): ICD-10-CM

## 2021-06-16 DIAGNOSIS — I10 ESSENTIAL HYPERTENSION WITH GOAL BLOOD PRESSURE LESS THAN 140/90: ICD-10-CM

## 2021-06-16 DIAGNOSIS — E78.5 DYSLIPIDEMIA: Primary | ICD-10-CM

## 2021-06-16 PROCEDURE — 255N000002 HC RX 255 OP 636: Performed by: INTERNAL MEDICINE

## 2021-06-16 PROCEDURE — 99215 OFFICE O/P EST HI 40 MIN: CPT | Performed by: INTERNAL MEDICINE

## 2021-06-16 PROCEDURE — 999N000208 ECHOCARDIOGRAM COMPLETE

## 2021-06-16 PROCEDURE — 94060 EVALUATION OF WHEEZING: CPT

## 2021-06-16 PROCEDURE — 94729 DIFFUSING CAPACITY: CPT

## 2021-06-16 PROCEDURE — 93306 TTE W/DOPPLER COMPLETE: CPT | Mod: 26 | Performed by: INTERNAL MEDICINE

## 2021-06-16 RX ORDER — TORSEMIDE 20 MG/1
20 TABLET ORAL DAILY
Qty: 90 TABLET | Refills: 3 | Status: SHIPPED | OUTPATIENT
Start: 2021-06-16 | End: 2021-07-16

## 2021-06-16 RX ORDER — FLUTICASONE PROPIONATE 50 MCG
1 SPRAY, SUSPENSION (ML) NASAL DAILY
COMMUNITY
End: 2022-11-29

## 2021-06-16 RX ORDER — POTASSIUM CHLORIDE 1500 MG/1
20 TABLET, EXTENDED RELEASE ORAL DAILY
Qty: 90 TABLET | Refills: 3 | Status: SHIPPED | OUTPATIENT
Start: 2021-06-16 | End: 2021-07-16

## 2021-06-16 RX ORDER — ROSUVASTATIN CALCIUM 40 MG/1
40 TABLET, COATED ORAL AT BEDTIME
Qty: 90 TABLET | Refills: 3 | Status: SHIPPED | OUTPATIENT
Start: 2021-06-16 | End: 2022-06-06

## 2021-06-16 RX ADMIN — HUMAN ALBUMIN MICROSPHERES AND PERFLUTREN 9 ML: 10; .22 INJECTION, SOLUTION INTRAVENOUS at 09:14

## 2021-06-16 ASSESSMENT — MIFFLIN-ST. JEOR: SCORE: 2444.56

## 2021-06-16 NOTE — LETTER
6/16/2021    Mali Patel MD  [address] [facility] [address]  [facility] [address]    RE: Markie Shepard       Dear Colleague,    I had the pleasure of seeing Markie Shepard in the [facility].    Cardiology Clinic Progress Note:    June 16, 2021   Patient Name: Markie Shepard  Patient MRN: [id number]     Consult indication: Heart failure    HPI:    I had the opportunity to see patient Markie Shepard today in cardiology clinic for a follow-up visit.  He is followed by our colleague Dr. Patel with Primary Care.    As you know, patient is a pleasant 71-year-old male with a past medical history significant for legal blindness, morbid obesity (BMI 53 kg/m ), coronary artery disease status post PCI, permanent atrial fibrillation (on warfarin), sleep apnea, significant prior smoking history, probable COPD, heart failure with preserved ejection fraction, who presents for follow-up.    He is accompanied today by his grandson.    Patient was last seen in cardiology clinic with my colleague Dr. Sheriff through a virtual visit, previously had been followed by Dr. Hobbs for many years.  When he was last seen, he had been doing well, no changes were warranted to his cardiac regimen.    Regarding his prior cardiac history, he was originally seen 6/27/2012, Lexiscan stress test demonstrating inferior infarct with moderate ischemia.  Subsequent coronary angiography demonstrated  of the RCA, with left-to-right collaterals.  Since he is asymptomatic, and has been doing well, the decision was made to not pursue high risk  PCI.    Since then, unfortunately he reports significant shortness of breath, as well as some weight gain.  He also notes worsening lower extremity swelling, abdominal fullness.  He denies any chest pain, chest pressure.  Denies any significant dizziness/lightheadedness, palpitations.  Recently underwent PFTs 6/16/2021, results are still pending,  however on preliminary review several parameters are abnormal suggesting primary lung disease.    Last labs from 4/2021 are notable for a potassium of 3.9, creatinine 0.82, total cholesterol 146, HDL 54, LDL 73, triglycerides 98.  He has had secondary polycythemia related to hypoxia for several years.      TTE 6/16/2021 demonstrates LVEF 55%, RV not well visualized, mild aortic stenosis.    Assessment and Plan/Recommendations:    # Acute on chronic HFpEF.  Patient denies symptoms concerning for angina, suspect decompensation likely subacute, though hypoxia likely multifactorial with contribution from primary lung disease as well, obesity hypoventilation syndrome, COPD.  RV was not well visualized on TTE 6/2021, likely has some underlying RV dysfunction as well.  # Abnormal PFTs 6/16/2021, suspect underlying COPD.  Significant prior smoking history.  # CAD with RCA .  Stable, denies symptoms concerning for angina.  # Permanent atrial fibrillation, on warfarin.   # Morbid obesity  # EMORY  # Significant vision impairment  # Mild aortic stenosis     -Discussed options for further evaluation and management  -Change furosemide 10 mg three times a week to torsemide 20 mg daily  -Start potassium supplementation 20 mill equivalents daily  -Change simvastatin to rosuvastatin  -BMP in 1 to 2 weeks  -Fasting lipids in 3 months  -Continue current cardiac regimen otherwise including amlodipine 10 mg daily, atenolol 150 mg daily, losartan 100 mg daily, warfarin  -Follow-up with cardiology CLAUDIA in 1 month, and with me in 3 months, or sooner as needed    Thank you for allowing our team to participate in the care of Markie Shepard.  Please do not hesitate to call or page me with any questions or concerns.    Sincerely,     Andrea Lockett MD, [facility]  Cardiology  Text Page   June 16, 2021    Total time spent on this encounter: 40 minutes, providing care as above including, but not limited to, reviewing prior medical  records, laboratory data, imaging studies, diagnostic studies, procedure notes, formulating an assessment and plan, recommendations.      Past Medical History:     Past Medical History:   Diagnosis Date     Adjustment disorder with depressed mood 6/17/12     ALCOHOL ABUSE-UNSPEC 10/5/2007     Atrial fibrillation (H)      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 On Cellcept, steroid taper     Coronary atherosclerosis of unspecified type of vessel, native or graft      Diabetes mellitus type 2 in obese (H)      Essential hypertension, benign      Mixed hyperlipidemia 10/5/2007     Polycythemia      Sleep apnea      Tobacco use disorder 10/5/2007    Smoker - 1/2 ppd. Started at 16 years     Total retinal detachment of left eye 2009/2010        Past Surgical History:   Past Surgical History:   Procedure Laterality Date     C OPEN RX ANKLE DISLOCATN+FIXATN  12/2008    Right Ankle     CARDIAC CATHERIZATION  7/03/2012    totally occluded RCA w/ mild left coronary disease     HEART CATH CORONARY ANGIOGRAM AND RIGHT HEART CATH  7/2012    RCA occlusion, IMI, no stent     PHACOEMULSIFICATION CLEAR CORNEA WITH STANDARD INTRAOCULAR LENS IMPLANT Right 6/4/2015    Procedure: PHACOEMULSIFICATION CLEAR CORNEA WITH STANDARD INTRAOCULAR LENS IMPLANT;  Surgeon: Tal Trinidad MD;  Location:  [facility]     RETINAL REATTACHMENT  11/09, 7/10    Left     TONSILLECTOMY       ZZC NONSPECIFIC PROCEDURE  1987    Right Epididymis Removed       Medications (outpatient):  Current Outpatient Medications   Medication Sig Dispense Refill     acetaminophen (TYLENOL) 500 MG tablet Take 500-1,000 mg by mouth every 6 hours as needed for mild pain       albuterol (PROAIR RESPICLICK) 108 (90 Base) MCG/ACT inhaler Inhale 2 puffs into the lungs every 4 hours as needed for shortness of breath / dyspnea 1 each 5     amLODIPine (NORVASC) 10 MG tablet Take 1 tablet (10 mg) by mouth daily 90 tablet 1     atenolol (TENORMIN) 100 MG tablet  Take 1 tablet (100 mg) by mouth daily along with 50 mg tablet for total of 150 mg daily. 90 tablet 1     atenolol (TENORMIN) 50 MG tablet Take 1 tablet (50 mg) by mouth daily Along with 100 mg tablet for total of 150 mg daily. 90 tablet 1     CENTRUM SILVER OR TABS ONE DAILY 3 MONTHS 1 YEAR     FLUoxetine (PROZAC) 20 MG capsule Take 3 daily 270 capsule 1     fluticasone (FLONASE) 50 MCG/ACT nasal spray Spray 1 spray into both nostrils daily       fluticasone-vilanterol (BREO ELLIPTA) 200-25 MCG/INH inhaler Inhale 1 puff into the lungs daily 60 each 11     furosemide (LASIX) 20 MG tablet Take one half tablet by mouth up to 3 times a week as needed for leg edema. 36 tablet 1     losartan (COZAAR) 100 MG tablet Take 1 tablet (100 mg) by mouth daily 90 tablet 1     omega-3 fatty acids (FISH OIL) 1200 MG capsule Take 1 capsule by mouth daily       simvastatin (ZOCOR) 20 MG tablet Take 1 tablet (20 mg) by mouth At Bedtime 90 tablet 1     traZODone (DESYREL) 100 MG tablet Take 1 tablet (100 mg) by mouth nightly as needed for sleep 90 tablet 1     warfarin ANTICOAGULANT (COUMADIN ANTICOAGULANT) 5 MG tablet Take 7.5 mg on Mondays and Fridays, and 5 mg all other days, or as directed by Coumadin nurse. (Patient taking differently: Take 7.5 mg on Mondays and 5 mg all other days, or as directed by Coumadin nurse.) 100 tablet 3     FLUoxetine (PROZAC) 40 MG capsule Take 1 capsule (40 mg) by mouth daily (Patient not taking: Reported on 6/16/2021) 90 capsule 0     oxymetazoline (AFRIN 12 HOUR) 0.05 % nasal spray Spray 2 sprays into both nostrils At Bedtime         Allergies:  Allergies   Allergen Reactions     Ambien Other (See Comments)     Parasomnia with sleep walking, injuries, delusions.  May have been in DTs at the same time.     Lisinopril Cough     Pt had cough     Seasonal Allergies        Social History:   History   Drug Use No      History   Smoking Status     Former Smoker     Packs/day: 1.00     Years: 53.00     Types:  "Cigarettes     Quit date: 9/29/2019   Smokeless Tobacco     Never Used     Comment: 1 ppd     Social History    Substance and Sexual Activity      Alcohol use: No        Comment: quit drinking heavily 2013       Family History:  Family History   Problem Relation Age of Onset     Arthritis Mother      Circulatory Mother         varicose veins     Eye Disorder Mother         detached retina     Thyroid Disease Mother      C.A.D. Father         Quadruple bypass     Gastrointestinal Disease Father         diverticulitis     Alzheimer Disease Father         demntia     Eye Disorder Father         cataract     Depression Sister         Post Partum      Alcoholism Sister      Neurologic Disorder Sister         Brain Aneurysm     Thyroid Disease Brother      Eye Disorder Brother         detached retina       Review of Systems:   A complete review of systems was negative except as mentioned in the History of Present Illness.     Objective & Physical Exam:  /79   Pulse 71   Ht 1.778 m (5' 10\")   Wt (!) 167.8 kg (370 lb)   SpO2 94%   BMI 53.09 kg/m    Wt Readings from Last 2 Encounters:   06/16/21 (!) 167.8 kg (370 lb)   04/12/21 (!) 166.9 kg (368 lb)     Body mass index is 53.09 kg/m .   Body surface area is 2.88 meters squared.    Constitutional: appears stated age, in no apparent distress, appears to be well nourished, significant visual impairment  ENT: normocephalic, without obvious abnormality, atraumatic  Pulmonary: Mild rales at the bases bilaterally  Cardiovascular: JVP difficult to discern, regular rate, regular rhythm, distant heart sounds  Gastrointestinal: abdominal exam benign  Neurologic: awake, alert, face symmetrical, moves all extremities  Skin: no jaundice  Psychiatric: affect is normal, answers questions appropriately, oriented to self and place    Data reviewed:  Lab Results   Component Value Date    WBC 8.3 04/12/2021    RBC 5.41 04/12/2021    HGB 17.8 (H) 04/12/2021    HCT 53.2 (H) 04/12/2021 "    MCV 98 04/12/2021    MCH 32.9 04/12/2021    MCHC 33.5 04/12/2021    RDW 12.9 04/12/2021     04/12/2021     Sodium   Date Value Ref Range Status   04/12/2021 137 133 - 144 mmol/L Final     Potassium   Date Value Ref Range Status   04/12/2021 3.9 3.4 - 5.3 mmol/L Final     Chloride   Date Value Ref Range Status   04/12/2021 104 94 - 109 mmol/L Final     Carbon Dioxide   Date Value Ref Range Status   04/12/2021 30 20 - 32 mmol/L Final     Anion Gap   Date Value Ref Range Status   04/12/2021 3 3 - 14 mmol/L Final     Glucose   Date Value Ref Range Status   04/12/2021 124 (H) 70 - 99 mg/dL Final     Urea Nitrogen   Date Value Ref Range Status   04/12/2021 10 7 - 30 mg/dL Final     Creatinine   Date Value Ref Range Status   04/12/2021 0.82 0.66 - 1.25 mg/dL Final     GFR Estimate   Date Value Ref Range Status   04/12/2021 90 >60 mL/min/[1.73_m2] Final     Comment:     Non  GFR Calc  Starting 12/18/2018, serum creatinine based estimated GFR (eGFR) will be   calculated using the Chronic Kidney Disease Epidemiology Collaboration   (CKD-EPI) equation.       Calcium   Date Value Ref Range Status   04/12/2021 10.0 8.5 - 10.1 mg/dL Final     Bilirubin Total   Date Value Ref Range Status   04/12/2021 0.9 0.2 - 1.3 mg/dL Final     Alkaline Phosphatase   Date Value Ref Range Status   04/12/2021 84 40 - 150 U/L Final     ALT   Date Value Ref Range Status   04/12/2021 29 0 - 70 U/L Final     AST   Date Value Ref Range Status   04/12/2021 23 0 - 45 U/L Final     Recent Labs   Lab Test 04/12/21  1457 02/17/20  1601 04/22/15  1241 04/22/15  1241   CHOL 146 152.0   < > 139   HDL 54 55.0   < > 38*   LDL 73 74.0   < > 69*   TRIG 98 116.0   < > 159*   CHOLHDLRATIO  --  2.8  --  3.7    < > = values in this interval not displayed.      Lab Results   Component Value Date    A1C 6.4 04/12/2021    A1C 6.1 02/17/2020    A1C 6.1 02/11/2019    A1C 5.8 10/17/2016    A1C 5.9 03/18/2015        Recent Results (from the past  4320 hour(s))   Echocardiogram Complete    Narrative    [id number]  [id number]  [id number]  [id number]^GLORIA^DAYNE^KERMIT     [facility]  [facility]  Echocardiography Laboratory  [address]  [place] [address]  Phone [phone]  Fax [phone]     Name: NILSON CASTILLO  MRN: [id number]  : 1950  Study Date: 2021 08:40 AM  Age: 71 yrs  Gender: Male  Patient Location: [facility]  Reason For Study: Chronic atrial fibrillation (H)  Ordering Physician: DAYNE CASTRO  Referring Physician: DAYNE CASTRO  Performed By: Rubio Gomez RDCS     BSA: 2.7 m2  Height: 70 in  Weight: 368 lb  HR: 73  BP: 133/82 mmHg  ______________________________________________________________________________  Procedure  Complete Echo Adult. Optison (NDC #4105-2188) given intravenously.     ______________________________________________________________________________  Interpretation Summary     Patient in wheelchair, very limited image quality.     1. The left ventricle is normal in size. The visual ejection fraction is  estimated at 55%.  2. The right ventricle is not well visualized.  3. Mild valvular aortic stenosis. Mean 12mmHg, Vmax 2.4m/s.     Echo from  showed EF 55%, no valve disease.  ______________________________________________________________________________  Left Ventricle  The left ventricle is normal in size. The visual ejection fraction is  estimated at 55%. Left ventricular diastolic function is indeterminate.  Regional wall motion abnormalities cannot be excluded due to limited  visualization.     Right Ventricle  The right ventricle is not well visualized.     Atria  The left atrium is moderately dilated. Right atrium not well visualized. There  is no atrial shunt seen.     Mitral Valve  The mitral valve is normal in structure and function.     Tricuspid Valve  The tricuspid valve is normal in structure and function.     Aortic Valve  Mild  valvular aortic stenosis. Mean 12mmHg, Vmax 2.4m/s.     Pulmonic Valve  The pulmonic valve is not well visualized.     Vessels  The ascending aorta is Borderline dilated. Dilation of the inferior vena cava  is present with abnormal respiratory variation in diameter.     Pericardium  There is no pericardial effusion.     Rhythm  The rhythm was atrial fibrillation with wide QRS rhythm.     ______________________________________________________________________________  MMode/2D Measurements & Calculations  Ao root diam: 3.7 cm  LA dimension: 5.6 cm  asc Aorta Diam: 3.8 cm  LA/Ao: 1.5  LVOT diam: 2.6 cm  LVOT area: 5.4 cm2     LA Volume Index (BP): 44.7 ml/m2     Doppler Measurements & Calculations  MV E max gresyon: 113.0 cm/sec  MV max P.0 mmHg  MV mean P.4 mmHg  MV V2 VTI: 23.7 cm  MVA(VTI): 4.4 cm2  MV dec time: 0.15 sec  Ao V2 max: 241.4 cm/sec  Ao max P.3 mmHg  Ao V2 mean: 163.4 cm/sec  Ao mean P.0 mmHg  Ao V2 VTI: 45.6 cm  FARA(I,D): 2.3 cm2  FARA(V,D): 2.4 cm2  LV V1 max P.7 mmHg  LV V1 max: 108.3 cm/sec  LV V1 VTI: 19.1 cm  SV(LVOT): 103.2 ml  SI(LVOT): 38.2 ml/m2  PA acc time: 0.12 sec  AV Greyson Ratio (DI): 0.45  FARA Index (cm2/m2): 0.84     E/E': 17.2  Peak E' Greyson: 6.6 cm/sec     ______________________________________________________________________________  Report approved by: Pan Pepper 2021 09:51 AM           Thank you for allowing me to participate in the care of your patient.      Sincerely,     Andrea Lockett MD     [facility]    cc:   Mali Patel MD  [address] [place] [id number]

## 2021-06-16 NOTE — PATIENT INSTRUCTIONS
June 16, 2021    Thank you for allowing our Cardiology team to participate in your care.     Please note the following changes to your heart treatment plan:     Medication changes:   - stop furosemide 10 mg three times a week  - start torsemide 20 mg daily  - start potassium supplementation 20 meq daily    Tests to be done:  - NON-fasting labs 6/28/21 (at follow up visit with Dr. Patel)  - FASTING cholesterol labs in 3 months (and also kidney, electrolyte labs)    Follow up:  - Follow up in about 1 month with cardiology CLAUDIA and 3 months with me, or sooner as needed.        Please contact our team at [phone] for any questions or concerns.   If you are having a medical emergency, please call 911.       Sincerely,    Andrea Lockett MD, [facility]  Cardiology    [facility] - [facility] [place]

## 2021-06-16 NOTE — PROGRESS NOTES
Cardiology Clinic Progress Note:    June 16, 2021   Patient Name: Markie Shepard  Patient MRN: [id number]     Consult indication: Heart failure    HPI:    I had the opportunity to see patient Markie Shepard today in cardiology clinic for a follow-up visit.  He is followed by our colleague Dr. Patel with Primary Care.    As you know, patient is a pleasant 71-year-old male with a past medical history significant for legal blindness, morbid obesity (BMI 53 kg/m ), coronary artery disease status post PCI, permanent atrial fibrillation (on warfarin), sleep apnea, significant prior smoking history, probable COPD, heart failure with preserved ejection fraction, who presents for follow-up.    He is accompanied today by his grandson.    Patient was last seen in cardiology clinic with my colleague Dr. Sheriff through a virtual visit, previously had been followed by Dr. Hobbs for many years.  When he was last seen, he had been doing well, no changes were warranted to his cardiac regimen.    Regarding his prior cardiac history, he was originally seen 6/27/2012, Lexiscan stress test demonstrating inferior infarct with moderate ischemia.  Subsequent coronary angiography demonstrated  of the RCA, with left-to-right collaterals.  Since he is asymptomatic, and has been doing well, the decision was made to not pursue high risk  PCI.    Since then, unfortunately he reports significant shortness of breath, as well as some weight gain.  He also notes worsening lower extremity swelling, abdominal fullness.  He denies any chest pain, chest pressure.  Denies any significant dizziness/lightheadedness, palpitations.  Recently underwent PFTs 6/16/2021, results are still pending, however on preliminary review several parameters are abnormal suggesting primary lung disease.    Last labs from 4/2021 are notable for a potassium of 3.9, creatinine 0.82, total cholesterol 146, HDL 54, LDL 73, triglycerides 98.  He has had secondary  polycythemia related to hypoxia for several years.      TTE 6/16/2021 demonstrates LVEF 55%, RV not well visualized, mild aortic stenosis.    Assessment and Plan/Recommendations:    # Acute on chronic HFpEF.  Patient denies symptoms concerning for angina, suspect decompensation likely subacute, though hypoxia likely multifactorial with contribution from primary lung disease as well, obesity hypoventilation syndrome, COPD.  RV was not well visualized on TTE 6/2021, likely has some underlying RV dysfunction as well.  # Abnormal PFTs 6/16/2021, suspect underlying COPD.  Significant prior smoking history.  # CAD with RCA .  Stable, denies symptoms concerning for angina.  # Permanent atrial fibrillation, on warfarin.   # Morbid obesity  # [facility]  # Significant vision impairment  # Mild aortic stenosis     -Discussed options for further evaluation and management  -Change furosemide 10 mg three times a week to torsemide 20 mg daily  -Start potassium supplementation 20 mill equivalents daily  -Change simvastatin to rosuvastatin  -BMP in 1 to 2 weeks  -Fasting lipids in 3 months  -Continue current cardiac regimen otherwise including amlodipine 10 mg daily, atenolol 150 mg daily, losartan 100 mg daily, warfarin  -Follow-up with cardiology CLAUDIA in 1 month, and with me in 3 months, or sooner as needed    Thank you for allowing our team to participate in the care of Markie Shepard.  Please do not hesitate to call or page me with any questions or concerns.    Sincerely,     Andrea Lockett MD, [facility]  Cardiology  Text Page   June 16, 2021    Total time spent on this encounter: 40 minutes, providing care as above including, but not limited to, reviewing prior medical records, laboratory data, imaging studies, diagnostic studies, procedure notes, formulating an assessment and plan, recommendations.      Past Medical History:     Past Medical History:   Diagnosis Date     Adjustment disorder with depressed mood 6/17/12      ALCOHOL ABUSE-UNSPEC 10/5/2007     Atrial fibrillation (H)      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 On Cellcept, steroid taper     Coronary atherosclerosis of unspecified type of vessel, native or graft      Diabetes mellitus type 2 in obese (H)      Essential hypertension, benign      Mixed hyperlipidemia 10/5/2007     Polycythemia      Sleep apnea      Tobacco use disorder 10/5/2007    Smoker - 1/2 ppd. Started at 16 years     Total retinal detachment of left eye 2009/2010        Past Surgical History:   Past Surgical History:   Procedure Laterality Date     C OPEN RX ANKLE DISLOCATN+FIXATN  12/2008    Right Ankle     CARDIAC CATHERIZATION  7/03/2012    totally occluded RCA w/ mild left coronary disease     HEART CATH CORONARY ANGIOGRAM AND RIGHT HEART CATH  7/2012    RCA occlusion, IMI, no stent     PHACOEMULSIFICATION CLEAR CORNEA WITH STANDARD INTRAOCULAR LENS IMPLANT Right 6/4/2015    Procedure: PHACOEMULSIFICATION CLEAR CORNEA WITH STANDARD INTRAOCULAR LENS IMPLANT;  Surgeon: Tal Trinidad MD;  Location:  [facility]     RETINAL REATTACHMENT  11/09, 7/10    Left     TONSILLECTOMY       ZZC NONSPECIFIC PROCEDURE  1987    Right Epididymis Removed       Medications (outpatient):  Current Outpatient Medications   Medication Sig Dispense Refill     acetaminophen (TYLENOL) 500 MG tablet Take 500-1,000 mg by mouth every 6 hours as needed for mild pain       albuterol (PROAIR RESPICLICK) 108 (90 Base) MCG/ACT inhaler Inhale 2 puffs into the lungs every 4 hours as needed for shortness of breath / dyspnea 1 each 5     amLODIPine (NORVASC) 10 MG tablet Take 1 tablet (10 mg) by mouth daily 90 tablet 1     atenolol (TENORMIN) 100 MG tablet Take 1 tablet (100 mg) by mouth daily along with 50 mg tablet for total of 150 mg daily. 90 tablet 1     atenolol (TENORMIN) 50 MG tablet Take 1 tablet (50 mg) by mouth daily Along with 100 mg tablet for total of 150 mg daily. 90 tablet 1     CENTRUM SILVER  OR TABS ONE DAILY 3 MONTHS 1 YEAR     FLUoxetine (PROZAC) 20 MG capsule Take 3 daily 270 capsule 1     fluticasone (FLONASE) 50 MCG/ACT nasal spray Spray 1 spray into both nostrils daily       fluticasone-vilanterol (BREO ELLIPTA) 200-25 MCG/INH inhaler Inhale 1 puff into the lungs daily 60 each 11     furosemide (LASIX) 20 MG tablet Take one half tablet by mouth up to 3 times a week as needed for leg edema. 36 tablet 1     losartan (COZAAR) 100 MG tablet Take 1 tablet (100 mg) by mouth daily 90 tablet 1     omega-3 fatty acids (FISH OIL) 1200 MG capsule Take 1 capsule by mouth daily       simvastatin (ZOCOR) 20 MG tablet Take 1 tablet (20 mg) by mouth At Bedtime 90 tablet 1     traZODone (DESYREL) 100 MG tablet Take 1 tablet (100 mg) by mouth nightly as needed for sleep 90 tablet 1     warfarin ANTICOAGULANT (COUMADIN ANTICOAGULANT) 5 MG tablet Take 7.5 mg on Mondays and Fridays, and 5 mg all other days, or as directed by Coumadin nurse. (Patient taking differently: Take 7.5 mg on Mondays and 5 mg all other days, or as directed by Coumadin nurse.) 100 tablet 3     FLUoxetine (PROZAC) 40 MG capsule Take 1 capsule (40 mg) by mouth daily (Patient not taking: Reported on 6/16/2021) 90 capsule 0     oxymetazoline (AFRIN 12 HOUR) 0.05 % nasal spray Spray 2 sprays into both nostrils At Bedtime         Allergies:  Allergies   Allergen Reactions     Ambien Other (See Comments)     Parasomnia with sleep walking, injuries, delusions.  May have been in DTs at the same time.     Lisinopril Cough     Pt had cough     Seasonal Allergies        Social History:   History   Drug Use No      History   Smoking Status     Former Smoker     Packs/day: 1.00     Years: 53.00     Types: Cigarettes     Quit date: 9/29/2019   Smokeless Tobacco     Never Used     Comment: 1 ppd     Social History    Substance and Sexual Activity      Alcohol use: No        Comment: quit drinking heavily 2013       Family History:  Family History   Problem  "Relation Age of Onset     Arthritis Mother      Circulatory Mother         varicose veins     Eye Disorder Mother         detached retina     Thyroid Disease Mother      C.A.D. Father         Quadruple bypass     Gastrointestinal Disease Father         diverticulitis     Alzheimer Disease Father         demntia     Eye Disorder Father         cataract     Depression Sister         Post Partum      Alcoholism Sister      Neurologic Disorder Sister         Brain Aneurysm     Thyroid Disease Brother      Eye Disorder Brother         detached retina       Review of Systems:   A complete review of systems was negative except as mentioned in the History of Present Illness.     Objective & Physical Exam:  /79   Pulse 71   Ht 1.778 m (5' 10\")   Wt (!) 167.8 kg (370 lb)   SpO2 94%   BMI 53.09 kg/m    Wt Readings from Last 2 Encounters:   06/16/21 (!) 167.8 kg (370 lb)   04/12/21 (!) 166.9 kg (368 lb)     Body mass index is 53.09 kg/m .   Body surface area is 2.88 meters squared.    Constitutional: appears stated age, in no apparent distress, appears to be well nourished, significant visual impairment  ENT: normocephalic, without obvious abnormality, atraumatic  Pulmonary: Mild rales at the bases bilaterally  Cardiovascular: JVP difficult to discern, regular rate, regular rhythm, distant heart sounds  Gastrointestinal: abdominal exam benign  Neurologic: awake, alert, face symmetrical, moves all extremities  Skin: no jaundice  Psychiatric: affect is normal, answers questions appropriately, oriented to self and place    Data reviewed:  Lab Results   Component Value Date    WBC 8.3 04/12/2021    RBC 5.41 04/12/2021    HGB 17.8 (H) 04/12/2021    HCT 53.2 (H) 04/12/2021    MCV 98 04/12/2021    MCH 32.9 04/12/2021    MCHC 33.5 04/12/2021    RDW 12.9 04/12/2021     04/12/2021     Sodium   Date Value Ref Range Status   04/12/2021 137 133 - 144 mmol/L Final     Potassium   Date Value Ref Range Status   04/12/2021 3.9 " 3.4 - 5.3 mmol/L Final     Chloride   Date Value Ref Range Status   04/12/2021 104 94 - 109 mmol/L Final     Carbon Dioxide   Date Value Ref Range Status   04/12/2021 30 20 - 32 mmol/L Final     Anion Gap   Date Value Ref Range Status   04/12/2021 3 3 - 14 mmol/L Final     Glucose   Date Value Ref Range Status   04/12/2021 124 (H) 70 - 99 mg/dL Final     Urea Nitrogen   Date Value Ref Range Status   04/12/2021 10 7 - 30 mg/dL Final     Creatinine   Date Value Ref Range Status   04/12/2021 0.82 0.66 - 1.25 mg/dL Final     GFR Estimate   Date Value Ref Range Status   04/12/2021 90 >60 mL/min/[1.73_m2] Final     Comment:     Non  GFR Calc  Starting 12/18/2018, serum creatinine based estimated GFR (eGFR) will be   calculated using the Chronic Kidney Disease Epidemiology Collaboration   (CKD-EPI) equation.       Calcium   Date Value Ref Range Status   04/12/2021 10.0 8.5 - 10.1 mg/dL Final     Bilirubin Total   Date Value Ref Range Status   04/12/2021 0.9 0.2 - 1.3 mg/dL Final     Alkaline Phosphatase   Date Value Ref Range Status   04/12/2021 84 40 - 150 U/L Final     ALT   Date Value Ref Range Status   04/12/2021 29 0 - 70 U/L Final     AST   Date Value Ref Range Status   04/12/2021 23 0 - 45 U/L Final     Recent Labs   Lab Test 04/12/21  1457 02/17/20  1601 04/22/15  1241 04/22/15  1241   CHOL 146 152.0   < > 139   HDL 54 55.0   < > 38*   LDL 73 74.0   < > 69*   TRIG 98 116.0   < > 159*   CHOLHDLRATIO  --  2.8  --  3.7    < > = values in this interval not displayed.      Lab Results   Component Value Date    A1C 6.4 04/12/2021    A1C 6.1 02/17/2020    A1C 6.1 02/11/2019    A1C 5.8 10/17/2016    A1C 5.9 03/18/2015        Recent Results (from the past 4320 hour(s))   Echocardiogram Complete    Narrative    [id number]  [id number]  [id number]  [id number]^PINGEL^DAYNE^[facility]  [facility]  Echocardiography Laboratory  [address]  [address]  [place]  [address]  Phone [phone]  Fax [phone]     Name: NILSON CASTILLO  MRN: [id number]  : 1950  Study Date: 2021 08:40 AM  Age: 71 yrs  Gender: Male  Patient Location: [facility]  Reason For Study: Chronic atrial fibrillation (H)  Ordering Physician: DAYNE CASTRO  Referring Physician: DAYNE CASTRO  Performed By: Rubio Gomez RDCS     BSA: 2.7 m2  Height: 70 in  Weight: 368 lb  HR: 73  BP: 133/82 mmHg  ______________________________________________________________________________  Procedure  Complete Echo Adult. Optison (NDC #[id number]) given intravenously.     ______________________________________________________________________________  Interpretation Summary     Patient in wheelchair, very limited image quality.     1. The left ventricle is normal in size. The visual ejection fraction is  estimated at 55%.  2. The right ventricle is not well visualized.  3. Mild valvular aortic stenosis. Mean 12mmHg, Vmax 2.4m/s.     Echo from  showed EF 55%, no valve disease.  ______________________________________________________________________________  Left Ventricle  The left ventricle is normal in size. The visual ejection fraction is  estimated at 55%. Left ventricular diastolic function is indeterminate.  Regional wall motion abnormalities cannot be excluded due to limited  visualization.     Right Ventricle  The right ventricle is not well visualized.     Atria  The left atrium is moderately dilated. Right atrium not well visualized. There  is no atrial shunt seen.     Mitral Valve  The mitral valve is normal in structure and function.     Tricuspid Valve  The tricuspid valve is normal in structure and function.     Aortic Valve  Mild valvular aortic stenosis. Mean 12mmHg, Vmax 2.4m/s.     Pulmonic Valve  The pulmonic valve is not well visualized.     Vessels  The ascending aorta is Borderline dilated. Dilation of the inferior vena cava  is present with abnormal respiratory variation in  diameter.     Pericardium  There is no pericardial effusion.     Rhythm  The rhythm was atrial fibrillation with wide QRS rhythm.     ______________________________________________________________________________  MMode/2D Measurements & Calculations  Ao root diam: 3.7 cm  LA dimension: 5.6 cm  asc Aorta Diam: 3.8 cm  LA/Ao: 1.5  LVOT diam: 2.6 cm  LVOT area: 5.4 cm2     LA Volume Index (BP): 44.7 ml/m2     Doppler Measurements & Calculations  MV E max greyson: 113.0 cm/sec  MV max P.0 mmHg  MV mean P.4 mmHg  MV V2 VTI: 23.7 cm  MVA(VTI): 4.4 cm2  MV dec time: 0.15 sec  Ao V2 max: 241.4 cm/sec  Ao max P.3 mmHg  Ao V2 mean: 163.4 cm/sec  Ao mean P.0 mmHg  Ao V2 VTI: 45.6 cm  FARA(I,D): 2.3 cm2  FARA(V,D): 2.4 cm2  LV V1 max P.7 mmHg  LV V1 max: 108.3 cm/sec  LV V1 VTI: 19.1 cm  SV(LVOT): 103.2 ml  SI(LVOT): 38.2 ml/m2  PA acc time: 0.12 sec  AV Greyson Ratio (DI): 0.45  FARA Index (cm2/m2): 0.84     E/E': 17.2  Peak E' Greyson: 6.6 cm/sec     ______________________________________________________________________________  Report approved by: Pan Pepper 2021 09:51 AM

## 2021-06-28 ENCOUNTER — OFFICE VISIT (OUTPATIENT)
Dept: FAMILY MEDICINE | Facility: CLINIC | Age: 71
End: 2021-06-28
Payer: COMMERCIAL

## 2021-06-28 VITALS
HEIGHT: 71 IN | BODY MASS INDEX: 44.1 KG/M2 | WEIGHT: 315 LBS | SYSTOLIC BLOOD PRESSURE: 120 MMHG | TEMPERATURE: 97.2 F | OXYGEN SATURATION: 94 % | HEART RATE: 80 BPM | DIASTOLIC BLOOD PRESSURE: 74 MMHG

## 2021-06-28 DIAGNOSIS — Z87.891 FORMER CIGARETTE SMOKER: ICD-10-CM

## 2021-06-28 DIAGNOSIS — Z87.891 PERSONAL HISTORY OF TOBACCO USE: ICD-10-CM

## 2021-06-28 DIAGNOSIS — I50.30 HEART FAILURE WITH PRESERVED EJECTION FRACTION, NYHA CLASS II (H): ICD-10-CM

## 2021-06-28 DIAGNOSIS — I25.10 CORONARY ARTERY DISEASE INVOLVING NATIVE HEART WITHOUT ANGINA PECTORIS, UNSPECIFIED VESSEL OR LESION TYPE: ICD-10-CM

## 2021-06-28 DIAGNOSIS — E11.9 TYPE 2 DIABETES MELLITUS WITHOUT COMPLICATION, WITHOUT LONG-TERM CURRENT USE OF INSULIN (H): ICD-10-CM

## 2021-06-28 DIAGNOSIS — J44.9 CHRONIC OBSTRUCTIVE PULMONARY DISEASE, UNSPECIFIED COPD TYPE (H): Primary | ICD-10-CM

## 2021-06-28 LAB
ANION GAP SERPL CALCULATED.3IONS-SCNC: 7 MMOL/L (ref 3–14)
BUN SERPL-MCNC: 17 MG/DL (ref 7–30)
CALCIUM SERPL-MCNC: 9.4 MG/DL (ref 8.5–10.1)
CHLORIDE SERPL-SCNC: 104 MMOL/L (ref 94–109)
CO2 SERPL-SCNC: 26 MMOL/L (ref 20–32)
CREAT SERPL-MCNC: 0.91 MG/DL (ref 0.66–1.25)
DLCOUNC-%PRED-PRE: 75 %
DLCOUNC-PRE: 20.24 ML/MIN/MMHG
DLCOUNC-PRED: 26.73 ML/MIN/MMHG
ERV-%PRED-PRE: 178 %
ERV-PRE: 0.22 L
ERV-PRED: 0.12 L
EXPTIME-PRE: 7.56 SEC
FEF2575-%PRED-POST: 25 %
FEF2575-%PRED-PRE: 27 %
FEF2575-POST: 0.64 L/SEC
FEF2575-PRE: 0.7 L/SEC
FEF2575-PRED: 2.51 L/SEC
FEFMAX-%PRED-PRE: 46 %
FEFMAX-PRE: 4.01 L/SEC
FEFMAX-PRED: 8.59 L/SEC
FEV1-%PRED-PRE: 40 %
FEV1-PRE: 1.36 L
FEV1FEV6-PRE: 65 %
FEV1FEV6-PRED: 78 %
FEV1FVC-PRE: 65 %
FEV1FVC-PRED: 76 %
FEV1SVC-PRE: 59 %
FEV1SVC-PRED: 67 %
FIFMAX-PRE: 3.13 L/SEC
FVC-%PRED-PRE: 47 %
FVC-PRE: 2.08 L
FVC-PRED: 4.4 L
GFR SERPL CREATININE-BSD FRML MDRD: 85 ML/MIN/{1.73_M2}
GLUCOSE SERPL-MCNC: 127 MG/DL (ref 70–99)
IC-%PRED-PRE: 42 %
IC-PRE: 2.04 L
IC-PRED: 4.81 L
POTASSIUM SERPL-SCNC: 4 MMOL/L (ref 3.4–5.3)
SODIUM SERPL-SCNC: 138 MMOL/L (ref 133–144)
VA-%PRED-PRE: 70 %
VA-PRE: 4.68 L
VC-%PRED-PRE: 46 %
VC-PRE: 2.29 L
VC-PRED: 4.94 L

## 2021-06-28 ASSESSMENT — MIFFLIN-ST. JEOR: SCORE: 2442.86

## 2021-06-28 NOTE — PROGRESS NOTES
Markie is a 71 year old male with a past medical history significant for legal blindness, morbid obesity (BMI 53 kg/m ), coronary artery disease status post PCI, permanent atrial fibrillation (on warfarin), sleep apnea, significant prior smoking history, probable COPD, heart failure with preserved ejection fraction who presents today for follow up.    CAD  Markie had a recent cardiology appointment on 6/16/21. At that time, he reported significant SOB as well as weight gain. He also had lower extremity swelling and abdominal fullness. He had abnormal PFTs on 6/16/21, with suspected underlying COPD. Last labs from 4/2021 are notable for a potassium of 3.9, creatinine 0.82, total cholesterol 146, HDL 54, LDL 73, triglycerides 98. TTE demonstated LVEF 55%, RV not well visualized, mild aortic stenosis.  At this cardiology appointment, furosemide was changed from 10mg 3x per week to torsemide 20mg QD, he was started on potassium supplementation 20 mill equivalents daily. Simvastatin was changed to rosuvastatin. It was recommended to have a BMP checked in 1-2 weeks. He has been experiencing frequent urination with the change in diuretic. He believes his edema has significantly improved. He occasionally wears compression stockings in the winter time. He tries to elevate his legs as often as he can.     COPD  Markie's breathing has also improved. He is using the Breo once per day, and this seems to be helping. He also uses ProAir but reports needing to use it TID. He has a history of sleep apnea and uses a CPAP (without oxygen, prescribed from [facility] over 10 years ago) consistently every night. He denies any problems breathing at night. He has never had lung cancer screening, and he is not interested in having this done. He admits to coughing up sputum in the mornings, but this is normal for him.     Diabetes type II  Markie is working on his diet, trying to decrease his sugar content and eat more  salads. He occasionally eats a frozen meal and goes out to eat 2-3 times per week. He uses a stepper for exercise, typically 6 days/week about 20 minutes at a time.   Lab Results   Component Value Date    A1C 6.4 04/12/2021    A1C 6.1 02/17/2020    A1C 6.1 02/11/2019    A1C 5.8 10/17/2016    A1C 5.9 03/18/2015         Patient Active Problem List   Diagnosis     CAD (coronary artery disease)     Hyperlipidemia LDL goal <70     Smoker     Essential hypertension with goal blood pressure less than 140/90     Chronic atrial fibrillation (H)     Obstructive sleep apnea     Mild major depression (H)     Low back pain     Mixed hyperlipidemia     Essential hypertension, benign     Long-term (current) use of anticoagulants [Z79.01]     Anisocoria     Permanent atrial fibrillation (H)     Prediabetes     Chronic obstructive pulmonary disease, unspecified COPD type (H)     Morbid obesity (H)     Chronic recurrent major depressive disorder (H)     Bilateral leg edema     Insomnia, unspecified type       Current Outpatient Medications   Medication Sig Dispense Refill     acetaminophen (TYLENOL) 500 MG tablet Take 500-1,000 mg by mouth every 6 hours as needed for mild pain       albuterol (PROAIR RESPICLICK) 108 (90 Base) MCG/ACT inhaler Inhale 2 puffs into the lungs every 4 hours as needed for shortness of breath / dyspnea 1 each 5     amLODIPine (NORVASC) 10 MG tablet Take 1 tablet (10 mg) by mouth daily 90 tablet 1     atenolol (TENORMIN) 100 MG tablet Take 1 tablet (100 mg) by mouth daily along with 50 mg tablet for total of 150 mg daily. 90 tablet 1     atenolol (TENORMIN) 50 MG tablet Take 1 tablet (50 mg) by mouth daily Along with 100 mg tablet for total of 150 mg daily. 90 tablet 1     CENTRUM SILVER OR TABS ONE DAILY 3 MONTHS 1 YEAR     FLUoxetine (PROZAC) 20 MG capsule Take 3 daily 270 capsule 1     FLUoxetine (PROZAC) 40 MG capsule Take 1 capsule (40 mg) by mouth daily (Patient not taking: Reported on 6/16/2021) 90  "capsule 0     fluticasone (FLONASE) 50 MCG/ACT nasal spray Spray 1 spray into both nostrils daily       fluticasone-vilanterol (BREO ELLIPTA) 200-25 MCG/INH inhaler Inhale 1 puff into the lungs daily 60 each 11     losartan (COZAAR) 100 MG tablet Take 1 tablet (100 mg) by mouth daily 90 tablet 1     omega-3 fatty acids (FISH OIL) 1200 MG capsule Take 1 capsule by mouth daily       oxymetazoline (AFRIN 12 HOUR) 0.05 % nasal spray Spray 2 sprays into both nostrils At Bedtime       potassium chloride ER (K-TAB) 20 MEQ CR tablet Take 1 tablet (20 mEq) by mouth daily 90 tablet 3     rosuvastatin (CRESTOR) 40 MG tablet Take 1 tablet (40 mg) by mouth At Bedtime 90 tablet 3     torsemide (DEMADEX) 20 MG tablet Take 1 tablet (20 mg) by mouth daily 90 tablet 3     traZODone (DESYREL) 100 MG tablet Take 1 tablet (100 mg) by mouth nightly as needed for sleep 90 tablet 1     warfarin ANTICOAGULANT (COUMADIN ANTICOAGULANT) 5 MG tablet Take 7.5 mg on Mondays and Fridays, and 5 mg all other days, or as directed by Coumadin nurse. (Patient taking differently: Take 7.5 mg on Mondays and 5 mg all other days, or as directed by Coumadin nurse.) 100 tablet 3       Allergies   Allergen Reactions     Ambien Other (See Comments)     Parasomnia with sleep walking, injuries, delusions.  May have been in DTs at the same time.     Lisinopril Cough     Pt had cough     Seasonal Allergies         EXAM  /74 (BP Location: Left arm, Patient Position: Sitting, Cuff Size: Adult Large)   Pulse 80   Temp 97.2  F (36.2  C) (Temporal)   Ht 1.797 m (5' 10.75\")   Wt (!) 166.5 kg (367 lb)   SpO2 94%   BMI 51.55 kg/m    Gen: Alert, pleasant, NAD, obese  COR: irregularly irregular rhythm, no murmurs  Lungs: CTA bilaterally, no rhonchi, wheezes or rales, no E to A changes  Extremities: +2 edema bilaterally to upper shins      Assessment:  (J44.9) Chronic obstructive pulmonary disease, unspecified COPD type (H)  (primary encounter " diagnosis)  Comment: Improvement with addition of  Breo but still using rescue inhaler TID, FEV1 40% on recent PFTs   Plan: Continue Breo and albuterol prn.  Add Incruse inhaler, once daily. Will send in mail to patient as this decision was made after his clinic visit.     (I25.10) Coronary artery disease involving native heart without angina pectoris, unspecified vessel or lesion type  Comment: Stable symptoms, started diuretic 2 weeks ago. He perceives improvement in leg edema.   Plan: Check electrolytes. Continue torsemide.     (Z87.891) Former cigarette smoker  Comment: Patient declines lung cancer screening.   Plan: Reviewed red flags to look out for, such as coughing up blood or recurrent pneumonia.     (E11.9) Type 2 diabetes mellitus without complication, without long-term current use of insulin (H)  Comment: recent A1c is 6.4%  Plan: continue working on diet, exercise program.    Recheck in 6 months.     48 minutes spend on the date of this encounter doing chart review, history and exam, documentation and further activities as noted above.     Mali Patel MD  Internal Medicine/Pediatrics

## 2021-06-28 NOTE — NURSING NOTE
"71 year old  Chief Complaint   Patient presents with     Leg Swelling     bilateral legs f/u with labs & COPD heart disease        Blood pressure 120/74, pulse 80, temperature 97.2  F (36.2  C), temperature source Temporal, height 1.797 m (5' 10.75\"), weight (!) 166.5 kg (367 lb), SpO2 94 %. Body mass index is 51.55 kg/m .  Patient Active Problem List   Diagnosis     CAD (coronary artery disease)     Hyperlipidemia LDL goal <70     Smoker     Essential hypertension with goal blood pressure less than 140/90     Chronic atrial fibrillation (H)     Obstructive sleep apnea     Mild major depression (H)     Low back pain     Mixed hyperlipidemia     Essential hypertension, benign     Long-term (current) use of anticoagulants [Z79.01]     Anisocoria     Permanent atrial fibrillation (H)     Prediabetes     Chronic obstructive pulmonary disease, unspecified COPD type (H)     Morbid obesity (H)     Chronic recurrent major depressive disorder (H)     Bilateral leg edema     Insomnia, unspecified type       Wt Readings from Last 2 Encounters:   06/28/21 (!) 166.5 kg (367 lb)   06/16/21 (!) 167.8 kg (370 lb)     BP Readings from Last 3 Encounters:   06/28/21 120/74   06/16/21 123/79   04/12/21 136/85         Current Outpatient Medications   Medication     acetaminophen (TYLENOL) 500 MG tablet     albuterol (PROAIR RESPICLICK) 108 (90 Base) MCG/ACT inhaler     amLODIPine (NORVASC) 10 MG tablet     atenolol (TENORMIN) 100 MG tablet     atenolol (TENORMIN) 50 MG tablet     CENTRUM SILVER OR TABS     FLUoxetine (PROZAC) 20 MG capsule     FLUoxetine (PROZAC) 40 MG capsule     fluticasone (FLONASE) 50 MCG/ACT nasal spray     fluticasone-vilanterol (BREO ELLIPTA) 200-25 MCG/INH inhaler     losartan (COZAAR) 100 MG tablet     omega-3 fatty acids (FISH OIL) 1200 MG capsule     oxymetazoline (AFRIN 12 HOUR) 0.05 % nasal spray     potassium chloride ER (K-TAB) 20 MEQ CR tablet     rosuvastatin (CRESTOR) 40 MG tablet     traZODone " (DESYREL) 100 MG tablet     warfarin ANTICOAGULANT (COUMADIN ANTICOAGULANT) 5 MG tablet     torsemide (DEMADEX) 20 MG tablet     No current facility-administered medications for this visit.        Social History     Tobacco Use     Smoking status: Former Smoker     Packs/day: 1.00     Years: 53.00     Pack years: 53.00     Types: Cigarettes     Quit date: 2019     Years since quittin.7     Smokeless tobacco: Never Used     Tobacco comment: 1 ppd   Substance Use Topics     Alcohol use: No     Comment: quit drinking heavily      Drug use: No       Health Maintenance Due   Topic Date Due     PHQ-9  2021       No results found for: PAP      2021 2:20 PM

## 2021-06-28 NOTE — PATIENT INSTRUCTIONS

## 2021-06-29 PROBLEM — E11.9 TYPE 2 DIABETES MELLITUS WITHOUT COMPLICATION, WITHOUT LONG-TERM CURRENT USE OF INSULIN (H): Status: ACTIVE | Noted: 2021-06-29

## 2021-07-01 ENCOUNTER — TELEPHONE (OUTPATIENT)
Dept: CARDIOLOGY | Facility: CLINIC | Age: 71
End: 2021-07-01

## 2021-07-01 NOTE — LETTER
July 9, 2021       TO: Markie Shepard   [address]  [facility] [address]       Dear Miguelina Devyn,    The results of your recent Blood work have been reviewed by Dr. Lockett. Overall the results are favorable showing normal Kidney function and stable potassium . We do not recommend any changes to your current management plan at this time. Please reach out should you have any further questions or concerns. A copy of the results has been included for your personal records.     Component      Latest Ref Rng & Units 6/28/2021   Sodium      133 - 144 mmol/L 138   Potassium      3.4 - 5.3 mmol/L 4.0   Chloride      94 - 109 mmol/L 104   Carbon Dioxide      20 - 32 mmol/L 26   Anion Gap      3 - 14 mmol/L 7   Glucose      70 - 99 mg/dL 127 (H)   Urea Nitrogen      7 - 30 mg/dL 17   Creatinine      0.66 - 1.25 mg/dL 0.91   GFR Estimate      >60 mL/min/1.73:m2 85   GFR Estimate If Black      >60 mL/min/1.73:m2 >90   Calcium      8.5 - 10.1 mg/dL 9.4         Sincerely,    [facility]

## 2021-07-01 NOTE — TELEPHONE ENCOUNTER
Component      Latest Ref Rng & Units 6/28/2021   Sodium      133 - 144 mmol/L 138   Potassium      3.4 - 5.3 mmol/L 4.0   Chloride      94 - 109 mmol/L 104   Carbon Dioxide      20 - 32 mmol/L 26   Anion Gap      3 - 14 mmol/L 7   Glucose      70 - 99 mg/dL 127 (H)   Urea Nitrogen      7 - 30 mg/dL 17   Creatinine      0.66 - 1.25 mg/dL 0.91   GFR Estimate      >60 mL/min/1.73:m2 85   GFR Estimate If Black      >60 mL/min/1.73:m2 >90   Calcium      8.5 - 10.1 mg/dL 9.4       ----- Message from Andrea Lockett MD sent at 6/29/2021 11:42 AM CDT -----  Results reviewed, please let the patient know that overall findings are reassuring, normal potassium and kidney function since starting torsemide. Follow up as previously planned, thanks!    Call placed to pt to review results and recommendations.  No answer - BERNICE / RADHA 07/01/21 8:07 AM      _letter sent with results and recommendations.   YAZAN العلي RN, BSN. 07/09/21 11:39 AM

## 2021-07-16 ENCOUNTER — TELEPHONE (OUTPATIENT)
Dept: CARDIOLOGY | Facility: CLINIC | Age: 71
End: 2021-07-16

## 2021-07-16 ENCOUNTER — OFFICE VISIT (OUTPATIENT)
Dept: CARDIOLOGY | Facility: CLINIC | Age: 71
End: 2021-07-16
Attending: INTERNAL MEDICINE
Payer: COMMERCIAL

## 2021-07-16 VITALS
HEART RATE: 69 BPM | WEIGHT: 315 LBS | BODY MASS INDEX: 52.39 KG/M2 | OXYGEN SATURATION: 95 % | SYSTOLIC BLOOD PRESSURE: 122 MMHG | DIASTOLIC BLOOD PRESSURE: 79 MMHG

## 2021-07-16 DIAGNOSIS — I50.33 ACUTE ON CHRONIC HEART FAILURE WITH PRESERVED EJECTION FRACTION (H): Primary | ICD-10-CM

## 2021-07-16 DIAGNOSIS — I50.30 HEART FAILURE WITH PRESERVED EJECTION FRACTION, NYHA CLASS II (H): ICD-10-CM

## 2021-07-16 DIAGNOSIS — I10 ESSENTIAL HYPERTENSION WITH GOAL BLOOD PRESSURE LESS THAN 140/90: ICD-10-CM

## 2021-07-16 PROCEDURE — 99214 OFFICE O/P EST MOD 30 MIN: CPT | Performed by: PHYSICIAN ASSISTANT

## 2021-07-16 RX ORDER — POTASSIUM CHLORIDE 1500 MG/1
20 TABLET, EXTENDED RELEASE ORAL 2 TIMES DAILY
Qty: 180 TABLET | Refills: 3 | Status: SHIPPED | OUTPATIENT
Start: 2021-07-16 | End: 2022-08-02

## 2021-07-16 RX ORDER — TORSEMIDE 20 MG/1
20 TABLET ORAL 2 TIMES DAILY
Qty: 180 TABLET | Refills: 3 | Status: SHIPPED | OUTPATIENT
Start: 2021-07-16 | End: 2021-10-05

## 2021-07-16 NOTE — LETTER
7/16/2021    Mali Patel MD  [address]  [facility] [address]    RE: Markie Shepard       Dear Colleague,    I had the pleasure of seeing Markie MERLE Devyn in the [facility].    Primary Cardiologist: Dr. Lockett    Reason for Visit: one month follow up after change from furosemide to torsemide with repeat BMP    History of Present Illness:   Markie is a 71-year-old male with past medical history notable for:    # Acute on chronic HFpEF.  Patient denies symptoms concerning for angina, suspect decompensation likely subacute, though hypoxia likely multifactorial with contribution from primary lung disease as well, obesity hypoventilation syndrome, COPD.  RV was not well visualized on TTE 6/2021, likely has some underlying RV dysfunction as well.  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  Stable, denies symptoms concerning for angina.  # Permanent atrial fibrillation, on warfarin.   # Morbid obesity  # EMORY  # Significant vision impairment  # Mild aortic stenosis   # T2DM- hA1c 6.4% with diet control  # Hx of Tobacco dependence    Markie presents to clinic with his grandson who is  one of his primary caregivers.  He reports that he was feeling a little bit better after medication changes but about 2 weeks ago he started to have weight gain, progressive weakness, shortness of breath, bendopnea, and orthopnea.  He describes that his daughter has been in town from [place] this week and he has been going out to eat almost every day.  He does have significant vision impairment and therefore he is not able to cook at home and relies on premade foods such as deli meat sandwiches.  He does try to have fruits and solid almost every day.  He was also evaluated by his PCP and was initiated on additional inhalers.  He arranges his own cardiac medications tells me he uses an electric magnifier when organizing  them.  Towards the end of the visit he reports he may have mixed up his medications and is not sure if he was taking his torsemide at all.  He will confirm what he was taking when he returns to his home with his grandson.    Assessment and Plan:  Markie is a 71-year-old male with past medical history notable for:    # Acute on chronic HFpEF.  Patient denies symptoms concerning for angina, suspect decompensation likely subacute, though hypoxia likely multifactorial with contribution from primary lung disease as well, obesity hypoventilation syndrome, COPD.  RV was not well visualized on TTE 6/2021, likely has some underlying RV dysfunction as well.  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  Stable, denies symptoms concerning for angina.  # Permanent atrial fibrillation, on warfarin.   # Morbid obesity  # EMORY  # Significant vision impairment  # Mild aortic stenosis   # T2DM- hA1c 6.4% with diet control  # Hx of Tobacco dependence    Markie appears to be an acute on chronic HFpEF.  It is unclear if this is strictly due to dietary indiscretion or mixup in his medications.  He will confirm what medications he has been taking when he returns home today.  He will contact my nursing team for an update.  Initially we decided to have him increase torsemide to 20 mg twice daily with 1 additional potassium pill.  We will wait on this recommendation at this time until we confirm what he has been taking.  We talked extensively about importance of low-sodium diet.  This was new information for him.  It is somewhat challenging for him to cook foods from scratch as he has significant vision impairment and therefore he relies on premade foods a lot.  We talked about limiting going out to eat or if he does go out to eat that he chooses low sodium options.  He verbalized understanding of this.  We will follow-up with him in 1 month with repeat BMP in 2 weeks.    This note was  completed in part using Dragon voice recognition software. Although reviewed after completion, some word and grammatical errors may occur.    Orders this Visit:  No orders of the defined types were placed in this encounter.    Orders Placed This Encounter   Medications     torsemide (DEMADEX) 20 MG tablet     Sig: Take 1 tablet (20 mg) by mouth 2 times daily     Dispense:  180 tablet     Refill:  3     Take one tablet (20 mg) in the morning and one tablet (20 mg) in the afternoon- no later than 1 PM.     potassium chloride ER (K-TAB) 20 MEQ CR tablet     Sig: Take 1 tablet (20 mEq) by mouth 2 times daily     Dispense:  180 tablet     Refill:  3     Medications Discontinued During This Encounter   Medication Reason     torsemide (DEMADEX) 20 MG tablet      potassium chloride ER (K-TAB) 20 MEQ CR tablet Reorder         Encounter Diagnoses   Name Primary?     Heart failure with preserved ejection fraction, NYHA class II (H)      Essential hypertension with goal blood pressure less than 140/90      Acute on chronic heart failure with preserved ejection fraction (H) Yes       CURRENT MEDICATIONS:  Current Outpatient Medications   Medication Sig Dispense Refill     acetaminophen (TYLENOL) 500 MG tablet Take 500-1,000 mg by mouth every 6 hours as needed for mild pain       albuterol (PROAIR RESPICLICK) 108 (90 Base) MCG/ACT inhaler Inhale 2 puffs into the lungs every 4 hours as needed for shortness of breath / dyspnea 1 each 5     amLODIPine (NORVASC) 10 MG tablet Take 1 tablet (10 mg) by mouth daily 90 tablet 1     atenolol (TENORMIN) 100 MG tablet Take 1 tablet (100 mg) by mouth daily along with 50 mg tablet for total of 150 mg daily. 90 tablet 1     atenolol (TENORMIN) 50 MG tablet Take 1 tablet (50 mg) by mouth daily Along with 100 mg tablet for total of 150 mg daily. 90 tablet 1     CENTRUM SILVER OR TABS ONE DAILY 3 MONTHS 1 YEAR     FLUoxetine (PROZAC) 20 MG capsule Take 3 daily 270 capsule 1     FLUoxetine (PROZAC)  40 MG capsule Take 1 capsule (40 mg) by mouth daily (Patient taking differently: Take 40 mg by mouth daily Total of 60 mg daily) 90 capsule 0     fluticasone (FLONASE) 50 MCG/ACT nasal spray Spray 1 spray into both nostrils daily       fluticasone-vilanterol (BREO ELLIPTA) 200-25 MCG/INH inhaler Inhale 1 puff into the lungs daily 60 each 11     losartan (COZAAR) 100 MG tablet Take 1 tablet (100 mg) by mouth daily 90 tablet 1     omega-3 fatty acids (FISH OIL) 1200 MG capsule Take 1 capsule by mouth daily       oxymetazoline (AFRIN 12 HOUR) 0.05 % nasal spray Spray 2 sprays into both nostrils At Bedtime       potassium chloride ER (K-TAB) 20 MEQ CR tablet Take 1 tablet (20 mEq) by mouth 2 times daily 180 tablet 3     rosuvastatin (CRESTOR) 40 MG tablet Take 1 tablet (40 mg) by mouth At Bedtime 90 tablet 3     torsemide (DEMADEX) 20 MG tablet Take 1 tablet (20 mg) by mouth 2 times daily 180 tablet 3     traZODone (DESYREL) 100 MG tablet Take 1 tablet (100 mg) by mouth nightly as needed for sleep 90 tablet 1     umeclidinium (INCRUSE ELLIPTA) 62.5 MCG/INH inhaler Inhale 1 puff into the lungs daily 90 each 3     warfarin ANTICOAGULANT (COUMADIN ANTICOAGULANT) 5 MG tablet Take 7.5 mg on Mondays and Fridays, and 5 mg all other days, or as directed by Coumadin nurse. (Patient taking differently: Take 7.5 mg on Mondays and 5 mg all other days, or as directed by Coumadin nurse.) 100 tablet 3       ALLERGIES     Allergies   Allergen Reactions     Ambien Other (See Comments)     Parasomnia with sleep walking, injuries, delusions.  May have been in DTs at the same time.     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 On Cellcept, steroid taper     Coronary  atherosclerosis of unspecified type of vessel, native or graft      Diabetes mellitus type 2 in obese (H)      Essential hypertension, benign      Mixed hyperlipidemia 10/5/2007     Polycythemia      Sleep apnea      Tobacco use disorder 10/5/2007    Smoker - 1/2 ppd. Started at 16 years     Total retinal detachment of left eye 2009/2010       PAST SURGICAL HISTORY:  Past Surgical History:   Procedure Laterality Date     C OPEN RX ANKLE DISLOCATN+FIXATN  12/2008    Right Ankle     CARDIAC CATHERIZATION  7/03/2012    totally occluded RCA w/ mild left coronary disease     HEART CATH CORONARY ANGIOGRAM AND RIGHT HEART CATH  7/2012    RCA occlusion, IMI, no stent     PHACOEMULSIFICATION CLEAR CORNEA WITH STANDARD INTRAOCULAR LENS IMPLANT Right 6/4/2015    Procedure: PHACOEMULSIFICATION CLEAR CORNEA WITH STANDARD INTRAOCULAR LENS IMPLANT;  Surgeon: Tal Trinidad MD;  Location:  [facility]     RETINAL REATTACHMENT  11/09, 7/10    Left     TONSILLECTOMY       ZZC NONSPECIFIC PROCEDURE  1987    Right Epididymis Removed       FAMILY HISTORY:  Family History   Problem Relation Age of Onset     Arthritis Mother      Circulatory Mother         varicose veins     Eye Disorder Mother         detached retina     Thyroid Disease Mother      C.A.D. Father         Quadruple bypass     Gastrointestinal Disease Father         diverticulitis     Alzheimer Disease Father         demntia     Eye Disorder Father         cataract     Depression Sister         Post Partum      Alcoholism Sister      Neurologic Disorder Sister         Brain Aneurysm     Thyroid Disease Brother      Eye Disorder Brother         detached retina       SOCIAL HISTORY:  Social History     Socioeconomic History     Marital status:      Spouse name: None     Number of children: 2     Years of education: 19     Highest education level: None   Occupational History     Occupation: actor     Employer: UNEMPLOYED     Employer: [employer]     Occupation:     Tobacco Use     Smoking status: Former Smoker     Packs/day: 1.00     Years: 53.00     Pack years: 53.00     Types: Cigarettes     Quit date: 2019     Years since quittin.7     Smokeless tobacco: Never Used     Tobacco comment: 1 ppd   Substance and Sexual Activity     Alcohol use: No     Comment: quit drinking heavily      Drug use: No     Sexual activity: Never   Other Topics Concern     Parent/sibling w/ CABG, MI or angioplasty before 65F 55M? Not Asked      Service Not Asked     Blood Transfusions Not Asked     Caffeine Concern Yes     Occupational Exposure Not Asked     Hobby Hazards Not Asked     Sleep Concern Not Asked     Stress Concern Not Asked     Weight Concern Not Asked     Special Diet No     Back Care Not Asked     Exercise Not Asked     Comment: 1 miles on stepper      Bike Helmet Not Asked     Seat Belt Not Asked     Self-Exams Not Asked   Social History Narrative    Balanced Diet - Yes    Osteoporosis Preventative measures-  Dairy servings per day: 1-3    Regular Exercise -  Yes Describe walk every day    Dental Exam up - YES - Date: 07    Eye Exam - YES - Date: 2 yrs    Self Testicular Exam -  One testicle removed yrs ago due to fallDo you have any concerns about STD's -  No    Abuse: Current or Past (Physical, Sexual or Emotional)- No    Do you feel safe in your environment - Yes    Guns stored in the home - No    Sunscreen used - Yes    Seatbelts used - Yes    Lipids - ?    Glucose -  ?    Colon Cancer Screening - No    Hemoccults - hemorrhoids    PSA - NO    Digital Rectal Exam - YES - Date:     Immunizations reviewed and up to date - No    Tory RN     Social Determinants of Health     Financial Resource Strain:      Difficulty of Paying Living Expenses:    Food Insecurity:      Worried About Running Out of Food in the Last Year:      Ran Out of Food in the Last Year:    Transportation Needs:      Lack of Transportation (Medical):      Lack of  Transportation (Non-Medical):    Physical Activity:      Days of Exercise per Week:      Minutes of Exercise per Session:    Stress:      Feeling of Stress :    Social Connections:      Frequency of Communication with Friends and Family:      Frequency of Social Gatherings with Friends and Family:      Attends Jewish Services:      Active Member of Clubs or Organizations:      Attends Club or Organization Meetings:      Marital Status:    Intimate Partner Violence:      Fear of Current or Ex-Partner:      Emotionally Abused:      Physically Abused:      Sexually Abused:        Review of Systems:  Skin:  Negative     Eyes:  Negative glasses  ENT:  Negative    Respiratory:  Positive for dyspnea on exertion;dyspnea at rest;sleep apnea;CPAP;mucoid expectorant  Cardiovascular:    Positive for;edema;heaviness;lower extremity symptoms;fatigue  Gastroenterology: Negative    Genitourinary:  Negative urinary frequency  Musculoskeletal:  Negative joint pain  Neurologic:  Negative    Psychiatric:  Negative sleep disturbances  Heme/Lymph/Imm:  Negative allergies  Endocrine:  Positive for      Physical Exam:  Vitals: /79   Pulse 69   Wt (!) 169.2 kg (373 lb)   SpO2 95%   BMI 52.39 kg/m       GEN:  NAD. In wheelchair.  NECK: Elevated JVD  C/V:  Regular rate and rhythm, no murmur, rub or gallop.  RESP: Clear to auscultation bilaterally.  GI: Abdomen soft, nontender, nondistended.   EXTREM: 2+ pitting RLE edema and 3+ LLE edema.   NEURO: Alert and oriented, cooperative.   PSYCH: Normal affect.  SKIN: stasis changes to his lower extremities.      Recent Lab Results:  LIPID RESULTS:  Lab Results   Component Value Date    CHOL 146 04/12/2021    HDL 54 04/12/2021    LDL 73 04/12/2021    TRIG 98 04/12/2021    CHOLHDLRATIO 2.8 02/17/2020       LIVER ENZYME RESULTS:  Lab Results   Component Value Date    AST 23 04/12/2021    ALT 29 04/12/2021       CBC RESULTS:  Lab Results   Component Value Date    WBC 8.3 04/12/2021    RBC  5.41 04/12/2021    HGB 17.8 (H) 04/12/2021    HCT 53.2 (H) 04/12/2021    MCV 98 04/12/2021    MCH 32.9 04/12/2021    MCHC 33.5 04/12/2021    RDW 12.9 04/12/2021     04/12/2021       BMP RESULTS:  Lab Results   Component Value Date     06/28/2021    POTASSIUM 4.0 06/28/2021    CHLORIDE 104 06/28/2021    CO2 26 06/28/2021    ANIONGAP 7 06/28/2021     (H) 06/28/2021    BUN 17 06/28/2021    CR 0.91 06/28/2021    GFRESTIMATED 85 06/28/2021    GFRESTBLACK >90 06/28/2021    VICTOR M 9.4 06/28/2021        A1C RESULTS:  Lab Results   Component Value Date    A1C 6.4 (H) 04/12/2021       INR RESULTS:  Lab Results   Component Value Date    INR 2.90 (H) 06/14/2021    INR 2.70 (H) 05/03/2021           Tanner Oliveira PA-C  July 16, 2021         Thank you for allowing me to participate in the care of your patient.      Sincerely,     Tanner Oliveira PA-C     [facility]  cc:   Andrea Lockett MD  [address], [address]  [place] [id number]

## 2021-07-16 NOTE — PROGRESS NOTES
Primary Cardiologist: Dr. Lockett    Reason for Visit: one month follow up after change from furosemide to torsemide with repeat BMP    History of Present Illness:   Markie is a 71-year-old male with past medical history notable for:    # Acute on chronic HFpEF.  Patient denies symptoms concerning for angina, suspect decompensation likely subacute, though hypoxia likely multifactorial with contribution from primary lung disease as well, obesity hypoventilation syndrome, COPD.  RV was not well visualized on TTE 6/2021, likely has some underlying RV dysfunction as well.  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  Stable, denies symptoms concerning for angina.  # Permanent atrial fibrillation, on warfarin.   # Morbid obesity  # EMORY  # Significant vision impairment  # Mild aortic stenosis   # T2DM- hA1c 6.4% with diet control  # Hx of Tobacco dependence    Markie presents to clinic with his grandson who is  one of his primary caregivers.  He reports that he was feeling a little bit better after medication changes but about 2 weeks ago he started to have weight gain, progressive weakness, shortness of breath, bendopnea, and orthopnea.  He describes that his daughter has been in town from [place] this week and he has been going out to eat almost every day.  He does have significant vision impairment and therefore he is not able to cook at home and relies on premade foods such as deli meat sandwiches.  He does try to have fruits and solid almost every day.  He was also evaluated by his PCP and was initiated on additional inhalers.  He arranges his own cardiac medications tells me he uses an electric magnifier when organizing them.  Towards the end of the visit he reports he may have mixed up his medications and is not sure if he was taking his torsemide at all.  He will confirm what he was taking when he returns to his home with his grandson.    Assessment and Plan:  Markie is  a 71-year-old male with past medical history notable for:    # Acute on chronic HFpEF.  Patient denies symptoms concerning for angina, suspect decompensation likely subacute, though hypoxia likely multifactorial with contribution from primary lung disease as well, obesity hypoventilation syndrome, COPD.  RV was not well visualized on TTE 6/2021, likely has some underlying RV dysfunction as well.  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  Stable, denies symptoms concerning for angina.  # Permanent atrial fibrillation, on warfarin.   # Morbid obesity  # EMORY  # Significant vision impairment  # Mild aortic stenosis   # T2DM- hA1c 6.4% with diet control  # Hx of Tobacco dependence    Markie appears to be an acute on chronic HFpEF.  It is unclear if this is strictly due to dietary indiscretion or mixup in his medications.  He will confirm what medications he has been taking when he returns home today.  He will contact my nursing team for an update.  Initially we decided to have him increase torsemide to 20 mg twice daily with 1 additional potassium pill.  We will wait on this recommendation at this time until we confirm what he has been taking.  We talked extensively about importance of low-sodium diet.  This was new information for him.  It is somewhat challenging for him to cook foods from scratch as he has significant vision impairment and therefore he relies on premade foods a lot.  We talked about limiting going out to eat or if he does go out to eat that he chooses low sodium options.  He verbalized understanding of this.  We will follow-up with him in 1 month with repeat BMP in 2 weeks.    This note was completed in part using Dragon voice recognition software. Although reviewed after completion, some word and grammatical errors may occur.    Orders this Visit:  No orders of the defined types were placed in this encounter.    Orders Placed This Encounter    Medications     torsemide (DEMADEX) 20 MG tablet     Sig: Take 1 tablet (20 mg) by mouth 2 times daily     Dispense:  180 tablet     Refill:  3     Take one tablet (20 mg) in the morning and one tablet (20 mg) in the afternoon- no later than 1 PM.     potassium chloride ER (K-TAB) 20 MEQ CR tablet     Sig: Take 1 tablet (20 mEq) by mouth 2 times daily     Dispense:  180 tablet     Refill:  3     Medications Discontinued During This Encounter   Medication Reason     torsemide (DEMADEX) 20 MG tablet      potassium chloride ER (K-TAB) 20 MEQ CR tablet Reorder         Encounter Diagnoses   Name Primary?     Heart failure with preserved ejection fraction, NYHA class II (H)      Essential hypertension with goal blood pressure less than 140/90      Acute on chronic heart failure with preserved ejection fraction (H) Yes       CURRENT MEDICATIONS:  Current Outpatient Medications   Medication Sig Dispense Refill     acetaminophen (TYLENOL) 500 MG tablet Take 500-1,000 mg by mouth every 6 hours as needed for mild pain       albuterol (PROAIR RESPICLICK) 108 (90 Base) MCG/ACT inhaler Inhale 2 puffs into the lungs every 4 hours as needed for shortness of breath / dyspnea 1 each 5     amLODIPine (NORVASC) 10 MG tablet Take 1 tablet (10 mg) by mouth daily 90 tablet 1     atenolol (TENORMIN) 100 MG tablet Take 1 tablet (100 mg) by mouth daily along with 50 mg tablet for total of 150 mg daily. 90 tablet 1     atenolol (TENORMIN) 50 MG tablet Take 1 tablet (50 mg) by mouth daily Along with 100 mg tablet for total of 150 mg daily. 90 tablet 1     CENTRUM SILVER OR TABS ONE DAILY 3 MONTHS 1 YEAR     FLUoxetine (PROZAC) 20 MG capsule Take 3 daily 270 capsule 1     FLUoxetine (PROZAC) 40 MG capsule Take 1 capsule (40 mg) by mouth daily (Patient taking differently: Take 40 mg by mouth daily Total of 60 mg daily) 90 capsule 0     fluticasone (FLONASE) 50 MCG/ACT nasal spray Spray 1 spray into both nostrils daily        fluticasone-vilanterol (BREO ELLIPTA) 200-25 MCG/INH inhaler Inhale 1 puff into the lungs daily 60 each 11     losartan (COZAAR) 100 MG tablet Take 1 tablet (100 mg) by mouth daily 90 tablet 1     omega-3 fatty acids (FISH OIL) 1200 MG capsule Take 1 capsule by mouth daily       oxymetazoline (AFRIN 12 HOUR) 0.05 % nasal spray Spray 2 sprays into both nostrils At Bedtime       potassium chloride ER (K-TAB) 20 MEQ CR tablet Take 1 tablet (20 mEq) by mouth 2 times daily 180 tablet 3     rosuvastatin (CRESTOR) 40 MG tablet Take 1 tablet (40 mg) by mouth At Bedtime 90 tablet 3     torsemide (DEMADEX) 20 MG tablet Take 1 tablet (20 mg) by mouth 2 times daily 180 tablet 3     traZODone (DESYREL) 100 MG tablet Take 1 tablet (100 mg) by mouth nightly as needed for sleep 90 tablet 1     umeclidinium (INCRUSE ELLIPTA) 62.5 MCG/INH inhaler Inhale 1 puff into the lungs daily 90 each 3     warfarin ANTICOAGULANT (COUMADIN ANTICOAGULANT) 5 MG tablet Take 7.5 mg on Mondays and Fridays, and 5 mg all other days, or as directed by Coumadin nurse. (Patient taking differently: Take 7.5 mg on Mondays and 5 mg all other days, or as directed by Coumadin nurse.) 100 tablet 3       ALLERGIES     Allergies   Allergen Reactions     Ambien Other (See Comments)     Parasomnia with sleep walking, injuries, delusions.  May have been in DTs at the same time.     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 On Cellcept, steroid taper     Coronary atherosclerosis of unspecified type of vessel, native or graft      Diabetes mellitus type 2 in obese (H)      Essential hypertension, benign      Mixed hyperlipidemia 10/5/2007     Polycythemia      Sleep apnea      Tobacco use disorder  10/5/2007    Smoker - 1/2 ppd. Started at 16 years     Total retinal detachment of left eye        PAST SURGICAL HISTORY:  Past Surgical History:   Procedure Laterality Date     C OPEN RX ANKLE DISLOCATN+FIXATN  2008    Right Ankle     CARDIAC CATHERIZATION  2012    totally occluded RCA w/ mild left coronary disease     HEART CATH CORONARY ANGIOGRAM AND RIGHT HEART CATH  2012    RCA occlusion, IMI, no stent     PHACOEMULSIFICATION CLEAR CORNEA WITH STANDARD INTRAOCULAR LENS IMPLANT Right 2015    Procedure: PHACOEMULSIFICATION CLEAR CORNEA WITH STANDARD INTRAOCULAR LENS IMPLANT;  Surgeon: Tal Trinidad MD;  Location:  [facility]     RETINAL REATTACHMENT  , 7/10    Left     TONSILLECTOMY       ZZC NONSPECIFIC PROCEDURE      Right Epididymis Removed       FAMILY HISTORY:  Family History   Problem Relation Age of Onset     Arthritis Mother      Circulatory Mother         varicose veins     Eye Disorder Mother         detached retina     Thyroid Disease Mother      C.A.D. Father         Quadruple bypass     Gastrointestinal Disease Father         diverticulitis     Alzheimer Disease Father         demntia     Eye Disorder Father         cataract     Depression Sister         Post Partum      Alcoholism Sister      Neurologic Disorder Sister         Brain Aneurysm     Thyroid Disease Brother      Eye Disorder Brother         detached retina       SOCIAL HISTORY:  Social History     Socioeconomic History     Marital status:      Spouse name: None     Number of children: 2     Years of education: 19     Highest education level: None   Occupational History     Occupation: actor     Employer: UNEMPLOYED     Employer: [employer]     Occupation:    Tobacco Use     Smoking status: Former Smoker     Packs/day: 1.00     Years: 53.00     Pack years: 53.00     Types: Cigarettes     Quit date: 2019     Years since quittin.7     Smokeless tobacco: Never Used      Tobacco comment: 1 ppd   Substance and Sexual Activity     Alcohol use: No     Comment: quit drinking heavily 2013     Drug use: No     Sexual activity: Never   Other Topics Concern     Parent/sibling w/ CABG, MI or angioplasty before 65F 55M? Not Asked      Service Not Asked     Blood Transfusions Not Asked     Caffeine Concern Yes     Occupational Exposure Not Asked     Hobby Hazards Not Asked     Sleep Concern Not Asked     Stress Concern Not Asked     Weight Concern Not Asked     Special Diet No     Back Care Not Asked     Exercise Not Asked     Comment: 1 miles on stepper      Bike Helmet Not Asked     Seat Belt Not Asked     Self-Exams Not Asked   Social History Narrative    Balanced Diet - Yes    Osteoporosis Preventative measures-  Dairy servings per day: 1-3    Regular Exercise -  Yes Describe walk every day    Dental Exam up - YES - Date: 1/2/07    Eye Exam - YES - Date: 2 yrs    Self Testicular Exam -  One testicle removed yrs ago due to fallDo you have any concerns about STD's -  No    Abuse: Current or Past (Physical, Sexual or Emotional)- No    Do you feel safe in your environment - Yes    Guns stored in the home - No    Sunscreen used - Yes    Seatbelts used - Yes    Lipids - ?    Glucose -  ?    Colon Cancer Screening - No    Hemoccults - hemorrhoids    PSA - NO    Digital Rectal Exam - YES - Date: 1993    Immunizations reviewed and up to date - No    Tory RN     Social Determinants of Health     Financial Resource Strain:      Difficulty of Paying Living Expenses:    Food Insecurity:      Worried About Running Out of Food in the Last Year:      Ran Out of Food in the Last Year:    Transportation Needs:      Lack of Transportation (Medical):      Lack of Transportation (Non-Medical):    Physical Activity:      Days of Exercise per Week:      Minutes of Exercise per Session:    Stress:      Feeling of Stress :    Social Connections:      Frequency of Communication with Friends and  Family:      Frequency of Social Gatherings with Friends and Family:      Attends Yazidi Services:      Active Member of Clubs or Organizations:      Attends Club or Organization Meetings:      Marital Status:    Intimate Partner Violence:      Fear of Current or Ex-Partner:      Emotionally Abused:      Physically Abused:      Sexually Abused:        Review of Systems:  Skin:  Negative     Eyes:  Negative glasses  ENT:  Negative    Respiratory:  Positive for dyspnea on exertion;dyspnea at rest;sleep apnea;CPAP;mucoid expectorant  Cardiovascular:    Positive for;edema;heaviness;lower extremity symptoms;fatigue  Gastroenterology: Negative    Genitourinary:  Negative urinary frequency  Musculoskeletal:  Negative joint pain  Neurologic:  Negative    Psychiatric:  Negative sleep disturbances  Heme/Lymph/Imm:  Negative allergies  Endocrine:  Positive for      Physical Exam:  Vitals: /79   Pulse 69   Wt (!) 169.2 kg (373 lb)   SpO2 95%   BMI 52.39 kg/m       GEN:  NAD. In wheelchair.  NECK: Elevated JVD  C/V:  Regular rate and rhythm, no murmur, rub or gallop.  RESP: Clear to auscultation bilaterally.  GI: Abdomen soft, nontender, nondistended.   EXTREM: 2+ pitting RLE edema and 3+ LLE edema.   NEURO: Alert and oriented, cooperative.   PSYCH: Normal affect.  SKIN: stasis changes to his lower extremities.      Recent Lab Results:  LIPID RESULTS:  Lab Results   Component Value Date    CHOL 146 04/12/2021    HDL 54 04/12/2021    LDL 73 04/12/2021    TRIG 98 04/12/2021    CHOLHDLRATIO 2.8 02/17/2020       LIVER ENZYME RESULTS:  Lab Results   Component Value Date    AST 23 04/12/2021    ALT 29 04/12/2021       CBC RESULTS:  Lab Results   Component Value Date    WBC 8.3 04/12/2021    RBC 5.41 04/12/2021    HGB 17.8 (H) 04/12/2021    HCT 53.2 (H) 04/12/2021    MCV 98 04/12/2021    MCH 32.9 04/12/2021    MCHC 33.5 04/12/2021    RDW 12.9 04/12/2021     04/12/2021       BMP RESULTS:  Lab Results   Component  Value Date     06/28/2021    POTASSIUM 4.0 06/28/2021    CHLORIDE 104 06/28/2021    CO2 26 06/28/2021    ANIONGAP 7 06/28/2021     (H) 06/28/2021    BUN 17 06/28/2021    CR 0.91 06/28/2021    GFRESTIMATED 85 06/28/2021    GFRESTBLACK >90 06/28/2021    VICTOR M 9.4 06/28/2021        A1C RESULTS:  Lab Results   Component Value Date    A1C 6.4 (H) 04/12/2021       INR RESULTS:  Lab Results   Component Value Date    INR 2.90 (H) 06/14/2021    INR 2.70 (H) 05/03/2021           Tanner Oliveira PA-C  July 16, 2021

## 2021-07-16 NOTE — TELEPHONE ENCOUNTER
Message from patient, he states he got home from today's visit and realized he had not taken his diuretic for several days. He took one right away and then called back for next steps.    1320 spoke with patient:  Patient found the torsemide bottle on the floor (with help from his grandson). He thinks he missed 3-4 days.    Plan after today's visit was to increase torsemide to 20mg twice daily and increase K+ to 20mEq twice daily.    Will message CLAUDIA Desino [employer] to confirm if the same plan should continue. Will check if any additional meds needed today.      9603 reply from CLAUDIA Desino [employer]:  Thanks for the update, Faye. Let's continue with plan to have him take torsemide 20 mg BID with additional potassium tablet. I would like him to get BMP in 2 weeks and follow up with me in 1 month. I had placed all of these orders already.     Thanks,     Tanner     Spoke with patient and he will start the new doses tomorrow. Reviewed CLAUDIA visit scheduled for 8/20/2021 with bpm and Mg labs.

## 2021-07-20 ENCOUNTER — TELEPHONE (OUTPATIENT)
Dept: CARDIOLOGY | Facility: CLINIC | Age: 71
End: 2021-07-20

## 2021-07-20 NOTE — TELEPHONE ENCOUNTER
Patient called that he noticed 2 blisters on his Rt. Foot.  One Blister behind his ankle which was fluid filled.   Patient compressed the fluid out, and put antibiotic ointment and a Band-Aid on it,.  Second blister in on the side of  the right big toe.  States Feet are less swollen and trys to elevate feet as much as possible during the day.     Patient states he is feeling better on the increased diuretic and potassium.  Tries to watch his diet, decreasing sodium and drinks a fair amount of water daily. About 80 oz.  Patient will decrease the water by a glass or so .    Patient states his breathing is better, back to baseline for shortness of breath, Now on 3 inhalers as recommended by PCP.     Reminded of BMP recommended for 2 weeks post [facility] visit 7-16-21 (due end of next week) Patient will try to get it at PCP office .   [facility] visit end of August.

## 2021-07-20 NOTE — TELEPHONE ENCOUNTER
Message left for Patient asking if he has a rash.   Patient ti call back stating he got this message and if he has a rash or not.  If no rash will continue to monitor.

## 2021-07-20 NOTE — TELEPHONE ENCOUNTER
Patient returned call. Patient states does not have a rash and will continue to monitor blisters.    Patient to call back with any further  questions or concerns.

## 2021-07-22 ENCOUNTER — TELEPHONE (OUTPATIENT)
Dept: CARDIOLOGY | Facility: CLINIC | Age: 71
End: 2021-07-22

## 2021-07-22 ENCOUNTER — OFFICE VISIT (OUTPATIENT)
Dept: URGENT CARE | Facility: URGENT CARE | Age: 71
End: 2021-07-22
Payer: COMMERCIAL

## 2021-07-22 VITALS
DIASTOLIC BLOOD PRESSURE: 78 MMHG | SYSTOLIC BLOOD PRESSURE: 122 MMHG | HEART RATE: 83 BPM | OXYGEN SATURATION: 95 % | TEMPERATURE: 96.8 F

## 2021-07-22 DIAGNOSIS — L98.9 SKIN LESION: Primary | ICD-10-CM

## 2021-07-22 PROCEDURE — 99213 OFFICE O/P EST LOW 20 MIN: CPT | Performed by: FAMILY MEDICINE

## 2021-07-22 NOTE — TELEPHONE ENCOUNTER
Call from patient, he is struggling with blisters on his foot since increasing the torsemide on 7/16/2021 to 20mg BID. He was using lasix in the past and started on a lower dose of torsemide with Dr. Lockett in June.  Patient states he has note changed shoes or injured his feet since the medication change    Patient states he has two large blisters that were oozing clear fluid earlier this week, but now are bleeding. He was able to use pressure and band-aids to get this under control but does not want to continue the torsemide and potassium at this point.    Patient states he legs are much better on this dose, feet are still a bit puffy.  Patient scheduled for bmp and magnesium on 7/30/2021  Patient scheduled for lab (not listed) and OV with CLAUDIA Tanner Oliveira on 8/20/2021    Patient has a history of CAD, ICM, afib, ascending aorta dilation.      Will message CLAUDIA Tanner Oliveira to review

## 2021-07-22 NOTE — PATIENT INSTRUCTIONS
Make appointment with [facility] (Office: [phone])  to have this foot lesion  -- check with your insurance to see if they are covered otherwise search thru your insurance plan for a specialist      If the area bleeds again compress the area and elevate the foot , seek medical attention if you cannot control the bleeding

## 2021-07-22 NOTE — TELEPHONE ENCOUNTER
Reply from CLAUDIA [employer] Oliveira:  I am not sure if his blisters represent a reaction to torsemide. He was on torsemide and furosemide before. We can reduce torsemide to 20 mg daily. Can he come in for evaluation of his legs tomorrow? Komal GONZALEZ has openings in the morning.     Tanner      1766 called patient, he is not able to make appointments easily due to transportation issues. He prefers to call his local clinic and a friend to see if he can be seen there tomorrow. He will call back with an update.      1600 message from patient, his friend took him to a local Urgent Care and his foot was diagnosed as a granuloma. He is going to be seeing a dermatologist for this. He was reassured that the torsemide was not the cause and he will be back on his meds starting tomorrow.

## 2021-07-22 NOTE — PROGRESS NOTES
Assessment & Plan       Skin lesion - suspected pyogenic granuloma    Suspect pyogenic granuloma as he reports possible recent injury then rapid growth of this structure. His history although is questionable given his poor eye sight. This could very well be an injury to the area of skin and this is actually a wart. No active bleeding at this time . Recommended f/u appt with dermatology  Thierry Liz MD   [facility]    Alexis Aden is a 71 year old male who presents to clinic today for the following health issues:  Chief Complaint   Patient presents with     Urgent Care     Foot Injury     blister back of right heel     Wart     right big toe, ozzing and blood     HPI    Patient recently working with his doctor to address edema with torsemide once daily.   Patient was seen in clinic last week and had his torsemide dose doubled -- he has seen imporvement    He notes in the last 1-2 days there was oozing of mixed fluid/blood -- today there was more blood.     no fevers.     He did recall having a small cut on his foot when he had a pedicure done.     He believes the mass on his foot grew rapidly over a few days    There was bleeding that soaked many paper towels and a small towel      Patient has limited vision       Patient Active Problem List    Diagnosis Date Noted     Type 2 diabetes mellitus without complication, without long-term current use of insulin (H) 06/29/2021     Priority: Medium     Prediabetes 04/12/2021     Priority: Medium     A1c 6.1% 2020       Chronic obstructive pulmonary disease, unspecified COPD type (H) 04/12/2021     Priority: Medium     Morbid obesity (H) 04/12/2021     Priority: Medium     Chronic recurrent major depressive disorder (H) 04/12/2021     Priority: Medium     Bilateral leg edema 04/12/2021     Priority: Medium     Insomnia, unspecified type 04/12/2021     Priority: Medium     Permanent atrial fibrillation (H) 11/29/2020     Priority: Medium      Anisocoria 02/18/2020     Priority: Medium     Left pupil smaller than right , legally blind, left eye       Long-term (current) use of anticoagulants [Z79.01] 03/21/2016     Priority: Medium     Essential hypertension, benign 04/21/2015     Priority: Medium     Low back pain 09/24/2013     Priority: Medium     Mild major depression (H) 06/18/2013     Priority: Medium     Obstructive sleep apnea 11/18/2012     Priority: Medium     Wears CPAP, has associated polycythemia  Problem list name updated by automated process. Provider to review       Smoker 09/26/2012     Priority: Medium     1/2 ppd       Essential hypertension with goal blood pressure less than 140/90 09/26/2012     Priority: Medium     Chronic atrial fibrillation (H) 07/26/2012     Priority: Medium     Started on coumadin, goes to [facility] for INR checks       CAD (coronary artery disease) 07/03/2012     Priority: Medium     Inferior MI, RCA totally occluded on angiogram 7/3/2012  EF 40-45% on angiogram  Medically managed       Hyperlipidemia LDL goal <70 07/03/2012     Priority: Medium     Mixed hyperlipidemia 10/05/2007     Priority: Medium       Current Outpatient Medications   Medication     acetaminophen (TYLENOL) 500 MG tablet     albuterol (PROAIR RESPICLICK) 108 (90 Base) MCG/ACT inhaler     amLODIPine (NORVASC) 10 MG tablet     atenolol (TENORMIN) 100 MG tablet     atenolol (TENORMIN) 50 MG tablet     CENTRUM SILVER OR TABS     FLUoxetine (PROZAC) 20 MG capsule     fluticasone (FLONASE) 50 MCG/ACT nasal spray     fluticasone-vilanterol (BREO ELLIPTA) 200-25 MCG/INH inhaler     losartan (COZAAR) 100 MG tablet     omega-3 fatty acids (FISH OIL) 1200 MG capsule     oxymetazoline (AFRIN 12 HOUR) 0.05 % nasal spray     potassium chloride ER (K-TAB) 20 MEQ CR tablet     rosuvastatin (CRESTOR) 40 MG tablet     torsemide (DEMADEX) 20 MG tablet     traZODone (DESYREL) 100 MG tablet     umeclidinium (INCRUSE ELLIPTA) 62.5 MCG/INH inhaler      warfarin ANTICOAGULANT (COUMADIN ANTICOAGULANT) 5 MG tablet     FLUoxetine (PROZAC) 40 MG capsule     No current facility-administered medications for this visit.           Objective    /78 (BP Location: Right arm, Patient Position: Sitting, Cuff Size: Adult Large)   Pulse 83   Temp 96.8  F (36  C) (Temporal)   SpO2 95%   Physical Exam     R medial foot at level of distal metatarsal: elevated < 1 cm mass that is soft to the touch which has no active bleeding            No results found for any visits on 07/22/21.          The use of Dragon/Deep Glint dictation services may have been used to construct the content in this note; any grammatical or spelling errors are non-intentional. Please contact the author of this note directly if you are in need of any clarification.

## 2021-07-26 ENCOUNTER — LAB (OUTPATIENT)
Dept: LAB | Facility: CLINIC | Age: 71
End: 2021-07-26
Payer: COMMERCIAL

## 2021-07-26 ENCOUNTER — ANTICOAGULATION THERAPY VISIT (OUTPATIENT)
Dept: ANTICOAGULATION | Facility: CLINIC | Age: 71
End: 2021-07-26

## 2021-07-26 DIAGNOSIS — I48.21 PERMANENT ATRIAL FIBRILLATION (H): ICD-10-CM

## 2021-07-26 DIAGNOSIS — I48.20 CHRONIC ATRIAL FIBRILLATION (H): ICD-10-CM

## 2021-07-26 DIAGNOSIS — Z79.01 LONG TERM CURRENT USE OF ANTICOAGULANT THERAPY: ICD-10-CM

## 2021-07-26 DIAGNOSIS — Z79.01 LONG TERM CURRENT USE OF ANTICOAGULANT THERAPY: Primary | ICD-10-CM

## 2021-07-26 LAB — INR BLD: 3.7 (ref 0.9–1.1)

## 2021-07-26 PROCEDURE — 36416 COLLJ CAPILLARY BLOOD SPEC: CPT

## 2021-07-26 PROCEDURE — 85610 PROTHROMBIN TIME: CPT

## 2021-07-26 NOTE — PROGRESS NOTES
ANTICOAGULATION MANAGEMENT     Markie BLOOM Devyn 71 year old male is on warfarin with supratherapeutic INR result. (Goal INR 2.0-3.0)    Recent labs: (last 7 days)     07/26/21  1103   INR 3.7*       ASSESSMENT     Source(s): Patient/Caregiver Call       Warfarin doses taken: Warfarin taken as instructed    Diet: Cutting down on salt intake.     New illness, injury, or hospitalization: Yes, increase edema (diuretic therapy changed in early July). Swelling has improved in legs, but still quite swollen in feet. New growth on right foot, dx with pyogenic granuloma on 7/22. Scheduled dermatology consult 9/14 for removal.    Medication/supplement changes: Torsemide started on 7/7/21 (replaced Lasix and dose increased on 7/16) which has potential for interaction; increasing INR    Signs or symptoms of bleeding or clotting: Yes: intermittent bleeding at pyogenic granuloma.    Previous INR: Therapeutic last visit; previously outside of goal range    Additional findings: Verified with [facility] rep, that patient can get INR done on 7/30.     PLAN     Recommended plan for temporary change(s) and ongoing change(s) affecting INR     Dosing Instructions: Hold dose then Decrease your warfarin dose (6.7% change) with next INR in 1 week       Summary  As of 7/26/2021    Full warfarin instructions:  7/26: Hold; Otherwise 5 mg every day   Next INR check:  7/30/2021             Telephone call with Markie who verbalizes understanding and agrees to plan    Lab visit scheduled    Education provided: Please call back if any changes to your diet, medications or how you've been taking warfarin, Monitoring for bleeding signs and symptoms, Monitoring for clotting signs and symptoms and Importance of notifying clinic for changes in medications; a sooner lab recheck maybe needed.    Plan made per [facility] anticoagulation protocol    Chelsie Encinas, JAQUI  Anticoagulation  Clinic  7/26/2021    _______________________________________________________________________     Anticoagulation Episode Summary     Current INR goal:  2.0-3.0   TTR:  55.7 % (1 y)   Target end date:  Indefinite   Send INR reminders to:  [facility]    Indications    Long-term (current) use of anticoagulants [Z79.01] [Z79.01]  Chronic atrial fibrillation (H) [I48.20]  Permanent atrial fibrillation (H) [I48.21]           Comments:  PREFERS PETER! Quit Smoking 9/29.         Anticoagulation Care Providers     Provider Role Specialty Phone number    Mali Patel MD Referring Internal Medicine [phone]

## 2021-07-30 ENCOUNTER — ANTICOAGULATION THERAPY VISIT (OUTPATIENT)
Dept: ANTICOAGULATION | Facility: CLINIC | Age: 71
End: 2021-07-30

## 2021-07-30 ENCOUNTER — LAB (OUTPATIENT)
Dept: LAB | Facility: CLINIC | Age: 71
End: 2021-07-30
Payer: COMMERCIAL

## 2021-07-30 DIAGNOSIS — I10 ESSENTIAL HYPERTENSION WITH GOAL BLOOD PRESSURE LESS THAN 140/90: ICD-10-CM

## 2021-07-30 DIAGNOSIS — I48.20 CHRONIC ATRIAL FIBRILLATION (H): ICD-10-CM

## 2021-07-30 DIAGNOSIS — I48.21 PERMANENT ATRIAL FIBRILLATION (H): ICD-10-CM

## 2021-07-30 DIAGNOSIS — Z79.01 LONG TERM CURRENT USE OF ANTICOAGULANT THERAPY: Primary | ICD-10-CM

## 2021-07-30 DIAGNOSIS — I50.30 HEART FAILURE WITH PRESERVED EJECTION FRACTION, NYHA CLASS II (H): ICD-10-CM

## 2021-07-30 DIAGNOSIS — E78.5 DYSLIPIDEMIA: ICD-10-CM

## 2021-07-30 DIAGNOSIS — I50.33 ACUTE ON CHRONIC HEART FAILURE WITH PRESERVED EJECTION FRACTION (H): ICD-10-CM

## 2021-07-30 DIAGNOSIS — Z79.01 LONG TERM CURRENT USE OF ANTICOAGULANT THERAPY: ICD-10-CM

## 2021-07-30 LAB
ALT SERPL W P-5'-P-CCNC: 33 U/L (ref 0–70)
ANION GAP SERPL CALCULATED.3IONS-SCNC: 8 MMOL/L (ref 3–14)
BUN SERPL-MCNC: 22 MG/DL (ref 7–30)
CALCIUM SERPL-MCNC: 9.4 MG/DL (ref 8.5–10.1)
CHLORIDE BLD-SCNC: 105 MMOL/L (ref 94–109)
CHOLEST SERPL-MCNC: 130 MG/DL
CO2 SERPL-SCNC: 26 MMOL/L (ref 20–32)
CREAT SERPL-MCNC: 1 MG/DL (ref 0.66–1.25)
FASTING STATUS PATIENT QL REPORTED: YES
GFR SERPL CREATININE-BSD FRML MDRD: 76 ML/MIN/1.73M2
GLUCOSE BLD-MCNC: 141 MG/DL (ref 70–99)
HDLC SERPL-MCNC: 58 MG/DL
INR PPP: 2.46 (ref 0.85–1.15)
LDLC SERPL CALC-MCNC: 52 MG/DL
MAGNESIUM SERPL-MCNC: 2 MG/DL (ref 1.6–2.3)
NONHDLC SERPL-MCNC: 72 MG/DL
POTASSIUM BLD-SCNC: 4 MMOL/L (ref 3.4–5.3)
SODIUM SERPL-SCNC: 139 MMOL/L (ref 133–144)
TRIGL SERPL-MCNC: 98 MG/DL

## 2021-07-30 PROCEDURE — 82465 ASSAY BLD/SERUM CHOLESTEROL: CPT | Performed by: INTERNAL MEDICINE

## 2021-07-30 PROCEDURE — 84460 ALANINE AMINO (ALT) (SGPT): CPT | Performed by: INTERNAL MEDICINE

## 2021-07-30 PROCEDURE — 85610 PROTHROMBIN TIME: CPT | Performed by: INTERNAL MEDICINE

## 2021-07-30 PROCEDURE — 83735 ASSAY OF MAGNESIUM: CPT | Performed by: INTERNAL MEDICINE

## 2021-07-30 PROCEDURE — 80048 BASIC METABOLIC PNL TOTAL CA: CPT | Performed by: PHYSICIAN ASSISTANT

## 2021-07-30 NOTE — PROGRESS NOTES
ANTICOAGULATION MANAGEMENT     Markie Shepard 71 year old male is on warfarin with therapeutic INR result. (Goal INR 2.0-3.0)    Recent labs: (last 7 days)     07/30/21  1054   INR 2.46*       ASSESSMENT     Source(s): Patient/Caregiver Call       Warfarin doses taken: Warfarin taken as instructed    Diet: No new diet changes identified    New illness, injury, or hospitalization: No    Medication/supplement changes: None noted    Signs or symptoms of bleeding or clotting: No    Previous INR: Supratherapeutic    Additional findings: None     PLAN     Recommended plan for no diet, medication or health factor changes affecting INR     Dosing Instructions: Continue your current warfarin dose with next INR in 3 weeks - (I wanted to recheck in 7-10 days, but patient is not able to come in during that time frame. States he will recheck at 8/20 lab visit)       Summary  As of 7/30/2021    Full warfarin instructions:  5 mg every day   Next INR check:  8/20/2021             Telephone call with Markie who verbalizes understanding and agrees to plan    Check at cardiology lab appt    Education provided: Please call back if any changes to your diet, medications or how you've been taking warfarin and Monitoring for bleeding signs and symptoms    Plan made per [facility] anticoagulation protocol    Pedro Campuzano RN  Anticoagulation Clinic  7/30/2021    _______________________________________________________________________     Anticoagulation Episode Summary     Current INR goal:  2.0-3.0   TTR:  55.1 % (1 y)   Target end date:  Indefinite   Send INR reminders to:  [facility]    Indications    Long-term (current) use of anticoagulants [Z79.01] [Z79.01]  Chronic atrial fibrillation (H) [I48.20]           Comments:  PREFERS PETER! Quit Smoking 9/29.         Anticoagulation Care Providers     Provider Role Specialty Phone number    Mali Patel MD Referring Internal Medicine [phone]

## 2021-08-04 ENCOUNTER — DOCUMENTATION ONLY (OUTPATIENT)
Dept: CARDIOLOGY | Facility: CLINIC | Age: 71
End: 2021-08-04

## 2021-08-04 NOTE — PROGRESS NOTES
Message from CLAUDIA Tanner Oliveira:  Tanner Oliveira PA-C [facility] Heart Team 2  I am glad he was seen in the urgent care for his leg rash. Is he back on torsemide 40 mg? If not, he should be and have repeat BMP before I see him on 8/20.     Thanks,     Tanner      Bmp is scheduled for 8/20/2021 with OV.  Patient restarted torsemide on 7/22/2021

## 2021-08-16 ENCOUNTER — TELEPHONE (OUTPATIENT)
Dept: SLEEP MEDICINE | Facility: CLINIC | Age: 71
End: 2021-08-16

## 2021-08-16 NOTE — TELEPHONE ENCOUNTER
Reason for call:  Other   Patient called regarding (reason for call): appointment  Additional comments: Patient is legally blind, unable to do video visit or in person visit, can pt be scheduled for telephone visit?  Please call patient to arrange an appointment    Phone number to reach patient:  Home number on file [phone] (home)    Best Time:  any    Can we leave a detailed message on this number?  YES    Travel screening: Not Applicable

## 2021-08-17 DIAGNOSIS — I10 ESSENTIAL HYPERTENSION WITH GOAL BLOOD PRESSURE LESS THAN 140/90: Primary | ICD-10-CM

## 2021-08-17 NOTE — TELEPHONE ENCOUNTER
Left a message for patient to contact sleep clinic for further assistance, patient was offered an in person appointment. Message will be sent to Dr. Champagne to see if a telephone visit is ok to have.    Tyesha Amado MA

## 2021-08-20 ENCOUNTER — LAB (OUTPATIENT)
Dept: LAB | Facility: CLINIC | Age: 71
End: 2021-08-20
Payer: COMMERCIAL

## 2021-08-20 ENCOUNTER — OFFICE VISIT (OUTPATIENT)
Dept: CARDIOLOGY | Facility: CLINIC | Age: 71
End: 2021-08-20
Payer: COMMERCIAL

## 2021-08-20 VITALS
HEIGHT: 71 IN | SYSTOLIC BLOOD PRESSURE: 104 MMHG | DIASTOLIC BLOOD PRESSURE: 70 MMHG | BODY MASS INDEX: 44.1 KG/M2 | HEART RATE: 91 BPM | WEIGHT: 315 LBS

## 2021-08-20 DIAGNOSIS — I50.33 ACUTE ON CHRONIC HEART FAILURE WITH PRESERVED EJECTION FRACTION (H): ICD-10-CM

## 2021-08-20 DIAGNOSIS — I50.30 HEART FAILURE WITH PRESERVED EJECTION FRACTION, NYHA CLASS II (H): ICD-10-CM

## 2021-08-20 DIAGNOSIS — I10 ESSENTIAL HYPERTENSION WITH GOAL BLOOD PRESSURE LESS THAN 140/90: ICD-10-CM

## 2021-08-20 LAB
ANION GAP SERPL CALCULATED.3IONS-SCNC: 5 MMOL/L (ref 3–14)
BUN SERPL-MCNC: 25 MG/DL (ref 7–30)
CALCIUM SERPL-MCNC: 9.8 MG/DL (ref 8.5–10.1)
CHLORIDE BLD-SCNC: 107 MMOL/L (ref 94–109)
CO2 SERPL-SCNC: 27 MMOL/L (ref 20–32)
CREAT SERPL-MCNC: 1.06 MG/DL (ref 0.66–1.25)
GFR SERPL CREATININE-BSD FRML MDRD: 71 ML/MIN/1.73M2
GLUCOSE BLD-MCNC: 140 MG/DL (ref 70–99)
MAGNESIUM SERPL-MCNC: 2.2 MG/DL (ref 1.6–2.3)
POTASSIUM BLD-SCNC: 4 MMOL/L (ref 3.4–5.3)
SODIUM SERPL-SCNC: 139 MMOL/L (ref 133–144)

## 2021-08-20 PROCEDURE — 36415 COLL VENOUS BLD VENIPUNCTURE: CPT | Performed by: PHYSICIAN ASSISTANT

## 2021-08-20 PROCEDURE — 83735 ASSAY OF MAGNESIUM: CPT | Performed by: PHYSICIAN ASSISTANT

## 2021-08-20 PROCEDURE — 80048 BASIC METABOLIC PNL TOTAL CA: CPT | Performed by: PHYSICIAN ASSISTANT

## 2021-08-20 PROCEDURE — 99214 OFFICE O/P EST MOD 30 MIN: CPT | Performed by: PHYSICIAN ASSISTANT

## 2021-08-20 ASSESSMENT — MIFFLIN-ST. JEOR: SCORE: 2446.83

## 2021-08-20 NOTE — PROGRESS NOTES
Primary Cardiologist: Dr. Lockett    Reason for Visit: One month follow up    History of Present Illness:   Markie is a 71-year-old male with past medical history notable for:     # Chronic HFpEF.  Likely multifactorial with contribution from primary lung disease as well, obesity hypoventilation syndrome, COPD.  RV was not well visualized on TTE 6/2021, likely has some underlying RV dysfunction as well.   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  Stable, denies symptoms concerning for angina.  # Permanent atrial fibrillation, on warfarin.   # Morbid obesity  # [facility]  # Significant vision impairment  # Mild aortic stenosis   # T2DM- hA1c 6.4% with diet control  # Hx of Tobacco dependence    During last office visit he had reports of worsening peripheral edema, weight gain, and abdominal distention in the setting of dietary indiscretion.  It was unclear what dose of Lasix he was taking.  After confirming this we eventually switch him to torsemide and asked him to take 20 mg twice daily with potassium chloride 20 mEq 2 times daily.  He later developed right foot rash and at first thought maybe this may be due to torsemide but was diagnosed as pyogenic granuloma.  He will be seeing dermatologist for further evaluation management of this.  He reports doing well on torsemide.  He denies lightheadedness.  He has no orthopnea.  He thinks peripheral edema has improved.  Is no abdominal distention.  He cannot adhere to low-sodium diet as he is blind and preparing meals is quite difficult.  He relies on premade foods.    Of note, his grandson is his primary caregiver who is currently training to become an EMT.     Assessment and Plan:  Markie is a 71-year-old male with past medical history notable for:     # Chronic HFpEF.  Likely multifactorial with contribution from primary lung disease as well, obesity hypoventilation syndrome, COPD.  RV was not well visualized on TTE 6/2021,  likely has some underlying RV dysfunction as well.   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  Stable, denies symptoms concerning for angina.  # Permanent atrial fibrillation, on warfarin.   # Morbid obesity  # [facility]  # Significant vision impairment  # Mild aortic stenosis   # T2DM- hA1c 6.4% with diet control  # Hx of Tobacco dependence    Markie appears to be doing well from a cardiac standpoint.  BMP shows normal electrolytes and renal function.  We will continue with current torsemide dose.  He will return in about 4 months with repeat BMP.  He will contact our clinic in the interim if he has lightheadedness or any other concerning symptoms.      This note was completed in part using Dragon voice recognition software. Although reviewed after completion, some word and grammatical errors may occur.    Orders this Visit:  No orders of the defined types were placed in this encounter.    No orders of the defined types were placed in this encounter.    There are no discontinued medications.      Encounter Diagnoses   Name Primary?     Heart failure with preserved ejection fraction, NYHA class II (H)      Essential hypertension with goal blood pressure less than 140/90      Acute on chronic heart failure with preserved ejection fraction (H)        CURRENT MEDICATIONS:  Current Outpatient Medications   Medication Sig Dispense Refill     acetaminophen (TYLENOL) 500 MG tablet Take 500-1,000 mg by mouth every 6 hours as needed for mild pain       albuterol (PROAIR RESPICLICK) 108 (90 Base) MCG/ACT inhaler Inhale 2 puffs into the lungs every 4 hours as needed for shortness of breath / dyspnea 1 each 5     amLODIPine (NORVASC) 10 MG tablet Take 1 tablet (10 mg) by mouth daily 90 tablet 1     atenolol (TENORMIN) 100 MG tablet Take 1 tablet (100 mg) by mouth daily along with 50 mg tablet for total of 150 mg daily. 90 tablet 1     atenolol (TENORMIN) 50 MG tablet Take 1  tablet (50 mg) by mouth daily Along with 100 mg tablet for total of 150 mg daily. 90 tablet 1     CENTRUM SILVER OR TABS ONE DAILY 3 MONTHS 1 YEAR     FLUoxetine (PROZAC) 20 MG capsule Take 3 daily 270 capsule 1     fluticasone (FLONASE) 50 MCG/ACT nasal spray Spray 1 spray into both nostrils daily       fluticasone-vilanterol (BREO ELLIPTA) 200-25 MCG/INH inhaler Inhale 1 puff into the lungs daily 60 each 11     losartan (COZAAR) 100 MG tablet Take 1 tablet (100 mg) by mouth daily 90 tablet 1     omega-3 fatty acids (FISH OIL) 1200 MG capsule Take 1 capsule by mouth daily       oxymetazoline (AFRIN 12 HOUR) 0.05 % nasal spray Spray 2 sprays into both nostrils At Bedtime       potassium chloride ER (K-TAB) 20 MEQ CR tablet Take 1 tablet (20 mEq) by mouth 2 times daily 180 tablet 3     rosuvastatin (CRESTOR) 40 MG tablet Take 1 tablet (40 mg) by mouth At Bedtime 90 tablet 3     torsemide (DEMADEX) 20 MG tablet Take 1 tablet (20 mg) by mouth 2 times daily 180 tablet 3     traZODone (DESYREL) 100 MG tablet Take 1 tablet (100 mg) by mouth nightly as needed for sleep 90 tablet 1     umeclidinium (INCRUSE ELLIPTA) 62.5 MCG/INH inhaler Inhale 1 puff into the lungs daily 90 each 3     warfarin ANTICOAGULANT (COUMADIN ANTICOAGULANT) 5 MG tablet Take 7.5 mg on Mondays and Fridays, and 5 mg all other days, or as directed by Coumadin nurse. (Patient taking differently: 5 mg daily or as directed by Coumadin nurse.) 100 tablet 3     FLUoxetine (PROZAC) 40 MG capsule Take 1 capsule (40 mg) by mouth daily (Patient not taking: Reported on 7/22/2021) 90 capsule 0       ALLERGIES     Allergies   Allergen Reactions     Ambien Other (See Comments)     Parasomnia with sleep walking, injuries, delusions.  May have been in DTs at the same time.     Lisinopril Cough     Pt had cough     Seasonal Allergies        PAST MEDICAL HISTORY:  Past Medical History:   Diagnosis Date     Adjustment disorder with depressed mood 06/17/2012     ALCOHOL  ABUSE-UNSPEC 10/05/2007     Atrial fibrillation (H)      Chorioretinitis of both eyes 02/05/2012    Right eye worse than left. On Cellcept, steroid taper     Coronary atherosclerosis of unspecified type of vessel, native or graft      Diabetes mellitus type 2 in obese (H)      Essential hypertension, benign      Heart failure with preserved ejection fraction, NYHA class II (H)      Mixed hyperlipidemia 10/05/2007     Nonrheumatic aortic valve stenosis      Polycythemia      Sleep apnea      Tobacco use disorder 10/05/2007    Smoker - 1/2 ppd. Started at 16 years     Total retinal detachment of left eye 2009/2010       PAST SURGICAL HISTORY:  Past Surgical History:   Procedure Laterality Date     C OPEN RX ANKLE DISLOCATN+FIXATN  12/2008    Right Ankle     CARDIAC CATHERIZATION  7/03/2012    totally occluded RCA w/ mild left coronary disease     HEART CATH CORONARY ANGIOGRAM AND RIGHT HEART CATH  7/2012    RCA occlusion, IMI, no stent     PHACOEMULSIFICATION CLEAR CORNEA WITH STANDARD INTRAOCULAR LENS IMPLANT Right 6/4/2015    Procedure: PHACOEMULSIFICATION CLEAR CORNEA WITH STANDARD INTRAOCULAR LENS IMPLANT;  Surgeon: Tal Trinidad MD;  Location:  [facility]     RETINAL REATTACHMENT  11/09, 7/10    Left     TONSILLECTOMY       ZZC NONSPECIFIC PROCEDURE  1987    Right Epididymis Removed       FAMILY HISTORY:  Family History   Problem Relation Age of Onset     Arthritis Mother      Circulatory Mother         varicose veins     Eye Disorder Mother         detached retina     Thyroid Disease Mother      C.A.D. Father         Quadruple bypass     Gastrointestinal Disease Father         diverticulitis     Alzheimer Disease Father         demntia     Eye Disorder Father         cataract     Depression Sister         Post Partum      Alcoholism Sister      Neurologic Disorder Sister         Brain Aneurysm     Thyroid Disease Brother      Eye Disorder Brother         detached retina       SOCIAL HISTORY:  Social History      Socioeconomic History     Marital status:      Spouse name: None     Number of children: 2     Years of education: 19     Highest education level: None   Occupational History     Occupation: actor     Employer: UNEMPLOYED     Employer: [employer]     Occupation:    Tobacco Use     Smoking status: Former Smoker     Packs/day: 1.00     Years: 53.00     Pack years: 53.00     Types: Cigarettes     Quit date: 2019     Years since quittin.8     Smokeless tobacco: Never Used     Tobacco comment: 1 ppd   Substance and Sexual Activity     Alcohol use: No     Comment: quit drinking heavily      Drug use: No     Sexual activity: Never   Other Topics Concern     Parent/sibling w/ CABG, MI or angioplasty before 65F 55M? Not Asked      Service Not Asked     Blood Transfusions Not Asked     Caffeine Concern Yes     Occupational Exposure Not Asked     Hobby Hazards Not Asked     Sleep Concern Not Asked     Stress Concern Not Asked     Weight Concern Not Asked     Special Diet No     Back Care Not Asked     Exercise Not Asked     Comment: 1 miles on stepper      Bike Helmet Not Asked     Seat Belt Not Asked     Self-Exams Not Asked   Social History Narrative    Balanced Diet - Yes    Osteoporosis Preventative measures-  Dairy servings per day: 1-3    Regular Exercise -  Yes Describe walk every day    Dental Exam up - YES - Date: 07    Eye Exam - YES - Date: 2 yrs    Self Testicular Exam -  One testicle removed yrs ago due to fallDo you have any concerns about STD's -  No    Abuse: Current or Past (Physical, Sexual or Emotional)- No    Do you feel safe in your environment - Yes    Guns stored in the home - No    Sunscreen used - Yes    Seatbelts used - Yes    Lipids - ?    Glucose -  ?    Colon Cancer Screening - No    Hemoccults - hemorrhoids    PSA - NO    Digital Rectal Exam - YES - Date:     Immunizations reviewed and up to date - No    Tory NAILS     Social  "Determinants of Health     Financial Resource Strain:      Difficulty of Paying Living Expenses:    Food Insecurity:      Worried About Running Out of Food in the Last Year:      Ran Out of Food in the Last Year:    Transportation Needs:      Lack of Transportation (Medical):      Lack of Transportation (Non-Medical):    Physical Activity:      Days of Exercise per Week:      Minutes of Exercise per Session:    Stress:      Feeling of Stress :    Social Connections:      Frequency of Communication with Friends and Family:      Frequency of Social Gatherings with Friends and Family:      Attends Rastafari Services:      Active Member of Clubs or Organizations:      Attends Club or Organization Meetings:      Marital Status:    Intimate Partner Violence:      Fear of Current or Ex-Partner:      Emotionally Abused:      Physically Abused:      Sexually Abused:        Review of Systems:  Skin:  Negative     Eyes:  Negative    ENT:  Negative    Respiratory:  Positive for shortness of breath;dyspnea on exertion;sleep apnea;CPAP  Cardiovascular:    edema;Positive for;lower extremity symptoms;heaviness;dizziness;lightheadedness;fatigue  Gastroenterology: Negative    Genitourinary:  Negative    Musculoskeletal:  Negative    Neurologic:  Negative    Psychiatric:  Negative    Heme/Lymph/Imm:  Negative    Endocrine:  Negative      Physical Exam:  Vitals: /70   Pulse 91   Ht 1.803 m (5' 11\")   Wt (!) 166.5 kg (367 lb)   BMI 51.19 kg/m       GEN:  NAD. In wheelchair. Oxygen on room air.   NECK: Unable to assess for JVP due to neck size.  C/V:  Regular rate and rhythm, no murmur, rub or gallop.  RESP: Clear to auscultation bilaterally.  GI: Abdomen soft, nontender, nondistended.    EXTREM: Trace pitting LE edema.   NEURO: Alert and oriented, cooperative. No obvious focal deficits.   PSYCH: Normal affect.  SKIN: Warm and dry.       Recent Lab Results:  LIPID RESULTS:  Lab Results   Component Value Date    CHOL 130 " 07/30/2021    CHOL 146 04/12/2021    HDL 58 07/30/2021    HDL 54 04/12/2021    LDL 52 07/30/2021    LDL 73 04/12/2021    TRIG 98 07/30/2021    TRIG 98 04/12/2021    CHOLHDLRATIO 2.8 02/17/2020       LIVER ENZYME RESULTS:  Lab Results   Component Value Date    AST 23 04/12/2021    ALT 33 07/30/2021    ALT 29 04/12/2021       CBC RESULTS:  Lab Results   Component Value Date    WBC 8.3 04/12/2021    RBC 5.41 04/12/2021    HGB 17.8 (H) 04/12/2021    HCT 53.2 (H) 04/12/2021    MCV 98 04/12/2021    MCH 32.9 04/12/2021    MCHC 33.5 04/12/2021    RDW 12.9 04/12/2021     04/12/2021       BMP RESULTS:  Lab Results   Component Value Date     08/20/2021     06/28/2021    POTASSIUM 4.0 08/20/2021    POTASSIUM 4.0 06/28/2021    CHLORIDE 107 08/20/2021    CHLORIDE 104 06/28/2021    CO2 27 08/20/2021    CO2 26 06/28/2021    ANIONGAP 5 08/20/2021    ANIONGAP 7 06/28/2021     (H) 08/20/2021     (H) 06/28/2021    BUN 25 08/20/2021    BUN 17 06/28/2021    CR 1.06 08/20/2021    CR 0.91 06/28/2021    GFRESTIMATED 71 08/20/2021    GFRESTIMATED 85 06/28/2021    GFRESTBLACK >90 06/28/2021    VICTOR M 9.8 08/20/2021    VICTOR M 9.4 06/28/2021        A1C RESULTS:  Lab Results   Component Value Date    A1C 6.4 (H) 04/12/2021       INR RESULTS:  Lab Results   Component Value Date    INR 2.46 (H) 07/30/2021    INR 3.7 (H) 07/26/2021    INR 2.90 (H) 06/14/2021    INR 2.70 (H) 05/03/2021           Tanner Oliveira PA-C  August 20, 2021

## 2021-08-20 NOTE — PATIENT INSTRUCTIONS
Today's Plan:   Follow up in 4 months with repeat blood work.     If you have questions or concerns please call my nurse team at [phone].     Scheduling phone number: [phone]  Reminder: Please bring in all current medications, over the counter supplements and vitamin bottles to your next appointment.    It was a pleasure seeing you today!     Tanner Oliveira PA-C  8/20/2021

## 2021-08-20 NOTE — LETTER
8/20/2021    Mali Patel MD  [address] [facility]  [facility] [address]    RE: Markie Shepard       Dear Colleague,    I had the pleasure of seeing Markie Shepard in the [facility].    Primary Cardiologist: Dr. Lockett    Reason for Visit: One month follow up    History of Present Illness:   Markie is a 71-year-old male with past medical history notable for:     # Chronic HFpEF.  Likely multifactorial with contribution from primary lung disease as well, obesity hypoventilation syndrome, COPD.  RV was not well visualized on TTE 6/2021, likely has some underlying RV dysfunction as well.   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  Stable, denies symptoms concerning for angina.  # Permanent atrial fibrillation, on warfarin.   # Morbid obesity  # EMORY  # Significant vision impairment  # Mild aortic stenosis   # T2DM- hA1c 6.4% with diet control  # Hx of Tobacco dependence    During last office visit he had reports of worsening peripheral edema, weight gain, and abdominal distention in the setting of dietary indiscretion.  It was unclear what dose of Lasix he was taking.  After confirming this we eventually switch him to torsemide and asked him to take 20 mg twice daily with potassium chloride 20 mEq 2 times daily.  He later developed right foot rash and at first thought maybe this may be due to torsemide but was diagnosed as pyogenic granuloma.  He will be seeing dermatologist for further evaluation management of this.  He reports doing well on torsemide.  He denies lightheadedness.  He has no orthopnea.  He thinks peripheral edema has improved.  Is no abdominal distention.  He cannot adhere to low-sodium diet as he is blind and preparing meals is quite difficult.  He relies on premade foods.    Of note, his grandson is his primary caregiver who is currently training to become an EMT.     Assessment and  Plan:  Markie is a 71-year-old male with past medical history notable for:     # Chronic HFpEF.  Likely multifactorial with contribution from primary lung disease as well, obesity hypoventilation syndrome, COPD.  RV was not well visualized on TTE 6/2021, likely has some underlying RV dysfunction as well.   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  Stable, denies symptoms concerning for angina.  # Permanent atrial fibrillation, on warfarin.   # Morbid obesity  # EMORY  # Significant vision impairment  # Mild aortic stenosis   # T2DM- hA1c 6.4% with diet control  # Hx of Tobacco dependence    Markie appears to be doing well from a cardiac standpoint.  BMP shows normal electrolytes and renal function.  We will continue with current torsemide dose.  He will return in about 4 months with repeat BMP.  He will contact our clinic in the interim if he has lightheadedness or any other concerning symptoms.      This note was completed in part using Dragon voice recognition software. Although reviewed after completion, some word and grammatical errors may occur.    Orders this Visit:  No orders of the defined types were placed in this encounter.    No orders of the defined types were placed in this encounter.    There are no discontinued medications.      Encounter Diagnoses   Name Primary?     Heart failure with preserved ejection fraction, NYHA class II (H)      Essential hypertension with goal blood pressure less than 140/90      Acute on chronic heart failure with preserved ejection fraction (H)        CURRENT MEDICATIONS:  Current Outpatient Medications   Medication Sig Dispense Refill     acetaminophen (TYLENOL) 500 MG tablet Take 500-1,000 mg by mouth every 6 hours as needed for mild pain       albuterol (PROAIR RESPICLICK) 108 (90 Base) MCG/ACT inhaler Inhale 2 puffs into the lungs every 4 hours as needed for shortness of breath / dyspnea 1 each 5     amLODIPine  (NORVASC) 10 MG tablet Take 1 tablet (10 mg) by mouth daily 90 tablet 1     atenolol (TENORMIN) 100 MG tablet Take 1 tablet (100 mg) by mouth daily along with 50 mg tablet for total of 150 mg daily. 90 tablet 1     atenolol (TENORMIN) 50 MG tablet Take 1 tablet (50 mg) by mouth daily Along with 100 mg tablet for total of 150 mg daily. 90 tablet 1     CENTRUM SILVER OR TABS ONE DAILY 3 MONTHS 1 YEAR     FLUoxetine (PROZAC) 20 MG capsule Take 3 daily 270 capsule 1     fluticasone (FLONASE) 50 MCG/ACT nasal spray Spray 1 spray into both nostrils daily       fluticasone-vilanterol (BREO ELLIPTA) 200-25 MCG/INH inhaler Inhale 1 puff into the lungs daily 60 each 11     losartan (COZAAR) 100 MG tablet Take 1 tablet (100 mg) by mouth daily 90 tablet 1     omega-3 fatty acids (FISH OIL) 1200 MG capsule Take 1 capsule by mouth daily       oxymetazoline (AFRIN 12 HOUR) 0.05 % nasal spray Spray 2 sprays into both nostrils At Bedtime       potassium chloride ER (K-TAB) 20 MEQ CR tablet Take 1 tablet (20 mEq) by mouth 2 times daily 180 tablet 3     rosuvastatin (CRESTOR) 40 MG tablet Take 1 tablet (40 mg) by mouth At Bedtime 90 tablet 3     torsemide (DEMADEX) 20 MG tablet Take 1 tablet (20 mg) by mouth 2 times daily 180 tablet 3     traZODone (DESYREL) 100 MG tablet Take 1 tablet (100 mg) by mouth nightly as needed for sleep 90 tablet 1     umeclidinium (INCRUSE ELLIPTA) 62.5 MCG/INH inhaler Inhale 1 puff into the lungs daily 90 each 3     warfarin ANTICOAGULANT (COUMADIN ANTICOAGULANT) 5 MG tablet Take 7.5 mg on Mondays and Fridays, and 5 mg all other days, or as directed by Coumadin nurse. (Patient taking differently: 5 mg daily or as directed by Coumadin nurse.) 100 tablet 3     FLUoxetine (PROZAC) 40 MG capsule Take 1 capsule (40 mg) by mouth daily (Patient not taking: Reported on 7/22/2021) 90 capsule 0       ALLERGIES     Allergies   Allergen Reactions     Ambien Other (See Comments)     Parasomnia with sleep walking,  injuries, delusions.  May have been in DTs at the same time.     Lisinopril Cough     Pt had cough     Seasonal Allergies        PAST MEDICAL HISTORY:  Past Medical History:   Diagnosis Date     Adjustment disorder with depressed mood 06/17/2012     ALCOHOL ABUSE-UNSPEC 10/05/2007     Atrial fibrillation (H)      Chorioretinitis of both eyes 02/05/2012    Right eye worse than left. On Cellcept, steroid taper     Coronary atherosclerosis of unspecified type of vessel, native or graft      Diabetes mellitus type 2 in obese (H)      Essential hypertension, benign      Heart failure with preserved ejection fraction, NYHA class II (H)      Mixed hyperlipidemia 10/05/2007     Nonrheumatic aortic valve stenosis      Polycythemia      Sleep apnea      Tobacco use disorder 10/05/2007    Smoker - 1/2 ppd. Started at 16 years     Total retinal detachment of left eye 2009/2010       PAST SURGICAL HISTORY:  Past Surgical History:   Procedure Laterality Date     C OPEN RX ANKLE DISLOCATN+FIXATN  12/2008    Right Ankle     CARDIAC CATHERIZATION  7/03/2012    totally occluded RCA w/ mild left coronary disease     HEART CATH CORONARY ANGIOGRAM AND RIGHT HEART CATH  7/2012    RCA occlusion, IMI, no stent     PHACOEMULSIFICATION CLEAR CORNEA WITH STANDARD INTRAOCULAR LENS IMPLANT Right 6/4/2015    Procedure: PHACOEMULSIFICATION CLEAR CORNEA WITH STANDARD INTRAOCULAR LENS IMPLANT;  Surgeon: Tal Trinidad MD;  Location:  [facility]     RETINAL REATTACHMENT  11/09, 7/10    Left     TONSILLECTOMY       ZZC NONSPECIFIC PROCEDURE  1987    Right Epididymis Removed       FAMILY HISTORY:  Family History   Problem Relation Age of Onset     Arthritis Mother      Circulatory Mother         varicose veins     Eye Disorder Mother         detached retina     Thyroid Disease Mother      C.A.D. Father         Quadruple bypass     Gastrointestinal Disease Father         diverticulitis     Alzheimer Disease Father         demntia     Eye Disorder Father          cataract     Depression Sister         Post Partum      Alcoholism Sister      Neurologic Disorder Sister         Brain Aneurysm     Thyroid Disease Brother      Eye Disorder Brother         detached retina       SOCIAL HISTORY:  Social History     Socioeconomic History     Marital status:      Spouse name: None     Number of children: 2     Years of education: 19     Highest education level: None   Occupational History     Occupation: actor     Employer: UNEMPLOYED     Employer: [employer]     Occupation:    Tobacco Use     Smoking status: Former Smoker     Packs/day: 1.00     Years: 53.00     Pack years: 53.00     Types: Cigarettes     Quit date: 2019     Years since quittin.8     Smokeless tobacco: Never Used     Tobacco comment: 1 ppd   Substance and Sexual Activity     Alcohol use: No     Comment: quit drinking heavily      Drug use: No     Sexual activity: Never   Other Topics Concern     Parent/sibling w/ CABG, MI or angioplasty before 65F 55M? Not Asked      Service Not Asked     Blood Transfusions Not Asked     Caffeine Concern Yes     Occupational Exposure Not Asked     Hobby Hazards Not Asked     Sleep Concern Not Asked     Stress Concern Not Asked     Weight Concern Not Asked     Special Diet No     Back Care Not Asked     Exercise Not Asked     Comment: 1 miles on stepper      Bike Helmet Not Asked     Seat Belt Not Asked     Self-Exams Not Asked   Social History Narrative    Balanced Diet - Yes    Osteoporosis Preventative measures-  Dairy servings per day: 1-3    Regular Exercise -  Yes Describe walk every day    Dental Exam up - YES - Date: 07    Eye Exam - YES - Date: 2 yrs    Self Testicular Exam -  One testicle removed yrs ago due to fallDo you have any concerns about STD's -  No    Abuse: Current or Past (Physical, Sexual or Emotional)- No    Do you feel safe in your environment - Yes    Guns stored in the home - No    Sunscreen used -  "Yes    Seatbelts used - Yes    Lipids - ?    Glucose -  ?    Colon Cancer Screening - No    Hemoccults - hemorrhoids    PSA - NO    Digital Rectal Exam - YES - Date: 1993    Immunizations reviewed and up to date - No    Tory NAILS     Social Determinants of Health     Financial Resource Strain:      Difficulty of Paying Living Expenses:    Food Insecurity:      Worried About Running Out of Food in the Last Year:      Ran Out of Food in the Last Year:    Transportation Needs:      Lack of Transportation (Medical):      Lack of Transportation (Non-Medical):    Physical Activity:      Days of Exercise per Week:      Minutes of Exercise per Session:    Stress:      Feeling of Stress :    Social Connections:      Frequency of Communication with Friends and Family:      Frequency of Social Gatherings with Friends and Family:      Attends Christianity Services:      Active Member of Clubs or Organizations:      Attends Club or Organization Meetings:      Marital Status:    Intimate Partner Violence:      Fear of Current or Ex-Partner:      Emotionally Abused:      Physically Abused:      Sexually Abused:        Review of Systems:  Skin:  Negative     Eyes:  Negative    ENT:  Negative    Respiratory:  Positive for shortness of breath;dyspnea on exertion;sleep apnea;CPAP  Cardiovascular:    edema;Positive for;lower extremity symptoms;heaviness;dizziness;lightheadedness;fatigue  Gastroenterology: Negative    Genitourinary:  Negative    Musculoskeletal:  Negative    Neurologic:  Negative    Psychiatric:  Negative    Heme/Lymph/Imm:  Negative    Endocrine:  Negative      Physical Exam:  Vitals: /70   Pulse 91   Ht 1.803 m (5' 11\")   Wt (!) 166.5 kg (367 lb)   BMI 51.19 kg/m       GEN:  NAD. In wheelchair. Oxygen on room air.   NECK: Unable to assess for JVP due to neck size.  C/V:  Regular rate and rhythm, no murmur, rub or gallop.  RESP: Clear to auscultation bilaterally.  GI: Abdomen soft, nontender, nondistended.  "   EXTREM: Trace pitting LE edema.   NEURO: Alert and oriented, cooperative. No obvious focal deficits.   PSYCH: Normal affect.  SKIN: Warm and dry.       Recent Lab Results:  LIPID RESULTS:  Lab Results   Component Value Date    CHOL 130 07/30/2021    CHOL 146 04/12/2021    HDL 58 07/30/2021    HDL 54 04/12/2021    LDL 52 07/30/2021    LDL 73 04/12/2021    TRIG 98 07/30/2021    TRIG 98 04/12/2021    CHOLHDLRATIO 2.8 02/17/2020       LIVER ENZYME RESULTS:  Lab Results   Component Value Date    AST 23 04/12/2021    ALT 33 07/30/2021    ALT 29 04/12/2021       CBC RESULTS:  Lab Results   Component Value Date    WBC 8.3 04/12/2021    RBC 5.41 04/12/2021    HGB 17.8 (H) 04/12/2021    HCT 53.2 (H) 04/12/2021    MCV 98 04/12/2021    MCH 32.9 04/12/2021    MCHC 33.5 04/12/2021    RDW 12.9 04/12/2021     04/12/2021       BMP RESULTS:  Lab Results   Component Value Date     08/20/2021     06/28/2021    POTASSIUM 4.0 08/20/2021    POTASSIUM 4.0 06/28/2021    CHLORIDE 107 08/20/2021    CHLORIDE 104 06/28/2021    CO2 27 08/20/2021    CO2 26 06/28/2021    ANIONGAP 5 08/20/2021    ANIONGAP 7 06/28/2021     (H) 08/20/2021     (H) 06/28/2021    BUN 25 08/20/2021    BUN 17 06/28/2021    CR 1.06 08/20/2021    CR 0.91 06/28/2021    GFRESTIMATED 71 08/20/2021    GFRESTIMATED 85 06/28/2021    GFRESTBLACK >90 06/28/2021    VICTOR M 9.8 08/20/2021    VICTOR M 9.4 06/28/2021        A1C RESULTS:  Lab Results   Component Value Date    A1C 6.4 (H) 04/12/2021       INR RESULTS:  Lab Results   Component Value Date    INR 2.46 (H) 07/30/2021    INR 3.7 (H) 07/26/2021    INR 2.90 (H) 06/14/2021    INR 2.70 (H) 05/03/2021           Tanner Oliveira PA-C  August 20, 2021         Thank you for allowing me to participate in the care of your patient.      Sincerely,     Tanner Oliveira PA-C     [facility]  cc:   Tanner Oliveira PA-C  [address] [place] [address]

## 2021-08-30 ENCOUNTER — TELEPHONE (OUTPATIENT)
Dept: ANTICOAGULATION | Facility: CLINIC | Age: 71
End: 2021-08-30

## 2021-08-30 NOTE — TELEPHONE ENCOUNTER
ANTICOAGULATION     Markie Shepard is overdue for INR check.      Spoke with Markie and scheduled lab appointment on 9/13/21    Delfina Cornejo RN

## 2021-09-13 ENCOUNTER — LAB (OUTPATIENT)
Dept: LAB | Facility: CLINIC | Age: 71
End: 2021-09-13
Payer: COMMERCIAL

## 2021-09-13 ENCOUNTER — ANTICOAGULATION THERAPY VISIT (OUTPATIENT)
Dept: ANTICOAGULATION | Facility: CLINIC | Age: 71
End: 2021-09-13

## 2021-09-13 DIAGNOSIS — Z79.01 LONG TERM CURRENT USE OF ANTICOAGULANT THERAPY: Primary | ICD-10-CM

## 2021-09-13 DIAGNOSIS — Z79.01 LONG TERM CURRENT USE OF ANTICOAGULANT THERAPY: ICD-10-CM

## 2021-09-13 DIAGNOSIS — I48.20 CHRONIC ATRIAL FIBRILLATION (H): ICD-10-CM

## 2021-09-13 DIAGNOSIS — I48.21 PERMANENT ATRIAL FIBRILLATION (H): ICD-10-CM

## 2021-09-13 LAB — INR BLD: 3 (ref 0.9–1.1)

## 2021-09-13 PROCEDURE — 85610 PROTHROMBIN TIME: CPT

## 2021-09-13 PROCEDURE — 36416 COLLJ CAPILLARY BLOOD SPEC: CPT

## 2021-09-13 NOTE — PROGRESS NOTES
Anticoagulation Management    Unable to reach Peter today.    Today's INR result of 3.0 is therapeutic (goal INR of 2.0-3.0).  Result received from: Clinic Lab    Follow up required to assess for changes     Left message to continue current dose of warfarin 5 mg tonight. Call [facility].      Anticoagulation clinic to follow up    Pedro Campuzano RN

## 2021-09-14 ENCOUNTER — TRANSFERRED RECORDS (OUTPATIENT)
Dept: HEALTH INFORMATION MANAGEMENT | Facility: CLINIC | Age: 71
End: 2021-09-14

## 2021-09-14 NOTE — PROGRESS NOTES
ANTICOAGULATION MANAGEMENT     Markie Shepard 71 year old male is on warfarin with therapeutic INR result. (Goal INR 2.0-3.0)    Recent labs: (last 7 days)     09/13/21  1242   INR 3.0*       ASSESSMENT     Source(s): Chart Review and Patient/Caregiver Call       Warfarin doses taken: Warfarin taken as instructed    Diet: No new diet changes identified    New illness, injury, or hospitalization: No    Medication/supplement changes: None noted    Signs or symptoms of bleeding or clotting: No    Previous INR: Therapeutic last visit; previously outside of goal range    Additional findings: Minor foot surgery today - does not need to be off of warfarin     PLAN     Recommended plan for no diet, medication or health factor changes affecting INR     Dosing Instructions: Continue your current warfarin dose with next INR in 6 weeks - wanted to recheck in 4 weeks but patient states he will come back in 6 weeks.       Summary  As of 9/13/2021    Full warfarin instructions:  5 mg every day   Next INR check:  10/25/2021             Telephone call with Markie who verbalizes understanding and agrees to plan    Lab visit scheduled    Education provided: Please call back if any changes to your diet, medications or how you've been taking warfarin    Plan made per [facility] anticoagulation protocol    Pedro Campuzano RN  Anticoagulation Clinic  9/14/2021    _______________________________________________________________________     Anticoagulation Episode Summary     Current INR goal:  2.0-3.0   TTR:  54.9 % (1 y)   Target end date:  Indefinite   Send INR reminders to:  [facility]    Indications    Long-term (current) use of anticoagulants [Z79.01] [Z79.01]  Chronic atrial fibrillation (H) [I48.20]           Comments:  PREFERS PETER! Quit Smoking 9/29.         Anticoagulation Care Providers     Provider Role Specialty Phone number    Mali Patel MD Referring Internal Medicine [phone]

## 2021-09-23 ENCOUNTER — TELEPHONE (OUTPATIENT)
Dept: FAMILY MEDICINE | Facility: CLINIC | Age: 71
End: 2021-09-23

## 2021-09-23 NOTE — TELEPHONE ENCOUNTER
Dr. Patel - please review, advise if pt should schedule appointment or other change in medication:  +++++++++++++++++++++++  Patient takes Breo Ellipta 1 puff in morning upon waking, Incruse Ellipta 1 puff in evening. He generally uses albuterol/Proair 2 puffs once or twice a day. Now taking it 3-4 times per day due to seasonal allergies which causes some shortness of breath during the day, and the Proair is effective in opening airways. He also uses Flonase for allergic rhinitis.   Christina Mckeon MS RN-BC  09/23/21  12:00 PM

## 2021-09-23 NOTE — TELEPHONE ENCOUNTER
"Called patient and LVM - He has 2 inhalers that are taken daily - Breo Ellipta and the Incruse Ellipta. There is a 3rd inhaler Proair that is his \"rescue\" inhaler and should only be used when needed. Please call back to speak to a nurse so we can assess the efficacy of your current medication and also how frequently you are using the albuterol/Proair inhaler.     Christina Mckeon MS RN-BC  09/23/21  11:48 AM      "

## 2021-09-23 NOTE — TELEPHONE ENCOUNTER
Who is calling? Patient  Reason for Call:Questions about the inhalers he is currently taking - specifically if he should take more or less of the PROAIR in combination with the two others.

## 2021-09-27 DIAGNOSIS — E78.5 HYPERLIPIDEMIA LDL GOAL <70: ICD-10-CM

## 2021-09-27 DIAGNOSIS — I50.30 HEART FAILURE WITH PRESERVED EJECTION FRACTION, NYHA CLASS II (H): Primary | ICD-10-CM

## 2021-09-27 NOTE — TELEPHONE ENCOUNTER
Called patient and LVM - relayed message below. Encouraged clinic visit - or go to Urgent Care.  Christina Mckeon MS RN-BC  09/27/21  9:25 AM    +++++++++++++++++++++++++  Markie needs a visit to the clinic as his respiratory symptoms are worsening.   To urgent care if needed.     Mali Patel MD   Internal Medicine/Pediatrics

## 2021-09-27 NOTE — TELEPHONE ENCOUNTER
Patient calls back, he reports he is feeling much better today. He has been outside, going on errands. He is using his albuterol inhaler less frequently now. He feels well overall. He has an upcoming cardiology appointment and will get that checked up too, as it looks like CHF may contribute to breathing problems. We discussed s/s of worsening respiratory and to call back or seek care if worsening. Patient verbalized understanding and agrees with this plan.   Christina Mckeon MS RN-BC  09/27/21  2:32 PM

## 2021-10-05 ENCOUNTER — OFFICE VISIT (OUTPATIENT)
Dept: CARDIOLOGY | Facility: CLINIC | Age: 71
End: 2021-10-05
Attending: INTERNAL MEDICINE
Payer: COMMERCIAL

## 2021-10-05 ENCOUNTER — LAB (OUTPATIENT)
Dept: LAB | Facility: CLINIC | Age: 71
End: 2021-10-05
Payer: COMMERCIAL

## 2021-10-05 VITALS
OXYGEN SATURATION: 94 % | HEIGHT: 71 IN | DIASTOLIC BLOOD PRESSURE: 69 MMHG | SYSTOLIC BLOOD PRESSURE: 106 MMHG | HEART RATE: 71 BPM | WEIGHT: 315 LBS | BODY MASS INDEX: 44.1 KG/M2

## 2021-10-05 DIAGNOSIS — I50.33 ACUTE ON CHRONIC HEART FAILURE WITH PRESERVED EJECTION FRACTION (H): ICD-10-CM

## 2021-10-05 DIAGNOSIS — I50.30 HEART FAILURE WITH PRESERVED EJECTION FRACTION, NYHA CLASS II (H): ICD-10-CM

## 2021-10-05 DIAGNOSIS — E78.5 HYPERLIPIDEMIA LDL GOAL <70: ICD-10-CM

## 2021-10-05 DIAGNOSIS — I10 ESSENTIAL HYPERTENSION WITH GOAL BLOOD PRESSURE LESS THAN 140/90: ICD-10-CM

## 2021-10-05 LAB
ALT SERPL W P-5'-P-CCNC: 31 U/L (ref 0–70)
ANION GAP SERPL CALCULATED.3IONS-SCNC: 6 MMOL/L (ref 3–14)
BUN SERPL-MCNC: 21 MG/DL (ref 7–30)
CALCIUM SERPL-MCNC: 9.2 MG/DL (ref 8.5–10.1)
CHLORIDE BLD-SCNC: 106 MMOL/L (ref 94–109)
CHOLEST SERPL-MCNC: 132 MG/DL
CO2 SERPL-SCNC: 26 MMOL/L (ref 20–32)
CREAT SERPL-MCNC: 1.09 MG/DL (ref 0.66–1.25)
FASTING STATUS PATIENT QL REPORTED: YES
GFR SERPL CREATININE-BSD FRML MDRD: 68 ML/MIN/1.73M2
GLUCOSE BLD-MCNC: 145 MG/DL (ref 70–99)
HDLC SERPL-MCNC: 55 MG/DL
LDLC SERPL CALC-MCNC: 55 MG/DL
NONHDLC SERPL-MCNC: 77 MG/DL
POTASSIUM BLD-SCNC: 3.8 MMOL/L (ref 3.4–5.3)
SODIUM SERPL-SCNC: 138 MMOL/L (ref 133–144)
TRIGL SERPL-MCNC: 108 MG/DL

## 2021-10-05 PROCEDURE — 80061 LIPID PANEL: CPT

## 2021-10-05 PROCEDURE — 99215 OFFICE O/P EST HI 40 MIN: CPT | Performed by: INTERNAL MEDICINE

## 2021-10-05 PROCEDURE — 36415 COLL VENOUS BLD VENIPUNCTURE: CPT

## 2021-10-05 PROCEDURE — 80048 BASIC METABOLIC PNL TOTAL CA: CPT

## 2021-10-05 PROCEDURE — 84460 ALANINE AMINO (ALT) (SGPT): CPT

## 2021-10-05 RX ORDER — AMLODIPINE BESYLATE 5 MG/1
5 TABLET ORAL DAILY
Qty: 90 TABLET | Refills: 3 | Status: SHIPPED | OUTPATIENT
Start: 2021-10-05 | End: 2022-02-15 | Stop reason: SINTOL

## 2021-10-05 RX ORDER — LOSARTAN POTASSIUM 25 MG/1
75 TABLET ORAL DAILY
Qty: 270 TABLET | Refills: 3 | Status: SHIPPED | OUTPATIENT
Start: 2021-10-05 | End: 2022-09-13

## 2021-10-05 RX ORDER — TORSEMIDE 20 MG/1
TABLET ORAL
Qty: 270 TABLET | Refills: 3 | Status: SHIPPED | OUTPATIENT
Start: 2021-10-05 | End: 2022-02-15

## 2021-10-05 ASSESSMENT — MIFFLIN-ST. JEOR: SCORE: 2452.5

## 2021-10-05 NOTE — LETTER
10/5/2021    Mali Patel MD  [address] [place]  [facility] [address]    RE: Markie BLOOM Juliodo       Dear Colleague,    I had the pleasure of seeing Markie Shepard in the [facility].        Cardiology Clinic Progress Note:    October 5, 2021   Patient Name: Markie Shepard  Patient MRN: [id number]     Consult indication: HFpEF    HPI:    I had the opportunity to see patient Markie Shepard in cardiology clinic for a follow up visit. Patient is followed by our colleague Mali Patel MD with Primary Care.     As you know, patient is a pleasant 71-year-old male with a past medical history significant for legal blindness, morbid obesity (BMI 53 kg/m ), coronary artery disease status post PCI, permanent atrial fibrillation (on warfarin), sleep apnea, significant prior smoking history, probable COPD, heart failure with preserved ejection fraction, who presents for follow-up.    He is accompanied today by his grandson, Jayme, who is training to be an EMT.    I had last met patient in clinic on 6/16/2021. Previously had been followed by my colleague Dr. Hobbs for many years.  Regarding his prior cardiac history, he was originally seen 6/27/2012, Lexiscan stress test demonstrating inferior infarct with moderate ischemia.  Subsequent coronary angiography demonstrated  of the RCA, with left-to-right collaterals.  Since he is asymptomatic, and has been doing well, the decision was made to not pursue high risk  PCI.    At our last clinic visit, he was experiencing symptoms consistent with decompensated heart failure, and so we started him on torsemide 20 mg daily, as well as potassium supplementation, and changed simvastatin to rosuvastatin. Since then he has been seen by my colleague Tanner ALEJANDRE. There was some confusion about his diuretic regimen, however she was able to clarify a regimen with the patient, and now he is on  torsemide 20 mg twice daily as well as potassium supplementation.    Overall he feels generally well, though still has significant lower extremity swelling, and some shortness of breath. Weight is about the same at 370 pounds. He denies any chest pain, chest pressure. He does not weigh himself at home due to legal blindness.     TTE 6/16/2021 demonstrated LVEF 55%, RV was not well visualized, he was noted to have mild aortic stenosis.    Labs from earlier today are notable for potassium 3.8, creatinine 1.09, total cholesterol 132, HDL 55, LDL 55.    Blood pressure today in clinic was 106/69 mmHg.    Assessment and Plan/Recommendations:    # Acute on chronic HFpEF, likely multifactorial with contribution from primary lung disease, obesity hypoventilation syndrome, COPD.  RV was not well visualized on TTE 6/2021, likely has some underlying RV dysfunction as well.  # Abnormal PFTs 6/16/2021.  Significant prior smoking history.  # CAD with RCA .  Stable, denies symptoms concerning for angina.  # Permanent atrial fibrillation, on warfarin.   # Morbid obesity  # EM[facility]  # Significant vision impairment  # Mild aortic stenosis     -We will increase torsemide 20 mg twice daily to 40 mg in the morning and 20 mg in the early afternoon  -Blood pressures are borderline low, and he has had some dizziness/lightheadedness, will decrease amlodipine 10 mg daily to 5 mg daily, and decrease losartan 100 mg daily to 75 mg daily  -Continue remainder of cardiac regimen including atenolol, rosuvastatin, warfarin  -BMP in 2 weeks  -Will plan on TTE in 2 years  -Follow-up with Tanner ALEJANDRE in 3 months, and with me in 1 year, or sooner as needed    Thank you for allowing our team to participate in the care of Markie Shepard.  Please do not hesitate to call or page me with any questions or concerns.    Sincerely,     Andrea Lockett MD, [facility]  Cardiology  Text Page   October 5, 2021    Voice recognition software  utilized. Although reviewed after completion, some word and grammatical errors may be present.    Total time spent on this encounter: 45 minutes, providing care in this encounter including, but not limited to, reviewing prior medical records, laboratory data, imaging studies, diagnostic studies, procedure notes, formulating an assessment and plan, recommendations.    Past Medical History:     Past Medical History:   Diagnosis Date     Adjustment disorder with depressed mood 06/17/2012     ALCOHOL ABUSE-UNSPEC 10/05/2007     Atrial fibrillation (H)      Chorioretinitis of both eyes 02/05/2012    Right eye worse than left. On Cellcept, steroid taper     Coronary atherosclerosis of unspecified type of vessel, native or graft      Diabetes mellitus type 2 in obese (H)      Essential hypertension, benign      Heart failure with preserved ejection fraction, NYHA class II (H)      Mixed hyperlipidemia 10/05/2007     Nonrheumatic aortic valve stenosis      Polycythemia      Sleep apnea      Tobacco use disorder 10/05/2007    Smoker - 1/2 ppd. Started at 16 years     Total retinal detachment of left eye 2009/2010        Past Surgical History:   Past Surgical History:   Procedure Laterality Date     C OPEN RX ANKLE DISLOCATN+FIXATN  12/2008    Right Ankle     CARDIAC CATHERIZATION  7/03/2012    totally occluded RCA w/ mild left coronary disease     HEART CATH CORONARY ANGIOGRAM AND RIGHT HEART CATH  7/2012    RCA occlusion, IMI, no stent     PHACOEMULSIFICATION CLEAR CORNEA WITH STANDARD INTRAOCULAR LENS IMPLANT Right 6/4/2015    Procedure: PHACOEMULSIFICATION CLEAR CORNEA WITH STANDARD INTRAOCULAR LENS IMPLANT;  Surgeon: Tal Trinidad MD;  Location: [facility]     RETINAL REATTACHMENT  11/09, 7/10    Left     TONSILLECTOMY       ZZC NONSPECIFIC PROCEDURE  1987    Right Epididymis Removed       Medications (outpatient):  Current Outpatient Medications   Medication Sig Dispense Refill     acetaminophen (TYLENOL) 500 MG tablet  Take 500-1,000 mg by mouth every 6 hours as needed for mild pain       albuterol (PROAIR RESPICLICK) 108 (90 Base) MCG/ACT inhaler Inhale 2 puffs into the lungs every 4 hours as needed for shortness of breath / dyspnea 1 each 5     amLODIPine (NORVASC) 10 MG tablet Take 1 tablet (10 mg) by mouth daily 90 tablet 1     atenolol (TENORMIN) 100 MG tablet Take 1 tablet (100 mg) by mouth daily along with 50 mg tablet for total of 150 mg daily. 90 tablet 1     atenolol (TENORMIN) 50 MG tablet Take 1 tablet (50 mg) by mouth daily Along with 100 mg tablet for total of 150 mg daily. 90 tablet 1     CENTRUM SILVER OR TABS ONE DAILY 3 MONTHS 1 YEAR     FLUoxetine (PROZAC) 20 MG capsule Take 3 daily 270 capsule 1     fluticasone (FLONASE) 50 MCG/ACT nasal spray Spray 1 spray into both nostrils daily       fluticasone-vilanterol (BREO ELLIPTA) 200-25 MCG/INH inhaler Inhale 1 puff into the lungs daily 60 each 11     losartan (COZAAR) 100 MG tablet Take 1 tablet (100 mg) by mouth daily 90 tablet 1     omega-3 fatty acids (FISH OIL) 1200 MG capsule Take 1 capsule by mouth daily       oxymetazoline (AFRIN 12 HOUR) 0.05 % nasal spray Spray 2 sprays into both nostrils At Bedtime       potassium chloride ER (K-TAB) 20 MEQ CR tablet Take 1 tablet (20 mEq) by mouth 2 times daily 180 tablet 3     rosuvastatin (CRESTOR) 40 MG tablet Take 1 tablet (40 mg) by mouth At Bedtime 90 tablet 3     torsemide (DEMADEX) 20 MG tablet Take 1 tablet (20 mg) by mouth 2 times daily 180 tablet 3     traZODone (DESYREL) 100 MG tablet Take 1 tablet (100 mg) by mouth nightly as needed for sleep 90 tablet 1     umeclidinium (INCRUSE ELLIPTA) 62.5 MCG/INH inhaler Inhale 1 puff into the lungs daily 90 each 3     warfarin ANTICOAGULANT (COUMADIN ANTICOAGULANT) 5 MG tablet Take 7.5 mg on Mondays and Fridays, and 5 mg all other days, or as directed by Coumadin nurse. (Patient taking differently: 5 mg daily or as directed by Coumadin nurse.) 100 tablet 3  "      Allergies:  Allergies   Allergen Reactions     Ambien Other (See Comments)     Parasomnia with sleep walking, injuries, delusions.  May have been in DTs at the same time.     Lisinopril Cough     Pt had cough     Seasonal Allergies        Social History:   History   Drug Use No      History   Smoking Status     Former Smoker     Packs/day: 1.00     Years: 53.00     Types: Cigarettes     Quit date: 9/29/2019   Smokeless Tobacco     Never Used     Comment: 1 ppd     Social History    Substance and Sexual Activity      Alcohol use: No        Comment: quit drinking heavily 2013       Family History:  Family History   Problem Relation Age of Onset     Arthritis Mother      Circulatory Mother         varicose veins     Eye Disorder Mother         detached retina     Thyroid Disease Mother      C.A.D. Father         Quadruple bypass     Gastrointestinal Disease Father         diverticulitis     Alzheimer Disease Father         demntia     Eye Disorder Father         cataract     Depression Sister         Post Partum      Alcoholism Sister      Neurologic Disorder Sister         Brain Aneurysm     Thyroid Disease Brother      Eye Disorder Brother         detached retina       Review of Systems:   A complete review of systems was negative except as mentioned in the History of Present Illness.     Objective & Physical Exam:  /69 (BP Location: Left arm, Cuff Size: Adult Large)   Pulse 71   Ht 1.791 m (5' 10.5\")   Wt (!) 167.8 kg (370 lb)   SpO2 94%   BMI 52.34 kg/m    Wt Readings from Last 2 Encounters:   10/05/21 (!) 167.8 kg (370 lb)   08/20/21 (!) 166.5 kg (367 lb)     Body mass index is 52.34 kg/m .   Body surface area is 2.89 meters squared.    Constitutional: appears stated age, in no apparent distress, obese, in wheelchair, legally blined  Pulmonary: rales at the bases bilaterally   Cardiovascular: Distant heart sounds but regular rate and rhythm, 2+ bilateral edema Gastrointestinal: abdominal exam " benign  Neurologic: awake, alert, face symmetrical, moves all extremities  Psychiatric: affect is normal, answers questions appropriately, oriented to self and place    Data reviewed:  Lab Results   Component Value Date    WBC 8.3 04/12/2021    RBC 5.41 04/12/2021    HGB 17.8 (H) 04/12/2021    HCT 53.2 (H) 04/12/2021    MCV 98 04/12/2021    MCH 32.9 04/12/2021    MCHC 33.5 04/12/2021    RDW 12.9 04/12/2021     04/12/2021     Sodium   Date Value Ref Range Status   08/20/2021 139 133 - 144 mmol/L Final   06/28/2021 138 133 - 144 mmol/L Final     Potassium   Date Value Ref Range Status   08/20/2021 4.0 3.4 - 5.3 mmol/L Final   06/28/2021 4.0 3.4 - 5.3 mmol/L Final     Chloride   Date Value Ref Range Status   08/20/2021 107 94 - 109 mmol/L Final   06/28/2021 104 94 - 109 mmol/L Final     Carbon Dioxide   Date Value Ref Range Status   06/28/2021 26 20 - 32 mmol/L Final     Carbon Dioxide (CO2)   Date Value Ref Range Status   08/20/2021 27 20 - 32 mmol/L Final     Anion Gap   Date Value Ref Range Status   08/20/2021 5 3 - 14 mmol/L Final   06/28/2021 7 3 - 14 mmol/L Final     Glucose   Date Value Ref Range Status   08/20/2021 140 (H) 70 - 99 mg/dL Final   06/28/2021 127 (H) 70 - 99 mg/dL Final     Urea Nitrogen   Date Value Ref Range Status   08/20/2021 25 7 - 30 mg/dL Final   06/28/2021 17 7 - 30 mg/dL Final     Creatinine   Date Value Ref Range Status   08/20/2021 1.06 0.66 - 1.25 mg/dL Final   06/28/2021 0.91 0.66 - 1.25 mg/dL Final     GFR Estimate   Date Value Ref Range Status   08/20/2021 71 >60 mL/min/1.73m2 Final     Comment:     As of July 11, 2021, eGFR is calculated by the CKD-EPI creatinine equation, without race adjustment. eGFR can be influenced by muscle mass, exercise, and diet. The reported eGFR is an estimation only and is only applicable if the renal function is stable.   06/28/2021 85 >60 mL/min/[1.73_m2] Final     Comment:     Non  GFR Calc  Starting 12/18/2018, serum creatinine  based estimated GFR (eGFR) will be   calculated using the Chronic Kidney Disease Epidemiology Collaboration   (CKD-EPI) equation.       Calcium   Date Value Ref Range Status   2021 9.8 8.5 - 10.1 mg/dL Final   2021 9.4 8.5 - 10.1 mg/dL Final     Bilirubin Total   Date Value Ref Range Status   2021 0.9 0.2 - 1.3 mg/dL Final     Alkaline Phosphatase   Date Value Ref Range Status   2021 84 40 - 150 U/L Final     ALT   Date Value Ref Range Status   2021 33 0 - 70 U/L Final   2021 29 0 - 70 U/L Final     AST   Date Value Ref Range Status   2021 23 0 - 45 U/L Final     Recent Labs   Lab Test 21  1102 21  1457 20  1601 20  1601 10/17/16  1408 04/22/15  1241   CHOL 130 146   < > 152.0   < > 139   HDL 58 54   < > 55.0   < > 38*   LDL 52 73  --  74.0   < > 69*   TRIG 98 98   < > 116.0   < > 159*   CHOLHDLRATIO  --   --   --  2.8  --  3.7    < > = values in this interval not displayed.      Lab Results   Component Value Date    A1C 6.4 2021    A1C 6.1 2020    A1C 6.1 2019    A1C 5.8 10/17/2016    A1C 5.9 2015        Recent Results (from the past 4320 hour(s))   Echocardiogram Complete    Narrative    [id number]  [id number]  [id number]  [id number]^GLORIA^DAYNE^[facility]ch[facility]  [address]  [place] [address]  Phone [phone]  Fax [phone]     Name: NILSON CASTILLO  MRN: [id number]  : 1950  Study Date: 2021 08:40 AM  Age: 71 yrs  Gender: Male  Patient Location: [facility]  Reason For Study: Chronic atrial fibrillation (H)  Ordering Physician: DAYNE CASTRO  Referring Physician: DAYNE CASTRO  Performed By: Rubio Gomez RDCS     BSA: 2.7 m2  Height: 70 in  Weight: 368 lb  HR: 73  BP: 133/82 mmHg  ______________________________________________________________________________  Procedure  Complete Echo Adult. Optison  (NDC #[id number]) given intravenously.     ______________________________________________________________________________  Interpretation Summary     Patient in wheelchair, very limited image quality.     1. The left ventricle is normal in size. The visual ejection fraction is  estimated at 55%.  2. The right ventricle is not well visualized.  3. Mild valvular aortic stenosis. Mean 12mmHg, Vmax 2.4m/s.     Echo from  showed EF 55%, no valve disease.  ______________________________________________________________________________  Left Ventricle  The left ventricle is normal in size. The visual ejection fraction is  estimated at 55%. Left ventricular diastolic function is indeterminate.  Regional wall motion abnormalities cannot be excluded due to limited  visualization.     Right Ventricle  The right ventricle is not well visualized.     Atria  The left atrium is moderately dilated. Right atrium not well visualized. There  is no atrial shunt seen.     Mitral Valve  The mitral valve is normal in structure and function.     Tricuspid Valve  The tricuspid valve is normal in structure and function.     Aortic Valve  Mild valvular aortic stenosis. Mean 12mmHg, Vmax 2.4m/s.     Pulmonic Valve  The pulmonic valve is not well visualized.     Vessels  The ascending aorta is Borderline dilated. Dilation of the inferior vena cava  is present with abnormal respiratory variation in diameter.     Pericardium  There is no pericardial effusion.     Rhythm  The rhythm was atrial fibrillation with wide QRS rhythm.     ______________________________________________________________________________  MMode/2D Measurements & Calculations  Ao root diam: 3.7 cm  LA dimension: 5.6 cm  asc Aorta Diam: 3.8 cm  LA/Ao: 1.5  LVOT diam: 2.6 cm  LVOT area: 5.4 cm2     LA Volume Index (BP): 44.7 ml/m2     Doppler Measurements & Calculations  MV E max santo: 113.0 cm/sec  MV max P.0 mmHg  MV mean P.4 mmHg  MV V2 VTI: 23.7 cm  MVA(VTI): 4.4  cm2  MV dec time: 0.15 sec  Ao V2 max: 241.4 cm/sec  Ao max P.3 mmHg  Ao V2 mean: 163.4 cm/sec  Ao mean P.0 mmHg  Ao V2 VTI: 45.6 cm  FARA(I,D): 2.3 cm2  FARA(V,D): 2.4 cm2  LV V1 max P.7 mmHg  LV V1 max: 108.3 cm/sec  LV V1 VTI: 19.1 cm  SV(LVOT): 103.2 ml  SI(LVOT): 38.2 ml/m2  PA acc time: 0.12 sec  AV Greyson Ratio (DI): 0.45  FARA Index (cm2/m2): 0.84     E/E': 17.2  Peak E' Greyson: 6.6 cm/sec     ______________________________________________________________________________  Report approved by: Pan Pepper 2021 09:51 AM                  Thank you for allowing me to participate in the care of your patient.      Sincerely,     Andrea Lockett MD     [facility]  cc:   Andrea Lockett MD  [address]  [place] [address]

## 2021-10-05 NOTE — PATIENT INSTRUCTIONS
October 5, 2021    Thank you for allowing our Cardiology team to participate in your care.     Please note the following changes to your heart treatment plan:     Medication changes:   - decrease losartan 100 mg daily to 75 mg daily  - decrease amlodipine 10 mg to 5 mg daily  - increase torsemide to 40mg in the morning and 20mg in the early afternoon    Tests to be done:  - NON-fasting labs in 2 weeks    Follow up:  - Follow up in 3 months with cardiology CLAUDIA and with me in 1 year, or sooner as needed.      Please contact our team at [phone] or [phone] for any questions or concerns.   For scheduling, please call [phone].  If you are having a medical emergency, please call [phone].     Sincerely,    Andrea Lockett MD, [facility]  Cardiology    [facility]

## 2021-10-05 NOTE — PROGRESS NOTES
Cardiology Clinic Progress Note:    October 5, 2021   Patient Name: Markie Shepard  Patient MRN: [id number]     Consult indication: HFpEF    HPI:    I had the opportunity to see patient Markie Shepard in cardiology clinic for a follow up visit. Patient is followed by our colleague Mali Patel MD with Primary Care.     As you know, patient is a pleasant 71-year-old male with a past medical history significant for legal blindness, morbid obesity (BMI 53 kg/m ), coronary artery disease status post PCI, permanent atrial fibrillation (on warfarin), sleep apnea, significant prior smoking history, probable COPD, heart failure with preserved ejection fraction, who presents for follow-up.    He is accompanied today by his grandson, Jayme, who is training to be an EMT.    I had last met patient in clinic on 6/16/2021. Previously had been followed by my colleague Dr. Hobbs for many years.  Regarding his prior cardiac history, he was originally seen 6/27/2012, Lexiscan stress test demonstrating inferior infarct with moderate ischemia.  Subsequent coronary angiography demonstrated  of the RCA, with left-to-right collaterals.  Since he is asymptomatic, and has been doing well, the decision was made to not pursue high risk  PCI.    At our last clinic visit, he was experiencing symptoms consistent with decompensated heart failure, and so we started him on torsemide 20 mg daily, as well as potassium supplementation, and changed simvastatin to rosuvastatin. Since then he has been seen by my colleague Tanner ALEJANDRE. There was some confusion about his diuretic regimen, however she was able to clarify a regimen with the patient, and now he is on torsemide 20 mg twice daily as well as potassium supplementation.    Overall he feels generally well, though still has significant lower extremity swelling, and some shortness of breath. Weight is about the same at 370 pounds. He denies any chest pain,  chest pressure. He does not weigh himself at home due to legal blindness.     TTE 6/16/2021 demonstrated LVEF 55%, RV was not well visualized, he was noted to have mild aortic stenosis.    Labs from earlier today are notable for potassium 3.8, creatinine 1.09, total cholesterol 132, HDL 55, LDL 55.    Blood pressure today in clinic was 106/69 mmHg.    Assessment and Plan/Recommendations:    # Acute on chronic HFpEF, likely multifactorial with contribution from primary lung disease, obesity hypoventilation syndrome, COPD.  RV was not well visualized on TTE 6/2021, likely has some underlying RV dysfunction as well.  # Abnormal PFTs 6/16/2021.  Significant prior smoking history.  # CAD with RCA .  Stable, denies symptoms concerning for angina.  # Permanent atrial fibrillation, on warfarin.   # Morbid obesity  # [facility]  # Significant vision impairment  # Mild aortic stenosis     -We will increase torsemide 20 mg twice daily to 40 mg in the morning and 20 mg in the early afternoon  -Blood pressures are borderline low, and he has had some dizziness/lightheadedness, will decrease amlodipine 10 mg daily to 5 mg daily, and decrease losartan 100 mg daily to 75 mg daily  -Continue remainder of cardiac regimen including atenolol, rosuvastatin, warfarin  -BMP in 2 weeks  -Will plan on TTE in 2 years  -Follow-up with Tanner ALEJANDRE in 3 months, and with me in 1 year, or sooner as needed    Thank you for allowing our team to participate in the care of Markie Shepard.  Please do not hesitate to call or page me with any questions or concerns.    Sincerely,     Andrea Lockett MD, [facility]  Cardiology  Text Page   October 5, 2021    Voice recognition software utilized. Although reviewed after completion, some word and grammatical errors may be present.    Total time spent on this encounter: 45 minutes, providing care in this encounter including, but not limited to, reviewing prior medical records, laboratory data,  imaging studies, diagnostic studies, procedure notes, formulating an assessment and plan, recommendations.    Past Medical History:     Past Medical History:   Diagnosis Date     Adjustment disorder with depressed mood 06/17/2012     ALCOHOL ABUSE-UNSPEC 10/05/2007     Atrial fibrillation (H)      Chorioretinitis of both eyes 02/05/2012    Right eye worse than left. On Cellcept, steroid taper     Coronary atherosclerosis of unspecified type of vessel, native or graft      Diabetes mellitus type 2 in obese (H)      Essential hypertension, benign      Heart failure with preserved ejection fraction, NYHA class II (H)      Mixed hyperlipidemia 10/05/2007     Nonrheumatic aortic valve stenosis      Polycythemia      Sleep apnea      Tobacco use disorder 10/05/2007    Smoker - 1/2 ppd. Started at 16 years     Total retinal detachment of left eye 2009/2010        Past Surgical History:   Past Surgical History:   Procedure Laterality Date     C OPEN RX ANKLE DISLOCATN+FIXATN  12/2008    Right Ankle     CARDIAC CATHERIZATION  7/03/2012    totally occluded RCA w/ mild left coronary disease     HEART CATH CORONARY ANGIOGRAM AND RIGHT HEART CATH  7/2012    RCA occlusion, IMI, no stent     PHACOEMULSIFICATION CLEAR CORNEA WITH STANDARD INTRAOCULAR LENS IMPLANT Right 6/4/2015    Procedure: PHACOEMULSIFICATION CLEAR CORNEA WITH STANDARD INTRAOCULAR LENS IMPLANT;  Surgeon: Tal Trinidad MD;  Location:  [facility]     RETINAL REATTACHMENT  11/09, 7/10    Left     TONSILLECTOMY       Z NONSPECIFIC PROCEDURE  1987    Right Epididymis Removed       Medications (outpatient):  Current Outpatient Medications   Medication Sig Dispense Refill     acetaminophen (TYLENOL) 500 MG tablet Take 500-1,000 mg by mouth every 6 hours as needed for mild pain       albuterol (PROAIR RESPICLICK) 108 (90 Base) MCG/ACT inhaler Inhale 2 puffs into the lungs every 4 hours as needed for shortness of breath / dyspnea 1 each 5     amLODIPine (NORVASC) 10 MG  tablet Take 1 tablet (10 mg) by mouth daily 90 tablet 1     atenolol (TENORMIN) 100 MG tablet Take 1 tablet (100 mg) by mouth daily along with 50 mg tablet for total of 150 mg daily. 90 tablet 1     atenolol (TENORMIN) 50 MG tablet Take 1 tablet (50 mg) by mouth daily Along with 100 mg tablet for total of 150 mg daily. 90 tablet 1     CENTRUM SILVER OR TABS ONE DAILY 3 MONTHS 1 YEAR     FLUoxetine (PROZAC) 20 MG capsule Take 3 daily 270 capsule 1     fluticasone (FLONASE) 50 MCG/ACT nasal spray Spray 1 spray into both nostrils daily       fluticasone-vilanterol (BREO ELLIPTA) 200-25 MCG/INH inhaler Inhale 1 puff into the lungs daily 60 each 11     losartan (COZAAR) 100 MG tablet Take 1 tablet (100 mg) by mouth daily 90 tablet 1     omega-3 fatty acids (FISH OIL) 1200 MG capsule Take 1 capsule by mouth daily       oxymetazoline (AFRIN 12 HOUR) 0.05 % nasal spray Spray 2 sprays into both nostrils At Bedtime       potassium chloride ER (K-TAB) 20 MEQ CR tablet Take 1 tablet (20 mEq) by mouth 2 times daily 180 tablet 3     rosuvastatin (CRESTOR) 40 MG tablet Take 1 tablet (40 mg) by mouth At Bedtime 90 tablet 3     torsemide (DEMADEX) 20 MG tablet Take 1 tablet (20 mg) by mouth 2 times daily 180 tablet 3     traZODone (DESYREL) 100 MG tablet Take 1 tablet (100 mg) by mouth nightly as needed for sleep 90 tablet 1     umeclidinium (INCRUSE ELLIPTA) 62.5 MCG/INH inhaler Inhale 1 puff into the lungs daily 90 each 3     warfarin ANTICOAGULANT (COUMADIN ANTICOAGULANT) 5 MG tablet Take 7.5 mg on Mondays and Fridays, and 5 mg all other days, or as directed by Coumadin nurse. (Patient taking differently: 5 mg daily or as directed by Coumadin nurse.) 100 tablet 3       Allergies:  Allergies   Allergen Reactions     Ambien Other (See Comments)     Parasomnia with sleep walking, injuries, delusions.  May have been in DTs at the same time.     Lisinopril Cough     Pt had cough     Seasonal Allergies        Social History:   History  "  Drug Use No      History   Smoking Status     Former Smoker     Packs/day: 1.00     Years: 53.00     Types: Cigarettes     Quit date: 9/29/2019   Smokeless Tobacco     Never Used     Comment: 1 ppd     Social History    Substance and Sexual Activity      Alcohol use: No        Comment: quit drinking heavily 2013       Family History:  Family History   Problem Relation Age of Onset     Arthritis Mother      Circulatory Mother         varicose veins     Eye Disorder Mother         detached retina     Thyroid Disease Mother      C.A.D. Father         Quadruple bypass     Gastrointestinal Disease Father         diverticulitis     Alzheimer Disease Father         demntia     Eye Disorder Father         cataract     Depression Sister         Post Partum      Alcoholism Sister      Neurologic Disorder Sister         Brain Aneurysm     Thyroid Disease Brother      Eye Disorder Brother         detached retina       Review of Systems:   A complete review of systems was negative except as mentioned in the History of Present Illness.     Objective & Physical Exam:  /69 (BP Location: Left arm, Cuff Size: Adult Large)   Pulse 71   Ht 1.791 m (5' 10.5\")   Wt (!) 167.8 kg (370 lb)   SpO2 94%   BMI 52.34 kg/m    Wt Readings from Last 2 Encounters:   10/05/21 (!) 167.8 kg (370 lb)   08/20/21 (!) 166.5 kg (367 lb)     Body mass index is 52.34 kg/m .   Body surface area is 2.89 meters squared.    Constitutional: appears stated age, in no apparent distress, obese, in wheelchair, legally blined  Pulmonary: rales at the bases bilaterally   Cardiovascular: Distant heart sounds but regular rate and rhythm, 2+ bilateral edema Gastrointestinal: abdominal exam benign  Neurologic: awake, alert, face symmetrical, moves all extremities  Psychiatric: affect is normal, answers questions appropriately, oriented to self and place    Data reviewed:  Lab Results   Component Value Date    WBC 8.3 04/12/2021    RBC 5.41 04/12/2021    HGB " 17.8 (H) 04/12/2021    HCT 53.2 (H) 04/12/2021    MCV 98 04/12/2021    MCH 32.9 04/12/2021    MCHC 33.5 04/12/2021    RDW 12.9 04/12/2021     04/12/2021     Sodium   Date Value Ref Range Status   08/20/2021 139 133 - 144 mmol/L Final   06/28/2021 138 133 - 144 mmol/L Final     Potassium   Date Value Ref Range Status   08/20/2021 4.0 3.4 - 5.3 mmol/L Final   06/28/2021 4.0 3.4 - 5.3 mmol/L Final     Chloride   Date Value Ref Range Status   08/20/2021 107 94 - 109 mmol/L Final   06/28/2021 104 94 - 109 mmol/L Final     Carbon Dioxide   Date Value Ref Range Status   06/28/2021 26 20 - 32 mmol/L Final     Carbon Dioxide (CO2)   Date Value Ref Range Status   08/20/2021 27 20 - 32 mmol/L Final     Anion Gap   Date Value Ref Range Status   08/20/2021 5 3 - 14 mmol/L Final   06/28/2021 7 3 - 14 mmol/L Final     Glucose   Date Value Ref Range Status   08/20/2021 140 (H) 70 - 99 mg/dL Final   06/28/2021 127 (H) 70 - 99 mg/dL Final     Urea Nitrogen   Date Value Ref Range Status   08/20/2021 25 7 - 30 mg/dL Final   06/28/2021 17 7 - 30 mg/dL Final     Creatinine   Date Value Ref Range Status   08/20/2021 1.06 0.66 - 1.25 mg/dL Final   06/28/2021 0.91 0.66 - 1.25 mg/dL Final     GFR Estimate   Date Value Ref Range Status   08/20/2021 71 >60 mL/min/1.73m2 Final     Comment:     As of July 11, 2021, eGFR is calculated by the CKD-EPI creatinine equation, without race adjustment. eGFR can be influenced by muscle mass, exercise, and diet. The reported eGFR is an estimation only and is only applicable if the renal function is stable.   06/28/2021 85 >60 mL/min/[1.73_m2] Final     Comment:     Non  GFR Calc  Starting 12/18/2018, serum creatinine based estimated GFR (eGFR) will be   calculated using the Chronic Kidney Disease Epidemiology Collaboration   (CKD-EPI) equation.       Calcium   Date Value Ref Range Status   08/20/2021 9.8 8.5 - 10.1 mg/dL Final   06/28/2021 9.4 8.5 - 10.1 mg/dL Final     Bilirubin  Total   Date Value Ref Range Status   2021 0.9 0.2 - 1.3 mg/dL Final     Alkaline Phosphatase   Date Value Ref Range Status   2021 84 40 - 150 U/L Final     ALT   Date Value Ref Range Status   2021 33 0 - 70 U/L Final   2021 29 0 - 70 U/L Final     AST   Date Value Ref Range Status   2021 23 0 - 45 U/L Final     Recent Labs   Lab Test 21  1102 21  1457 20  1601 20  1601 10/17/16  1408 04/22/15  1241   CHOL 130 146   < > 152.0   < > 139   HDL 58 54   < > 55.0   < > 38*   LDL 52 73  --  74.0   < > 69*   TRIG 98 98   < > 116.0   < > 159*   CHOLHDLRATIO  --   --   --  2.8  --  3.7    < > = values in this interval not displayed.      Lab Results   Component Value Date    A1C 6.4 2021    A1C 6.1 2020    A1C 6.1 2019    A1C 5.8 10/17/2016    A1C 5.9 2015        Recent Results (from the past 4320 hour(s))   Echocardiogram Complete    Narrative    [id number]  [id number]  [id number]  [id number]^GLORIA^[facility]NE[facility]  [facility]  [address]  [address]  [place] [address]  Phone [phone]  Fax [phone]     Name: NILSON CASTILLO  MRN: [id number]  : 1950  Study Date: 2021 08:40 AM  Age: 71 yrs  Gender: Male  Patient Location: [facility]  Reason For Study: Chronic atrial fibrillation (H)  Ordering Physician: DAYNE CASTRO  Referring Physician: DAYNE CASTRO  Performed By: Rubio Gomez SHASHANK     BSA: 2.7 m2  Height: 70 in  Weight: 368 lb  HR: 73  BP: 133/82 mmHg  ______________________________________________________________________________  Procedure  Complete Echo Adult. Optison (NDC #[id number]) given intravenously.     ______________________________________________________________________________  Interpretation Summary     Patient in wheelchair, very limited image quality.     1. The left ventricle is normal in size. The visual ejection  fraction is  estimated at 55%.  2. The right ventricle is not well visualized.  3. Mild valvular aortic stenosis. Mean 12mmHg, Vmax 2.4m/s.     Echo from 2015 showed EF 55%, no valve disease.  ______________________________________________________________________________  Left Ventricle  The left ventricle is normal in size. The visual ejection fraction is  estimated at 55%. Left ventricular diastolic function is indeterminate.  Regional wall motion abnormalities cannot be excluded due to limited  visualization.     Right Ventricle  The right ventricle is not well visualized.     Atria  The left atrium is moderately dilated. Right atrium not well visualized. There  is no atrial shunt seen.     Mitral Valve  The mitral valve is normal in structure and function.     Tricuspid Valve  The tricuspid valve is normal in structure and function.     Aortic Valve  Mild valvular aortic stenosis. Mean 12mmHg, Vmax 2.4m/s.     Pulmonic Valve  The pulmonic valve is not well visualized.     Vessels  The ascending aorta is Borderline dilated. Dilation of the inferior vena cava  is present with abnormal respiratory variation in diameter.     Pericardium  There is no pericardial effusion.     Rhythm  The rhythm was atrial fibrillation with wide QRS rhythm.     ______________________________________________________________________________  MMode/2D Measurements & Calculations  Ao root diam: 3.7 cm  LA dimension: 5.6 cm  asc Aorta Diam: 3.8 cm  LA/Ao: 1.5  LVOT diam: 2.6 cm  LVOT area: 5.4 cm2     LA Volume Index (BP): 44.7 ml/m2     Doppler Measurements & Calculations  MV E max santo: 113.0 cm/sec  MV max P.0 mmHg  MV mean P.4 mmHg  MV V2 VTI: 23.7 cm  MVA(VTI): 4.4 cm2  MV dec time: 0.15 sec  Ao V2 max: 241.4 cm/sec  Ao max P.3 mmHg  Ao V2 mean: 163.4 cm/sec  Ao mean P.0 mmHg  Ao V2 VTI: 45.6 cm  FARA(I,D): 2.3 cm2  FARA(V,D): 2.4 cm2  LV V1 max P.7 mmHg  LV V1 max: 108.3 cm/sec  LV V1 VTI: 19.1 cm  SV(LVOT): 103.2  ml  SI(LVOT): 38.2 ml/m2  PA acc time: 0.12 sec  AV Greyson Ratio (DI): 0.45  FARA Index (cm2/m2): 0.84     E/E': 17.2  Peak E' Greyson: 6.6 cm/sec     ______________________________________________________________________________  Report approved by: Pan Pepper 06/16/2021 09:51 AM

## 2021-10-20 ENCOUNTER — DOCUMENTATION ONLY (OUTPATIENT)
Dept: OTHER | Facility: CLINIC | Age: 71
End: 2021-10-20

## 2021-10-25 ENCOUNTER — ANTICOAGULATION THERAPY VISIT (OUTPATIENT)
Dept: ANTICOAGULATION | Facility: CLINIC | Age: 71
End: 2021-10-25

## 2021-10-25 ENCOUNTER — LAB (OUTPATIENT)
Dept: LAB | Facility: CLINIC | Age: 71
End: 2021-10-25
Payer: COMMERCIAL

## 2021-10-25 ENCOUNTER — TELEPHONE (OUTPATIENT)
Dept: FAMILY MEDICINE | Facility: CLINIC | Age: 71
End: 2021-10-25

## 2021-10-25 DIAGNOSIS — Z79.01 LONG TERM CURRENT USE OF ANTICOAGULANT THERAPY: Primary | ICD-10-CM

## 2021-10-25 DIAGNOSIS — I48.20 CHRONIC ATRIAL FIBRILLATION (H): ICD-10-CM

## 2021-10-25 DIAGNOSIS — Z79.01 LONG TERM CURRENT USE OF ANTICOAGULANT THERAPY: ICD-10-CM

## 2021-10-25 DIAGNOSIS — I50.30 HEART FAILURE WITH PRESERVED EJECTION FRACTION, NYHA CLASS II (H): ICD-10-CM

## 2021-10-25 DIAGNOSIS — I48.21 PERMANENT ATRIAL FIBRILLATION (H): ICD-10-CM

## 2021-10-25 LAB
ALT SERPL W P-5'-P-CCNC: 30 U/L (ref 0–70)
ANION GAP SERPL CALCULATED.3IONS-SCNC: 5 MMOL/L (ref 3–14)
BUN SERPL-MCNC: 25 MG/DL (ref 7–30)
CALCIUM SERPL-MCNC: 9.4 MG/DL (ref 8.5–10.1)
CHLORIDE BLD-SCNC: 104 MMOL/L (ref 94–109)
CHOLEST SERPL-MCNC: 118 MG/DL
CO2 SERPL-SCNC: 29 MMOL/L (ref 20–32)
CREAT SERPL-MCNC: 1.1 MG/DL (ref 0.66–1.25)
FASTING STATUS PATIENT QL REPORTED: YES
GFR SERPL CREATININE-BSD FRML MDRD: 68 ML/MIN/1.73M2
GLUCOSE BLD-MCNC: 135 MG/DL (ref 70–99)
HDLC SERPL-MCNC: 52 MG/DL
INR BLD: 3.8 (ref 0.9–1.1)
LDLC SERPL CALC-MCNC: 44 MG/DL
NONHDLC SERPL-MCNC: 66 MG/DL
POTASSIUM BLD-SCNC: 4.1 MMOL/L (ref 3.4–5.3)
SODIUM SERPL-SCNC: 138 MMOL/L (ref 133–144)
TRIGL SERPL-MCNC: 108 MG/DL

## 2021-10-25 PROCEDURE — 36415 COLL VENOUS BLD VENIPUNCTURE: CPT

## 2021-10-25 PROCEDURE — 80048 BASIC METABOLIC PNL TOTAL CA: CPT

## 2021-10-25 PROCEDURE — 85610 PROTHROMBIN TIME: CPT

## 2021-10-25 PROCEDURE — 36416 COLLJ CAPILLARY BLOOD SPEC: CPT

## 2021-10-25 PROCEDURE — 84460 ALANINE AMINO (ALT) (SGPT): CPT

## 2021-10-25 PROCEDURE — 80061 LIPID PANEL: CPT

## 2021-10-25 RX ORDER — WARFARIN SODIUM 5 MG/1
TABLET ORAL
Qty: 100 TABLET | Refills: 3
Start: 2021-10-25 | End: 2021-12-02

## 2021-10-25 NOTE — TELEPHONE ENCOUNTER
ANTICOAGULATION MANAGEMENT      Markie Shepard due for annual renewal of referral to anticoagulation monitoring. Order pended for your review and signature.      ANTICOAGULATION SUMMARY      Warfarin indication(s)     Atrial fibrillation    Heart valve present?  NO       Current goal range   INR: 2.0-3.0     Goal appropriate for indication? Yes, INR 2-3 appropriate for hx of DVT, PE, hypercoagulable state, Afib, LVAD, or bileaflet AVR without risk factors     Current duration of therapy Indefinite/long term therapy   Time in Therapeutic Range (TTR)  (Goal > 60%) 54.9%       Office visit with referring provider's group within last year yes on 6/28/21       Tierra Bass RN

## 2021-10-25 NOTE — PROGRESS NOTES
ANTICOAGULATION MANAGEMENT     Markie Shepard 71 year old male is on warfarin with supratherapeutic INR result. (Goal INR 2.0-3.0)    Recent labs: (last 7 days)     10/25/21  1129   INR 3.8*       ASSESSMENT     Source(s): Chart Review and Patient/Caregiver Call       Warfarin doses taken: Warfarin taken as instructed    Diet: since starting torsemide in July he went from 1/2-3/4 cup spinach salad to 1 cum spinach salad daily and is getting tired of it    New illness, injury, or hospitalization: No    Medication/supplement changes: torsemide dose increased on 10/5/21 Concurrent use of TORSEMIDE and WARFARIN may result in increased warfarin plasma concentrations, decreased warfarin clearance, and elevated INR. Continues with MVI which has 60 mcg vit K.    Signs or symptoms of bleeding or clotting: Yes: bruise to right arm after blood draw 1 month ago has not gone away, though is diminishing now.    Previous INR: Therapeutic last visit; previously outside of goal range    Additional findings: None     PLAN     Recommended plan for ongoing change(s) affecting INR     Dosing Instructions:  Decrease your warfarin dose (14% change) with next INR in 2 weeks. He also plans to mix his spinach with other greens to provide more variety so he can continue with daily intake.    Summary  As of 10/25/2021    Full warfarin instructions:  2.5 mg every Mon, Fri; 5 mg all other days   Next INR check:  11/8/2021             Telephone call with Markie who verbalizes understanding and agrees to plan    Lab visit scheduled    Education provided: Please call back if any changes to your diet, medications or how you've been taking warfarin, Importance of consistent vitamin K intake, Impact of vitamin K foods on INR, Goal range and significance of current result, Monitoring for bleeding signs and symptoms and Contact [phone]  with any changes, questions or concerns.     Plan made per [facility] anticoagulation protocol    Tierra Bass,  RN  Anticoagulation Clinic  10/25/2021    _______________________________________________________________________     Anticoagulation Episode Summary     Current INR goal:  2.0-3.0   TTR:  53.7 % (1 y)   Target end date:  Indefinite   Send INR reminders to:  DAYLIN DALY    Indications    Long-term (current) use of anticoagulants [Z79.01] [Z79.01]  Chronic atrial fibrillation (H) [I48.20]           Comments:  PREFERS PETER! Quit Smoking 9/29.         Anticoagulation Care Providers     Provider Role Specialty Phone number    Mali Patel MD Referring Internal Medicine [phone]

## 2021-10-26 ENCOUNTER — TELEPHONE (OUTPATIENT)
Dept: FAMILY MEDICINE | Facility: CLINIC | Age: 71
End: 2021-10-26

## 2021-10-26 NOTE — TELEPHONE ENCOUNTER
ANTICOAGULATION     Markie Shepard is overdue for INR check.     Per chart review, pt scheduled for INR check on 11/8. No reminder call made.    Chelsie Encinas RN

## 2021-10-29 NOTE — PROGRESS NOTES
"Markie Shepard is a 71 year old male being evaluated via a billable telephone visit.     \"This telephone visit will be conducted via a call between you and your physician/provider. We have found that certain health care needs can be provided without the need for an in-person visit or physical exam.  This service lets us provide the care you need with a telephone conversation.  If a prescription is necessary we can send it directly to your pharmacy.  If lab work is needed we can place an order for that and you can then stop by our lab to have the test done at a later time.\"    Telephone visits are billed at different rates depending on your insurance coverage.  Please reach out to your insurance provider with any questions.    Will you be in the [facility] at the time of the visit? Yes      Patient has given verbal consent for  a Telephone visit? Yes    What telephone number would you like your provider to contact at at:  [phone]    How would you like to obtain your AVS? Mail a copy    Christel Edge MA    Telephone Visit Details:     Telephone Visit Start Time: 208 p.m.    Telephone Visit End Time:228 p.m.    [facility]     Sleep clinic follow up visit note    Date on this visit: 11/1/2021    Primary Physician: Mali Patel     Chief complaint: Follow-up of EMORY, review CPAP compliance.    Markie Shepard 71 year old male with h/o  legal blindness, morbid obesity, coronary artery disease status post PCI, permanent atrial fibrillation (on warfarin), sleep apnea, significant prior smoking history, probable COPD, heart failure with preserved ejection fraction, who presents for telephone visit today for follow up of EMORY.  He was set up at [facility] on August 11, 2020. Patient received a Resmed AirSense 10 Auto. Pressures were set at 9 cm H2O.  He reports using the CPAP device regularly during sleep.  He uses FFM and reports occasional air leaks which he attributes to not having replaced " the mask in a while.  He sleeps alone, hence there is not anyone to report whether or not he snores with the CPAP device.   He denies awakenings due to choking/gasping for air while using the CPAP device.    He reports feeling comfortable with the current pressure settings on the device.    He reports occasional  daytime sleepiness/fatigue.    He goes to bed shortly at 1 AM, wake up at 4:15AM to use bathroom, goes back to sleep and puts on the mask  and falls back sleep and sleeps for 2-3 hours and after that he wakes up, and can't get back to sleep and  moves to recliner turns on TV and is able to fall back asleep for 1-3 hours (he does not use CPAP when he sleeps in recliner).    He doesn't drive since he is legally blind.    Previous sleep study reports:  PSG#1 diagnostic sleep study(September 18, 2012), He underwent an overnight diagnostic sleep study on September 18, 2012, at [facility].  At that time he weighed 308 pounds and was noted to have an AHI of 8.4/h and during that sleep study REM sleep was not observed.     PSG#2 all-night titration study(Oct 2012)He underwent a repeat sleep study in  October 2012 which was an all-night titration study using CPAP device and was prescribed CPAP at pressure setting of 9 cm of water.    CPAP Compliance download:            ResMed   CPAP 9.0 cmH2O 30 day usage data  76% of days with > 4 hours of use. 0/30 days with no use.   Average use 305 minutes per day.   95%ile Leak 46.37 L/min.   AHI 0.51 events per hour.     He reports on and off fatigue that he attributes to several adjustments that have been made to his medications in the last year.  Furosmide was switched to Torsemide 2 tabs in morning and 1 tab at lunch  Simvastatin switched to Rosuvastatin  Fluoxetine 40 mg was increased to 60 mg by his primary care provider, but patient reduced to 40 mg and notices improvement in the fatigue level since then and reports that his depression  has been reasonably controlled.  Losartan changed from 100 mg/day to 75 mg/day   Amlodipine 10 mg/day reduced to to 5 mg/day    Allergies:    Allergies   Allergen Reactions     Ambien Other (See Comments)     Parasomnia with sleep walking, injuries, delusions.  May have been in DTs at the same time.     Lisinopril Cough     Pt had cough     Seasonal Allergies        Medications:    Current Outpatient Medications   Medication Sig Dispense Refill     acetaminophen (TYLENOL) 500 MG tablet Take 500-1,000 mg by mouth every 6 hours as needed for mild pain       albuterol (PROAIR RESPICLICK) 108 (90 Base) MCG/ACT inhaler Inhale 2 puffs into the lungs every 4 hours as needed for shortness of breath / dyspnea 1 each 5     amLODIPine (NORVASC) 5 MG tablet Take 1 tablet (5 mg) by mouth daily 90 tablet 3     atenolol (TENORMIN) 100 MG tablet Take 1 tablet (100 mg) by mouth daily along with 50 mg tablet for total of 150 mg daily. 90 tablet 1     atenolol (TENORMIN) 50 MG tablet Take 1 tablet (50 mg) by mouth daily Along with 100 mg tablet for total of 150 mg daily. 90 tablet 1     CENTRUM SILVER OR TABS ONE DAILY 3 MONTHS 1 YEAR     FLUoxetine (PROZAC) 20 MG capsule Take 3 daily (Patient taking differently: Take 20 mg by mouth Take 2 tabs PO daily) 270 capsule 1     fluticasone (FLONASE) 50 MCG/ACT nasal spray Spray 1 spray into both nostrils daily       fluticasone-vilanterol (BREO ELLIPTA) 200-25 MCG/INH inhaler Inhale 1 puff into the lungs daily 60 each 11     losartan (COZAAR) 25 MG tablet Take 3 tablets (75 mg) by mouth daily 270 tablet 3     omega-3 fatty acids (FISH OIL) 1200 MG capsule Take 1 capsule by mouth daily       oxymetazoline (AFRIN 12 HOUR) 0.05 % nasal spray Spray 2 sprays into both nostrils At Bedtime       potassium chloride ER (K-TAB) 20 MEQ CR tablet Take 1 tablet (20 mEq) by mouth 2 times daily 180 tablet 3     rosuvastatin (CRESTOR) 40 MG tablet Take 1 tablet (40 mg) by mouth At Bedtime 90 tablet 3      torsemide (DEMADEX) 20 MG tablet Take 2 tablets (40 mg) by mouth every morning AND 1 tablet (20 mg) daily (with lunch). 270 tablet 3     traZODone (DESYREL) 100 MG tablet Take 1 tablet (100 mg) by mouth nightly as needed for sleep 90 tablet 1     umeclidinium (INCRUSE ELLIPTA) 62.5 MCG/INH inhaler Inhale 1 puff into the lungs daily 90 each 3     warfarin ANTICOAGULANT (COUMADIN ANTICOAGULANT) 5 MG tablet Take 2.5 mg on Mondays and Fridays, and 5 mg all other days, or as directed by Coumadin nurse. 100 tablet 3       Problem List:  Patient Active Problem List    Diagnosis Date Noted     Heart failure with preserved ejection fraction, NYHA class II (H)      Priority: Medium     Nonrheumatic aortic valve stenosis      Priority: Medium     Type 2 diabetes mellitus without complication, without long-term current use of insulin (H) 06/29/2021     Priority: Medium     Prediabetes 04/12/2021     Priority: Medium     A1c 6.1% 2020       Chronic obstructive pulmonary disease, unspecified COPD type (H) 04/12/2021     Priority: Medium     Morbid obesity (H) 04/12/2021     Priority: Medium     Chronic recurrent major depressive disorder (H) 04/12/2021     Priority: Medium     Bilateral leg edema 04/12/2021     Priority: Medium     Insomnia, unspecified type 04/12/2021     Priority: Medium     Anisocoria 02/18/2020     Priority: Medium     Left pupil smaller than right , legally blind, left eye       Long-term (current) use of anticoagulants [Z79.01] 03/21/2016     Priority: Medium     Essential hypertension, benign 04/21/2015     Priority: Medium     Low back pain 09/24/2013     Priority: Medium     Mild major depression (H) 06/18/2013     Priority: Medium     Obstructive sleep apnea 11/18/2012     Priority: Medium     Wears CPAP, has associated polycythemia  Problem list name updated by automated process. Provider to review       Smoker 09/26/2012     Priority: Medium     1/2 ppd       Essential hypertension with goal blood  pressure less than 140/90 09/26/2012     Priority: Medium     Chronic atrial fibrillation (H) 07/26/2012     Priority: Medium     Started on coumadin, goes to [facility] for INR checks       CAD (coronary artery disease) 07/03/2012     Priority: Medium     Inferior MI, RCA totally occluded on angiogram 7/3/2012  EF 40-45% on angiogram  Medically managed       Hyperlipidemia LDL goal <70 07/03/2012     Priority: Medium     Mixed hyperlipidemia 10/05/2007     Priority: Medium        Past Medical/Surgical History:  Past Medical History:   Diagnosis Date     Adjustment disorder with depressed mood 06/17/2012     ALCOHOL ABUSE-UNSPEC 10/05/2007     Atrial fibrillation (H)      Chorioretinitis of both eyes 02/05/2012    Right eye worse than left. On Cellcept, steroid taper     Coronary atherosclerosis of unspecified type of vessel, native or graft      Diabetes mellitus type 2 in obese (H)      Essential hypertension, benign      Heart failure with preserved ejection fraction, NYHA class II (H)      Mixed hyperlipidemia 10/05/2007     Nonrheumatic aortic valve stenosis      Polycythemia      Sleep apnea      Tobacco use disorder 10/05/2007    Smoker - 1/2 ppd. Started at 16 years     Total retinal detachment of left eye 2009/2010     Past Surgical History:   Procedure Laterality Date     C OPEN RX ANKLE DISLOCATN+FIXATN  12/2008    Right Ankle     CARDIAC CATHERIZATION  7/03/2012    totally occluded RCA w/ mild left coronary disease     HEART CATH CORONARY ANGIOGRAM AND RIGHT HEART CATH  7/2012    RCA occlusion, IMI, no stent     PHACOEMULSIFICATION CLEAR CORNEA WITH STANDARD INTRAOCULAR LENS IMPLANT Right 6/4/2015    Procedure: PHACOEMULSIFICATION CLEAR CORNEA WITH STANDARD INTRAOCULAR LENS IMPLANT;  Surgeon: Tal Trinidad MD;  Location: [facility]     RETINAL REATTACHMENT  11/09, 7/10    Left     TONSILLECTOMY       Z NONSPECIFIC PROCEDURE  1987    Right Epididymis Removed       Social History:  Social History      Socioeconomic History     Marital status:      Spouse name: Not on file     Number of children: 2     Years of education: 19     Highest education level: Not on file   Occupational History     Occupation: actor     Employer: UNEMPLOYED     Employer: [employer]     Occupation:    Tobacco Use     Smoking status: Former Smoker     Packs/day: 1.00     Years: 53.00     Pack years: 53.00     Types: Cigarettes     Quit date: 2019     Years since quittin.0     Smokeless tobacco: Never Used     Tobacco comment: 1 ppd   Substance and Sexual Activity     Alcohol use: No     Comment: quit drinking heavily      Drug use: No     Sexual activity: Never   Other Topics Concern     Parent/sibling w/ CABG, MI or angioplasty before 65F 55M? Not Asked      Service Not Asked     Blood Transfusions Not Asked     Caffeine Concern Yes     Occupational Exposure Not Asked     Hobby Hazards Not Asked     Sleep Concern Not Asked     Stress Concern Not Asked     Weight Concern Not Asked     Special Diet No     Back Care Not Asked     Exercise Not Asked     Comment: 1 miles on stepper      Bike Helmet Not Asked     Seat Belt Not Asked     Self-Exams Not Asked   Social History Narrative    Balanced Diet - Yes    Osteoporosis Preventative measures-  Dairy servings per day: 1-3    Regular Exercise -  Yes Describe walk every day    Dental Exam up - YES - Date: 07    Eye Exam - YES - Date: 2 yrs    Self Testicular Exam -  One testicle removed yrs ago due to fallDo you have any concerns about STD's -  No    Abuse: Current or Past (Physical, Sexual or Emotional)- No    Do you feel safe in your environment - Yes    Guns stored in the home - No    Sunscreen used - Yes    Seatbelts used - Yes    Lipids - ?    Glucose -  ?    Colon Cancer Screening - No    Hemoccults - hemorrhoids    PSA - NO    Digital Rectal Exam - YES - Date:     Immunizations reviewed and up to date - No    Tory NAILS      Social Determinants of Health     Financial Resource Strain:      Difficulty of Paying Living Expenses:    Food Insecurity:      Worried About Running Out of Food in the Last Year:      Ran Out of Food in the Last Year:    Transportation Needs:      Lack of Transportation (Medical):      Lack of Transportation (Non-Medical):    Physical Activity:      Days of Exercise per Week:      Minutes of Exercise per Session:    Stress:      Feeling of Stress :    Social Connections:      Frequency of Communication with Friends and Family:      Frequency of Social Gatherings with Friends and Family:      Attends Yazidism Services:      Active Member of Clubs or Organizations:      Attends Club or Organization Meetings:      Marital Status:    Intimate Partner Violence:      Fear of Current or Ex-Partner:      Emotionally Abused:      Physically Abused:      Sexually Abused:        Family History:  Family History   Problem Relation Age of Onset     Arthritis Mother      Circulatory Mother         varicose veins     Eye Disorder Mother         detached retina     Thyroid Disease Mother      C.A.D. Father         Quadruple bypass     Gastrointestinal Disease Father         diverticulitis     Alzheimer Disease Father         demntia     Eye Disorder Father         cataract     Depression Sister         Post Partum      Alcoholism Sister      Neurologic Disorder Sister         Brain Aneurysm     Thyroid Disease Brother      Eye Disorder Brother         detached retina         Physical Examination:  Vitals: There were no vitals taken for this visit.  BMI= There is no height or weight on file to calculate BMI.      Dickens Total Score 10/29/2021   Total score - Dickens 3     General: No apparent distress  Chest: No cough, no audible wheezing, able to talk in full sentences  Psych: coherent speech, normal rate and volume, able to articulate logical thoughts, able   to abstract reason, no tangential thoughts, no hallucinations  "  or delusions  His affect is normal  Neuro:  Mental status: Alert and  Oriented X 3  Speech: normal       Impression/Plan:  Obstructive sleep apnea: Pt reports adequate compliance with PAP therapy and reports positive benefits with PAP use.   EMORY is adequately controlled with CPAP at the current settings per compliance DL.   Prescription provided for renewal of PAP supplies.  Recommended him to continue using the CPAP regularly during sleep and instructed  to get  the supplies for the PAP replaced regularly.    Air leaks will hopefully resolve once he replaces the mask. If they do not resolve in spite of mask replacement, he was instructed to get back to me and he agreed  Patient instructed to remember to bring PAP with him  if hospitalized and if anticipating procedure that requires sedation/surgery to be sure to discuss with the provider/surgeon that he  has sleep apnea and uses PAP therapy.    H/o CAD, heart failure with preserved ejection fraction, history of atrial fibrillation, hypertension: He will continue follow-up with Cardiology.  We discussed weight management with healthy diet, and exercise.      Patient was strongly advised to avoid driving, operating any heavy machinery or other hazardous situations while drowsy or sleepy.  Patient was counseled on the importance of driving while alert, to pull over if drowsy, or nap before getting into the vehicle if sleepy.        Plan is to follow up in 1 year or sooner if any concerns.    CC: No ref. provider found      The above note was dictated using voice recognition software. Although reviewed after completion, some word and grammatical error may remain . Please contact the author for any clarifications.    \"I spent a total of 30  minutes with Markie Shepard during today's telephone visit, most  of this time was spent counseling the patient and  coordinating care regarding  EMORY, CPAP treatment,  going over PAP download,  renewal of CPAP supplies, chart " "review  including documentation and further activities as noted above.\"      Val Jim MD  [address] [address] [phone]  Dept: [phone]                "

## 2021-10-29 NOTE — PATIENT INSTRUCTIONS

## 2021-11-01 ENCOUNTER — VIRTUAL VISIT (OUTPATIENT)
Dept: SLEEP MEDICINE | Facility: CLINIC | Age: 71
End: 2021-11-01
Payer: COMMERCIAL

## 2021-11-01 DIAGNOSIS — G47.33 OSA ON CPAP: Primary | ICD-10-CM

## 2021-11-01 PROCEDURE — 99214 OFFICE O/P EST MOD 30 MIN: CPT | Mod: 95 | Performed by: INTERNAL MEDICINE

## 2021-11-02 ENCOUNTER — TELEPHONE (OUTPATIENT)
Dept: SLEEP MEDICINE | Facility: CLINIC | Age: 71
End: 2021-11-02

## 2021-11-02 NOTE — NURSING NOTE
AVS sent via mail to [address] [address]   on 11/2/2021 2:02 PM            RENEE Mendoza  [facility]

## 2021-11-04 ENCOUNTER — TELEPHONE (OUTPATIENT)
Dept: CARDIOLOGY | Facility: CLINIC | Age: 71
End: 2021-11-04

## 2021-11-04 NOTE — TELEPHONE ENCOUNTER
"Call placed to pt to review results and recommendations.       Component      Latest Ref Rng & Units 10/25/2021   Sodium      133 - 144 mmol/L 138   Potassium      3.4 - 5.3 mmol/L 4.1   Chloride      94 - 109 mmol/L 104   Carbon Dioxide      20 - 32 mmol/L 29   Anion Gap      3 - 14 mmol/L 5   Urea Nitrogen      7 - 30 mg/dL 25   Creatinine      0.66 - 1.25 mg/dL 1.10   Calcium      8.5 - 10.1 mg/dL 9.4   Glucose      70 - 99 mg/dL 135 (H)   GFR Estimate      >60 mL/min/1.73m2 68   Cholesterol      <200 mg/dL 118   Triglycerides      <150 mg/dL 108   HDL Cholesterol      >=40 mg/dL 52   LDL Cholesterol Calculated      <=100 mg/dL 44   Non HDL Cholesterol      <130 mg/dL 66   Patient Fasting > 8hrs?       Yes   ALT      0 - 70 U/L 30         ----- Message from Andrea Lockett MD sent at 10/28/2021  7:53 AM CDT -----  Results reviewed, please let the patient know that overall findings are reassuring, normal electrolytes and kidney function, and lipid panel is favorable. Follow up as previously planned, thanks!    Pt reports he has had some increased fatigue over the past couple days but has also has elevated INR readings. Denies LH / DZ / CP. Pt reports he feels well today and is not sure I this is related to the \"bug \" going around or his medication changes. Pt provided with reassurance that it is atypical sxs of increased diuretic. Pt reports he is going to continue monitoring and will call with any concerns.   YAZAN العلي RN, BSN. 11/04/21 2:04 PM     "

## 2021-11-08 ENCOUNTER — ANTICOAGULATION THERAPY VISIT (OUTPATIENT)
Dept: ANTICOAGULATION | Facility: CLINIC | Age: 71
End: 2021-11-08

## 2021-11-08 ENCOUNTER — LAB (OUTPATIENT)
Dept: LAB | Facility: CLINIC | Age: 71
End: 2021-11-08
Payer: COMMERCIAL

## 2021-11-08 DIAGNOSIS — Z79.01 LONG TERM CURRENT USE OF ANTICOAGULANT THERAPY: ICD-10-CM

## 2021-11-08 DIAGNOSIS — I48.20 CHRONIC ATRIAL FIBRILLATION (H): ICD-10-CM

## 2021-11-08 DIAGNOSIS — Z79.01 LONG TERM CURRENT USE OF ANTICOAGULANT THERAPY: Primary | ICD-10-CM

## 2021-11-08 LAB — INR BLD: 2.2 (ref 0.9–1.1)

## 2021-11-08 PROCEDURE — 85610 PROTHROMBIN TIME: CPT

## 2021-11-08 PROCEDURE — 36416 COLLJ CAPILLARY BLOOD SPEC: CPT

## 2021-11-08 NOTE — PROGRESS NOTES
ANTICOAGULATION MANAGEMENT     Markie Shepard 71 year old male is on warfarin with therapeutic INR result. (Goal INR 2.0-3.0)    Recent labs: (last 7 days)     11/08/21  1117   INR 2.2*       ASSESSMENT     Source(s): Chart Review and Patient/Caregiver Call       Warfarin doses taken: Warfarin taken as instructed    Diet: No new diet changes identified    New illness, injury, or hospitalization: No    Medication/supplement changes: None noted    Signs or symptoms of bleeding or clotting: No    Previous INR: Supratherapeutic    Additional findings: None     PLAN     Recommended plan for no diet, medication or health factor changes affecting INR     Dosing Instructions: Continue your current warfarin dose with next INR in 5 weeks. Wanted to recheck in 3 weeks - patient said he cannot do that and asked for 6 weeks. I offered to compromise and reschedule in 4 weeks but ultimately the soonest I could get him to come in was 5 weeks.     Summary  As of 11/8/2021    Full warfarin instructions:  2.5 mg every Mon, Fri; 5 mg all other days   Next INR check:  12/13/2021             Telephone call with Markie who verbalizes understanding and agrees to plan    Lab visit scheduled    Education provided: Please call back if any changes to your diet, medications or how you've been taking warfarin, Goal range and significance of current result and Importance of following up at instructed interval    Plan made per [facility] anticoagulation protocol    Pedro Campuzano RN  Anticoagulation Clinic  11/8/2021    _______________________________________________________________________     Anticoagulation Episode Summary     Current INR goal:  2.0-3.0   TTR:  55.6 % (1 y)   Target end date:  Indefinite   Send INR reminders to:  DAYLIN DALY    Indications    Long-term (current) use of anticoagulants [Z79.01] [Z79.01]  Chronic atrial fibrillation (H) [I48.20]           Comments:  PREFERS PETER! Quit Smoking 9/29.          Anticoagulation Care Providers     Provider Role Specialty Phone number    Mali Patel MD Referring Internal Medicine [phone]

## 2021-12-01 ENCOUNTER — VIRTUAL VISIT (OUTPATIENT)
Dept: FAMILY MEDICINE | Facility: CLINIC | Age: 71
End: 2021-12-01
Payer: COMMERCIAL

## 2021-12-01 ENCOUNTER — TELEPHONE (OUTPATIENT)
Dept: FAMILY MEDICINE | Facility: CLINIC | Age: 71
End: 2021-12-01

## 2021-12-01 DIAGNOSIS — F33.9 CHRONIC RECURRENT MAJOR DEPRESSIVE DISORDER (H): Primary | ICD-10-CM

## 2021-12-01 DIAGNOSIS — J44.9 CHRONIC OBSTRUCTIVE PULMONARY DISEASE, UNSPECIFIED COPD TYPE (H): ICD-10-CM

## 2021-12-01 ASSESSMENT — ANXIETY QUESTIONNAIRES
IF YOU CHECKED OFF ANY PROBLEMS ON THIS QUESTIONNAIRE, HOW DIFFICULT HAVE THESE PROBLEMS MADE IT FOR YOU TO DO YOUR WORK, TAKE CARE OF THINGS AT HOME, OR GET ALONG WITH OTHER PEOPLE: NOT DIFFICULT AT ALL
3. WORRYING TOO MUCH ABOUT DIFFERENT THINGS: NOT AT ALL
6. BECOMING EASILY ANNOYED OR IRRITABLE: SEVERAL DAYS
2. NOT BEING ABLE TO STOP OR CONTROL WORRYING: NOT AT ALL
1. FEELING NERVOUS, ANXIOUS, OR ON EDGE: SEVERAL DAYS
7. FEELING AFRAID AS IF SOMETHING AWFUL MIGHT HAPPEN: SEVERAL DAYS
5. BEING SO RESTLESS THAT IT IS HARD TO SIT STILL: NOT AT ALL
GAD7 TOTAL SCORE: 3

## 2021-12-01 ASSESSMENT — PATIENT HEALTH QUESTIONNAIRE - PHQ9: 5. POOR APPETITE OR OVEREATING: NOT AT ALL

## 2021-12-01 NOTE — PROGRESS NOTES
Markie is a 71 year old who is being evaluated via a billable telephone visit.      What phone number would you like to be contacted at? [phone]  How would you like to obtain your AVS? Mail a copy    Start time: 4:05 PM  End time: 4:27 PM  Total : 22 minutes      Assessment & Plan     (F33.9) Chronic recurrent major depressive disorder (H)  Comment: currently taking fluoxetine 40 mg daily and would like to continue tapering dose  Plan: Decrease dose of fluoxetine to 20 mg daily x 2 weeks, then further decrease to 20 mg M/W/F x 2 weeks. If mood worsens, Markie can increase his dose back to 20 mg daily. If his mood is good, he can discontinue fluoxetine entirely at that time.    (J44.9) Chronic obstructive pulmonary disease, unspecified COPD type (H)  Comment: reports insurance no longer covers Incruse, asking about alternatives  Plan: Will have the  call to schedule an appointment with Rufina Alves for medication management, finding a covered replacement for Incruse inhaler.     25 minutes spend on the date of this encounter doing chart review, history and exam, documentation and further activities as noted above.     Mali Patel MD  Internal Medicine/Pediatrics      I, Kaley Rodriges, am serving as a scribe to document services personally performed by Dr. Mali Patel, based on data collection and the provider's statements to me. Dr. Patel has reviewed, edited, and approved the above note.       Subjective   Markie is a 71 year old who presents for the following health issues    HPI     Major depressive disorder (H)  Markie has history of depression and insomnia. At our 4/12/21 visit, he reported worsening anxiety symptoms since the start of 2021. He noted getting agitated and having trouble making decisions. These symptoms were exacerbated by the civil unrest, [place], and political climate. He was concerned about his children and his grandchildren. Meditation was unhelpful, could  not focus. Markie's vision was also causing anxiety and he felt vulnerable when leaving his home. Just the traffic and fear of falling could feel overwhelming. I increased his dose of fluoxetine from 40 mg daily to 60 mg daily at that time. He has a support system of family and friends, no formal counseling. No ETOH use. Offered a referral to in-clinic [facility], Shawn Ullrich, if he desired.    Today, Markie reports that he dropped back down to 40 mg of fluoxetine daily about one month ago. He would like to continue weaning off of this medication.    PHQ 12/9/2019 4/12/2021 12/1/2021   PHQ-9 Total Score 1 7 6   Q9: Thoughts of better off dead/self-harm past 2 weeks Not at all Not at all Not at all     YAAKOV-7 SCORE 12/9/2019 4/12/2021 12/1/2021   Total Score - - -   Total Score 1 20 3       Edema, Weight discussion  Markie is wondering it the fluoxetine could be impacting his weight. He currently weighs about 360 pounds has not gained weight, but feels as though his belly is larger. He takes torsemide 20 mg tablets, 2 in the morning and 1 at lunch as prescribed by cardiology.    Wt Readings from Last 4 Encounters:   10/05/21 (!) 167.8 kg (370 lb)   08/20/21 (!) 166.5 kg (367 lb)   07/16/21 (!) 169.2 kg (373 lb)   06/28/21 (!) 166.5 kg (367 lb)       Asthma, COPD  Markie recently got a letter notifying him that his insurance will no longer cover his Incruse inhaler starting January 1st. He uses this around 6 PM and has enough to last him through January 2022. He was not notified about covered alternatives. Markie is also using a Breo inhaler every morning around 9 AM but is not sure of it's purpose. He typically needs to use his albuterol rescue inhaler around 2 PM and again around 11 PM before going to bed around 1 AM. His need to use the albuterol inhaler is making him question if the Breo inhaler is working.     He noticed more difficulty breathing during the allergy season but this has tapered down recently. He breathing is  good at this time.       Review of Systems   Constitutional, HEENT, cardiovascular, pulmonary, gi and gu systems are negative, except as otherwise noted.      Objective       Vitals:  No vitals were obtained today due to virtual visit.    Physical Exam   healthy, alert and no distress  PSYCH: Alert and oriented times 3; coherent speech, normal   rate and volume, able to articulate logical thoughts, able   to abstract reason, no tangential thoughts, no hallucinations   or delusions  His affect is normal  RESP: No cough, no audible wheezing, able to talk in full sentences  Remainder of exam unable to be completed due to telephone visits          Phone call duration: 22 minutes

## 2021-12-01 NOTE — TELEPHONE ENCOUNTER
Who is calling? Patient   Reason for Call: 1/1/2022 insurances will change requirements for Fluoxetine and umeclidinium (INCRUSE ELLIPTA) 62.5 MCG/INH inhaler. And his hoping to stop some medication due to feeling fatigue. And has a question on albuterol (PROAIR RESPICLICK) 108 (90 Base) MCG/ACT inhaler

## 2021-12-02 DIAGNOSIS — I48.20 CHRONIC ATRIAL FIBRILLATION (H): ICD-10-CM

## 2021-12-02 DIAGNOSIS — I10 ESSENTIAL HYPERTENSION WITH GOAL BLOOD PRESSURE LESS THAN 140/90: ICD-10-CM

## 2021-12-02 RX ORDER — ATENOLOL 100 MG/1
100 TABLET ORAL DAILY
Qty: 90 TABLET | Refills: 1 | Status: SHIPPED | OUTPATIENT
Start: 2021-12-02 | End: 2022-06-07

## 2021-12-02 RX ORDER — WARFARIN SODIUM 5 MG/1
TABLET ORAL
Qty: 100 TABLET | Refills: 3 | Status: SHIPPED | OUTPATIENT
Start: 2021-12-02 | End: 2022-12-26

## 2021-12-02 ASSESSMENT — PATIENT HEALTH QUESTIONNAIRE - PHQ9: SUM OF ALL RESPONSES TO PHQ QUESTIONS 1-9: 6

## 2021-12-02 ASSESSMENT — ANXIETY QUESTIONNAIRES: GAD7 TOTAL SCORE: 3

## 2021-12-02 NOTE — TELEPHONE ENCOUNTER
Last visit was virtually on 12/02/2021, no future visits scheduled.    Prescription approved per [facility] Refill Protocol.  Usha Do RN  [facility]

## 2021-12-09 ENCOUNTER — VIRTUAL VISIT (OUTPATIENT)
Dept: FAMILY MEDICINE | Facility: CLINIC | Age: 71
End: 2021-12-09
Payer: COMMERCIAL

## 2021-12-09 DIAGNOSIS — F33.9 CHRONIC RECURRENT MAJOR DEPRESSIVE DISORDER (H): ICD-10-CM

## 2021-12-09 DIAGNOSIS — J44.9 CHRONIC OBSTRUCTIVE PULMONARY DISEASE, UNSPECIFIED COPD TYPE (H): Primary | ICD-10-CM

## 2021-12-09 RX ORDER — FLUOXETINE 40 MG/1
40 CAPSULE ORAL DAILY
Qty: 30 CAPSULE | Refills: 3 | Status: SHIPPED | OUTPATIENT
Start: 2021-12-09 | End: 2022-04-07

## 2021-12-09 NOTE — Clinical Note
Luis Miguel Patel,     My name is Loida and I am a student pharmacist on rotation with Dr. Alves. We saw Markie on the 9th to discuss some of his concerns regarding his medications. During our conversation, it came up that his regular INR visits for his warfarin monitoring are inconvenient for him due to his vision impairment and need to arrange a ride as he cannot drive himself. We discussed the potential of transitioning to a DOAC and he was interested in this option.     In reviewing his chart, he has no observed contraindications for DOAC therapy (no mitral stenosis or mechanical heart valve, no documented liver damage, eGFR > 60). Apixaban would be a preferable DOAC as it has more data for use in the elderly, those with a lower CrCl, and has adjustments for weight/renal function. There may be cost concerns and we would plan to have that cost/benefit conversation with him. If you're agreeable to this change, we can discuss it with Markie during our follow up on Thursday.    Loida Murry

## 2021-12-09 NOTE — PROGRESS NOTES
ASSESSMENT:    1. Condition - Depression: Efficacy - Dosage too low: Dose too low  Patient-initiated fluoxetine taper resulted in an increase in depressive symptoms prompting the desire to go back to a higher dose. Appropriate to increase dose at this time and reassess in the near future to determine if increase dose was effective at reducing the severity and frequency of depressive symptoms.     2. Condition - COPD: Adherence - Cost: More cost effective medication available  Utilizing the 2022 [employer]ul[employer], the only [place] that will be covered are the Spiriva Handihaler and Spiriva Respimat. Due to the patient's vision impairment, an aerosolized product would be easier to use and thus Spiriva Respimat would be the preferred LAMA in this case.      3. Condition - COPD: Safety - Dosage too high: Frequency inappropriate  Per the package insert, Afrin nasal spray should not be used for more than 3 consecutive days as frequent or prolonged use may cause nasal congestion to recur or worsen. Due to the patient failing cessation of use and feeling as though Afrin is necessary in the evening to open up nasal passages prior to CPAP placement, an alternate agent that is safe to utilize long term would be more appropriate such as a corticosteroid. The patient currently utilizes Flonase daily in the morning, it would be appropriate to have a discussion with the patient to see if he would be willing to utilize Flonase twice daily and discontinue use of Afrin to avoid rebound congestion.     4. Condition - Anticoagulation: Adherence - Adherence: More convenient medication available  The patient is currently on warfarin for atrial fibrillation. Due to the patient's vision impairment, INR appointments are challenging for him. Based on chart review, the patient has no existing contraindications for DOACs (no mitral stenosis or mechanical heart valve, no documented liver damage, eGFR > 60). Apixaban  would be a preferable DOAC to transition the patient to as he continues to age, as it has more data than alternate DOACs to use in the elderly, those with a lower CrCl, and has adjustments for weight/renal function. Of note, warfarin is tier 1 on the patient's 2022 insurance plan whereas apixaban is tier 3. This would be a significant cost change for the patient and a discussion would be appropriate to determine if that increase in price would be manageable for the patient and worth transitioning off of warfarin to avoid INR checks.     5. Condition - Medication Reconcilliation: No drug therapy problem  Medication list was updated per patient report with no additional concerns that are not discussed above.      PLAN:  Medications comments/ concerns are:   1. Increase fluoxetine to 40 mg. Rx updated and sent to pharmacy. -completed   2. PharmD to send prescription for Spriva Respimat to be filled in January (in place of Incruse Ellipta) due to insurance. -pending   3. PharmD to connect with care team to discuss transitioning patient from warfarin to apixaban. -pending   4. PharmD to discuss with patient at follow-up visit transitioning off of Afrin as well as the pros/cons of transitioning off of warfarin. -pending     Follow up: 12/16/21 at 4 pm over the phone    - - - - - - - - - - - - - - -  SUBJECTIVE:  Markie is a 71 year old male called for an initial medication therapy management visit. Primary care provider is Mali Patel. Was referred by his provider for   Chief Complaint   Patient presents with     Medication Therapy Management   Markie brought medications to the visit: no.     Main concern today is fluoxetine taper and cost of COPD medications.    Allergies/ADRs: Reviewed in chart  Pt reports using the following medical devices: CPAP  Pt reports the following barriers to care: cost, vision impariment  Adverse reactions to medications: none  Concerns with medications: yes, see below    Tobacco: He  "reports that he quit smoking about 2 years ago. His smoking use included cigarettes. He has a 53.00 pack-year smoking history. He has never used smokeless tobacco.  Alcohol: not assessed at this visit  Caffeine: not assessed at this visit  Illicit drug use: not assessed at this visit  Activity: not assessed at this visit    Medication Experience: patient has good understanding of his medications and is in tune to his body/changes in symptoms  Reports of cognitive concerns: no     Overall Adherence/Medication experience:Patient reports taking medication 4 times each day and reports missing this many doses 1 time per month.  Unknown if patient utilizes a pill box to help manage medications. Reports able to afford medications, though states he is in the \"doughnut hole\" at this time.    Depression: Reports he started taking fluoxetine 20 mg in ~ 2013, then in ~ 2017 he moved up to 40 mg daily. He states that last spring he increased to 60 mg daily. In September of this year, he asked his doctor to taper to 40 mg out of concern that his fluoxetine was making him drowsy. He reports that he is now down to 20 mg been at that dose for ~ 8 days. He states his mental health is doing a \"bit of a rollercoaster\", go from feeling fine to feeling very tired and very sad, but can talk himself out of it. Happens 2-3 times during each day. He is hoping to go back up to 40 mg daily to help with his depressive symptoms and is requesting that a new prescription be sent to his pharmacy.     COPD: Reports using his albuterol 108 mcg/act inhaler 2 times daily, 1 puff daily in the morning of the Breo Ellipta 200-25 mcg/act inhaler, and 1 puff daily in the afternoon of the Incruse Ellipta 62.5 mcg/act inhaler. In addition he utilizes Flonase 50 mcg/act nasal spray daily in the morning and Afrin 0.05% nasal spray daily before bedtime. He reports that overall his COPD is going okay. He feels as though his Breo Ellipta does not have the same " kind of effect that the Proair does and states that by 1 pm has to go to the Proair as well as at 11:30 before bedtime. He reports that [employer] sent him a letter that they will no longer be covering Incruse Ellipta and that he would like to find an alternative. The differences between Spiriva Handihaler and Spiriva Respimat were discussed and he states he would prefer Respimat as he likes aerosol inhalers. In regards to sleep he states that he is able to fall asleep and stay asleep for ~ 5 hours without waking, typically wakes once for the bathroom, and can then sleep for 2 more hours. He does have a CPAP that he uses every night. He states he has been using the Afrin every night for years as he feels as though it opens up his nasal passages so that his airway is clear to put on the CPAP. He reports he has tried to stop using the Afrin in the past, but felt as though it interfered with his ability to fall asleep.     Anticoagulation: Reports taking 2.5 mg of warfarin on Mondays and Fridays and 5 mg all other days. He states that with his changes in fluoxetine dose he has had fluctuating INRs at his appointments and thus has had to go in more frequently. He states that going in is a challenge for him due to his visual impairment and having to arrange rides. He reports he has never been talked to about a DOAC before, but states he would be interested in learning more about it and potentially transitioning off of the warfarin in order to reduce the frequency in which he has to go in for appointments.    Medication Reconciliation: In addition to the medications highlighted above, the patient reports taking acetaminophen 500 mg tablets daily as needed, amlodipine 5 mg daily, atenolol 150 mg daily, daily multivitamin, losartan 75 mg daily, fish oil 1200 mg daily, potassium 20 mEq twice daily, torsemide 40 mg in the morning and 20 mg with lunch, and trazodone 100 mg daily at bedtime. He denies any side effects or concerns  with his medications outside of what is discussed above.       OBJECTIVE:   Patient Care Team:  Mali Patel MD as PCP - General (Internal Medicine)  Tal Trinidad MD as MD (Ophthalmology)  Val Jim MD as Assigned Sleep Provider  Henry Candelario DPM as Assigned Musculoskeletal Provider  Mali Patel MD as Assigned PCP  Andrea Lockett MD as Assigned Heart and Vascular Provider  Current Outpatient Medications   Medication Sig Dispense Refill     acetaminophen (TYLENOL) 500 MG tablet Take 500-1,000 mg by mouth every 6 hours as needed for mild pain       albuterol (PROAIR RESPICLICK) 108 (90 Base) MCG/ACT inhaler Inhale 2 puffs into the lungs every 4 hours as needed for shortness of breath / dyspnea 1 each 5     amLODIPine (NORVASC) 5 MG tablet Take 1 tablet (5 mg) by mouth daily 90 tablet 3     atenolol (TENORMIN) 100 MG tablet Take 1 tablet (100 mg) by mouth daily along with 50 mg tablet for total of 150 mg daily. 90 tablet 1     atenolol (TENORMIN) 50 MG tablet Take 1 tablet (50 mg) by mouth daily Along with 100 mg tablet for total of 150 mg daily. 90 tablet 1     CENTRUM SILVER OR TABS ONE DAILY 3 MONTHS 1 YEAR     FLUoxetine (PROZAC) 20 MG capsule Take 3 daily (Patient taking differently: Take 20 mg by mouth Take 2 tabs PO daily) 270 capsule 1     fluticasone (FLONASE) 50 MCG/ACT nasal spray Spray 1 spray into both nostrils daily       fluticasone-vilanterol (BREO ELLIPTA) 200-25 MCG/INH inhaler Inhale 1 puff into the lungs daily 60 each 11     losartan (COZAAR) 25 MG tablet Take 3 tablets (75 mg) by mouth daily 270 tablet 3     omega-3 fatty acids (FISH OIL) 1200 MG capsule Take 1 capsule by mouth daily       oxymetazoline (AFRIN 12 HOUR) 0.05 % nasal spray Spray 2 sprays into both nostrils At Bedtime       potassium chloride ER (K-TAB) 20 MEQ CR tablet Take 1 tablet (20 mEq) by mouth 2 times daily 180 tablet 3     rosuvastatin (CRESTOR) 40 MG tablet Take  "1 tablet (40 mg) by mouth At Bedtime 90 tablet 3     torsemide (DEMADEX) 20 MG tablet Take 2 tablets (40 mg) by mouth every morning AND 1 tablet (20 mg) daily (with lunch). 270 tablet 3     traZODone (DESYREL) 100 MG tablet Take 1 tablet (100 mg) by mouth nightly as needed for sleep 90 tablet 1     umeclidinium (INCRUSE ELLIPTA) 62.5 MCG/INH inhaler Inhale 1 puff into the lungs daily 90 each 3     warfarin ANTICOAGULANT (COUMADIN ANTICOAGULANT) 5 MG tablet Take 2.5 mg on Mondays and Fridays, and 5 mg all other days, or as directed by Coumadin nurse. 100 tablet 3       Allergies   Allergen Reactions     Ambien Other (See Comments)     Parasomnia with sleep walking, injuries, delusions.  May have been in DTs at the same time.     Lisinopril Cough     Pt had cough     Seasonal Allergies      Past Medical History:   Diagnosis Date     Adjustment disorder with depressed mood 06/17/2012     ALCOHOL ABUSE-UNSPEC 10/05/2007     Atrial fibrillation (H)      Chorioretinitis of both eyes 02/05/2012    Right eye worse than left. On Cellcept, steroid taper     Coronary atherosclerosis of unspecified type of vessel, native or graft      Diabetes mellitus type 2 in obese (H)      Essential hypertension, benign      Heart failure with preserved ejection fraction, NYHA class II (H)      Mixed hyperlipidemia 10/05/2007     Nonrheumatic aortic valve stenosis      Polycythemia      Sleep apnea      Tobacco use disorder 10/05/2007    Smoker - 1/2 ppd. Started at 16 years     Total retinal detachment of left eye 2009/2010        There were no vitals filed for this visit.  Estimated body mass index is 52.34 kg/m  as calculated from the following:    Height as of 10/5/21: 1.791 m (5' 10.5\").    Weight as of 10/5/21: 167.8 kg (370 lb).    Last Comprehensive Metabolic Panel:  Sodium   Date Value Ref Range Status   10/25/2021 138 133 - 144 mmol/L Final   06/28/2021 138 133 - 144 mmol/L Final     Potassium   Date Value Ref Range Status "   10/25/2021 4.1 3.4 - 5.3 mmol/L Final   06/28/2021 4.0 3.4 - 5.3 mmol/L Final     Chloride   Date Value Ref Range Status   10/25/2021 104 94 - 109 mmol/L Final   06/28/2021 104 94 - 109 mmol/L Final     Carbon Dioxide   Date Value Ref Range Status   06/28/2021 26 20 - 32 mmol/L Final     Carbon Dioxide (CO2)   Date Value Ref Range Status   10/25/2021 29 20 - 32 mmol/L Final     Anion Gap   Date Value Ref Range Status   10/25/2021 5 3 - 14 mmol/L Final   06/28/2021 7 3 - 14 mmol/L Final     Glucose   Date Value Ref Range Status   10/25/2021 135 (H) 70 - 99 mg/dL Final   06/28/2021 127 (H) 70 - 99 mg/dL Final     Urea Nitrogen   Date Value Ref Range Status   10/25/2021 25 7 - 30 mg/dL Final   06/28/2021 17 7 - 30 mg/dL Final     Creatinine   Date Value Ref Range Status   10/25/2021 1.10 0.66 - 1.25 mg/dL Final   06/28/2021 0.91 0.66 - 1.25 mg/dL Final     GFR Estimate   Date Value Ref Range Status   10/25/2021 68 >60 mL/min/1.73m2 Final     Comment:     As of July 11, 2021, eGFR is calculated by the CKD-EPI creatinine equation, without race adjustment. eGFR can be influenced by muscle mass, exercise, and diet. The reported eGFR is an estimation only and is only applicable if the renal function is stable.   06/28/2021 85 >60 mL/min/[1.73_m2] Final     Comment:     Non  GFR Calc  Starting 12/18/2018, serum creatinine based estimated GFR (eGFR) will be   calculated using the Chronic Kidney Disease Epidemiology Collaboration   (CKD-EPI) equation.       Calcium   Date Value Ref Range Status   10/25/2021 9.4 8.5 - 10.1 mg/dL Final   06/28/2021 9.4 8.5 - 10.1 mg/dL Final      BP Readings from Last 3 Encounters:   10/05/21 106/69   08/20/21 104/70   07/22/21 122/78       Cholesterol   Date Value Ref Range Status   10/25/2021 118 <200 mg/dL Final   10/05/2021 132 <200 mg/dL Final   04/12/2021 146 <200 mg/dL Final   02/17/2020 152.0 0.0 - 200.0 Final     HDL Cholesterol   Date Value Ref Range Status    04/12/2021 54 >39 mg/dL Final   02/17/2020 55.0 >40.0 Final     Direct Measure HDL   Date Value Ref Range Status   10/25/2021 52 >=40 mg/dL Final   10/05/2021 55 >=40 mg/dL Final     LDL Cholesterol Calculated   Date Value Ref Range Status   10/25/2021 44 <=100 mg/dL Final   10/05/2021 55 <=100 mg/dL Final   04/12/2021 73 <100 mg/dL Final     Comment:     Desirable:       <100 mg/dl   02/11/2019 64 <100 mg/dL Final     Comment:     Desirable:       <100 mg/dl     LDL Cholesterol Direct   Date Value Ref Range Status   02/17/2020 74.0 0.0 - 129.0 Final     Triglycerides   Date Value Ref Range Status   10/25/2021 108 <150 mg/dL Final   10/05/2021 108 <150 mg/dL Final   04/12/2021 98 <150 mg/dL Final   02/17/2020 116.0 0.0 - 150.0 Final     Cholesterol/HDL Ratio   Date Value Ref Range Status   02/17/2020 2.8 0.0 - 5.0 Final   04/22/2015 3.7 <4.4 Final        The ASCVD Risk score (Shapleigh DC Jr., et al., 2013) failed to calculate for the following reasons:    The valid total cholesterol range is 130 to 320 mg/dL     CrCl is around 148 mL/min calculated using Cockcroft-Gault and Adjusted body weight.    - - - - - - - - - - - - - - -  Options for treatment and/or follow-up care were reviewed with the patient. Markie was engaged and actively involved in the decision making process. Markie verbalized understanding of the options discussed and was satisfied with the final plan. Markie was given a copy of these recommendations and an up to date medication list in an after visit summary. This report was sent to Mali Patel.    Note written by Loida Reed PharmD Student    ____________________________  Preceptor Use Only:  In supervising the student, I have reviewed and verified the student's documentation and found it to be correct and complete.  Preceptor Signature: Rufina Alves PharmD - December 13, 2021      - - - - - - - - - - - - - - -  Flowsheet completed.  # of medical conditions reviewed: 3  # of  "medications reviewed: 17  # of DTP identified: 4  Time spent: 30 minutes  Level of service:5      TELEPHONE VISIT DETAILS and Consent  Can you please confirm what state you are currently located in? [place]  \"If you are located in a state other than [place] we cannot proceed with your visit.  This is due to state licensing laws that do not allow your provider to practice in another state.  This is not a billing or insurance issue. Please let me know if you need prescriptions filled or have other immediate concerns and I will get that message to your care team. I can also have you speak to our  to make an appointment when you are back in the [place].    Markie Shepard is a 71 year old male who is being evaluated via a billable telephone visit.      The patient has been notified of following:     \"This telephone visit will be conducted via a call between you and your physician/provider. We have found that certain health care needs can be provided without the need for a physical exam.  This service lets us provide the care you need with a short phone conversation.  If a prescription is necessary we can send it directly to your pharmacy.  If lab work is needed we can place an order for that and you can then stop by our lab to have the test done at a later time.    Telephone visits are billed at different rates depending on your insurance coverage. During this emergency period, for some insurers they may be billed the same as an in-person visit.  Please reach out to your insurance provider with any questions.    If during the course of the call the physician/provider feels a telephone visit is not appropriate, you will not be charged for this service.\"    Patient has given verbal consent for Telephone visit?  Yes    How would you like to obtain your AVS? [place]      Distant site (telephone provider location): [facility]  Appointment start time: 4:05 PM  Appointment end time: 4:35 PM  Phone call duration:  30 " minutes

## 2021-12-13 ENCOUNTER — LAB (OUTPATIENT)
Dept: LAB | Facility: CLINIC | Age: 71
End: 2021-12-13
Payer: COMMERCIAL

## 2021-12-13 ENCOUNTER — ANTICOAGULATION THERAPY VISIT (OUTPATIENT)
Dept: ANTICOAGULATION | Facility: CLINIC | Age: 71
End: 2021-12-13

## 2021-12-13 DIAGNOSIS — Z79.01 LONG TERM CURRENT USE OF ANTICOAGULANT THERAPY: Primary | ICD-10-CM

## 2021-12-13 DIAGNOSIS — I48.20 CHRONIC ATRIAL FIBRILLATION (H): ICD-10-CM

## 2021-12-13 DIAGNOSIS — Z79.01 LONG TERM CURRENT USE OF ANTICOAGULANT THERAPY: ICD-10-CM

## 2021-12-13 LAB — INR BLD: 2.5 (ref 0.9–1.1)

## 2021-12-13 PROCEDURE — 36416 COLLJ CAPILLARY BLOOD SPEC: CPT

## 2021-12-13 PROCEDURE — 85610 PROTHROMBIN TIME: CPT

## 2021-12-13 NOTE — PROGRESS NOTES
Anticoagulation Management     Attempted to reach Markie to discuss today's INR result, no answer at this time.      Left  requesting callback at patient's earliest convenience.      Anticoagulation clinic to follow up     Chelsie Encinas RN

## 2021-12-13 NOTE — PROGRESS NOTES
ANTICOAGULATION MANAGEMENT     Markie Shepard 71 year old male is on warfarin with therapeutic INR result. (Goal INR 2.0-3.0)    Recent labs: (last 7 days)     12/13/21  1114   INR 2.5*       ASSESSMENT     Source(s): Chart Review and Patient/Caregiver Call       Warfarin doses taken: Warfarin taken as instructed    Diet: No new diet changes identified    New illness, injury, or hospitalization: No - however, patient mentioned intermittent congestion and fatigue     Medication/supplement changes: Fluoxetine dose adjustments: increased to 60 mg in late summer, then reduced back to 40 mg and most recently reduced to 20 mg for 7 days. Patient did not tolerate this dose, so back on 40 mg daily as of late last week. Which has potential for interaction; increasing INR    Signs or symptoms of bleeding or clotting: No    Previous INR: Therapeutic last visit; previously outside of goal range    Additional findings: Routine eye injection scheduled for next week for macular degeneration sx     PLAN     Recommended plan for temporary change(s) and ongoing change(s) affecting INR     Dosing Instructions: Continue your current warfarin dose with next INR in 6 weeks       Summary  As of 12/13/2021    Full warfarin instructions:  2.5 mg every Mon, Fri; 5 mg all other days   Next INR check:  1/24/2022             Telephone call with Markie who verbalizes understanding and agrees to plan    Lab visit scheduled    Education provided: Please call back if any changes to your diet, medications or how you've been taking warfarin, Monitoring for bleeding signs and symptoms, Monitoring for clotting signs and symptoms and Importance of notifying clinic for changes in medications; a sooner lab recheck maybe needed.    Plan made per [facility] anticoagulation protocol    Chelsie Encinas, RN  Anticoagulation Clinic  12/13/2021    _______________________________________________________________________     Anticoagulation Episode Summary     Current  INR goal:  2.0-3.0   TTR:  65.2 % (1 y)   Target end date:  Indefinite   Send INR reminders to:  DAYLIN DALY    Indications    Long-term (current) use of anticoagulants [Z79.01] [Z79.01]  Chronic atrial fibrillation (H) [I48.20]           Comments:  PREFERS PETER! Quit Smoking 9/29.         Anticoagulation Care Providers     Provider Role Specialty Phone number    Mali Patel MD Referring Internal Medicine [phone]

## 2021-12-16 ENCOUNTER — VIRTUAL VISIT (OUTPATIENT)
Dept: FAMILY MEDICINE | Facility: CLINIC | Age: 71
End: 2021-12-16
Payer: COMMERCIAL

## 2021-12-16 DIAGNOSIS — Z79.01 LONG TERM CURRENT USE OF ANTICOAGULANT THERAPY: ICD-10-CM

## 2021-12-16 DIAGNOSIS — F33.9 CHRONIC RECURRENT MAJOR DEPRESSIVE DISORDER (H): Primary | ICD-10-CM

## 2021-12-16 NOTE — PROGRESS NOTES
ASSESSMENT:    1. Condition - COPD: Safety - Dosage too high: Frequency inappropriate  Per the package insert, Afrin nasal spray should not be used for more than 3 consecutive days as frequent or prolonged use may cause nasal congestion to recur or worsen. An alternate agent that is safe to utilize long term would be more appropriate such as a corticosteroid. The patient currently utilizes Flonase daily in the morning, would be appropriate to utilize Flonase twice daily and discontinue use of Afrin to avoid rebound congestion. Saline nasal spray would also be appropriate to use as needed if the patient is experiencing dryness/irritation.     2. Condition - Depression: No drug therapy problem  Recent increase in fluoxetine dose has reduced patient's depressive symptoms. No changes are needed at this time.     3. Condition - Atrial fibrillation/Anticoagulation: No drug therapy problem  Patient is a candidate for DOACs, which may pose a convenience benefit as the patient is visually impaired and needs to arrange rides to his INR appointments. As the patient feels warfarin would be more convenient for him than DOACs from a cost standpoint, no changes are needed at this time. Consider reassessing in the future if the patient experiences changes in insurance status.      PLAN:  Medications comments/ concerns are:   1. Stop using Afrin. -completed   2. Start using Flonase daily at night in place of Afrin. If you are experiencing dryness or irritation, you may also start using saline nasal spray as needed. -completed   3. Continue all other medications as prescribed.    Follow up: 1/20/22 at 4 pm via phone call to discuss transition from Incruse Ellipta to Spiriva Respimat and assess transition off of Afrin    - - - - - - - - - - - - - - -  SUBJECTIVE:  Markie is a 71 year old male called for a follow up medication therapy management visit. Primary care provider is Mali Patel. Was referred by his provider for    Chief Complaint   Patient presents with     Medication Therapy Management   . Markie brought medications to the visit: no.     PLAN FROM LAST VISIT:   1. Increase fluoxetine to 40 mg. Rx updated and sent to pharmacy. -completed   2. PharmD to send prescription for Spriva Respimat to be filled in January (in place of Incruse Ellipta) due to insurance. -pending   3. PharmD to connect with care team to discuss transitioning patient from warfarin to apixaban. -pending   4. PharmD to discuss with patient at follow-up visit transitioning off of Afrin as well as the pros/cons of transitioning off of warfarin. -pending   ----  Markie's MAIN CONCERN TODAY is following up on his fluoxetine and discussing Afrin and warfarin therapies.    Allergies/ADRs: Reviewed in chart  Pt reports using the following medical devices: CPAP  Pt reports the following barriers to care: cost, vision impariment  Adverse reactions to medications: none  Concerns with medications: yes, see below     Tobacco: He reports that he quit smoking about 2 years ago. His smoking use included cigarettes. He has a 53.00 pack-year smoking history. He has never used smokeless tobacco.  Alcohol: not assessed at this visit  Caffeine: not assessed at this visit  Illicit drug use: not assessed at this visit  Activity: not assessed at this visit    Medication Experience: patient has good understanding of his medications and is in tune to his body/changes in symptoms  Reports of cognitive concerns: no      COPD - Reports he is using Afrin nasal spray daily in the evening for congestion and has been for years. We discussed the physiological changes that may occur due to long term Afrin use as well as the likelihood of rebound congestion. He raised concern that the one time he used Flonase in the night, he couldn't get to sleep and is wondering if it was due to the medicaton. Discussed that Flonase is a steroid that may be activating, but do to it being in the form of a  nasal spray the likelihood is low and the benefit may outweigh the risk. Patient is agreeable and states he will try again with the Flonase in the evenings and  a saline nasal spray the next time he is at the drug store.     Fluoxetine - Reports he was able to  the prescription for fluoxetine 40 mg and has been using it daily. He states he feels as though his mood is improving and is content at this dose.     Atrial Fibrillation/Anticoagulation - Discussed the prospect of switching from warfarin to apixaban from a convenience standpoint. The patient states that due to apixaban being tier 3 and costing him $47 per month, compared to warfarin which is tier 1, he would like to stick with warfarin at this time.    OBJECTIVE:   Patient Care Team:  Mali Patel MD as PCP - General (Internal Medicine)  Tal Trinidad MD as MD (Ophthalmology)  Val Jim MD as Assigned Sleep Provider  Henry Candelario DPM as Assigned Musculoskeletal Provider  Mali Patel MD as Assigned PCP  Andrea Lockett MD as Assigned Heart and Vascular Provider  Current Outpatient Medications   Medication Sig Dispense Refill     acetaminophen (TYLENOL) 500 MG tablet Take 500-1,000 mg by mouth every 6 hours as needed for mild pain       albuterol (PROAIR RESPICLICK) 108 (90 Base) MCG/ACT inhaler Inhale 2 puffs into the lungs every 4 hours as needed for shortness of breath / dyspnea 1 each 5     amLODIPine (NORVASC) 5 MG tablet Take 1 tablet (5 mg) by mouth daily 90 tablet 3     atenolol (TENORMIN) 100 MG tablet Take 1 tablet (100 mg) by mouth daily along with 50 mg tablet for total of 150 mg daily. 90 tablet 1     atenolol (TENORMIN) 50 MG tablet Take 1 tablet (50 mg) by mouth daily Along with 100 mg tablet for total of 150 mg daily. 90 tablet 1     CENTRUM SILVER OR TABS ONE DAILY 3 MONTHS 1 YEAR     FLUoxetine (PROZAC) 40 MG capsule Take 1 capsule (40 mg) by mouth daily 30 capsule 3      fluticasone (FLONASE) 50 MCG/ACT nasal spray Spray 1 spray into both nostrils daily       fluticasone-vilanterol (BREO ELLIPTA) 200-25 MCG/INH inhaler Inhale 1 puff into the lungs daily 60 each 11     losartan (COZAAR) 25 MG tablet Take 3 tablets (75 mg) by mouth daily 270 tablet 3     omega-3 fatty acids (FISH OIL) 1200 MG capsule Take 1 capsule by mouth daily       oxymetazoline (AFRIN 12 HOUR) 0.05 % nasal spray Spray 2 sprays into both nostrils At Bedtime       potassium chloride ER (K-TAB) 20 MEQ CR tablet Take 1 tablet (20 mEq) by mouth 2 times daily 180 tablet 3     rosuvastatin (CRESTOR) 40 MG tablet Take 1 tablet (40 mg) by mouth At Bedtime 90 tablet 3     torsemide (DEMADEX) 20 MG tablet Take 2 tablets (40 mg) by mouth every morning AND 1 tablet (20 mg) daily (with lunch). 270 tablet 3     traZODone (DESYREL) 100 MG tablet Take 1 tablet (100 mg) by mouth nightly as needed for sleep 90 tablet 1     umeclidinium (INCRUSE ELLIPTA) 62.5 MCG/INH inhaler Inhale 1 puff into the lungs daily 90 each 3     warfarin ANTICOAGULANT (COUMADIN ANTICOAGULANT) 5 MG tablet Take 2.5 mg on Mondays and Fridays, and 5 mg all other days, or as directed by Coumadin nurse. 100 tablet 3     Patient Active Problem List   Diagnosis     CAD (coronary artery disease)     Hyperlipidemia LDL goal <70     Smoker     Essential hypertension with goal blood pressure less than 140/90     Chronic atrial fibrillation (H)     Obstructive sleep apnea     Mild major depression (H)     Low back pain     Mixed hyperlipidemia     Essential hypertension, benign     Long-term (current) use of anticoagulants [Z79.01]     Anisocoria     Prediabetes     Chronic obstructive pulmonary disease, unspecified COPD type (H)     Morbid obesity (H)     Chronic recurrent major depressive disorder (H)     Bilateral leg edema     Insomnia, unspecified type     Type 2 diabetes mellitus without complication, without long-term current use of insulin (H)     Heart  "failure with preserved ejection fraction, NYHA class II (H)     Nonrheumatic aortic valve stenosis     There were no vitals filed for this visit.  Estimated body mass index is 52.34 kg/m  as calculated from the following:    Height as of 10/5/21: 1.791 m (5' 10.5\").    Weight as of 10/5/21: 167.8 kg (370 lb).    - - - - - - - - - - - - - - -  Options for treatment and/or follow-up care were reviewed with the patient. Markie was engaged and actively involved in the decision making process. Markie verbalized understanding of the options discussed and was satisfied with the final plan. Markie was given a copy of these recommendations and an up to date medication list in an after visit summary. This report was sent to Mali Patel.     Note written by Loida Reed PharmD student    ____________________________  Preceptor Use Only:  In supervising the student, I have reviewed and verified the student's documentation and found it to be correct and complete.  Preceptor Signature: Rufina Alves PharmD - December 16, 2021      - - - - - - - - - - - - - - -  Flowsheet completed.  # of medical conditions reviewed: 2  # of medications reviewed: 3  # of DTP identified: 2  Time spent: 30 minutes  Level of service:3      TELEPHONE VISIT DETAILS and Consent  Can you please confirm what state you are currently located in? [place]  \"If you are located in a state other than [place] we cannot proceed with your visit.  This is due to state licensing laws that do not allow your provider to practice in another state.  This is not a billing or insurance issue. Please let me know if you need prescriptions filled or have other immediate concerns and I will get that message to your care team. I can also have you speak to our  to make an appointment when you are back in the [place].    Markie Shepard is a 71 year old male who is being evaluated via a billable telephone visit.      The patient has been notified of following: " "    \"This telephone visit will be conducted via a call between you and your physician/provider. We have found that certain health care needs can be provided without the need for a physical exam.  This service lets us provide the care you need with a short phone conversation.  If a prescription is necessary we can send it directly to your pharmacy.  If lab work is needed we can place an order for that and you can then stop by our lab to have the test done at a later time.    Telephone visits are billed at different rates depending on your insurance coverage. During this emergency period, for some insurers they may be billed the same as an in-person visit.  Please reach out to your insurance provider with any questions.    If during the course of the call the physician/provider feels a telephone visit is not appropriate, you will not be charged for this service.\"    Patient has given verbal consent for Telephone visit?  Yes    How would you like to obtain your AVS? [place]      Distant site (telephone provider location): [facility]  Appointment start time: 4:04 PM  Appointment end time: 4:21 PM  Phone call duration:  17 minutes        "

## 2021-12-21 ENCOUNTER — TELEPHONE (OUTPATIENT)
Dept: FAMILY MEDICINE | Facility: CLINIC | Age: 71
End: 2021-12-21
Payer: COMMERCIAL

## 2021-12-21 NOTE — TELEPHONE ENCOUNTER
Pt wanted to report that he has starting taking afrin in the evening to help with sleep.    Flonase wasn't working as far as him being able to get to sleep due the steroid.      Lacie

## 2022-01-06 DIAGNOSIS — J44.9 CHRONIC OBSTRUCTIVE PULMONARY DISEASE, UNSPECIFIED COPD TYPE (H): Primary | ICD-10-CM

## 2022-01-10 ENCOUNTER — TELEPHONE (OUTPATIENT)
Dept: FAMILY MEDICINE | Facility: CLINIC | Age: 72
End: 2022-01-10
Payer: COMMERCIAL

## 2022-01-10 DIAGNOSIS — I10 ESSENTIAL HYPERTENSION WITH GOAL BLOOD PRESSURE LESS THAN 140/90: ICD-10-CM

## 2022-01-10 RX ORDER — ATENOLOL 50 MG/1
50 TABLET ORAL DAILY
Qty: 90 TABLET | Refills: 1 | Status: SHIPPED | OUTPATIENT
Start: 2022-01-10 | End: 2022-06-06

## 2022-01-10 NOTE — TELEPHONE ENCOUNTER
Who is calling? Patient  Medication name: atenolol (TENORMIN) 50 MG tablet  Is this a refill request? Yes  Have they contacted the pharmacy?  Yes  Pharmacy:   [id number] - [place], [place] - [address]  [address]  [facility] [address]  Phone: [phone] Fax: [phone]      Question/Concern: Contacted Pharmacy about atenolol 50 mg. Patient states they have enough of the 100mg. Hopes to speak with RNs or Rufina about the refill. Wants to make sure the dosage hasn't changed   Would patient like a call back? Yes

## 2022-01-10 NOTE — TELEPHONE ENCOUNTER
Medication requested: atenolol (TENORMIN) 50 MG tablet  Last office visit: 12/1/21  [facility] appointments: none  Medication last refilled: 4/12/21 #90 + 1 refill  Last qualifying labs:     Takes this along with the 100 mg tablet, which was refilled on 12/2/21 for 6 months = total of 150 mg daily    Christina Mckeon MS RN-BC  01/10/22  11:02 AM

## 2022-01-13 DIAGNOSIS — G47.00 INSOMNIA, UNSPECIFIED TYPE: ICD-10-CM

## 2022-01-13 NOTE — TELEPHONE ENCOUNTER
Last time prescribed: 4/12/21 , 90 tabs/caps x 1 refills  Last office visit: 12/1/21  Next appointment: No Future Appointment Scheduled  Routing refill request to provider for review/approval because: Drug interaction warning with fluoxetine    Christina Mckeon MS RN-BC  01/14/22  11:54 AM

## 2022-01-14 RX ORDER — TRAZODONE HYDROCHLORIDE 100 MG/1
100 TABLET ORAL
Qty: 90 TABLET | Refills: 1 | Status: SHIPPED | OUTPATIENT
Start: 2022-01-14 | End: 2022-07-05

## 2022-01-24 ENCOUNTER — LAB (OUTPATIENT)
Dept: LAB | Facility: CLINIC | Age: 72
End: 2022-01-24
Payer: COMMERCIAL

## 2022-01-24 ENCOUNTER — ANTICOAGULATION THERAPY VISIT (OUTPATIENT)
Dept: ANTICOAGULATION | Facility: CLINIC | Age: 72
End: 2022-01-24

## 2022-01-24 DIAGNOSIS — Z79.01 LONG TERM CURRENT USE OF ANTICOAGULANT THERAPY: ICD-10-CM

## 2022-01-24 DIAGNOSIS — I48.20 CHRONIC ATRIAL FIBRILLATION (H): ICD-10-CM

## 2022-01-24 DIAGNOSIS — Z79.01 LONG TERM CURRENT USE OF ANTICOAGULANT THERAPY: Primary | ICD-10-CM

## 2022-01-24 LAB — INR BLD: 2.3 (ref 0.9–1.1)

## 2022-01-24 PROCEDURE — 85610 PROTHROMBIN TIME: CPT

## 2022-01-24 PROCEDURE — 36416 COLLJ CAPILLARY BLOOD SPEC: CPT

## 2022-01-24 NOTE — PROGRESS NOTES
Anticoagulation Management    Unable to reach Markie today.    Today's INR result of 2.3 is therapeutic (goal INR of 2.0-3.0).  Result received from: Clinic Lab    Follow up required to confirm warfarin dose taken and assess for changes    Left message to continue current dose of warfarin 2.5 mg tonight. Request call back for assessment.      Anticoagulation clinic to follow up    Chelsie Encinas RN

## 2022-01-24 NOTE — PROGRESS NOTES
ANTICOAGULATION MANAGEMENT     Markie Shepard 71 year old male is on warfarin with therapeutic INR result. (Goal INR 2.0-3.0)    Recent labs: (last 7 days)     01/24/22  1112   INR 2.3*       ASSESSMENT     Source(s): Chart Review and Patient/Caregiver Call       Warfarin doses taken: Warfarin taken as instructed    Diet: No new diet changes identified    New illness, injury, or hospitalization: No    Medication/supplement changes: None noted    Signs or symptoms of bleeding or clotting: No    Previous INR: Therapeutic last 2(+) visits    Additional findings: None     PLAN     Recommended plan for no diet, medication or health factor changes affecting INR     Dosing Instructions: Continue your current warfarin dose with next INR in 6 weeks       Summary  As of 1/24/2022    Full warfarin instructions:  2.5 mg every Mon, Fri; 5 mg all other days   Next INR check:  3/7/2022             Telephone call with Markie who verbalizes understanding and agrees to plan    Lab visit scheduled    Education provided: Please call back if any changes to your diet, medications or how you've been taking warfarin, Monitoring for bleeding signs and symptoms, Monitoring for clotting signs and symptoms and Importance of notifying clinic for changes in medications; a sooner lab recheck maybe needed.    Plan made per [facility] anticoagulation protocol    Chelsie Encinas RN  Anticoagulation Clinic  1/24/2022    _______________________________________________________________________     Anticoagulation Episode Summary     Current INR goal:  2.0-3.0   TTR:  74.8 % (1 y)   Target end date:  Indefinite   Send INR reminders to:  [facility]    Indications    Long-term (current) use of anticoagulants [Z79.01] [Z79.01]  Chronic atrial fibrillation (H) [I48.20]           Comments:  PREFERS PETER! Quit Smoking 9/29.         Anticoagulation Care Providers     Provider Role Specialty Phone number    Mali Patel MD Referring Internal  Medicine [phone]

## 2022-02-04 ENCOUNTER — OFFICE VISIT (OUTPATIENT)
Dept: CARDIOLOGY | Facility: CLINIC | Age: 72
End: 2022-02-04
Attending: INTERNAL MEDICINE
Payer: COMMERCIAL

## 2022-02-04 VITALS
SYSTOLIC BLOOD PRESSURE: 110 MMHG | WEIGHT: 315 LBS | OXYGEN SATURATION: 94 % | DIASTOLIC BLOOD PRESSURE: 68 MMHG | HEIGHT: 71 IN | BODY MASS INDEX: 44.1 KG/M2

## 2022-02-04 DIAGNOSIS — I50.30 HEART FAILURE WITH PRESERVED EJECTION FRACTION, NYHA CLASS II (H): ICD-10-CM

## 2022-02-04 PROCEDURE — 99215 OFFICE O/P EST HI 40 MIN: CPT | Performed by: PHYSICIAN ASSISTANT

## 2022-02-04 RX ORDER — MULTIPLE VITAMINS W/ MINERALS TAB 9MG-400MCG
1 TAB ORAL DAILY
COMMUNITY

## 2022-02-04 ASSESSMENT — MIFFLIN-ST. JEOR: SCORE: 2464.97

## 2022-02-04 NOTE — LETTER
2/4/2022    Mali Patel MD  [address] [facility] [address]    RE: Markie Shepard       Dear Colleague,     I had the pleasure of seeing Markie Shepard in the [facility].  Primary Cardiologist: Dr. Lockett     Reason for Visit: 3 month follow up     History of Present Illness:   Markie is a 71-year-old male with past medical history notable for:     # Chronic HFpEF.  Likely multifactorial with contribution from primary lung disease as well, obesity hypoventilation syndrome, COPD.  RV was not well visualized on TTE 6/2021, likely has some underlying RV dysfunction as well.   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  Stable, denies symptoms concerning for angina.  # Permanent atrial fibrillation, on warfarin.   # Morbid obesity  # EMORY  # Significant vision impairment  # Mild aortic stenosis   # T2DM- hA1c 6.4% with diet control  # Hx of Tobacco dependence    He last saw Dr. Lockett 3 months ago and at that time his torsemide was increased from 20 mg BID to 40 mg in the morning and 20 mg in the afternoon. His losartan and amlodipine doses were decreased.  He returns to clinic with his grandson stating he feels better.  He no longer has blisters on his lower extremities.  He does have some shortness of breath but this has been stable.  Unfortunately his weight has not changed.  He is not able to weigh himself at home due to his poor vision.  He does go out to eat a few times a week as cooking is hard for him.  When he is eating at home he does try to pay attention to salt intake.  He denies chest discomfort, worsening orthopnea, abdominal distention, lightheadedness, or bleeding issues.    Assessment and Plan:  Markie is a 71-year-old male with past medical history notable for:     # Chronic HFpEF.  Likely multifactorial with contribution from primary lung disease as well, obesity hypoventilation syndrome, COPD.  RV was not well  visualized on TTE 6/2021, likely has some underlying RV dysfunction as well.   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  Stable, denies symptoms concerning for angina.  # Permanent atrial fibrillation, on warfarin.   # Morbid obesity  # EMORY  # Significant vision impairment  # Mild aortic stenosis   # T2DM- hA1c 6.4% with diet control  # Hx of Tobacco dependence    He appears to be doing well but his weight has not changed since increase in torsemide.  Unfortunately he has not had a BMP done today prior to the visit.  I have recommended that we obtain BMP.  Transportation is quite difficult for him and therefore he prefers that he gets his lab work done in 1 month when he comes in to his local clinic for INR.  As he feels better and his symptoms are stable, we will wait for 3 to 4 weeks until he can get his BMP.  Depending on his electrolytes and renal function we will consider further increasing torsemide.  We can also consider decreasing/discontinuing amlodipine to help with his peripheral edema.  I suspect some of this peripheral edema is secondary to lymphedema.  We briefly talked about possible lymphedema referral but patient is not interested in this as transportation is very difficult for him.    40 minutes spent on the date of the encounter with chart review, patient visit, care coordination, and documentation.      This note was completed in part using Dragon voice recognition software. Although reviewed after completion, some word and grammatical errors may occur.    Orders this Visit:  Orders Placed This Encounter   Procedures     Basic metabolic panel     Orders Placed This Encounter   Medications     multivitamin w/minerals (MULTI-VITAMIN) tablet     Sig: Take 1 tablet by mouth daily Century 55+     Medications Discontinued During This Encounter   Medication Reason     CENTRUM SILVER OR TABS Stopped by Patient         Encounter Diagnosis   Name  Primary?     Heart failure with preserved ejection fraction, NYHA class II (H)        CURRENT MEDICATIONS:  Current Outpatient Medications   Medication Sig Dispense Refill     acetaminophen (TYLENOL) 500 MG tablet Take 500-1,000 mg by mouth every 6 hours as needed for mild pain       albuterol (PROAIR RESPICLICK) 108 (90 Base) MCG/ACT inhaler Inhale 2 puffs into the lungs every 4 hours as needed for shortness of breath / dyspnea 1 each 5     amLODIPine (NORVASC) 5 MG tablet Take 1 tablet (5 mg) by mouth daily 90 tablet 3     atenolol (TENORMIN) 100 MG tablet Take 1 tablet (100 mg) by mouth daily along with 50 mg tablet for total of 150 mg daily. 90 tablet 1     atenolol (TENORMIN) 50 MG tablet Take 1 tablet (50 mg) by mouth daily Along with 100 mg tablet for total of 150 mg daily. 90 tablet 1     FLUoxetine (PROZAC) 40 MG capsule Take 1 capsule (40 mg) by mouth daily 30 capsule 3     fluticasone (FLONASE) 50 MCG/ACT nasal spray Spray 1 spray into both nostrils daily       fluticasone-vilanterol (BREO ELLIPTA) 200-25 MCG/INH inhaler Inhale 1 puff into the lungs daily 60 each 11     losartan (COZAAR) 25 MG tablet Take 3 tablets (75 mg) by mouth daily 270 tablet 3     multivitamin w/minerals (MULTI-VITAMIN) tablet Take 1 tablet by mouth daily Century 55+       omega-3 fatty acids (FISH OIL) 1200 MG capsule Take 1 capsule by mouth daily       oxymetazoline (AFRIN 12 HOUR) 0.05 % nasal spray Spray 2 sprays into both nostrils At Bedtime       potassium chloride ER (K-TAB) 20 MEQ CR tablet Take 1 tablet (20 mEq) by mouth 2 times daily 180 tablet 3     rosuvastatin (CRESTOR) 40 MG tablet Take 1 tablet (40 mg) by mouth At Bedtime 90 tablet 3     tiotropium (SPIRIVA RESPIMAT) 2.5 MCG/ACT inhaler Inhale 2 puffs into the lungs daily 4 g 11     torsemide (DEMADEX) 20 MG tablet Take 2 tablets (40 mg) by mouth every morning AND 1 tablet (20 mg) daily (with lunch). 270 tablet 3     traZODone (DESYREL) 100 MG tablet Take 1 tablet  (100 mg) by mouth nightly as needed for sleep 90 tablet 1     warfarin ANTICOAGULANT (COUMADIN ANTICOAGULANT) 5 MG tablet Take 2.5 mg on Mondays and Fridays, and 5 mg all other days, or as directed by Coumadin nurse. 100 tablet 3       ALLERGIES     Allergies   Allergen Reactions     Ambien Other (See Comments)     Parasomnia with sleep walking, injuries, delusions.  May have been in DTs at the same time.     Lisinopril Cough     Pt had cough     Seasonal Allergies        PAST MEDICAL HISTORY:  Past Medical History:   Diagnosis Date     Adjustment disorder with depressed mood 06/17/2012     ALCOHOL ABUSE-UNSPEC 10/05/2007     Atrial fibrillation (H)      Chorioretinitis of both eyes 02/05/2012    Right eye worse than left. On Cellcept, steroid taper     Coronary atherosclerosis of unspecified type of vessel, native or graft      Diabetes mellitus type 2 in obese (H)      Essential hypertension, benign      Heart failure with preserved ejection fraction, NYHA class II (H)      Mixed hyperlipidemia 10/05/2007     Nonrheumatic aortic valve stenosis      Polycythemia      Sleep apnea      Tobacco use disorder 10/05/2007    Smoker - 1/2 ppd. Started at 16 years     Total retinal detachment of left eye 2009/2010       PAST SURGICAL HISTORY:  Past Surgical History:   Procedure Laterality Date     CARDIAC CATHERIZATION  7/03/2012    totally occluded RCA w/ mild left coronary disease     HEART CATH CORONARY ANGIOGRAM AND RIGHT HEART CATH  7/2012    RCA occlusion, IMI, no stent     PHACOEMULSIFICATION CLEAR CORNEA WITH STANDARD INTRAOCULAR LENS IMPLANT Right 6/4/2015    Procedure: PHACOEMULSIFICATION CLEAR CORNEA WITH STANDARD INTRAOCULAR LENS IMPLANT;  Surgeon: Tal Trinidad MD;  Location: [facility]     RETINAL REATTACHMENT  11/09, 7/10    Left     TONSILLECTOMY       [facility] NONSPECIFIC PROCEDURE  1987    Right Epididymis Removed     [facility] OPEN RX ANKLE DISLOCATN+FIXATN  12/2008    Right Ankle       FAMILY HISTORY:  Family History    Problem Relation Age of Onset     Arthritis Mother      Circulatory Mother         varicose veins     Eye Disorder Mother         detached retina     Thyroid Disease Mother      C.A.D. Father         Quadruple bypass     Gastrointestinal Disease Father         diverticulitis     Alzheimer Disease Father         demntia     Eye Disorder Father         cataract     Depression Sister         Post Partum      Alcoholism Sister      Neurologic Disorder Sister         Brain Aneurysm     Thyroid Disease Brother      Eye Disorder Brother         detached retina       SOCIAL HISTORY:  Social History     Socioeconomic History     Marital status:      Spouse name: None     Number of children: 2     Years of education: 19     Highest education level: None   Occupational History     Occupation: actor     Employer: UNEMPLOYED     Employer: [employer]     Occupation:    Tobacco Use     Smoking status: Former Smoker     Packs/day: 1.00     Years: 53.00     Pack years: 53.00     Types: Cigarettes     Quit date: 2019     Years since quittin.3     Smokeless tobacco: Never Used     Tobacco comment: 1 ppd   Substance and Sexual Activity     Alcohol use: No     Comment: quit drinking heavily      Drug use: No     Sexual activity: Never   Other Topics Concern     Parent/sibling w/ CABG, MI or angioplasty before 65F 55M? Not Asked      Service Not Asked     Blood Transfusions Not Asked     Caffeine Concern Yes     Occupational Exposure Not Asked     Hobby Hazards Not Asked     Sleep Concern Not Asked     Stress Concern Not Asked     Weight Concern Not Asked     Special Diet No     Back Care Not Asked     Exercise Not Asked     Comment: 1 miles on stepper      Bike Helmet Not Asked     Seat Belt Not Asked     Self-Exams Not Asked   Social History Narrative    Balanced Diet - Yes    Osteoporosis Preventative measures-  Dairy servings per day: 1-3    Regular Exercise -  Yes Describe walk  "every day    Dental Exam up - YES - Date: 1/2/07    Eye Exam - YES - Date: 2 yrs    Self Testicular Exam -  One testicle removed yrs ago due to fallDo you have any concerns about STD's -  No    Abuse: Current or Past (Physical, Sexual or Emotional)- No    Do you feel safe in your environment - Yes    Guns stored in the home - No    Sunscreen used - Yes    Seatbelts used - Yes    Lipids - ?    Glucose -  ?    Colon Cancer Screening - No    Hemoccults - hemorrhoids    PSA - NO    Digital Rectal Exam - YES - Date: 1993    Immunizations reviewed and up to date - No    Tory NAILS     Social Determinants of Health     Financial Resource Strain: Not on file   Food Insecurity: Not on file   Transportation Needs: Not on file   Physical Activity: Not on file   Stress: Not on file   Social Connections: Not on file   Intimate Partner Violence: Not on file   Housing Stability: Not on file       Review of Systems:  Skin:  Positive for rash;scaling;pigmentation   Eyes:  Positive for    ENT:  Negative    Respiratory:  Positive for shortness of breath;dyspnea at rest;sleep apnea;CPAP  Cardiovascular:    Positive for;edema  Gastroenterology: Negative    Genitourinary:  Negative    Musculoskeletal:  Positive for arthritis  Neurologic:  Negative    Psychiatric:  Negative    Heme/Lymph/Imm:  Positive for allergies  Endocrine:  Negative      Physical Exam:  Vitals: /68 (BP Location: Right arm, Cuff Size: Adult Large)   Ht 1.803 m (5' 11\")   Wt (!) 168.3 kg (371 lb)   SpO2 94%   BMI 51.74 kg/m       GEN:  NAD  NECK: No JVD  C/V:  Regular rate and rhythm, no murmur, rub or gallop.  RESP: Clear to auscultation bilaterally.  GI: Abdomen soft, nontender, nondistended.   EXTREM: No pitting LE edema.   NEURO: Alert and oriented, cooperative. No obvious focal deficits.   PSYCH: Normal affect.  SKIN: Warm and dry.       Recent Lab Results:  LIPID RESULTS:  Lab Results   Component Value Date    CHOL 118 10/25/2021    CHOL 146 " 04/12/2021    HDL 52 10/25/2021    HDL 54 04/12/2021    LDL 44 10/25/2021    LDL 73 04/12/2021    TRIG 108 10/25/2021    TRIG 98 04/12/2021    CHOLHDLRATIO 2.8 02/17/2020       LIVER ENZYME RESULTS:  Lab Results   Component Value Date    AST 23 04/12/2021    ALT 30 10/25/2021    ALT 29 04/12/2021       CBC RESULTS:  Lab Results   Component Value Date    WBC 8.3 04/12/2021    RBC 5.41 04/12/2021    HGB 17.8 (H) 04/12/2021    HCT 53.2 (H) 04/12/2021    MCV 98 04/12/2021    MCH 32.9 04/12/2021    MCHC 33.5 04/12/2021    RDW 12.9 04/12/2021     04/12/2021       BMP RESULTS:  Lab Results   Component Value Date     10/25/2021     06/28/2021    POTASSIUM 4.1 10/25/2021    POTASSIUM 4.0 06/28/2021    CHLORIDE 104 10/25/2021    CHLORIDE 104 06/28/2021    CO2 29 10/25/2021    CO2 26 06/28/2021    ANIONGAP 5 10/25/2021    ANIONGAP 7 06/28/2021     (H) 10/25/2021     (H) 06/28/2021    BUN 25 10/25/2021    BUN 17 06/28/2021    CR 1.10 10/25/2021    CR 0.91 06/28/2021    GFRESTIMATED 68 10/25/2021    GFRESTIMATED 85 06/28/2021    GFRESTBLACK >90 06/28/2021    VICTOR M 9.4 10/25/2021    VICTOR M 9.4 06/28/2021        A1C RESULTS:  Lab Results   Component Value Date    A1C 6.4 (H) 04/12/2021       INR RESULTS:  Lab Results   Component Value Date    INR 2.3 (H) 01/24/2022    INR 2.5 (H) 12/13/2021    INR 2.46 (H) 07/30/2021    INR 2.90 (H) 06/14/2021    INR 2.70 (H) 05/03/2021           Tanner Oliveira PA-C  February 4, 2022       cc:   Andrea Lockett MD  [address], [address]  [place] [address]

## 2022-02-04 NOTE — PATIENT INSTRUCTIONS
Today's Plan:   1) Lab in 1 month.   2) Depending on your results, we will consider increasing torsemide a bit more.     If you have questions or concerns please call my nurse team at [phone].     Scheduling phone number: [phone].  Reminder: Please bring in all current medications, over the counter supplements and vitamin bottles to your next appointment.    It was a pleasure seeing you today!     Tanner Oliveira PA-C  2/4/2022

## 2022-02-04 NOTE — PROGRESS NOTES
Primary Cardiologist: Dr. Lockett     Reason for Visit: 3 month follow up     History of Present Illness:   Markie is a 71-year-old male with past medical history notable for:     # Chronic HFpEF.  Likely multifactorial with contribution from primary lung disease as well, obesity hypoventilation syndrome, COPD.  RV was not well visualized on TTE 6/2021, likely has some underlying RV dysfunction as well.   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  Stable, denies symptoms concerning for angina.  # Permanent atrial fibrillation, on warfarin.   # Morbid obesity  # EMORY  # Significant vision impairment  # Mild aortic stenosis   # T2DM- hA1c 6.4% with diet control  # Hx of Tobacco dependence    He last saw Dr. Lockett 3 months ago and at that time his torsemide was increased from 20 mg BID to 40 mg in the morning and 20 mg in the afternoon. His losartan and amlodipine doses were decreased.  He returns to clinic with his grandson stating he feels better.  He no longer has blisters on his lower extremities.  He does have some shortness of breath but this has been stable.  Unfortunately his weight has not changed.  He is not able to weigh himself at home due to his poor vision.  He does go out to eat a few times a week as cooking is hard for him.  When he is eating at home he does try to pay attention to salt intake.  He denies chest discomfort, worsening orthopnea, abdominal distention, lightheadedness, or bleeding issues.    Assessment and Plan:  Markie is a 71-year-old male with past medical history notable for:     # Chronic HFpEF.  Likely multifactorial with contribution from primary lung disease as well, obesity hypoventilation syndrome, COPD.  RV was not well visualized on TTE 6/2021, likely has some underlying RV dysfunction as well.   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  Stable, denies symptoms concerning for  angina.  # Permanent atrial fibrillation, on warfarin.   # Morbid obesity  # EMORY  # Significant vision impairment  # Mild aortic stenosis   # T2DM- hA1c 6.4% with diet control  # Hx of Tobacco dependence    He appears to be doing well but his weight has not changed since increase in torsemide.  Unfortunately he has not had a BMP done today prior to the visit.  I have recommended that we obtain BMP.  Transportation is quite difficult for him and therefore he prefers that he gets his lab work done in 1 month when he comes in to his local clinic for INR.  As he feels better and his symptoms are stable, we will wait for 3 to 4 weeks until he can get his BMP.  Depending on his electrolytes and renal function we will consider further increasing torsemide.  We can also consider decreasing/discontinuing amlodipine to help with his peripheral edema.  I suspect some of this peripheral edema is secondary to lymphedema.  We briefly talked about possible lymphedema referral but patient is not interested in this as transportation is very difficult for him.    40 minutes spent on the date of the encounter with chart review, patient visit, care coordination, and documentation.      This note was completed in part using Dragon voice recognition software. Although reviewed after completion, some word and grammatical errors may occur.    Orders this Visit:  Orders Placed This Encounter   Procedures     Basic metabolic panel     Orders Placed This Encounter   Medications     multivitamin w/minerals (MULTI-VITAMIN) tablet     Sig: Take 1 tablet by mouth daily Century 55+     Medications Discontinued During This Encounter   Medication Reason     CENTRUM SILVER OR TABS Stopped by Patient         Encounter Diagnosis   Name Primary?     Heart failure with preserved ejection fraction, NYHA class II (H)        CURRENT MEDICATIONS:  Current Outpatient Medications   Medication Sig Dispense Refill     acetaminophen (TYLENOL) 500 MG tablet Take  500-1,000 mg by mouth every 6 hours as needed for mild pain       albuterol (PROAIR RESPICLICK) 108 (90 Base) MCG/ACT inhaler Inhale 2 puffs into the lungs every 4 hours as needed for shortness of breath / dyspnea 1 each 5     amLODIPine (NORVASC) 5 MG tablet Take 1 tablet (5 mg) by mouth daily 90 tablet 3     atenolol (TENORMIN) 100 MG tablet Take 1 tablet (100 mg) by mouth daily along with 50 mg tablet for total of 150 mg daily. 90 tablet 1     atenolol (TENORMIN) 50 MG tablet Take 1 tablet (50 mg) by mouth daily Along with 100 mg tablet for total of 150 mg daily. 90 tablet 1     FLUoxetine (PROZAC) 40 MG capsule Take 1 capsule (40 mg) by mouth daily 30 capsule 3     fluticasone (FLONASE) 50 MCG/ACT nasal spray Spray 1 spray into both nostrils daily       fluticasone-vilanterol (BREO ELLIPTA) 200-25 MCG/INH inhaler Inhale 1 puff into the lungs daily 60 each 11     losartan (COZAAR) 25 MG tablet Take 3 tablets (75 mg) by mouth daily 270 tablet 3     multivitamin w/minerals (MULTI-VITAMIN) tablet Take 1 tablet by mouth daily Century 55+       omega-3 fatty acids (FISH OIL) 1200 MG capsule Take 1 capsule by mouth daily       oxymetazoline (AFRIN 12 HOUR) 0.05 % nasal spray Spray 2 sprays into both nostrils At Bedtime       potassium chloride ER (K-TAB) 20 MEQ CR tablet Take 1 tablet (20 mEq) by mouth 2 times daily 180 tablet 3     rosuvastatin (CRESTOR) 40 MG tablet Take 1 tablet (40 mg) by mouth At Bedtime 90 tablet 3     tiotropium (SPIRIVA RESPIMAT) 2.5 MCG/ACT inhaler Inhale 2 puffs into the lungs daily 4 g 11     torsemide (DEMADEX) 20 MG tablet Take 2 tablets (40 mg) by mouth every morning AND 1 tablet (20 mg) daily (with lunch). 270 tablet 3     traZODone (DESYREL) 100 MG tablet Take 1 tablet (100 mg) by mouth nightly as needed for sleep 90 tablet 1     warfarin ANTICOAGULANT (COUMADIN ANTICOAGULANT) 5 MG tablet Take 2.5 mg on Mondays and Fridays, and 5 mg all other days, or as directed by Coumadin nurse. 100  tablet 3       ALLERGIES     Allergies   Allergen Reactions     Ambien Other (See Comments)     Parasomnia with sleep walking, injuries, delusions.  May have been in DTs at the same time.     Lisinopril Cough     Pt had cough     Seasonal Allergies        PAST MEDICAL HISTORY:  Past Medical History:   Diagnosis Date     Adjustment disorder with depressed mood 06/17/2012     ALCOHOL ABUSE-UNSPEC 10/05/2007     Atrial fibrillation (H)      Chorioretinitis of both eyes 02/05/2012    Right eye worse than left. On Cellcept, steroid taper     Coronary atherosclerosis of unspecified type of vessel, native or graft      Diabetes mellitus type 2 in obese (H)      Essential hypertension, benign      Heart failure with preserved ejection fraction, NYHA class II (H)      Mixed hyperlipidemia 10/05/2007     Nonrheumatic aortic valve stenosis      Polycythemia      Sleep apnea      Tobacco use disorder 10/05/2007    Smoker - 1/2 ppd. Started at 16 years     Total retinal detachment of left eye 2009/2010       PAST SURGICAL HISTORY:  Past Surgical History:   Procedure Laterality Date     CARDIAC CATHERIZATION  7/03/2012    totally occluded RCA w/ mild left coronary disease     HEART CATH CORONARY ANGIOGRAM AND RIGHT HEART CATH  7/2012    RCA occlusion, IMI, no stent     PHACOEMULSIFICATION CLEAR CORNEA WITH STANDARD INTRAOCULAR LENS IMPLANT Right 6/4/2015    Procedure: PHACOEMULSIFICATION CLEAR CORNEA WITH STANDARD INTRAOCULAR LENS IMPLANT;  Surgeon: Tal Trinidad MD;  Location:  [facility]     RETINAL REATTACHMENT  11/09, 7/10    Left     TONSILLECTOMY       Z NONSPECIFIC PROCEDURE  1987    Right Epididymis Removed     [facility] OPEN RX ANKLE DISLOCATN+FIXATN  12/2008    Right Ankle       FAMILY HISTORY:  Family History   Problem Relation Age of Onset     Arthritis Mother      Circulatory Mother         varicose veins     Eye Disorder Mother         detached retina     Thyroid Disease Mother      C.A.D. Father         Quadruple bypass      Gastrointestinal Disease Father         diverticulitis     Alzheimer Disease Father         demntia     Eye Disorder Father         cataract     Depression Sister         Post Partum      Alcoholism Sister      Neurologic Disorder Sister         Brain Aneurysm     Thyroid Disease Brother      Eye Disorder Brother         detached retina       SOCIAL HISTORY:  Social History     Socioeconomic History     Marital status:      Spouse name: None     Number of children: 2     Years of education: 19     Highest education level: None   Occupational History     Occupation: actor     Employer: UNEMPLOYED     Employer: [employer]     Occupation:    Tobacco Use     Smoking status: Former Smoker     Packs/day: 1.00     Years: 53.00     Pack years: 53.00     Types: Cigarettes     Quit date: 2019     Years since quittin.3     Smokeless tobacco: Never Used     Tobacco comment: 1 ppd   Substance and Sexual Activity     Alcohol use: No     Comment: quit drinking heavily      Drug use: No     Sexual activity: Never   Other Topics Concern     Parent/sibling w/ CABG, MI or angioplasty before 65F 55M? Not Asked      Service Not Asked     Blood Transfusions Not Asked     Caffeine Concern Yes     Occupational Exposure Not Asked     Hobby Hazards Not Asked     Sleep Concern Not Asked     Stress Concern Not Asked     Weight Concern Not Asked     Special Diet No     Back Care Not Asked     Exercise Not Asked     Comment: 1 miles on stepper      Bike Helmet Not Asked     Seat Belt Not Asked     Self-Exams Not Asked   Social History Narrative    Balanced Diet - Yes    Osteoporosis Preventative measures-  Dairy servings per day: 1-3    Regular Exercise -  Yes Describe walk every day    Dental Exam up - YES - Date: 07    Eye Exam - YES - Date: 2 yrs    Self Testicular Exam -  One testicle removed yrs ago due to fallDo you have any concerns about STD's -  No    Abuse: Current or Past  "(Physical, Sexual or Emotional)- No    Do you feel safe in your environment - Yes    Guns stored in the home - No    Sunscreen used - Yes    Seatbelts used - Yes    Lipids - ?    Glucose -  ?    Colon Cancer Screening - No    Hemoccults - hemorrhoids    PSA - NO    Digital Rectal Exam - YES - Date: 1993    Immunizations reviewed and up to date - No    Tory NAILS     Social Determinants of Health     Financial Resource Strain: Not on file   Food Insecurity: Not on file   Transportation Needs: Not on file   Physical Activity: Not on file   Stress: Not on file   Social Connections: Not on file   Intimate Partner Violence: Not on file   Housing Stability: Not on file       Review of Systems:  Skin:  Positive for rash;scaling;pigmentation   Eyes:  Positive for    ENT:  Negative    Respiratory:  Positive for shortness of breath;dyspnea at rest;sleep apnea;CPAP  Cardiovascular:    Positive for;edema  Gastroenterology: Negative    Genitourinary:  Negative    Musculoskeletal:  Positive for arthritis  Neurologic:  Negative    Psychiatric:  Negative    Heme/Lymph/Imm:  Positive for allergies  Endocrine:  Negative      Physical Exam:  Vitals: /68 (BP Location: Right arm, Cuff Size: Adult Large)   Ht 1.803 m (5' 11\")   Wt (!) 168.3 kg (371 lb)   SpO2 94%   BMI 51.74 kg/m       GEN:  NAD  NECK: No JVD  C/V:  Regular rate and rhythm, no murmur, rub or gallop.  RESP: Clear to auscultation bilaterally.  GI: Abdomen soft, nontender, nondistended.   EXTREM: No pitting LE edema.   NEURO: Alert and oriented, cooperative. No obvious focal deficits.   PSYCH: Normal affect.  SKIN: Warm and dry.       Recent Lab Results:  LIPID RESULTS:  Lab Results   Component Value Date    CHOL 118 10/25/2021    CHOL 146 04/12/2021    HDL 52 10/25/2021    HDL 54 04/12/2021    LDL 44 10/25/2021    LDL 73 04/12/2021    TRIG 108 10/25/2021    TRIG 98 04/12/2021    CHOLHDLRATIO 2.8 02/17/2020       LIVER ENZYME RESULTS:  Lab Results   Component " Value Date    AST 23 04/12/2021    ALT 30 10/25/2021    ALT 29 04/12/2021       CBC RESULTS:  Lab Results   Component Value Date    WBC 8.3 04/12/2021    RBC 5.41 04/12/2021    HGB 17.8 (H) 04/12/2021    HCT 53.2 (H) 04/12/2021    MCV 98 04/12/2021    MCH 32.9 04/12/2021    MCHC 33.5 04/12/2021    RDW 12.9 04/12/2021     04/12/2021       BMP RESULTS:  Lab Results   Component Value Date     10/25/2021     06/28/2021    POTASSIUM 4.1 10/25/2021    POTASSIUM 4.0 06/28/2021    CHLORIDE 104 10/25/2021    CHLORIDE 104 06/28/2021    CO2 29 10/25/2021    CO2 26 06/28/2021    ANIONGAP 5 10/25/2021    ANIONGAP 7 06/28/2021     (H) 10/25/2021     (H) 06/28/2021    BUN 25 10/25/2021    BUN 17 06/28/2021    CR 1.10 10/25/2021    CR 0.91 06/28/2021    GFRESTIMATED 68 10/25/2021    GFRESTIMATED 85 06/28/2021    GFRESTBLACK >90 06/28/2021    VICTOR M 9.4 10/25/2021    VICTOR M 9.4 06/28/2021        A1C RESULTS:  Lab Results   Component Value Date    A1C 6.4 (H) 04/12/2021       INR RESULTS:  Lab Results   Component Value Date    INR 2.3 (H) 01/24/2022    INR 2.5 (H) 12/13/2021    INR 2.46 (H) 07/30/2021    INR 2.90 (H) 06/14/2021    INR 2.70 (H) 05/03/2021           Tanner Oliveira PA-C  February 4, 2022

## 2022-02-14 ENCOUNTER — TELEPHONE (OUTPATIENT)
Dept: CARDIOLOGY | Facility: CLINIC | Age: 72
End: 2022-02-14
Payer: COMMERCIAL

## 2022-02-14 DIAGNOSIS — I50.33 ACUTE ON CHRONIC HEART FAILURE WITH PRESERVED EJECTION FRACTION (H): ICD-10-CM

## 2022-02-14 NOTE — TELEPHONE ENCOUNTER
[facility] Call Center    Phone Message    May a detailed message be left on voicemail: yes     Reason for Call: Other: Pt has fluid blisters on his left shin that are filled w/a white/yellow fluid.  and Edema on his right leg.  He states his diaretics are not effective and needs to have them adjusted.  Pt states he will be contacting the [facility] to have his 3.7.22 blood draw moved up sooner.  Please call pt to advise of the diaretic med change and to discuss current health issues.     Action Taken: Message routed to:  [facility] cardio    Travel Screening: Not Applicable

## 2022-02-14 NOTE — TELEPHONE ENCOUNTER
Called patient to review his edema. Patient states he notice water blister starting on his left shin on Saturday. It continued to swell all weekend and he popped it today. Patient states he is elevating his legs, notes that they are very swollen from the knee down.   Patient states he has not gained any weight. He tells this by the fit of his clothes and shoes as he cannot read a scale. Patient does not have a BP cuff at home.    Patient called his PCP clinic and moved the [facility] due in March up to 2/21/2022.    Patient is taking torsemide 40mg qAM and 20mg at lunch.  He is still taking the amlodipine 5mg daily.    Will message CLAUDIA Tanner Oliveira to review

## 2022-02-15 RX ORDER — TORSEMIDE 20 MG/1
TABLET ORAL
Qty: 270 TABLET | Refills: 3 | COMMUNITY
Start: 2022-02-15 | End: 2022-06-14

## 2022-02-15 NOTE — TELEPHONE ENCOUNTER
Spoke to patient, reviewed Tanner's recommendation to stop amlodipine and increase torsemide to 40mg BID. Pt verbalized understanding and was agreeable to plan. He said his swelling is down today but it tends to come and go so he would like to try this plan. [facility] scheduled for Monday 2/21, will call patient for an update then.     Pt declines to do lymphedema therapy, states he is blind and does not think he could wrap his legs daily on his own. He will keep elevating his legs. He states he will do the best he can to watch his salt but since he is blind, he does not cook. He eats a salad w/ vinaigrette, turkey/ham sandwich, and a frozen dinner daily.

## 2022-02-15 NOTE — TELEPHONE ENCOUNTER
Reply from CLAUDIA Tanner Oliveira:  Tanner Oliveira PA-C Anderson, Barbara E, RN  Cc: [facility] Heart Team 2  Caller: Unspecified (Yesterday,  3:05 PM)  I think his edema is multifactorial- let's see if we can stop amlodipine and increase afternoon dose of torsemide to 40 mg. Lymphedema referral is recommended if patient is willing to consider this.     Thanks,     Tanner     Attempted to contact patient to review medication changes recommended by CLAUDIA Tanner Oliveira. Left message for patient to call back.

## 2022-02-21 ENCOUNTER — LAB (OUTPATIENT)
Dept: LAB | Facility: CLINIC | Age: 72
End: 2022-02-21
Payer: COMMERCIAL

## 2022-02-21 ENCOUNTER — ANTICOAGULATION THERAPY VISIT (OUTPATIENT)
Dept: ANTICOAGULATION | Facility: CLINIC | Age: 72
End: 2022-02-21

## 2022-02-21 ENCOUNTER — TELEPHONE (OUTPATIENT)
Dept: CARDIOLOGY | Facility: CLINIC | Age: 72
End: 2022-02-21

## 2022-02-21 DIAGNOSIS — Z79.01 LONG TERM CURRENT USE OF ANTICOAGULANT THERAPY: ICD-10-CM

## 2022-02-21 DIAGNOSIS — Z79.01 LONG TERM CURRENT USE OF ANTICOAGULANT THERAPY: Primary | ICD-10-CM

## 2022-02-21 DIAGNOSIS — I50.30 HEART FAILURE WITH PRESERVED EJECTION FRACTION, NYHA CLASS II (H): ICD-10-CM

## 2022-02-21 DIAGNOSIS — I48.20 CHRONIC ATRIAL FIBRILLATION (H): ICD-10-CM

## 2022-02-21 LAB
ANION GAP SERPL CALCULATED.3IONS-SCNC: 8 MMOL/L (ref 3–14)
BUN SERPL-MCNC: 27 MG/DL (ref 7–30)
CALCIUM SERPL-MCNC: 9.7 MG/DL (ref 8.5–10.1)
CHLORIDE BLD-SCNC: 105 MMOL/L (ref 94–109)
CO2 SERPL-SCNC: 26 MMOL/L (ref 20–32)
CREAT SERPL-MCNC: 1.12 MG/DL (ref 0.66–1.25)
GFR SERPL CREATININE-BSD FRML MDRD: 71 ML/MIN/1.73M2
GLUCOSE BLD-MCNC: 144 MG/DL (ref 70–99)
INR BLD: 2.5 (ref 0.9–1.1)
POTASSIUM BLD-SCNC: 3.9 MMOL/L (ref 3.4–5.3)
SODIUM SERPL-SCNC: 139 MMOL/L (ref 133–144)

## 2022-02-21 PROCEDURE — 85610 PROTHROMBIN TIME: CPT

## 2022-02-21 PROCEDURE — 80048 BASIC METABOLIC PNL TOTAL CA: CPT

## 2022-02-21 PROCEDURE — 36415 COLL VENOUS BLD VENIPUNCTURE: CPT

## 2022-02-21 NOTE — PROGRESS NOTES
No answer. Left vm for Markie to return call to [facility] RN. If no return call , this writer will try again later in the day. If no changes or concerns, next INR could be in about 4 weeks, though Cardiologist may consider increasing torsemide dose depending on results of today's BMP lab, this could increase future INR results.   Tierra BLOOM RN  Anticoagulation Team

## 2022-02-21 NOTE — PROGRESS NOTES
ANTICOAGULATION MANAGEMENT     Markie Shepard 71 year old male is on warfarin with therapeutic INR result. (Goal INR 2.0-3.0)    Recent labs: (last 7 days)     02/21/22  1110   INR 2.5*       ASSESSMENT     Source(s): Chart Review and Patient/Caregiver Call       Warfarin doses taken: Warfarin taken as instructed    Diet: No new diet changes identified    New illness, injury, or hospitalization: No    Medication/supplement changes: torsemide dose increased on 2/15/22 which has potential for interaction; increasing INR    amlodipine stopped on 2/15/22 No interaction anticipated    Signs or symptoms of bleeding or clotting: No    Previous INR: Therapeutic last 2(+) visits    Additional findings: None     PLAN     Recommended plan for ongoing change(s) affecting INR     Dosing Instructions: Continue your current warfarin dose with next INR in 4 weeks       Summary  As of 2/21/2022    Full warfarin instructions:  2.5 mg every Mon, Fri; 5 mg all other days   Next INR check:  3/28/2022             Telephone call with Markie who verbalizes understanding and agrees to plan    Patient elected to schedule next visit in 5 weeks, he wanted to go 6 weeks but understood nurse's concern about torsemide dose increase raising INR.    Education provided: Please call back if any changes to your diet, medications or how you've been taking warfarin, Importance of consistent vitamin K intake, Goal range and significance of current result, Importance of following up at instructed interval, Potential interaction between warfarin and torsemide dose increase, No interaction anticipated between warfarin and discontinuing amlodipine, Monitoring for bleeding signs and symptoms, When to seek medical attention/emergency care and Contact [phone]  with any changes, questions or concerns.     Plan made per [facility] anticoagulation protocol    Tierra Bass RN  Anticoagulation  Clinic  2/21/2022    _______________________________________________________________________     Anticoagulation Episode Summary     Current INR goal:  2.0-3.0   TTR:  74.8 % (1 y)   Target end date:  Indefinite   Send INR reminders to:  DAYLIN DALY    Indications    Long-term (current) use of anticoagulants [Z79.01] [Z79.01]  Chronic atrial fibrillation (H) [I48.20]           Comments:  PREFERS PETER! Quit Smoking 9/29.         Anticoagulation Care Providers     Provider Role Specialty Phone number    Mali Patel MD Referring Internal Medicine [phone]

## 2022-02-22 ENCOUNTER — TELEPHONE (OUTPATIENT)
Dept: CARDIOLOGY | Facility: CLINIC | Age: 72
End: 2022-02-22
Payer: COMMERCIAL

## 2022-02-22 NOTE — TELEPHONE ENCOUNTER
Tanner' s reply after reviewing BMP results 2-21-22  His renal function is normal. Hopefully with stopping amlodipine and increasing torsemide his peripheral edema and blistering have improved.     Spoke with Patient and BMP results reviewed.  Patient states he is feeling well. Blister is much better, Torsemide seems to be working. Unable to weigh himself.    Patient will call with any questions or concerns.

## 2022-03-28 ENCOUNTER — ANTICOAGULATION THERAPY VISIT (OUTPATIENT)
Dept: ANTICOAGULATION | Facility: CLINIC | Age: 72
End: 2022-03-28

## 2022-03-28 ENCOUNTER — LAB (OUTPATIENT)
Dept: LAB | Facility: CLINIC | Age: 72
End: 2022-03-28
Payer: COMMERCIAL

## 2022-03-28 DIAGNOSIS — Z79.01 LONG TERM CURRENT USE OF ANTICOAGULANT THERAPY: Primary | ICD-10-CM

## 2022-03-28 DIAGNOSIS — I48.20 CHRONIC ATRIAL FIBRILLATION (H): ICD-10-CM

## 2022-03-28 DIAGNOSIS — Z79.01 LONG TERM CURRENT USE OF ANTICOAGULANT THERAPY: ICD-10-CM

## 2022-03-28 LAB — INR BLD: 2.6 (ref 0.9–1.1)

## 2022-03-28 PROCEDURE — 36416 COLLJ CAPILLARY BLOOD SPEC: CPT

## 2022-03-28 PROCEDURE — 85610 PROTHROMBIN TIME: CPT

## 2022-03-28 NOTE — PROGRESS NOTES
ANTICOAGULATION MANAGEMENT     Markie Shepard 72 year old male is on warfarin with therapeutic INR result. (Goal INR 2.0-3.0)    Recent labs: (last 7 days)     03/28/22  1114   INR 2.6*       ASSESSMENT       Source(s): Chart Review and Patient/Caregiver Call       Warfarin doses taken: Warfarin taken as instructed    Diet: No new diet changes identified    New illness, injury, or hospitalization: No    Medication/supplement changes: None noted    Signs or symptoms of bleeding or clotting: No    Previous INR: Therapeutic last 2(+) visits    Additional findings: None       PLAN     Recommended plan for no diet, medication or health factor changes affecting INR     Dosing Instructions: Continue your current warfarin dose with next INR in 6 weeks       Summary  As of 3/28/2022    Full warfarin instructions:  2.5 mg every Mon, Fri; 5 mg all other days   Next INR check:  5/9/2022             Telephone call with Markie who verbalizes understanding and agrees to plan    Lab visit scheduled    Education provided: Please call back if any changes to your diet, medications or how you've been taking warfarin    Plan made per [facility] anticoagulation protocol    Pedro Campuzano RN  Anticoagulation Clinic  3/28/2022    _______________________________________________________________________     Anticoagulation Episode Summary     Current INR goal:  2.0-3.0   TTR:  74.8 % (1 y)   Target end date:  Indefinite   Send INR reminders to:  DAYLIN DALY    Indications    Long-term (current) use of anticoagulants [Z79.01] [Z79.01]  Chronic atrial fibrillation (H) [I48.20]           Comments:  PREFERS PETER! Quit Smoking 9/29.         Anticoagulation Care Providers     Provider Role Specialty Phone number    Mali Patel MD Referring Internal Medicine [phone]

## 2022-04-06 DIAGNOSIS — F33.9 CHRONIC RECURRENT MAJOR DEPRESSIVE DISORDER (H): ICD-10-CM

## 2022-04-06 DIAGNOSIS — J44.9 CHRONIC OBSTRUCTIVE PULMONARY DISEASE, UNSPECIFIED COPD TYPE (H): ICD-10-CM

## 2022-04-07 RX ORDER — FLUOXETINE 40 MG/1
40 CAPSULE ORAL DAILY
Qty: 30 CAPSULE | Refills: 3 | Status: SHIPPED | OUTPATIENT
Start: 2022-04-07 | End: 2022-08-04

## 2022-04-07 RX ORDER — ALBUTEROL SULFATE 90 UG/1
2 POWDER, METERED RESPIRATORY (INHALATION) EVERY 4 HOURS PRN
Qty: 1 EACH | Refills: 5 | Status: SHIPPED | OUTPATIENT
Start: 2022-04-07 | End: 2022-12-07

## 2022-04-07 NOTE — TELEPHONE ENCOUNTER
Medication requested: fluticasone-vilanterol (BREO ELLIPTA) 200-25 MCG/INH inhaler  Last office visit:   [facility] appointments: none  Medication last refilled: 4/12/21; 60 + 11 refills  Last qualifying labs: N/A    Medication requested: FLUoxetine (PROZAC) 40 MG capsule  Last office visit: 6/28/21  [facility] appointments: none  Medication last refilled: 12/9/21; 30 + 3  Last qualifying labs:   PHQ-9 / YAAKOV-7 Scores 8/2015 to present 12/1/2021   YAAKOV-7 Score  3   YAAKOV-7 Score DocFlow 3   PHQ-9 Score DocFlow 6       Medication requested: albuterol (PROAIR RESPICLICK) 108 (90 Base) MCG/ACT inhaler  Last office visit:   [facility] appointments: none  Medication last refilled: 4/12/21; 1 + 5 refills  Last qualifying labs: N/A      Medication is being filled for 1 time refill only due to:  Patient needs to be seen because : Pt will need to be seen by July for further refills. LOV June 2021..     Henry HORAN, RN  04/07/22 12:35 PM

## 2022-04-08 ENCOUNTER — TELEPHONE (OUTPATIENT)
Dept: FAMILY MEDICINE | Facility: CLINIC | Age: 72
End: 2022-04-08
Payer: COMMERCIAL

## 2022-04-08 NOTE — TELEPHONE ENCOUNTER
Prior Authorization Retail Medication Request    Medication/Dose: ProAir Respiclick Inhaler  ICD code (if different than what is on RX):  Same  Previously Tried and Failed:  Same  Rationale:  Same    Insurance Name:  are [place] [employer]  Insurance ID:  [id number]  Insurance Phone:  [phone]    Pharmacy Information (if different than what is on RX)  Name:  Same  Phone:  Same

## 2022-04-15 NOTE — TELEPHONE ENCOUNTER
Central Prior Authorization Team   Phone: [phone]      PA Initiation - I've submitted the P/A request to insurance as an URGENT request.     Medication: ProAir Respiclick Inhaler  Insurance Company: [employer] - Phone [phone] Fax [phone]  Pharmacy Filling the Rx: [id number] - [facility] [address]  Filling Pharmacy Phone: [phone]  Filling Pharmacy Fax:    Start Date: 4/15/2022

## 2022-04-15 NOTE — TELEPHONE ENCOUNTER
Prior Authorization Approval    Authorization Effective Date: 3/16/2022  Authorization Expiration Date: 4/15/2023  Medication: ProAir Respiclick Inhaler  Approved Dose/Quantity: 1 per 30 days  Reference #: [id number]   Insurance Company: [employer] - Phone [phone] Fax [phone]  Expected CoPay:       Which Pharmacy is filling the prescription (Not needed for infusion/clinic administered): [id number] - [facility] [address]  Pharmacy Notified: Yes - spoke to Kelsi who says, they spoke to clinic yesterday was given rx for Albuterol. If provider would like patient to now use the Proair respiclick since it is approved, pharmacy asks for a new prescription to be sent to them again for confirmation.  Patient Notified: No

## 2022-05-09 ENCOUNTER — ANTICOAGULATION THERAPY VISIT (OUTPATIENT)
Dept: ANTICOAGULATION | Facility: CLINIC | Age: 72
End: 2022-05-09

## 2022-05-09 ENCOUNTER — LAB (OUTPATIENT)
Dept: LAB | Facility: CLINIC | Age: 72
End: 2022-05-09
Payer: COMMERCIAL

## 2022-05-09 DIAGNOSIS — I48.20 CHRONIC ATRIAL FIBRILLATION (H): ICD-10-CM

## 2022-05-09 DIAGNOSIS — Z79.01 LONG TERM CURRENT USE OF ANTICOAGULANT THERAPY: Primary | ICD-10-CM

## 2022-05-09 DIAGNOSIS — Z79.01 LONG TERM CURRENT USE OF ANTICOAGULANT THERAPY: ICD-10-CM

## 2022-05-09 LAB — INR BLD: 2.2 (ref 0.9–1.1)

## 2022-05-09 PROCEDURE — 36416 COLLJ CAPILLARY BLOOD SPEC: CPT

## 2022-05-09 PROCEDURE — 85610 PROTHROMBIN TIME: CPT

## 2022-05-09 NOTE — PROGRESS NOTES
ANTICOAGULATION MANAGEMENT     Markie Shepard 72 year old male is on warfarin with therapeutic INR result. (Goal INR 2.0-3.0)    Recent labs: (last 7 days)     05/09/22  1109   INR 2.2*       ASSESSMENT       Source(s): Chart Review    Previous INR was Therapeutic last 2(+) visits    Medication, diet, health changes since last INR chart reviewed; none identified     PLAN     Recommended plan for no diet, medication or health factor changes affecting INR     Dosing Instructions: continue your current warfarin dose with next INR in 6 weeks       Summary  As of 5/9/2022    Full warfarin instructions:  2.5 mg every Mon, Fri; 5 mg all other days   Next INR check:  6/20/2022             Detailed voice message left for Markie with dosing instructions and follow up date.     Contact [phone]  to schedule and with any changes, questions or concerns.     Education provided: Please call back if any changes to your diet, medications or how you've been taking warfarin    Plan made per [facility] anticoagulation protocol    Pedro Campuzano RN  Anticoagulation Clinic  5/9/2022    _______________________________________________________________________     Anticoagulation Episode Summary     Current INR goal:  2.0-3.0   TTR:  75.9 % (1 y)   Target end date:  Indefinite   Send INR reminders to:  [facility]    Indications    Long-term (current) use of anticoagulants [Z79.01] [Z79.01]  Chronic atrial fibrillation (H) [I48.20]           Comments:  PREFERS PETER! Quit Smoking 9/29.         Anticoagulation Care Providers     Provider Role Specialty Phone number    Mali Patel MD Referring Internal Medicine [phone]

## 2022-06-03 DIAGNOSIS — I10 ESSENTIAL HYPERTENSION WITH GOAL BLOOD PRESSURE LESS THAN 140/90: ICD-10-CM

## 2022-06-03 NOTE — TELEPHONE ENCOUNTER
Last office visit was on 12/01/2021, no future visits scheduled.  Medication last refilled on 12/02/2021 with 90 tablets and 0 refills.    Prescription approved per [facility] Refill Protocol.  Usha Do RN  [facility]

## 2022-06-06 DIAGNOSIS — E78.5 DYSLIPIDEMIA: ICD-10-CM

## 2022-06-06 RX ORDER — ATENOLOL 50 MG/1
50 TABLET ORAL DAILY
Qty: 90 TABLET | Refills: 1 | Status: SHIPPED | OUTPATIENT
Start: 2022-06-06 | End: 2022-12-08

## 2022-06-06 RX ORDER — ROSUVASTATIN CALCIUM 40 MG/1
40 TABLET, COATED ORAL AT BEDTIME
Qty: 90 TABLET | Refills: 1 | Status: SHIPPED | OUTPATIENT
Start: 2022-06-06 | End: 2022-11-30

## 2022-06-07 DIAGNOSIS — I10 ESSENTIAL HYPERTENSION WITH GOAL BLOOD PRESSURE LESS THAN 140/90: ICD-10-CM

## 2022-06-07 RX ORDER — ATENOLOL 100 MG/1
100 TABLET ORAL DAILY
Qty: 90 TABLET | Refills: 1 | Status: SHIPPED | OUTPATIENT
Start: 2022-06-07 | End: 2022-12-01

## 2022-06-07 NOTE — TELEPHONE ENCOUNTER
Last visit 12/1/21, no future visit  Prescription approved per [facility] Refill Protocol.  Usha Do RN  [facility]

## 2022-06-08 ENCOUNTER — TELEPHONE (OUTPATIENT)
Dept: FAMILY MEDICINE | Facility: CLINIC | Age: 72
End: 2022-06-08

## 2022-06-08 NOTE — TELEPHONE ENCOUNTER
Who is calling? Patient  Reason for Call: Spiriviva Respimat inhaler is too expensive for patient, and he  has replaced this with the  Albuterol. Requests call back if there are any questions

## 2022-06-14 ENCOUNTER — TELEPHONE (OUTPATIENT)
Dept: CARDIOLOGY | Facility: CLINIC | Age: 72
End: 2022-06-14
Payer: COMMERCIAL

## 2022-06-14 ENCOUNTER — TELEPHONE (OUTPATIENT)
Dept: FAMILY MEDICINE | Facility: CLINIC | Age: 72
End: 2022-06-14
Payer: COMMERCIAL

## 2022-06-14 DIAGNOSIS — I50.33 ACUTE ON CHRONIC HEART FAILURE WITH PRESERVED EJECTION FRACTION (H): ICD-10-CM

## 2022-06-14 RX ORDER — TORSEMIDE 20 MG/1
TABLET ORAL
Qty: 270 TABLET | Refills: 1 | Status: SHIPPED | OUTPATIENT
Start: 2022-06-14 | End: 2022-11-07

## 2022-06-14 NOTE — TELEPHONE ENCOUNTER
Patient called requesting a refill on Torsemide 20 mg 2 tablets in the AM and 2 tablets in the afternoon, [facility] [address]  [facility]     Refill e scribed. [facility] Cardiology Refill Guideline reviewed. Met    Last [facility] 2-21-22    Telephone note 2-15-22 - from Tanner -   I think his edema is multifactorial- let's see if we can stop amlodipine and increase afternoon dose of torsemide to 40 mg. Lymphedema referral is recommended if patient is willing to consider this.      Patient states he is taking total of 4 tablets daily. Denies shortness of breath, edema. And weight is 360 lbs. feels good.     Last [facility] 2-4-22 -    Last Dr. Lockett visit 10-5-21.

## 2022-06-14 NOTE — TELEPHONE ENCOUNTER
Who is calling? Patient   Reason for Call: Patient has some concerns about their medication torsemide (DEMADEX) 20 MG tablet. Would like to speak with RN about it.               59 yo female h/o gerd, depression/anxiety c/o 3 d intermittent L sided cp - cramping like and also palpitations w sob, gerd sensation, mild epigastric pain that she's had before w anxiety.  Pt notes increased anxiety and depression due to home stressors and states this increased today when her  fell and hurt his face - pt reports near syncope when she saw his bloody nose and injuries w increased L sided cp at that time.  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  No h/o cad, htn, hld, dm, + tob as teenager, no fh cad.  No prior cardiac testing.  No le pain/swelling, h/o pe/dvt.  + slight cough w/o uri sx, fever.  Pt denies si/hi.  Pain 6/10.

## 2022-06-14 NOTE — TELEPHONE ENCOUNTER
Spoke with pt on phone - pt has already been in touch with cardiology. Please see note from RN Nalini Carmona from today regarding torsemide refill.    Henry Ray - AUNG, RN  06/14/22 1:12 PM

## 2022-06-20 ENCOUNTER — ANTICOAGULATION THERAPY VISIT (OUTPATIENT)
Dept: ANTICOAGULATION | Facility: CLINIC | Age: 72
End: 2022-06-20

## 2022-06-20 ENCOUNTER — LAB (OUTPATIENT)
Dept: LAB | Facility: CLINIC | Age: 72
End: 2022-06-20
Payer: COMMERCIAL

## 2022-06-20 DIAGNOSIS — I48.20 CHRONIC ATRIAL FIBRILLATION (H): ICD-10-CM

## 2022-06-20 DIAGNOSIS — Z79.01 LONG TERM CURRENT USE OF ANTICOAGULANT THERAPY: ICD-10-CM

## 2022-06-20 DIAGNOSIS — Z79.01 LONG TERM CURRENT USE OF ANTICOAGULANT THERAPY: Primary | ICD-10-CM

## 2022-06-20 LAB — INR BLD: 2.5 (ref 0.9–1.1)

## 2022-06-20 PROCEDURE — 36416 COLLJ CAPILLARY BLOOD SPEC: CPT

## 2022-06-20 PROCEDURE — 85610 PROTHROMBIN TIME: CPT

## 2022-06-20 NOTE — PROGRESS NOTES
ANTICOAGULATION MANAGEMENT     Markie Shepard 72 year old male is on warfarin with therapeutic INR result. (Goal INR 2.0-3.0)    Recent labs: (last 7 days)     06/20/22  1117   INR 2.5*       ASSESSMENT       Source(s): Chart Review and Patient/Caregiver Call       Warfarin doses taken: Warfarin taken as instructed    Diet: No new diet changes identified    New illness, injury, or hospitalization: No   On warfarin for permanent atrial fibrillation.    Medication/supplement changes: None noted    Signs or symptoms of bleeding or clotting: No    Previous INR: Therapeutic last 6 visits    Additional findings: None       PLAN     Recommended plan for no diet, medication or health factor changes affecting INR     Dosing Instructions:    continue your current warfarin dose with next INR in 6-7 weeks       Summary  As of 6/20/2022    Full warfarin instructions:  2.5 mg every Mon, Fri; 5 mg all other days   Next INR check:  8/8/2022             Telephone call with  Markie ([phone]) who verbalizes understanding and agrees to plan    Lab visit scheduled - INR on 8/8/22 @ [place].    Education provided: Importance of consistent vitamin K intake and Goal range and significance of current result    Plan made per [facility] anticoagulation protocol    Sharon Nguyen RN  Anticoagulation Clinic  6/20/2022    _______________________________________________________________________     Anticoagulation Episode Summary     Current INR goal:  2.0-3.0   TTR:  76.0 % (1 y)   Target end date:  Indefinite   Send INR reminders to:  [facility]    Indications    Long-term (current) use of anticoagulants [Z79.01] [Z79.01]  Chronic atrial fibrillation (H) [I48.20]           Comments:  PREFERS PETER! Quit Smoking 9/29.         Anticoagulation Care Providers     Provider Role Specialty Phone number    Mali Patel MD Referring Internal Medicine [phone]

## 2022-07-05 DIAGNOSIS — G47.00 INSOMNIA, UNSPECIFIED TYPE: ICD-10-CM

## 2022-07-05 RX ORDER — TRAZODONE HYDROCHLORIDE 100 MG/1
100 TABLET ORAL
Qty: 90 TABLET | Refills: 1 | Status: SHIPPED | OUTPATIENT
Start: 2022-07-05 | End: 2022-12-26

## 2022-07-05 NOTE — TELEPHONE ENCOUNTER
Last visit 12/1/21, no future visit  Routing refill request to provider for review/approval because:  Drug interaction warning    Usha Do RN  [facility]

## 2022-08-02 DIAGNOSIS — I50.30 HEART FAILURE WITH PRESERVED EJECTION FRACTION, NYHA CLASS II (H): ICD-10-CM

## 2022-08-02 RX ORDER — POTASSIUM CHLORIDE 1500 MG/1
20 TABLET, EXTENDED RELEASE ORAL 2 TIMES DAILY
Qty: 180 TABLET | Refills: 1 | Status: SHIPPED | OUTPATIENT
Start: 2022-08-02 | End: 2022-12-08

## 2022-08-03 DIAGNOSIS — F33.9 CHRONIC RECURRENT MAJOR DEPRESSIVE DISORDER (H): ICD-10-CM

## 2022-08-03 DIAGNOSIS — J44.9 CHRONIC OBSTRUCTIVE PULMONARY DISEASE, UNSPECIFIED COPD TYPE (H): ICD-10-CM

## 2022-08-03 NOTE — TELEPHONE ENCOUNTER
Last office visit: 12/1/2021  Next appointment: none  Medication last refilled: Unknown    Not on med list    Breo Ellipta inhalation aerosol powder breath activated 200-25 MCG/INH

## 2022-08-03 NOTE — TELEPHONE ENCOUNTER
Last office visit: 12/1/2021  Next appointment: none  Medication last refilled: 4/7/2022 #30+3RF    Lacie

## 2022-08-04 RX ORDER — FLUOXETINE 40 MG/1
40 CAPSULE ORAL DAILY
Qty: 30 CAPSULE | Refills: 1 | Status: SHIPPED | OUTPATIENT
Start: 2022-08-04 | End: 2022-10-05

## 2022-08-04 NOTE — TELEPHONE ENCOUNTER
Fluoxetine (Prozac) 40 mg + Fluticasone-vilanterol (Breo) 200-25 mcg/INH    Last Office Visit: 12/1/21  Future [facility] Appointments: None  Medication last refilled:     4/7/22 #30 with with 3 refill(s)-Fluoxetine  4/7/22 #60 with with 3 refill(s)-Breo inhaler    Last PHQ-9 12/9/2019 4/12/2021 12/1/2021   PHQ-9 Total Score 1 7 6     YAAKOV-7 SCORE 12/9/2019 4/12/2021 12/1/2021   Total Score 1 20 3     NO ACT on file.    Routing refill request to provider for review/approval because:  Drug interaction warning between Fluoxetine and Trazodone.    Jessica Stein, FILOMENAN, RN, CCM

## 2022-08-08 ENCOUNTER — LAB (OUTPATIENT)
Dept: LAB | Facility: CLINIC | Age: 72
End: 2022-08-08
Payer: COMMERCIAL

## 2022-08-08 ENCOUNTER — ANTICOAGULATION THERAPY VISIT (OUTPATIENT)
Dept: ANTICOAGULATION | Facility: CLINIC | Age: 72
End: 2022-08-08

## 2022-08-08 DIAGNOSIS — Z79.01 LONG TERM CURRENT USE OF ANTICOAGULANT THERAPY: ICD-10-CM

## 2022-08-08 DIAGNOSIS — I48.20 CHRONIC ATRIAL FIBRILLATION (H): ICD-10-CM

## 2022-08-08 DIAGNOSIS — Z79.01 LONG TERM CURRENT USE OF ANTICOAGULANT THERAPY: Primary | ICD-10-CM

## 2022-08-08 LAB — INR BLD: 2.7 (ref 0.9–1.1)

## 2022-08-08 PROCEDURE — 36415 COLL VENOUS BLD VENIPUNCTURE: CPT

## 2022-08-08 PROCEDURE — 85610 PROTHROMBIN TIME: CPT

## 2022-08-08 NOTE — PROGRESS NOTES
ANTICOAGULATION MANAGEMENT     Markie Shepard 72 year old male is on warfarin with therapeutic INR result. (Goal INR 2.0-3.0)    Recent labs: (last 7 days)     08/08/22  1111   INR 2.7*       ASSESSMENT       Source(s): Chart Review and Patient/Caregiver Call       Warfarin doses taken: Warfarin taken as instructed    Diet: No new diet changes identified    New illness, injury, or hospitalization: No    Medication/supplement changes: None noted    Signs or symptoms of bleeding or clotting: No    Previous INR: Therapeutic last 2(+) visits    Additional findings: None       PLAN     Recommended plan for no diet, medication or health factor changes affecting INR     Dosing Instructions: Continue your current warfarin dose with next INR in 6 weeks       Summary  As of 8/8/2022    Full warfarin instructions:  2.5 mg every Mon, Fri; 5 mg all other days   Next INR check:  9/19/2022             Telephone call with Markie who verbalizes understanding and agrees to plan    Lab visit scheduled    Education provided: Please call back if any changes to your diet, medications or how you've been taking warfarin and Contact [phone]  with any changes, questions or concerns.     Plan made per [facility] anticoagulation protocol    Tierra Bass RN  Anticoagulation Clinic  8/8/2022    _______________________________________________________________________     Anticoagulation Episode Summary     Current INR goal:  2.0-3.0   TTR:  86.6 % (1 y)   Target end date:  Indefinite   Send INR reminders to:  [facility]    Indications    Long-term (current) use of anticoagulants [Z79.01] [Z79.01]  Chronic atrial fibrillation (H) [I48.20]           Comments:  PREFERS PETER! Quit Smoking 9/29.         Anticoagulation Care Providers     Provider Role Specialty Phone number    Mali Patel MD Referring Internal Medicine [phone]

## 2022-08-09 ENCOUNTER — TELEPHONE (OUTPATIENT)
Dept: FAMILY MEDICINE | Facility: CLINIC | Age: 72
End: 2022-08-09

## 2022-08-09 NOTE — TELEPHONE ENCOUNTER
Markie called back to report he spoke with his pharmacy and the Advair inhaler through his insurance carries a co-pay of $95, which is higher than his Breo.  I informed Markie he should stay on the Breo and he was pleased with this news.  FILOMENA MittalN, RN, [facility]  08/09/22  1:49 PM

## 2022-08-09 NOTE — TELEPHONE ENCOUNTER
Called and spoke with Markie, and his insurance sent him a letter recommending he switch from Breo to Advair as it will save him a lot of $$.  He currently pays $92/month for the Breo inhaler.  FILOMENA MittalN, RN, [facility]  08/09/22  12:19 PM

## 2022-08-09 NOTE — TELEPHONE ENCOUNTER
Who is calling? Steven  Medication name: Advair diskus  Is this a refill request? No  Have they contacted the pharmacy?  No  Pharmacy:   [id number] - [place], [place] - [address]  [address]  [facility] [address]  Phone: [phone] Fax: [phone]    Question/Concern: Pt received letter from insurance stating that he should get a script for the Advair as its cheaper? He wanted to discuss whether this was recommended?  Would patient like a call back? Luisa Medellin

## 2022-08-23 NOTE — PROGRESS NOTES
Continue Tylenol 3 as needed.  Condition is stable.   [facility] Refill Guideline reviewed.  Medication meets criteria for refill. KARRIE Torres RN

## 2022-09-13 DIAGNOSIS — I10 ESSENTIAL HYPERTENSION WITH GOAL BLOOD PRESSURE LESS THAN 140/90: ICD-10-CM

## 2022-09-13 RX ORDER — LOSARTAN POTASSIUM 25 MG/1
75 TABLET ORAL DAILY
Qty: 270 TABLET | Refills: 1 | Status: SHIPPED | OUTPATIENT
Start: 2022-09-13 | End: 2022-12-08

## 2022-09-13 NOTE — TELEPHONE ENCOUNTER
Received refill request for:  Losartan  [facility] Cardiology Refill Guideline reviewed.  Medication meets criteria for refill.    Celeste Linder RN, BSN  09/13/22 at 3:39 PM

## 2022-09-14 ENCOUNTER — TELEPHONE (OUTPATIENT)
Dept: FAMILY MEDICINE | Facility: CLINIC | Age: 72
End: 2022-09-14

## 2022-09-14 DIAGNOSIS — Z79.01 LONG TERM CURRENT USE OF ANTICOAGULANT THERAPY: Primary | ICD-10-CM

## 2022-09-14 DIAGNOSIS — I48.20 CHRONIC ATRIAL FIBRILLATION (H): ICD-10-CM

## 2022-09-14 NOTE — TELEPHONE ENCOUNTER
Who is calling? Patient  Reason for Call:Having tooth extraction surgery - tbd - on and wants to know if he should stop taking his Warfarin

## 2022-09-14 NOTE — TELEPHONE ENCOUNTER
Routing to PharmD to advise, I know there is no warfarin hold for single tooth extraction, would root removal change that plan?    Tonya Campuzano RN   [facility] Anticoagulation Clinic

## 2022-09-14 NOTE — TELEPHONE ENCOUNTER
Returned call to pt and advised him to call his Coumadin clinic at [phone]. He understood.  AUNG Marie, RN  09/14/22, 3:06 PM

## 2022-09-14 NOTE — TELEPHONE ENCOUNTER
Reason for Call:  Other call back    Detailed comments: Pt is having tooth and roots removed and needs to know what to do about him meds before the surgery not sure exactly when the oral surgery can take place    Phone Number Patient can be reached at: Cell number on file:    No relevant phone numbers on file.       Best Time: anytime    Can we leave a detailed message on this number? YES    Call taken on 9/14/2022 at 3:09 PM by Aliza Miller

## 2022-09-16 NOTE — TELEPHONE ENCOUNTER
MANDY-PROCEDURAL ANTICOAGULATION  MANAGEMENT    DENTAL: Chest 2012 mandy-procedural guidelines suggest patients who require a minor dental procedure, continuing warfarin with coadministration of an oral prohemostatic agent or stopping VKAs 2 to 3 days before the procedure instead of alternative strategies      Minor dental procedures assessed include tooth extractions and endodontic (root canal) procedures     RECOMMENDATION     No warfarin interruption required.     Rosita Damon, PatrickD, BCPS

## 2022-09-19 ENCOUNTER — ANTICOAGULATION THERAPY VISIT (OUTPATIENT)
Dept: ANTICOAGULATION | Facility: CLINIC | Age: 72
End: 2022-09-19

## 2022-09-19 ENCOUNTER — LAB (OUTPATIENT)
Dept: LAB | Facility: CLINIC | Age: 72
End: 2022-09-19
Payer: COMMERCIAL

## 2022-09-19 DIAGNOSIS — Z79.01 LONG TERM CURRENT USE OF ANTICOAGULANT THERAPY: Primary | ICD-10-CM

## 2022-09-19 DIAGNOSIS — I48.20 CHRONIC ATRIAL FIBRILLATION (H): ICD-10-CM

## 2022-09-19 DIAGNOSIS — Z79.01 LONG TERM CURRENT USE OF ANTICOAGULANT THERAPY: ICD-10-CM

## 2022-09-19 LAB — INR BLD: 2.5 (ref 0.9–1.1)

## 2022-09-19 PROCEDURE — 36415 COLL VENOUS BLD VENIPUNCTURE: CPT

## 2022-09-19 PROCEDURE — 85610 PROTHROMBIN TIME: CPT

## 2022-09-19 NOTE — PROGRESS NOTES
ANTICOAGULATION MANAGEMENT     Markie Shepard 72 year old male is on warfarin with therapeutic INR result. (Goal INR 2.0-3.0)    Recent labs: (last 7 days)     09/19/22  1114   INR 2.5*       ASSESSMENT       Source(s): Chart Review       Warfarin doses taken: Reviewed in chart    Diet: Unable to assess    New illness, injury, or hospitalization: No    Medication/supplement changes: None noted    Signs or symptoms of bleeding or clotting: No    Previous INR: Therapeutic last 2(+) visits    Additional findings: Upcoming surgery/procedure two extractions - consultation scheduled for 10/3/22 - see 9/14/22 TE for procedure plan       PLAN     Recommended plan for no diet, medication or health factor changes affecting INR     Dosing Instructions: Continue your current warfarin dose with next INR in 6 weeks       Summary  As of 9/19/2022    Full warfarin instructions:  2.5 mg every Mon, Fri; 5 mg all other days   Next INR check:  10/31/2022             Detailed voice message left for Markie with dosing instructions and follow up date.     Contact [phone]  to schedule and with any changes, questions or concerns.     Education provided: Please call back if any changes to your diet, medications or how you've been taking warfarin and Contact [phone]  with any changes, questions or concerns.     Plan made per [facility] anticoagulation protocol    Chelsie Encinas RN  Anticoagulation Clinic  9/19/2022    _______________________________________________________________________     Anticoagulation Episode Summary     Current INR goal:  2.0-3.0   TTR:  88.1 % (1 y)   Target end date:  Indefinite   Send INR reminders to:  [facility]    Indications    Long-term (current) use of anticoagulants [Z79.01] [Z79.01]  Chronic atrial fibrillation (H) [I48.20]           Comments:  PREFERS PETER! Quit Smoking 9/29.         Anticoagulation Care Providers     Provider Role Specialty Phone number    Mali Patel MD  [facility] Internal Medicine [phone]

## 2022-09-19 NOTE — PROGRESS NOTES
Patient returned call to [facility] team: patient reported losing two teeth this past week and plans to have 2 extractions. Oral Surgery consultation is scheduled for 10/3/22, however, patient is hoping they will pull teeth same day d/t transportation issues. See 9/14/22 procedure TE for details. Patient denied any further changes per assessment below. Chelsie Encinas, FILOMENAN, RN

## 2022-09-19 NOTE — TELEPHONE ENCOUNTER
Spoke with patient - reviewed upcoming dental procedure. Patient is having two teeth removed (change in plans). Oral Surgeon consultation is scheduled for 10/3/22, however, patient is hoping surgeon just pulls teeth that day d/t transportation issues. Routing back to Rosita [facility] to verify no change in plan. Once confirmed, [facility] team will call Dr. Milton Platt, Oral Surgeon, office at #[phone] to notify of any hold orders -  is Madeleine. Chelsie Encinas, FILOMENAN, RN

## 2022-09-20 NOTE — TELEPHONE ENCOUNTER
Called Oral Surgeon's office and left a detailed message with  (Madeleine not available) that warfarin hold not advised for 1-3 tooth extractions. Provided call back number if needed.    Called Markie to inform him of the same that once extractions are scheduled, he can call us with the date and we can schedule an INR check 1- days prior, whatever dental provider feel comfortable with.  Patient states understanding. He will follow up with Dr. Platt if INR is required prior to extractions. Otherwise, he is not scheduled until 10/31/22.    Tierra BLOOM RN  Anticoagulation Team

## 2022-09-20 NOTE — TELEPHONE ENCOUNTER
Called [facility] and spoke with Kelly and notified her of the urine and meconium drug screen result (Positive for THC).    Encounter Closed     The American Association of Oral and Maxillofacial Surgeons defines the extraction of 1-3 teeth as low bleed risk. Warfarin may be continued uninterrupted for this procedure.     Rosita Damon PharmD, BCPS

## 2022-10-04 DIAGNOSIS — J44.9 CHRONIC OBSTRUCTIVE PULMONARY DISEASE, UNSPECIFIED COPD TYPE (H): ICD-10-CM

## 2022-10-04 DIAGNOSIS — F33.9 CHRONIC RECURRENT MAJOR DEPRESSIVE DISORDER (H): ICD-10-CM

## 2022-10-05 RX ORDER — FLUOXETINE 40 MG/1
40 CAPSULE ORAL DAILY
Qty: 30 CAPSULE | Refills: 1 | Status: SHIPPED | OUTPATIENT
Start: 2022-10-05 | End: 2022-12-01

## 2022-10-05 NOTE — TELEPHONE ENCOUNTER
Medication requested: fluticasone-vilanterol (BREO ELLIPTA) 200-25 MCG/INH inhaler  Last office visit: 6/28/21  [facility] appointments: none  Medication last refilled: 8/4/22; 60 + 1 refill  Last qualifying labs: N/A    Medication requested: FLUoxetine (PROZAC) 40 MG capsule  Last office visit: 6/28/21  [facility] appointments: none  Medication last refilled: 8/4/22; 30 + 1 refill  Last qualifying labs:   PHQ-9 / YAAKOV-7 Scores 8/2015 to present 12/1/2021   YAAKOV-7 Score DocFlow 3     PHQ-9 / YAAKOV-7 Scores 8/2015 to present 12/1/2021   PHQ-9 Score DocFlow 6     Medication is being filled for 1 time refill only due to:  Patient needs to be seen because it has been more than one year since last visit.    Requested  assist by calling pt to schedule follow-up appointment.    Henry HORAN, RN  10/05/22 12:56 PM

## 2022-10-11 ENCOUNTER — TELEPHONE (OUTPATIENT)
Dept: FAMILY MEDICINE | Facility: CLINIC | Age: 72
End: 2022-10-11

## 2022-10-11 NOTE — TELEPHONE ENCOUNTER
----- Message from Mali Patel MD sent at 10/5/2022  9:42 PM CDT -----  Regarding: needs appt  Please call Markie to help him schedule clinic visit for medication refills in the next 1-2 months.  He is a complex patient, legally blind so may need to coordinate with a .    If he wishes to coordinate preventive exam and medication check at same time, OK for this patient. However I need 60 minutes do do it all.    Otherwise 40min appt for medication refills only.    Thanks!    Mali

## 2022-10-18 ENCOUNTER — TELEPHONE (OUTPATIENT)
Dept: FAMILY MEDICINE | Facility: CLINIC | Age: 72
End: 2022-10-18

## 2022-10-18 NOTE — TELEPHONE ENCOUNTER
Reason for Call:  Patient had oral surgery on 10/3/22 and had an INR prior to and it was 2.5 and patient did not report it/please contact patient for next route of care    Detailed comments: Return call to Patient    Phone Number Patient can be reached at: Home number on file [phone] (home)    Best Time: return call     Can we leave a detailed message on this number? NO    Call taken on 10/18/2022 at 11:21 AM by Liza Noonan

## 2022-10-18 NOTE — TELEPHONE ENCOUNTER
Spoke with Markie about below. He was able to have his teeth extracted at oral surgeons office on 10/3 and wanted to let us know INR was 2.5. No change in medications, doses etc. Advised lets still check INR as scheduled at last appt on 10/31/22 and he agrees with plan.    Tonya Campuzano RN   [facility] Anticoagulation Clinic

## 2022-10-27 ENCOUNTER — TELEPHONE (OUTPATIENT)
Dept: FAMILY MEDICINE | Facility: CLINIC | Age: 72
End: 2022-10-27

## 2022-10-27 DIAGNOSIS — I48.20 CHRONIC ATRIAL FIBRILLATION (H): ICD-10-CM

## 2022-10-27 DIAGNOSIS — Z79.01 LONG TERM CURRENT USE OF ANTICOAGULANT THERAPY: Primary | ICD-10-CM

## 2022-10-27 NOTE — TELEPHONE ENCOUNTER
ANTICOAGULATION CLINIC REFERRAL RENEWAL REQUEST       An annual renewal order is required for all patients referred to [facility].?  Please review and sign the pended referral order for Markie Shepard.       ANTICOAGULATION SUMMARY      Warfarin indication(s)   Atrial Fibrillation    Mechanical heart valve present?  NO       Current goal range   INR: 2.0-3.0     Goal appropriate for indication? Goal INR 2-3, standard for indication(s) above     Time in Therapeutic Range (TTR)  (Goal > 60%) 88.1%       Office visit with referring provider's group within last year yes on 12/1/21       Pedro Campuznao RN  [facility]ticoagulation [facility]

## 2022-10-31 ENCOUNTER — ANTICOAGULATION THERAPY VISIT (OUTPATIENT)
Dept: ANTICOAGULATION | Facility: CLINIC | Age: 72
End: 2022-10-31

## 2022-10-31 ENCOUNTER — LAB (OUTPATIENT)
Dept: LAB | Facility: CLINIC | Age: 72
End: 2022-10-31
Payer: COMMERCIAL

## 2022-10-31 DIAGNOSIS — Z79.01 LONG TERM CURRENT USE OF ANTICOAGULANT THERAPY: ICD-10-CM

## 2022-10-31 DIAGNOSIS — Z79.01 LONG TERM CURRENT USE OF ANTICOAGULANT THERAPY: Primary | ICD-10-CM

## 2022-10-31 DIAGNOSIS — I48.20 CHRONIC ATRIAL FIBRILLATION (H): ICD-10-CM

## 2022-10-31 LAB — INR BLD: 2.6 (ref 0.9–1.1)

## 2022-10-31 PROCEDURE — 36416 COLLJ CAPILLARY BLOOD SPEC: CPT

## 2022-10-31 PROCEDURE — 85610 PROTHROMBIN TIME: CPT

## 2022-10-31 NOTE — PROGRESS NOTES
ANTICOAGULATION MANAGEMENT     Markie Shepard 72 year old male is on warfarin with therapeutic INR result. (Goal INR 2.0-3.0)    Recent labs: (last 7 days)     10/31/22  1115   INR 2.6*       ASSESSMENT       Source(s): Chart Review       Warfarin doses taken: Reviewed in chart    Diet: No new diet changes identified    New illness, injury, or hospitalization: No    Medication/supplement changes: None noted    Signs or symptoms of bleeding or clotting: No    Previous INR: Therapeutic last 2(+) visits    Additional findings: None       PLAN     Recommended plan for no diet, medication or health factor changes affecting INR     Dosing Instructions: Continue your current warfarin dose with next INR in 6 weeks       Summary  As of 10/31/2022    Full warfarin instructions:  2.5 mg every Mon, Fri; 5 mg all other days; Starting 10/31/2022   Next INR check:  12/12/2022             Detailed voice message left for Markie with dosing instructions and follow up date.     Contact [phone] to schedule and with any changes, questions or concerns.     Education provided:     Please call back if any changes to your diet, medications or how you've been taking warfarin    Contact [phone] with any changes, questions or concerns.     Plan made per [facility] anticoagulation protocol    Karen Whitehead RN  Anticoagulation Clinic  10/31/2022    _______________________________________________________________________     Anticoagulation Episode Summary     Current INR goal:  2.0-3.0   TTR:  99.6 % (1 y)   Target end date:  Indefinite   Send INR reminders to:  [facility]    Indications    Long-term (current) use of anticoagulants [Z79.01] [Z79.01]  Chronic atrial fibrillation (H) [I48.20]           Comments:  PREFERS PETER! Quit Smoking 9/29.         Anticoagulation Care Providers     Provider Role Specialty Phone number    Mali Patel MD Referring Internal Medicine [phone]

## 2022-11-07 ENCOUNTER — TELEPHONE (OUTPATIENT)
Dept: CARDIOLOGY | Facility: CLINIC | Age: 72
End: 2022-11-07

## 2022-11-07 DIAGNOSIS — I50.33 ACUTE ON CHRONIC HEART FAILURE WITH PRESERVED EJECTION FRACTION (H): ICD-10-CM

## 2022-11-07 RX ORDER — TORSEMIDE 20 MG/1
TABLET ORAL
Qty: 360 TABLET | Refills: 0 | Status: SHIPPED | OUTPATIENT
Start: 2022-11-07 | End: 2022-12-08

## 2022-11-07 NOTE — TELEPHONE ENCOUNTER
Message from patient that he needs a torsemide refill. Patient is due to see Dr. Lockett on 12/8/2022.  [facility] Cardiology Refill Guideline reviewed.  Medication meets criteria for refill.   Rx escripted for torsemide 40mg BID.    Left a message for the patient with the update.

## 2022-11-28 ENCOUNTER — TELEPHONE (OUTPATIENT)
Dept: FAMILY MEDICINE | Facility: CLINIC | Age: 72
End: 2022-11-28

## 2022-11-28 NOTE — TELEPHONE ENCOUNTER
Patient was contacted by their pharmacy to let patient know albuterol will be a tier three drug. Making the cost go from 12 to 90 dollars. Patient would like to know if there's any alternative to albuterol.

## 2022-11-28 NOTE — TELEPHONE ENCOUNTER
Discussed issue with pharmacist at [facility]. She states that the pt's albuterol inhaler has been $10 since this summer, and that she believes he is referring to his Breo inhaler which was $92 on the most recent refill. This amount is still far cheaper than any local options using GoodRx. Will forward to [facility] pharmacist Rufina Alves for assistance.    Henry HORAN, RN  11/28/22 3:29 PM

## 2022-11-29 DIAGNOSIS — I10 ESSENTIAL HYPERTENSION WITH GOAL BLOOD PRESSURE LESS THAN 140/90: ICD-10-CM

## 2022-11-29 DIAGNOSIS — J32.9 SINUSITIS, UNSPECIFIED CHRONICITY, UNSPECIFIED LOCATION: Primary | ICD-10-CM

## 2022-11-29 DIAGNOSIS — F33.9 CHRONIC RECURRENT MAJOR DEPRESSIVE DISORDER (H): ICD-10-CM

## 2022-11-30 DIAGNOSIS — E78.5 DYSLIPIDEMIA: ICD-10-CM

## 2022-11-30 RX ORDER — ROSUVASTATIN CALCIUM 40 MG/1
40 TABLET, COATED ORAL AT BEDTIME
Qty: 90 TABLET | Refills: 0 | Status: SHIPPED | OUTPATIENT
Start: 2022-11-30 | End: 2022-12-08

## 2022-12-01 RX ORDER — ATENOLOL 100 MG/1
100 TABLET ORAL DAILY
Qty: 90 TABLET | Refills: 0 | Status: SHIPPED | OUTPATIENT
Start: 2022-12-01 | End: 2022-12-08

## 2022-12-01 RX ORDER — FLUTICASONE PROPIONATE 50 MCG
1 SPRAY, SUSPENSION (ML) NASAL DAILY
Qty: 9.9 ML | Refills: 0 | Status: SHIPPED | OUTPATIENT
Start: 2022-12-01 | End: 2023-03-29

## 2022-12-01 RX ORDER — FLUOXETINE 40 MG/1
40 CAPSULE ORAL DAILY
Qty: 30 CAPSULE | Refills: 0 | Status: SHIPPED | OUTPATIENT
Start: 2022-12-01 | End: 2022-12-26

## 2022-12-01 NOTE — TELEPHONE ENCOUNTER
Forwarding message to  staff to schedule patient as he has no future appointments on the schedule and is requesting 3 medication refills today.  Dr. Patel requested pt come see her by December 5, 2022 for a 40-60 minute visit.    FILOMENA MarieN, RN  12/01/22, 11:29 AM

## 2022-12-01 NOTE — TELEPHONE ENCOUNTER
Medication requested: atenolol (TENORMIN) 100 MG tablet  Last office visit: 12/1/2021 [facility] appointments: None  Medication last refilled: 6/7/2022; 90 + 1 refill     Medication requested: FLUoxetine (PROZAC) 40 MG capsule  Medication last refilled: 10/5/2022; 30 + 1 refill     Medication requested: fluticasone (FLONASE) 50 MCG/ACT nasal spray  Medication last refilled: HISTORICAL  Last qualifying labs:     BP Readings from Last 3 Encounters:   02/04/22 110/68   10/05/21 106/69   08/20/21 104/70     PHQ-9 and GAD7 Scores 12/1/2021   PHQ-9 Total Score 6   YAAKOV-7 Total Score 3     Medication is being filled for 1 time refill only due to:  Patient needs to be seen because it has been more than one year since last visit.   Sent reminder message to  to please keep calling pt until they reach him because he needs help scheduling his appt.    AUNG Marie, RN  12/01/22, 11:36 AM

## 2022-12-05 DIAGNOSIS — J44.9 CHRONIC OBSTRUCTIVE PULMONARY DISEASE, UNSPECIFIED COPD TYPE (H): Primary | ICD-10-CM

## 2022-12-05 RX ORDER — ALBUTEROL SULFATE 90 UG/1
2 POWDER, METERED RESPIRATORY (INHALATION) EVERY 4 HOURS PRN
Qty: 1 EACH | Refills: 5 | Status: CANCELLED | OUTPATIENT
Start: 2022-12-05

## 2022-12-05 NOTE — TELEPHONE ENCOUNTER
Pt also reported that we were suppose to be looking in to an alternative to the albuterol inhaler due to cost.    Lacie

## 2022-12-07 RX ORDER — ALBUTEROL SULFATE 90 UG/1
2 AEROSOL, METERED RESPIRATORY (INHALATION) EVERY 4 HOURS PRN
Qty: 8.5 G | Refills: 0 | Status: SHIPPED | OUTPATIENT
Start: 2022-12-07 | End: 2022-12-29

## 2022-12-07 NOTE — CONFIDENTIAL NOTE
Albuterol (ProAir) 108 (90 Base) MCG/ACT inhaler    Last Office Visit: 12/1/21  [place] [facility] [place]ointments: NONE  Medication last refilled: 4/7/22 #1 with 5 refill(s)    Medication is being filled for 1 time refill only due to:  Patient needs to be seen because it has been more than one year since last visit.    Message sent to scheduling to contact patient and schedule appointment with Dr. Patel.    Jessica Stein, FILOMENAN, RN, CCM

## 2022-12-08 ENCOUNTER — OFFICE VISIT (OUTPATIENT)
Dept: CARDIOLOGY | Facility: CLINIC | Age: 72
End: 2022-12-08
Payer: COMMERCIAL

## 2022-12-08 VITALS
HEIGHT: 71 IN | DIASTOLIC BLOOD PRESSURE: 81 MMHG | OXYGEN SATURATION: 97 % | SYSTOLIC BLOOD PRESSURE: 121 MMHG | BODY MASS INDEX: 44.1 KG/M2 | WEIGHT: 315 LBS | HEART RATE: 74 BPM

## 2022-12-08 DIAGNOSIS — I10 ESSENTIAL HYPERTENSION WITH GOAL BLOOD PRESSURE LESS THAN 140/90: ICD-10-CM

## 2022-12-08 DIAGNOSIS — I50.33 ACUTE ON CHRONIC HEART FAILURE WITH PRESERVED EJECTION FRACTION (H): ICD-10-CM

## 2022-12-08 DIAGNOSIS — I50.30 HEART FAILURE WITH PRESERVED EJECTION FRACTION, NYHA CLASS II (H): ICD-10-CM

## 2022-12-08 DIAGNOSIS — L81.9 DISCOLORATION OF SKIN OF LOWER LEG: ICD-10-CM

## 2022-12-08 DIAGNOSIS — E13.59 OTHER SPECIFIED DIABETES MELLITUS WITH OTHER CIRCULATORY COMPLICATIONS (H): ICD-10-CM

## 2022-12-08 DIAGNOSIS — R60.0 BILATERAL LOWER EXTREMITY EDEMA: Primary | ICD-10-CM

## 2022-12-08 DIAGNOSIS — E78.5 DYSLIPIDEMIA: ICD-10-CM

## 2022-12-08 DIAGNOSIS — L97.901 VENOUS STASIS ULCER LIMITED TO BREAKDOWN OF SKIN WITHOUT VARICOSE VEINS, UNSPECIFIED SITE (H): ICD-10-CM

## 2022-12-08 DIAGNOSIS — I87.2 VENOUS STASIS ULCER LIMITED TO BREAKDOWN OF SKIN WITHOUT VARICOSE VEINS, UNSPECIFIED SITE (H): ICD-10-CM

## 2022-12-08 DIAGNOSIS — L97.821 NON-PRESSURE CHRONIC ULCER OF OTHER PART OF LEFT LOWER LEG LIMITED TO BREAKDOWN OF SKIN (H): ICD-10-CM

## 2022-12-08 PROCEDURE — 99215 OFFICE O/P EST HI 40 MIN: CPT | Performed by: INTERNAL MEDICINE

## 2022-12-08 RX ORDER — LOSARTAN POTASSIUM 25 MG/1
75 TABLET ORAL DAILY
Qty: 270 TABLET | Refills: 1 | Status: SHIPPED | OUTPATIENT
Start: 2022-12-08 | End: 2023-05-30

## 2022-12-08 RX ORDER — POTASSIUM CHLORIDE 1500 MG/1
20 TABLET, EXTENDED RELEASE ORAL 2 TIMES DAILY
Qty: 180 TABLET | Refills: 1 | Status: SHIPPED | OUTPATIENT
Start: 2022-12-08 | End: 2023-08-17

## 2022-12-08 RX ORDER — TORSEMIDE 20 MG/1
TABLET ORAL
Qty: 360 TABLET | Refills: 0 | Status: SHIPPED | OUTPATIENT
Start: 2022-12-08 | End: 2023-05-04

## 2022-12-08 RX ORDER — ATENOLOL 100 MG/1
100 TABLET ORAL DAILY
Qty: 90 TABLET | Refills: 0 | Status: SHIPPED | OUTPATIENT
Start: 2022-12-08 | End: 2023-05-30

## 2022-12-08 RX ORDER — ATENOLOL 50 MG/1
50 TABLET ORAL DAILY
Qty: 90 TABLET | Refills: 1 | Status: SHIPPED | OUTPATIENT
Start: 2022-12-08 | End: 2023-05-30

## 2022-12-08 RX ORDER — ROSUVASTATIN CALCIUM 40 MG/1
40 TABLET, COATED ORAL AT BEDTIME
Qty: 90 TABLET | Refills: 0 | Status: SHIPPED | OUTPATIENT
Start: 2022-12-08 | End: 2023-05-23

## 2022-12-08 NOTE — LETTER
12/8/2022    Mali Patel MD  [address] [facility]  [place] [place] [facility] [place] [place] [address]    RE: Markie Shepard       Dear Colleague,     I had the pleasure of seeing Markie Shepard in the [facility].      Cardiology Clinic Progress Note:    December 8, 2022   Patient Name: Markie Shepard  Patient MRN: [id number]     Consult indication: HFpEF    HPI:    I had the opportunity to see patient Markie Shepard in cardiology clinic for a follow up visit. Patient is followed by our colleague Mali Patel MD with Primary Care.     As you know, patient is a pleasant 72-year-old male with a past medical history significant for legal blindness, morbid obesity, coronary artery disease status post PCI, permanent atrial fibrillation (on warfarin), sleep apnea, significant prior smoking history, COPD, heart failure with preserved ejection fraction, chronic venous stasis, who presents for follow-up.    I had last seen him in clinic on 10/5/2021, at that time he is accompanied by his grandson, Jayme, who is training to be an EMT.  He is here alone today, and Alen has since become certified.     Regarding his prior cardiac history, he was originally seen 6/27/2012, Lexiscan stress test demonstrating inferior infarct with moderate ischemia.  Subsequent coronary angiography demonstrated  of the RCA, with left-to-right collaterals.  Since he was asymptomatic, and had been doing well, the decision was made to not pursue high risk  PCI.    At our prior clinic visit, he had symptoms concerning for decompensated heart failure, and so we increased his torsemide to 40 mg in the morning and 20 mg in the early afternoon.  He was also having borderline hypotension and so we decreased amlodipine and losartan dosages.    Since then, he has been seen by my colleague Tanner ALEJANDRE.  Torsemide has been changed to 40 mg twice daily, and amlodipine was discontinued.  Patient reports that overall he feels improved  from our last meeting.  He has lost a significant mount of weight, now weighs 347 pounds, down from 371 pounds.  He exercises daily with resistance bands and a stepping apparatus at his bedside.  He denies any abnormal chest pain, chest pressure, abnormal shortness of breath.  He denies any dizziness/lightheadedness.  He does still have lower extremity swelling, however this is improved from before.  He denies any loss of sensation in his feet, numbness/tingling.    TTE 6/16/2021  Interpretation Summary     Patient in wheelchair, very limited image quality.     1. The left ventricle is normal in size. The visual ejection fraction is  estimated at 55%.  2. The right ventricle is not well visualized.  3. Mild valvular aortic stenosis. Mean 12mmHg, Vmax 2.4m/s.     Echo from 2015 showed EF 55%, no valve disease.    Assessment and Plan/Recommendations:    # Chronic HFpEF, likely multifactorial with contribution from primary lung disease, obesity hypoventilation syndrome, COPD.  RV was not well visualized on TTE 6/2021, likely has some underlying RV dysfunction as well.  Euvolemic, weight down to 3 from 47 pounds, down from 371 pounds.  #COPD, on inhaler therapy   # Chronic venous stasis with mild skin breakdown, no evidence of infection  # CAD with RCA .  Stable, denies symptoms concerning for angina.  # Permanent atrial fibrillation, on warfarin.   # Morbid obesity  # EMORY  # Significant vision impairment  # Mild aortic stenosis     - Overall patient is in stable cardiovascular health without symptoms concerning for angina or decompensated heart failure  - Continue current regimen of atenolol, losartan, torsemide, potassium supplementation, rosuvastatin  - Continue warfarin per Anticoagulation clinic  - BMP and CBC in about 1 week  - KRYSTEN testing  - Lymphedema referral  - Follow-up with Tanner ALEJANDRE in 6 months with an ECG at that time, follow-up with me in 1 year or sooner as needed, anticipate will need an  echocardiogram at that time    Thank you for allowing our team to participate in the care of Markie Shepard.  Please do not hesitate to call or page me with any questions or concerns.    Sincerely,     Andrea Lockett MD, [facility]  Cardiology  Text Page   December 8, 2022    Voice recognition software utilized.     Total time spent on this encounter: 45 minutes, providing care in this encounter including, but not limited to, reviewing prior medical records, laboratory data, imaging studies, diagnostic studies, procedure notes, formulating an assessment and plan, recommendations, discussion and counseling with patient face to face, dictation.    Past Medical History:     Past Medical History:   Diagnosis Date     Adjustment disorder with depressed mood 06/17/2012     ALCOHOL ABUSE-UNSPEC 10/05/2007     Atrial fibrillation (H)      Chorioretinitis of both eyes 02/05/2012    Right eye worse than left. On Cellcept, steroid taper     Coronary atherosclerosis of unspecified type of vessel, native or graft      Diabetes mellitus type 2 in obese (H)      Essential hypertension, benign      Heart failure with preserved ejection fraction, NYHA class II (H)      Mixed hyperlipidemia 10/05/2007     Nonrheumatic aortic valve stenosis      Polycythemia      Sleep apnea      Tobacco use disorder 10/05/2007    Smoker - 1/2 ppd. Started at 16 years     Total retinal detachment of left eye 2009/2010        Past Surgical History:   Past Surgical History:   Procedure Laterality Date     CARDIAC CATHERIZATION  07/03/2012    totally occluded RCA w/ mild left coronary disease     COLONOSCOPY  2016    repeat 2026     HEART CATH CORONARY ANGIOGRAM AND RIGHT HEART CATH  07/01/2012    RCA occlusion, IMI, no stent     PHACOEMULSIFICATION CLEAR CORNEA WITH STANDARD INTRAOCULAR LENS IMPLANT Right 06/04/2015    Procedure: PHACOEMULSIFICATION CLEAR CORNEA WITH STANDARD INTRAOCULAR LENS IMPLANT;  Surgeon: Tal Trinidad MD;  Location:  [facility]     RETINAL REATTACHMENT  11/09, 7/10    Left     TONSILLECTOMY       [facility] NONSPECIFIC PROCEDURE  01/01/1987    Right Epididymis Removed     [facility] OPEN RX ANKLE DISLOCATN+FIXATN  12/01/2008    Right Ankle       Medications (outpatient):  Current Outpatient Medications   Medication Sig Dispense Refill     acetaminophen (TYLENOL) 500 MG tablet Take 500-1,000 mg by mouth every 6 hours as needed for mild pain       albuterol (PROAIR HFA/PROVENTIL HFA/VENTOLIN HFA) 108 (90 Base) MCG/ACT inhaler Inhale 2 puffs into the lungs every 4 hours as needed for shortness of breath / dyspnea 8.5 g 0     atenolol (TENORMIN) 100 MG tablet Take 1 tablet (100 mg) by mouth daily along with 50 mg tablet for total of 150 mg daily. 90 tablet 0     atenolol (TENORMIN) 50 MG tablet Take 1 tablet (50 mg) by mouth daily Along with 100 mg tablet for total of 150 mg daily. 90 tablet 1     FLUoxetine (PROZAC) 40 MG capsule Take 1 capsule (40 mg) by mouth daily 30 capsule 0     fluticasone (FLONASE) 50 MCG/ACT nasal spray Spray 1 spray into both nostrils daily 9.9 mL 0     fluticasone-vilanterol (BREO ELLIPTA) 200-25 MCG/INH inhaler Inhale 1 puff into the lungs daily 60 each 1     losartan (COZAAR) 25 MG tablet Take 3 tablets (75 mg) by mouth daily 270 tablet 1     multivitamin w/minerals (THERA-VIT-M) tablet Take 1 tablet by mouth daily Century 55+       omega-3 fatty acids 1200 MG capsule Take 1 capsule by mouth daily       oxymetazoline (AFRIN) 0.05 % nasal spray Spray 2 sprays into both nostrils At Bedtime       potassium chloride ER (K-TAB) 20 MEQ CR tablet Take 1 tablet (20 mEq) by mouth 2 times daily 180 tablet 1     rosuvastatin (CRESTOR) 40 MG tablet Take 1 tablet (40 mg) by mouth At Bedtime 90 tablet 0     tiotropium (SPIRIVA RESPIMAT) 2.5 MCG/ACT inhaler Inhale 2 puffs into the lungs daily 4 g 11     torsemide (DEMADEX) 20 MG tablet Take 2 tablets (40 mg) by mouth every morning AND 2 tablets (40 mg) daily (with lunch). 360 tablet 0  "    traZODone (DESYREL) 100 MG tablet Take 1 tablet (100 mg) by mouth nightly as needed for sleep 90 tablet 1     warfarin ANTICOAGULANT (COUMADIN ANTICOAGULANT) 5 MG tablet Take 2.5 mg on Mondays and Fridays, and 5 mg all other days, or as directed by Coumadin nurse. 100 tablet 3       Allergies:  Allergies   Allergen Reactions     Ambien Other (See Comments)     Parasomnia with sleep walking, injuries, delusions.  May have been in DTs at the same time.     Lisinopril Cough     Pt had cough     Seasonal Allergies        Social History:   History   Drug Use No      History   Smoking Status     Former     Packs/day: 1.00     Years: 53.00     Types: Cigarettes     Quit date: 9/29/2019   Smokeless Tobacco     Never     Comment: 1 ppd     Social History    Substance and Sexual Activity      Alcohol use: No        Comment: quit drinking heavily 2013       Family History:  Family History   Problem Relation Age of Onset     Arthritis Mother      Circulatory Mother         varicose veins     Eye Disorder Mother         detached retina     Thyroid Disease Mother      C.A.D. Father         Quadruple bypass     Gastrointestinal Disease Father         diverticulitis     Alzheimer Disease Father         demntia     Eye Disorder Father         cataract     Depression Sister         Post Partum      Alcoholism Sister      Neurologic Disorder Sister         Brain Aneurysm     Thyroid Disease Brother      Eye Disorder Brother         detached retina       Review of Systems:   A complete review of systems was negative except as mentioned in the History of Present Illness.     Objective & Physical Exam:  /81   Pulse 74   Ht 1.803 m (5' 11\")   Wt (!) 157.8 kg (347 lb 14.4 oz)   SpO2 97%   BMI 48.52 kg/m    Wt Readings from Last 2 Encounters:   12/08/22 (!) 157.8 kg (347 lb 14.4 oz)   02/04/22 (!) 168.3 kg (371 lb)     Body mass index is 48.52 kg/m .   Body surface area is 2.81 meters squared.    Constitutional: appears " stated age, in no apparent distress, appears to be well nourished, severe vision impairment  Head: normocephalic, atraumatic  Neck: supple, trachea midline  Pulmonary: clear to auscultation bilaterally, no wheezes, no rales, no increased work of breathing  Cardiovascular: JVP normal, distant heart sounds but RRR, 1+ BLE edema at the feet/ankles  Gastrointestinal: no guarding, non-rigid   Neurologic: awake, alert, moves all extremities  Skin: no jaundice, warm on limited exam, chronic venous stasis changes and duskiness around lower shins and feet bilaterally with mild skin breakdown over left shin but no drainage/erythema or tenderness  Psychiatric: affect is normal, answers questions appropriately, oriented to self and place    Data reviewed:  Lab Results   Component Value Date    WBC 8.3 04/12/2021    RBC 5.41 04/12/2021    HGB 17.8 (H) 04/12/2021    HCT 53.2 (H) 04/12/2021    MCV 98 04/12/2021    MCH 32.9 04/12/2021    MCHC 33.5 04/12/2021    RDW 12.9 04/12/2021     04/12/2021     Sodium   Date Value Ref Range Status   02/21/2022 139 133 - 144 mmol/L Final   06/28/2021 138 133 - 144 mmol/L Final     Potassium   Date Value Ref Range Status   02/21/2022 3.9 3.4 - 5.3 mmol/L Final   06/28/2021 4.0 3.4 - 5.3 mmol/L Final     Chloride   Date Value Ref Range Status   02/21/2022 105 94 - 109 mmol/L Final   06/28/2021 104 94 - 109 mmol/L Final     Carbon Dioxide   Date Value Ref Range Status   06/28/2021 26 20 - 32 mmol/L Final     Carbon Dioxide (CO2)   Date Value Ref Range Status   02/21/2022 26 20 - 32 mmol/L Final     Anion Gap   Date Value Ref Range Status   02/21/2022 8 3 - 14 mmol/L Final   06/28/2021 7 3 - 14 mmol/L Final     Glucose   Date Value Ref Range Status   02/21/2022 144 (H) 70 - 99 mg/dL Final   06/28/2021 127 (H) 70 - 99 mg/dL Final     Urea Nitrogen   Date Value Ref Range Status   02/21/2022 27 7 - 30 mg/dL Final   06/28/2021 17 7 - 30 mg/dL Final     Creatinine   Date Value Ref Range Status    02/21/2022 1.12 0.66 - 1.25 mg/dL Final   06/28/2021 0.91 0.66 - 1.25 mg/dL Final     GFR Estimate   Date Value Ref Range Status   02/21/2022 71 >60 mL/min/1.73m2 Final     Comment:     Effective December 21, 2021 eGFRcr in adults is calculated using the 2021 CKD-EPI creatinine equation which includes age and gender (Live et al., [place], DOI: 10.[facility]6/LLLSbb6347893)   06/28/2021 85 >60 mL/min/[1.73_m2] Final     Comment:     Non  GFR Calc  Starting 12/18/2018, serum creatinine based estimated GFR (eGFR) will be   calculated using the Chronic Kidney Disease Epidemiology Collaboration   (CKD-EPI) equation.       Calcium   Date Value Ref Range Status   02/21/2022 9.7 8.5 - 10.1 mg/dL Final   06/28/2021 9.4 8.5 - 10.1 mg/dL Final     Bilirubin Total   Date Value Ref Range Status   04/12/2021 0.9 0.2 - 1.3 mg/dL Final     Alkaline Phosphatase   Date Value Ref Range Status   04/12/2021 84 40 - 150 U/L Final     ALT   Date Value Ref Range Status   10/25/2021 30 0 - 70 U/L Final   04/12/2021 29 0 - 70 U/L Final     AST   Date Value Ref Range Status   04/12/2021 23 0 - 45 U/L Final     Recent Labs   Lab Test 10/25/21  1119 10/05/21  0951 04/12/21  1457 02/17/20  1601 10/17/16  1408 04/22/15  1241   CHOL 118 132   < > 152.0   < > 139   HDL 52 55   < > 55.0   < > 38*   LDL 44 55   < > 74.0   < > 69*   TRIG 108 108   < > 116.0   < > 159*   CHOLHDLRATIO  --   --   --  2.8  --  3.7    < > = values in this interval not displayed.      Lab Results   Component Value Date    A1C 6.4 04/12/2021    A1C 6.1 02/17/2020    A1C 6.1 02/11/2019    A1C 5.8 10/17/2016    A1C 5.9 03/18/2015          Thank you for allowing me to participate in the care of your patient.      Sincerely,     Andrea Lockett MD     [facility]  cc:   Andrea Lockett MD  [address], [place] [address] [place] [id number]

## 2022-12-08 NOTE — PROGRESS NOTES
Cardiology Clinic Progress Note:    December 8, 2022   Patient Name: Markie Shepard  Patient MRN: [id number]     Consult indication: HFpEF    HPI:    I had the opportunity to see patient Markie Shepard in cardiology clinic for a follow up visit. Patient is followed by our colleague Mali Patel MD with Primary Care.     As you know, patient is a pleasant 72-year-old male with a past medical history significant for legal blindness, morbid obesity, coronary artery disease status post PCI, permanent atrial fibrillation (on warfarin), sleep apnea, significant prior smoking history, COPD, heart failure with preserved ejection fraction, chronic venous stasis, who presents for follow-up.    I had last seen him in clinic on 10/5/2021, at that time he is accompanied by his grandson, Jayme, who is training to be an EMT.  He is here alone today, and Alen has since become certified.     Regarding his prior cardiac history, he was originally seen 6/27/2012, Lexiscan stress test demonstrating inferior infarct with moderate ischemia.  Subsequent coronary angiography demonstrated  of the RCA, with left-to-right collaterals.  Since he was asymptomatic, and had been doing well, the decision was made to not pursue high risk  PCI.    At our prior clinic visit, he had symptoms concerning for decompensated heart failure, and so we increased his torsemide to 40 mg in the morning and 20 mg in the early afternoon.  He was also having borderline hypotension and so we decreased amlodipine and losartan dosages.    Since then, he has been seen by my colleague Tanner ALEJANDRE.  Torsemide has been changed to 40 mg twice daily, and amlodipine was discontinued.  Patient reports that overall he feels improved from our last meeting.  He has lost a significant mount of weight, now weighs 347 pounds, down from 371 pounds.  He exercises daily with resistance bands and a stepping apparatus at his bedside.  He denies any  abnormal chest pain, chest pressure, abnormal shortness of breath.  He denies any dizziness/lightheadedness.  He does still have lower extremity swelling, however this is improved from before.  He denies any loss of sensation in his feet, numbness/tingling.    TTE 6/16/2021  Interpretation Summary     Patient in wheelchair, very limited image quality.     1. The left ventricle is normal in size. The visual ejection fraction is  estimated at 55%.  2. The right ventricle is not well visualized.  3. Mild valvular aortic stenosis. Mean 12mmHg, Vmax 2.4m/s.     Echo from 2015 showed EF 55%, no valve disease.    Assessment and Plan/Recommendations:    # Chronic HFpEF, likely multifactorial with contribution from primary lung disease, obesity hypoventilation syndrome, COPD.  RV was not well visualized on TTE 6/2021, likely has some underlying RV dysfunction as well.  Euvolemic, weight down to 3 from 47 pounds, down from 371 pounds.  #COPD, on inhaler therapy   # Chronic venous stasis with mild skin breakdown, no evidence of infection  # CAD with RCA .  Stable, denies symptoms concerning for angina.  # Permanent atrial fibrillation, on warfarin.   # Morbid obesity  # EMORY  # Significant vision impairment  # Mild aortic stenosis     - Overall patient is in stable cardiovascular health without symptoms concerning for angina or decompensated heart failure  - Continue current regimen of atenolol, losartan, torsemide, potassium supplementation, rosuvastatin  - Continue warfarin per Anticoagulation clinic  - BMP and CBC in about 1 week  - KRYSTEN testing  - Lymphedema referral  - Follow-up with Tanner ALEJANDRE in 6 months with an ECG at that time, follow-up with me in 1 year or sooner as needed, anticipate will need an echocardiogram at that time    Thank you for allowing our team to participate in the care of Markie Shepard.  Please do not hesitate to call or page me with any questions or concerns.    Sincerely,     Andrea  MD Levy, [facility]  Cardiology  Text Page   December 8, 2022    Voice recognition software utilized.     Total time spent on this encounter: 45 minutes, providing care in this encounter including, but not limited to, reviewing prior medical records, laboratory data, imaging studies, diagnostic studies, procedure notes, formulating an assessment and plan, recommendations, discussion and counseling with patient face to face, dictation.    Past Medical History:     Past Medical History:   Diagnosis Date     Adjustment disorder with depressed mood 06/17/2012     ALCOHOL ABUSE-UNSPEC 10/05/2007     Atrial fibrillation (H)      Chorioretinitis of both eyes 02/05/2012    Right eye worse than left. On Cellcept, steroid taper     Coronary atherosclerosis of unspecified type of vessel, native or graft      Diabetes mellitus type 2 in obese (H)      Essential hypertension, benign      Heart failure with preserved ejection fraction, NYHA class II (H)      Mixed hyperlipidemia 10/05/2007     Nonrheumatic aortic valve stenosis      Polycythemia      Sleep apnea      Tobacco use disorder 10/05/2007    Smoker - 1/2 ppd. Started at 16 years     Total retinal detachment of left eye 2009/2010        Past Surgical History:   Past Surgical History:   Procedure Laterality Date     CARDIAC CATHERIZATION  07/03/2012    totally occluded RCA w/ mild left coronary disease     COLONOSCOPY  2016    repeat 2026     HEART CATH CORONARY ANGIOGRAM AND RIGHT HEART CATH  07/01/2012    RCA occlusion, IMI, no stent     PHACOEMULSIFICATION CLEAR CORNEA WITH STANDARD INTRAOCULAR LENS IMPLANT Right 06/04/2015    Procedure: PHACOEMULSIFICATION CLEAR CORNEA WITH STANDARD INTRAOCULAR LENS IMPLANT;  Surgeon: Tal Trinidad MD;  Location: [facility]     RETINAL REATTACHMENT  11/09, 7/10    Left     TONSILLECTOMY       [facility] NONSPECIFIC PROCEDURE  01/01/1987    Right Epididymis Removed     [facility] OPEN RX ANKLE DISLOCATN+FIXATN  12/01/2008    Right Ankle        Medications (outpatient):  Current Outpatient Medications   Medication Sig Dispense Refill     acetaminophen (TYLENOL) 500 MG tablet Take 500-1,000 mg by mouth every 6 hours as needed for mild pain       albuterol (PROAIR HFA/PROVENTIL HFA/VENTOLIN HFA) 108 (90 Base) MCG/ACT inhaler Inhale 2 puffs into the lungs every 4 hours as needed for shortness of breath / dyspnea 8.5 g 0     atenolol (TENORMIN) 100 MG tablet Take 1 tablet (100 mg) by mouth daily along with 50 mg tablet for total of 150 mg daily. 90 tablet 0     atenolol (TENORMIN) 50 MG tablet Take 1 tablet (50 mg) by mouth daily Along with 100 mg tablet for total of 150 mg daily. 90 tablet 1     FLUoxetine (PROZAC) 40 MG capsule Take 1 capsule (40 mg) by mouth daily 30 capsule 0     fluticasone (FLONASE) 50 MCG/ACT nasal spray Spray 1 spray into both nostrils daily 9.9 mL 0     fluticasone-vilanterol (BREO ELLIPTA) 200-25 MCG/INH inhaler Inhale 1 puff into the lungs daily 60 each 1     losartan (COZAAR) 25 MG tablet Take 3 tablets (75 mg) by mouth daily 270 tablet 1     multivitamin w/minerals (THERA-VIT-M) tablet Take 1 tablet by mouth daily Century 55+       omega-3 fatty acids 1200 MG capsule Take 1 capsule by mouth daily       oxymetazoline (AFRIN) 0.05 % nasal spray Spray 2 sprays into both nostrils At Bedtime       potassium chloride ER (K-TAB) 20 MEQ CR tablet Take 1 tablet (20 mEq) by mouth 2 times daily 180 tablet 1     rosuvastatin (CRESTOR) 40 MG tablet Take 1 tablet (40 mg) by mouth At Bedtime 90 tablet 0     tiotropium (SPIRIVA RESPIMAT) 2.5 MCG/ACT inhaler Inhale 2 puffs into the lungs daily 4 g 11     torsemide (DEMADEX) 20 MG tablet Take 2 tablets (40 mg) by mouth every morning AND 2 tablets (40 mg) daily (with lunch). 360 tablet 0     traZODone (DESYREL) 100 MG tablet Take 1 tablet (100 mg) by mouth nightly as needed for sleep 90 tablet 1     warfarin ANTICOAGULANT (COUMADIN ANTICOAGULANT) 5 MG tablet Take 2.5 mg on Mondays and Fridays,  "and 5 mg all other days, or as directed by Coumadin nurse. 100 tablet 3       Allergies:  Allergies   Allergen Reactions     Ambien Other (See Comments)     Parasomnia with sleep walking, injuries, delusions.  May have been in DTs at the same time.     Lisinopril Cough     Pt had cough     Seasonal Allergies        Social History:   History   Drug Use No      History   Smoking Status     Former     Packs/day: 1.00     Years: 53.00     Types: Cigarettes     Quit date: 9/29/2019   Smokeless Tobacco     Never     Comment: 1 ppd     Social History    Substance and Sexual Activity      Alcohol use: No        Comment: quit drinking heavily 2013       Family History:  Family History   Problem Relation Age of Onset     Arthritis Mother      Circulatory Mother         varicose veins     Eye Disorder Mother         detached retina     Thyroid Disease Mother      C.A.D. Father         Quadruple bypass     Gastrointestinal Disease Father         diverticulitis     Alzheimer Disease Father         demntia     Eye Disorder Father         cataract     Depression Sister         Post Partum      Alcoholism Sister      Neurologic Disorder Sister         Brain Aneurysm     Thyroid Disease Brother      Eye Disorder Brother         detached retina       Review of Systems:   A complete review of systems was negative except as mentioned in the History of Present Illness.     Objective & Physical Exam:  /81   Pulse 74   Ht 1.803 m (5' 11\")   Wt (!) 157.8 kg (347 lb 14.4 oz)   SpO2 97%   BMI 48.52 kg/m    Wt Readings from Last 2 Encounters:   12/08/22 (!) 157.8 kg (347 lb 14.4 oz)   02/04/22 (!) 168.3 kg (371 lb)     Body mass index is 48.52 kg/m .   Body surface area is 2.81 meters squared.    Constitutional: appears stated age, in no apparent distress, appears to be well nourished, severe vision impairment  Head: normocephalic, atraumatic  Neck: supple, trachea midline  Pulmonary: clear to auscultation bilaterally, no wheezes, " no rales, no increased work of breathing  Cardiovascular: JVP normal, distant heart sounds but RRR, 1+ BLE edema at the feet/ankles  Gastrointestinal: no guarding, non-rigid   Neurologic: awake, alert, moves all extremities  Skin: no jaundice, warm on limited exam, chronic venous stasis changes and duskiness around lower shins and feet bilaterally with mild skin breakdown over left shin but no drainage/erythema or tenderness  Psychiatric: affect is normal, answers questions appropriately, oriented to self and place    Data reviewed:  Lab Results   Component Value Date    WBC 8.3 04/12/2021    RBC 5.41 04/12/2021    HGB 17.8 (H) 04/12/2021    HCT 53.2 (H) 04/12/2021    MCV 98 04/12/2021    MCH 32.9 04/12/2021    MCHC 33.5 04/12/2021    RDW 12.9 04/12/2021     04/12/2021     Sodium   Date Value Ref Range Status   02/21/2022 139 133 - 144 mmol/L Final   06/28/2021 138 133 - 144 mmol/L Final     Potassium   Date Value Ref Range Status   02/21/2022 3.9 3.4 - 5.3 mmol/L Final   06/28/2021 4.0 3.4 - 5.3 mmol/L Final     Chloride   Date Value Ref Range Status   02/21/2022 105 94 - 109 mmol/L Final   06/28/2021 104 94 - 109 mmol/L Final     Carbon Dioxide   Date Value Ref Range Status   06/28/2021 26 20 - 32 mmol/L Final     Carbon Dioxide (CO2)   Date Value Ref Range Status   02/21/2022 26 20 - 32 mmol/L Final     Anion Gap   Date Value Ref Range Status   02/21/2022 8 3 - 14 mmol/L Final   06/28/2021 7 3 - 14 mmol/L Final     Glucose   Date Value Ref Range Status   02/21/2022 144 (H) 70 - 99 mg/dL Final   06/28/2021 127 (H) 70 - 99 mg/dL Final     Urea Nitrogen   Date Value Ref Range Status   02/21/2022 27 7 - 30 mg/dL Final   06/28/2021 17 7 - 30 mg/dL Final     Creatinine   Date Value Ref Range Status   02/21/2022 1.12 0.66 - 1.25 mg/dL Final   06/28/2021 0.91 0.66 - 1.25 mg/dL Final     GFR Estimate   Date Value Ref Range Status   02/21/2022 71 >60 mL/min/1.73m2 Final     Comment:     Effective December 21, 2021  eGFRcr in adults is calculated using the 2021 CKD-EPI creatinine equation which includes age and gender (Live et al., NEJ, DOI: 10.1056/AHYGlr1556293)   06/28/2021 85 >60 mL/min/[1.73_m2] Final     Comment:     Non  GFR Calc  Starting 12/18/2018, serum creatinine based estimated GFR (eGFR) will be   calculated using the Chronic Kidney Disease Epidemiology Collaboration   (CKD-EPI) equation.       Calcium   Date Value Ref Range Status   02/21/2022 9.7 8.5 - 10.1 mg/dL Final   06/28/2021 9.4 8.5 - 10.1 mg/dL Final     Bilirubin Total   Date Value Ref Range Status   04/12/2021 0.9 0.2 - 1.3 mg/dL Final     Alkaline Phosphatase   Date Value Ref Range Status   04/12/2021 84 40 - 150 U/L Final     ALT   Date Value Ref Range Status   10/25/2021 30 0 - 70 U/L Final   04/12/2021 29 0 - 70 U/L Final     AST   Date Value Ref Range Status   04/12/2021 23 0 - 45 U/L Final     Recent Labs   Lab Test 10/25/21  1119 10/05/21  0951 04/12/21  1457 02/17/20  1601 10/17/16  1408 04/22/15  1241   CHOL 118 132   < > 152.0   < > 139   HDL 52 55   < > 55.0   < > 38*   LDL 44 55   < > 74.0   < > 69*   TRIG 108 108   < > 116.0   < > 159*   CHOLHDLRATIO  --   --   --  2.8  --  3.7    < > = values in this interval not displayed.      Lab Results   Component Value Date    A1C 6.4 04/12/2021    A1C 6.1 02/17/2020    A1C 6.1 02/11/2019    A1C 5.8 10/17/2016    A1C 5.9 03/18/2015

## 2022-12-08 NOTE — PATIENT INSTRUCTIONS
December 8, 2022    Thank you for allowing our Cardiology team to participate in your care.     Please note the following changes to your heart treatment plan:     Medication changes:   - none    Tests to be done:  - NON-fasting labs next week  - Lower leg KRYSTEN testing    Follow up:  - Follow up in 3 months with Aybike and with me in 1 year, or sooner as needed.  - Follow up with Lymphedema Clinic      For scheduling, please call [phone].    Please contact our team at [phone] (Lillian NAILS) or [phone] for any questions or concerns.     If you are having a medical emergency, please call [phone].     Sincerely,    Andrea Lockett MD, FACC  Cardiology    [facility] - [facility] - [place]

## 2022-12-09 ENCOUNTER — TELEPHONE (OUTPATIENT)
Dept: FAMILY MEDICINE | Facility: CLINIC | Age: 72
End: 2022-12-09

## 2022-12-09 DIAGNOSIS — J44.9 CHRONIC OBSTRUCTIVE PULMONARY DISEASE, UNSPECIFIED COPD TYPE (H): Primary | ICD-10-CM

## 2022-12-09 RX ORDER — FLUTICASONE FUROATE AND VILANTEROL 200; 25 UG/1; UG/1
1 POWDER RESPIRATORY (INHALATION) DAILY
Qty: 60 EACH | Refills: 0 | Status: SHIPPED | OUTPATIENT
Start: 2022-12-09 | End: 2022-12-26

## 2022-12-09 NOTE — TELEPHONE ENCOUNTER
Spoke with Dr. Patel regarding requests for pt to schedule appt. Pt called  and scheduled an extended med refill visit for 12/26. Will send one month supply of Breo inhaler.    Henry HORAN, RN  12/09/22 2:23 PM

## 2022-12-09 NOTE — TELEPHONE ENCOUNTER
Who is calling? Pharmacy  Medication name: fluticasone-vilanterol (BREO ELLIPTA) 200-25 MCG/INH inhaler  Is this a refill request? Yes  Have they contacted the pharmacy?  Yes  Pharmacy:   [id number] - [place], [place] - [address]  [address]  [facility] [address]  Phone: [phone] Fax: [phone]    Question/Concern: Pharmacy calling unable to reach us through faxes   Would patient like a call back? No

## 2022-12-09 NOTE — TELEPHONE ENCOUNTER
Pt has been contacted many times over the last two months to schedule an appointment with Dr. Patel as it has been greater than a year since his last office visit. RN staff and  staff have left VMs requesting calls back to schedule and pt has not been in touch with the clinic. Refill is denied currently until pt schedules appointment with Dr. Patel. Spoke with Natty pharmacist at [facility] to confirm this information and requested assistance with urging pt to make appointment.    Henry HORAN, RN  12/09/22 11:38 AM

## 2022-12-12 ENCOUNTER — TELEPHONE (OUTPATIENT)
Dept: SLEEP MEDICINE | Facility: CLINIC | Age: 72
End: 2022-12-12

## 2022-12-12 ENCOUNTER — LAB (OUTPATIENT)
Dept: LAB | Facility: CLINIC | Age: 72
End: 2022-12-12
Payer: COMMERCIAL

## 2022-12-12 ENCOUNTER — ANTICOAGULATION THERAPY VISIT (OUTPATIENT)
Dept: ANTICOAGULATION | Facility: CLINIC | Age: 72
End: 2022-12-12

## 2022-12-12 DIAGNOSIS — I48.20 CHRONIC ATRIAL FIBRILLATION (H): ICD-10-CM

## 2022-12-12 DIAGNOSIS — G47.33 OSA ON CPAP: Primary | ICD-10-CM

## 2022-12-12 DIAGNOSIS — Z79.01 LONG TERM CURRENT USE OF ANTICOAGULANT THERAPY: Primary | ICD-10-CM

## 2022-12-12 DIAGNOSIS — Z79.01 LONG TERM CURRENT USE OF ANTICOAGULANT THERAPY: ICD-10-CM

## 2022-12-12 DIAGNOSIS — I50.30 HEART FAILURE WITH PRESERVED EJECTION FRACTION, NYHA CLASS II (H): ICD-10-CM

## 2022-12-12 LAB
ANION GAP SERPL CALCULATED.3IONS-SCNC: 11 MMOL/L (ref 7–15)
BUN SERPL-MCNC: 27.6 MG/DL (ref 8–23)
CALCIUM SERPL-MCNC: 9.7 MG/DL (ref 8.8–10.2)
CHLORIDE SERPL-SCNC: 104 MMOL/L (ref 98–107)
CREAT SERPL-MCNC: 1.16 MG/DL (ref 0.67–1.17)
DEPRECATED HCO3 PLAS-SCNC: 26 MMOL/L (ref 22–29)
ERYTHROCYTE [DISTWIDTH] IN BLOOD BY AUTOMATED COUNT: 13.2 % (ref 10–15)
GFR SERPL CREATININE-BSD FRML MDRD: 67 ML/MIN/1.73M2
GLUCOSE SERPL-MCNC: 124 MG/DL (ref 70–99)
HCT VFR BLD AUTO: 49.4 % (ref 40–53)
HGB BLD-MCNC: 16.2 G/DL (ref 13.3–17.7)
INR BLD: 2.7 (ref 0.9–1.1)
MCH RBC QN AUTO: 32.1 PG (ref 26.5–33)
MCHC RBC AUTO-ENTMCNC: 32.8 G/DL (ref 31.5–36.5)
MCV RBC AUTO: 98 FL (ref 78–100)
PLATELET # BLD AUTO: 170 10E3/UL (ref 150–450)
POTASSIUM SERPL-SCNC: 3.9 MMOL/L (ref 3.4–5.3)
RBC # BLD AUTO: 5.04 10E6/UL (ref 4.4–5.9)
SODIUM SERPL-SCNC: 141 MMOL/L (ref 136–145)
WBC # BLD AUTO: 8.5 10E3/UL (ref 4–11)

## 2022-12-12 PROCEDURE — 85027 COMPLETE CBC AUTOMATED: CPT

## 2022-12-12 PROCEDURE — 36415 COLL VENOUS BLD VENIPUNCTURE: CPT

## 2022-12-12 PROCEDURE — 85610 PROTHROMBIN TIME: CPT

## 2022-12-12 PROCEDURE — 80048 BASIC METABOLIC PNL TOTAL CA: CPT

## 2022-12-12 NOTE — PROGRESS NOTES
ANTICOAGULATION MANAGEMENT     Markie Shepard 72 year old male is on warfarin with therapeutic INR result. (Goal INR 2.0-3.0)    Recent labs: (last 7 days)     12/12/22  1125   INR 2.7*       ASSESSMENT     Source(s): Chart Review and Patient/Caregiver Call     Warfarin doses taken: Reviewed in chart  Diet: No new diet changes identified  New illness, injury, or hospitalization: No  Medication/supplement changes: None noted  Signs or symptoms of bleeding or clotting: No  Previous INR: Therapeutic last 2(+) visits  Additional findings: None       PLAN     Recommended plan for no diet, medication or health factor changes affecting INR     Dosing Instructions: Continue your current warfarin dose with next INR in 6 weeks       Summary  As of 12/12/2022    Full warfarin instructions:  2.5 mg every Mon, Fri; 5 mg all other days; Starting 12/12/2022   Next INR check:  1/30/2023             Detailed voice message left for Markie with dosing instructions and follow up date.     Contact [phone] to schedule and with any changes, questions or concerns.     Education provided:   Please call back if any changes to your diet, medications or how you've been taking warfarin  Contact [phone] with any changes, questions or concerns.     Plan made per [facility] anticoagulation protocol    Karen Whitehead RN  Anticoagulation Clinic  12/12/2022    _______________________________________________________________________     Anticoagulation Episode Summary     Current INR goal:  2.0-3.0   TTR:  100.0 % (1 y)   Target end date:  Indefinite   Send INR reminders to:  [facility]    Indications    Long-term (current) use of anticoagulants [Z79.01] [Z79.01]  Chronic atrial fibrillation (H) [I48.20]           Comments:  PREFERS PETER! Quit Smoking 9/29.         Anticoagulation Care Providers     Provider Role Specialty Phone number    Mali Patel MD Referring Internal Medicine [phone]

## 2022-12-12 NOTE — TELEPHONE ENCOUNTER
Reason for Call:  Other prescription    Detailed comments: Pt has been trying to get new supplies for the last month and hasn't heard back from anyone.  Pt's suppy needs include a new mask, hose, reservoir and filters.  Pt would really appreciate a call back regarding the supplies and why it's taking so long to get these replaced.    Phone Number Patient can be reached at: Home number on file [phone] (home)    Best Time: anytime    Can we leave a detailed message on this number? YES    Call taken on 12/12/2022 at 1:53 PM by Wen Quick

## 2022-12-14 NOTE — TELEPHONE ENCOUNTER
Updated order needed for supplies. Order pended.     Spoke with patient. He has been trying to contact [facility] but unable to get through. Advised he call nurse back if [facility] does not reach out early next week regarding new order.     Also advised he is due for appointment. He is unable to schedule at this time. Scheduling number provided.     Tierra JOSHI RN  [facility]

## 2022-12-15 NOTE — RESULT ENCOUNTER NOTE
Results reviewed, please let the patient know that overall findings are reassuring, normal electrolytes and blood counts. Follow up as previously planned, thanks!

## 2022-12-19 ENCOUNTER — HOSPITAL ENCOUNTER (OUTPATIENT)
Dept: ULTRASOUND IMAGING | Facility: CLINIC | Age: 72
Discharge: HOME | End: 2022-12-19
Attending: INTERNAL MEDICINE | Admitting: INTERNAL MEDICINE
Payer: COMMERCIAL

## 2022-12-19 DIAGNOSIS — L97.821 NON-PRESSURE CHRONIC ULCER OF OTHER PART OF LEFT LOWER LEG LIMITED TO BREAKDOWN OF SKIN (H): ICD-10-CM

## 2022-12-19 PROCEDURE — 93922 UPR/L XTREMITY ART 2 LEVELS: CPT | Mod: 26 | Performed by: INTERNAL MEDICINE

## 2022-12-19 PROCEDURE — 93922 UPR/L XTREMITY ART 2 LEVELS: CPT

## 2022-12-20 NOTE — RESULT ENCOUNTER NOTE
Results reviewed, please let the patient know that overall findings are reassuring KRYSTEN normal, TBI within normal range, thanks!

## 2022-12-22 ENCOUNTER — TELEPHONE (OUTPATIENT)
Dept: CARDIOLOGY | Facility: CLINIC | Age: 72
End: 2022-12-22

## 2022-12-22 DIAGNOSIS — R60.0 BILATERAL LOWER EXTREMITY EDEMA: Primary | ICD-10-CM

## 2022-12-22 NOTE — TELEPHONE ENCOUNTER
Call placed to pt to review:   Component      Latest Ref Rng & Units 12/12/2022   Sodium      136 - 145 mmol/L 141   Potassium      3.4 - 5.3 mmol/L 3.9   Chloride      98 - 107 mmol/L 104   Carbon Dioxide (CO2)      22 - 29 mmol/L 26   Anion Gap      7 - 15 mmol/L 11   Urea Nitrogen      8.0 - 23.0 mg/dL 27.6 (H)   Creatinine      0.67 - 1.17 mg/dL 1.16   Calcium      8.8 - 10.2 mg/dL 9.7   Glucose      70 - 99 mg/dL 124 (H)   GFR Estimate      >60 mL/min/1.73m2 67   WBC      4.0 - 11.0 10e3/uL 8.5   RBC Count      4.40 - 5.90 10e6/uL 5.04   Hemoglobin      13.3 - 17.7 g/dL 16.2   Hematocrit      40.0 - 53.0 % 49.4   MCV      78 - 100 fL 98   MCH      26.5 - 33.0 pg 32.1   MCHC      31.5 - 36.5 g/dL 32.8   RDW      10.0 - 15.0 % 13.2   Platelet Count      150 - 450 10e3/uL 170     ----- Message from Andrea Lockett MD sent at 12/15/2022  7:48 AM CST -----  Results reviewed, please let the patient know that overall findings are reassuring, normal electrolytes and blood counts. Follow up as previously planned, thanks!      BLE KRYSTEN's:   Impression:      Right leg: Resting KRYSTEN is 1.3. Normal.     Left leg: Resting KRYSTEN is 1.24. Normal.   Andrea Lockett MD   12/20/2022 12:30 PM CST       Results reviewed, please let the patient know that overall findings are reassuring KRYSTEN normal, TBI within normal range, thanks!      Pt reports he has OV with PMD Monday to review foot discoloration (described as purplr in nature). Pt denies any pain or discomfort associated with this.     Pt would like ot have eval through [facility] and needs placement close to home. Reviewed with pt we librado plan to review a  referral and see if we can get him in closer to home vs [facility]. Will reach out to see of referral can be sent to [facility] near his home.   YAZAN العلي RN, BSN. 12/22/22 1:20 PM

## 2022-12-26 ENCOUNTER — OFFICE VISIT (OUTPATIENT)
Dept: FAMILY MEDICINE | Facility: CLINIC | Age: 72
End: 2022-12-26
Payer: COMMERCIAL

## 2022-12-26 VITALS
WEIGHT: 315 LBS | SYSTOLIC BLOOD PRESSURE: 117 MMHG | BODY MASS INDEX: 47.84 KG/M2 | RESPIRATION RATE: 15 BRPM | DIASTOLIC BLOOD PRESSURE: 82 MMHG | OXYGEN SATURATION: 94 % | TEMPERATURE: 97.6 F | HEART RATE: 65 BPM

## 2022-12-26 DIAGNOSIS — J44.9 CHRONIC OBSTRUCTIVE PULMONARY DISEASE, UNSPECIFIED COPD TYPE (H): ICD-10-CM

## 2022-12-26 DIAGNOSIS — I48.20 CHRONIC ATRIAL FIBRILLATION (H): ICD-10-CM

## 2022-12-26 DIAGNOSIS — F33.9 CHRONIC RECURRENT MAJOR DEPRESSIVE DISORDER (H): ICD-10-CM

## 2022-12-26 DIAGNOSIS — G47.00 INSOMNIA, UNSPECIFIED TYPE: ICD-10-CM

## 2022-12-26 DIAGNOSIS — R73.03 PREDIABETES: ICD-10-CM

## 2022-12-26 DIAGNOSIS — I87.8 VENOUS STASIS: Primary | ICD-10-CM

## 2022-12-26 LAB — HBA1C MFR BLD: 6.4 % (ref 0–5.6)

## 2022-12-26 RX ORDER — ALBUTEROL SULFATE 90 UG/1
2 AEROSOL, METERED RESPIRATORY (INHALATION) EVERY 4 HOURS PRN
Qty: 8.5 G | Refills: 0 | Status: CANCELLED | OUTPATIENT
Start: 2022-12-26

## 2022-12-26 RX ORDER — ALBUTEROL SULFATE 90 UG/1
2 AEROSOL, METERED RESPIRATORY (INHALATION) EVERY 4 HOURS PRN
Qty: 18 G | Refills: 11 | Status: SHIPPED | OUTPATIENT
Start: 2022-12-26 | End: 2024-01-02

## 2022-12-26 RX ORDER — WARFARIN SODIUM 5 MG/1
TABLET ORAL
Qty: 100 TABLET | Refills: 3 | Status: SHIPPED | OUTPATIENT
Start: 2022-12-26 | End: 2024-02-21

## 2022-12-26 RX ORDER — FLUTICASONE FUROATE AND VILANTEROL 200; 25 UG/1; UG/1
1 POWDER RESPIRATORY (INHALATION) DAILY
Qty: 60 EACH | Refills: 11 | Status: SHIPPED | OUTPATIENT
Start: 2022-12-26 | End: 2023-01-06 | Stop reason: ALTCHOICE

## 2022-12-26 RX ORDER — FLUOXETINE 40 MG/1
40 CAPSULE ORAL DAILY
Qty: 90 CAPSULE | Refills: 3 | Status: SHIPPED | OUTPATIENT
Start: 2022-12-26 | End: 2024-01-02

## 2022-12-26 RX ORDER — TRAZODONE HYDROCHLORIDE 100 MG/1
100 TABLET ORAL
Qty: 90 TABLET | Refills: 1 | Status: SHIPPED | OUTPATIENT
Start: 2022-12-26 | End: 2023-07-03

## 2022-12-26 ASSESSMENT — PATIENT HEALTH QUESTIONNAIRE - PHQ9
SUM OF ALL RESPONSES TO PHQ QUESTIONS 1-9: 1
5. POOR APPETITE OR OVEREATING: NOT AT ALL

## 2022-12-26 ASSESSMENT — ANXIETY QUESTIONNAIRES
2. NOT BEING ABLE TO STOP OR CONTROL WORRYING: NOT AT ALL
IF YOU CHECKED OFF ANY PROBLEMS ON THIS QUESTIONNAIRE, HOW DIFFICULT HAVE THESE PROBLEMS MADE IT FOR YOU TO DO YOUR WORK, TAKE CARE OF THINGS AT HOME, OR GET ALONG WITH OTHER PEOPLE: NOT DIFFICULT AT ALL
5. BEING SO RESTLESS THAT IT IS HARD TO SIT STILL: NOT AT ALL
3. WORRYING TOO MUCH ABOUT DIFFERENT THINGS: NOT AT ALL
1. FEELING NERVOUS, ANXIOUS, OR ON EDGE: NOT AT ALL
7. FEELING AFRAID AS IF SOMETHING AWFUL MIGHT HAPPEN: SEVERAL DAYS
GAD7 TOTAL SCORE: 2
6. BECOMING EASILY ANNOYED OR IRRITABLE: SEVERAL DAYS
GAD7 TOTAL SCORE: 2

## 2022-12-26 NOTE — NURSING NOTE
72 year old  Chief Complaint   Patient presents with     Recheck Medication     Check in on medications, needs refills, updates     Forms     Talk about handicap parking permit       Blood pressure 117/82, pulse 65, temperature 97.6  F (36.4  C), temperature source Skin, resp. rate 15, weight (!) 155.6 kg (343 lb), SpO2 94 %. Body mass index is 47.84 kg/m .  Patient Active Problem List   Diagnosis     CAD (coronary artery disease)     Hyperlipidemia LDL goal <70     Smoker     Essential hypertension with goal blood pressure less than 140/90     Chronic atrial fibrillation (H)     Obstructive sleep apnea     Mild major depression (H)     Low back pain     Mixed hyperlipidemia     Essential hypertension, benign     Long-term (current) use of anticoagulants [Z79.01]     Anisocoria     Prediabetes     Chronic obstructive pulmonary disease, unspecified COPD type (H)     Morbid obesity (H)     Chronic recurrent major depressive disorder (H)     Bilateral leg edema     Insomnia, unspecified type     Type 2 diabetes mellitus without complication, without long-term current use of insulin (H)     Heart failure with preserved ejection fraction, NYHA class II (H)     Nonrheumatic aortic valve stenosis       Wt Readings from Last 2 Encounters:   12/26/22 (!) 155.6 kg (343 lb)   12/08/22 (!) 157.8 kg (347 lb 14.4 oz)     BP Readings from Last 3 Encounters:   12/26/22 117/82   12/08/22 121/81   02/04/22 110/68         Current Outpatient Medications   Medication     acetaminophen (TYLENOL) 500 MG tablet     albuterol (PROAIR HFA/PROVENTIL HFA/VENTOLIN HFA) 108 (90 Base) MCG/ACT inhaler     atenolol (TENORMIN) 100 MG tablet     atenolol (TENORMIN) 50 MG tablet     FLUoxetine (PROZAC) 40 MG capsule     fluticasone (FLONASE) 50 MCG/ACT nasal spray     fluticasone-vilanterol (BREO ELLIPTA) 200-25 MCG/ACT inhaler     fluticasone-vilanterol (BREO ELLIPTA) 200-25 MCG/INH inhaler     losartan (COZAAR) 25 MG tablet     multivitamin  w/minerals (THERA-VIT-M) tablet     omega-3 fatty acids 1200 MG capsule     oxymetazoline (AFRIN) 0.05 % nasal spray     potassium chloride ER (K-TAB) 20 MEQ CR tablet     rosuvastatin (CRESTOR) 40 MG tablet     tiotropium (SPIRIVA RESPIMAT) 2.5 MCG/ACT inhaler     torsemide (DEMADEX) 20 MG tablet     traZODone (DESYREL) 100 MG tablet     warfarin ANTICOAGULANT (COUMADIN ANTICOAGULANT) 5 MG tablet     No current facility-administered medications for this visit.       Social History     Tobacco Use     Smoking status: Former     Packs/day: 1.00     Years: 53.00     Pack years: 53.00     Types: Cigarettes     Quit date: 9/29/2019     Years since quitting: 3.2     Smokeless tobacco: Never     Tobacco comments:     1 ppd   Substance Use Topics     Alcohol use: No     Comment: quit drinking heavily 2013     Drug use: No       Health Maintenance Due   Topic Date Due     HF ACTION PLAN  Never done     DIABETIC FOOT EXAM  Never done     COLORECTAL CANCER SCREENING  Never done     MICROALBUMIN  06/14/2013     EYE EXAM  06/29/2016     A1C  07/12/2021     PHQ-9  03/01/2022     MEDICARE ANNUAL WELLNESS VISIT  04/12/2022     FALL RISK ASSESSMENT  06/28/2022     ALT  10/25/2022     LIPID  10/25/2022       No results found for: PAP      December 26, 2022 2:56 PM

## 2022-12-26 NOTE — PROGRESS NOTES
"Markie Shepard is a 72 year old male with a past medical history significant for legal blindness, morbid obesity, coronary artery disease status post PCI, permanent atrial fibrillation (on warfarin), sleep apnea (CPAP), significant prior smoking history, COPD, heart failure with preserved ejection fraction, and chronic venous stasis who presents for a medication check. He has brought in a letter noting that his albuterol has been moved to a higher tier and the cost will increase if it is not substituted for another medication, starting 1/1/2023.     Eyes  Markie is legally blind. He follows with a retinal specialist regularly. He notes that his left eye is worse than his right. He states \"with luck, I'll keep this [my right eye].\" He has not had an eye injection in 1.5 years.     Chronic obstructive pulmonary disease, unspecified COPD type (H)  Markie has mixed restrictive / obstructive pattern on PFTs. He is a former smoker. Currenlty suing Breo inhaler 1 puff daily, Flonase nasal spray 1 spray daily, and an albuterol rescue inhaler as needed. He has a prescription for Spiriva 2 puffs daily, but is not currently using this. Spiriva had been started after Incruse inhaler was not covered by pharmacy  He reports that his breathing is generally ok, but he has difficulty taking \"big breaths.\" He notes that this is when he will use his albuterol rescue inhaler. He notes that he has been waking up with nasal congestion, but is able to clear this out in the morning by blowing his nose. He also notes that he will cough up phlegm.     Lymphedema  Markie recently followed with cardiology for evaluation of hypertension, heart failure and hx of atrial fibrillation. At  His visit he was noted to have lower extremity edema and purple toes. He has compression stockings, but reports difficulty getting them on and off. He was given a referral to a lymphedema clinic by his cardiologist.  Mrakie denies numbness, pain, or \"pins and " "needles\" in his feet. He states that he has difficulty donning compression socks or wrap leg due to his legal blindness.     Chronic recurrent major depressive disorder (H)  Markie takes fluoxetine 40 mg daily for treatment of anxiety and depression. He reports that his mood is doing well recently.     PHQ 4/12/2021 12/1/2021 12/26/2022   PHQ-9 Total Score 7 6 1   Q9: Thoughts of better off dead/self-harm past 2 weeks Not at all Not at all Not at all     YAAKOV-7 SCORE 4/12/2021 12/1/2021 12/26/2022   Total Score - - -   Total Score 20 3 2     Insomnia, unspecified type  Markie takes trazodone 100 mg nightly as needed for treatment of insomnia. He reports that his sleep has been very good. He has been getting 6.5-8 hours of sleep each night.    Weight Loss  Markie has lost 27 lbs over the last year or so. He reports that he is doing a step exercise daily. His diet is healthy, including bananas, spinach salad, and turkey sandwiches. He does also have cookies. A1c due, will recheck this today. He has hx of prediabetes. Last A1c was 6.4%.     Wt Readings from Last 4 Encounters:   12/26/22 (!) 155.6 kg (343 lb)   12/08/22 (!) 157.8 kg (347 lb 14.4 oz)   02/04/22 (!) 168.3 kg (371 lb)   10/05/21 (!) 167.8 kg (370 lb)     Chronic atrial fibrillation (H)  Markie has chronic atrial fibrillation, He is on life long anticoagulation with warfarin.  He also takes torsemide 40 mg BID. Denies dizziness or chest pain.     Handicap Parking Request  Markie does not drive, due to legal blindness, but has a . He is requesting a handicap parking permit. He uses a cane. Balance is not as good as he would like. He has some osteoarthritis. Has underlying COPD and coronary artery disease.  He has never had a handicap permit or license plates in the past.        Patient Active Problem List   Diagnosis     CAD (coronary artery disease)     Hyperlipidemia LDL goal <70     Smoker     Essential hypertension with goal blood pressure less than " 140/90     Chronic atrial fibrillation (H)     Obstructive sleep apnea     Mild major depression (H)     Low back pain     Mixed hyperlipidemia     Essential hypertension, benign     Long-term (current) use of anticoagulants [Z79.01]     Anisocoria     Prediabetes     Chronic obstructive pulmonary disease, unspecified COPD type (H)     Morbid obesity (H)     Chronic recurrent major depressive disorder (H)     Bilateral leg edema     Insomnia, unspecified type     Type 2 diabetes mellitus without complication, without long-term current use of insulin (H)     Heart failure with preserved ejection fraction, NYHA class II (H)     Nonrheumatic aortic valve stenosis       Current Outpatient Medications   Medication Sig Dispense Refill     acetaminophen (TYLENOL) 500 MG tablet Take 500-1,000 mg by mouth every 6 hours as needed for mild pain       albuterol (PROAIR HFA/PROVENTIL HFA/VENTOLIN HFA) 108 (90 Base) MCG/ACT inhaler Inhale 2 puffs into the lungs every 4 hours as needed for shortness of breath / dyspnea 8.5 g 0     atenolol (TENORMIN) 100 MG tablet Take 1 tablet (100 mg) by mouth daily along with 50 mg tablet for total of 150 mg daily. 90 tablet 0     atenolol (TENORMIN) 50 MG tablet Take 1 tablet (50 mg) by mouth daily Along with 100 mg tablet for total of 150 mg daily. 90 tablet 1     FLUoxetine (PROZAC) 40 MG capsule Take 1 capsule (40 mg) by mouth daily 30 capsule 0     fluticasone (FLONASE) 50 MCG/ACT nasal spray Spray 1 spray into both nostrils daily 9.9 mL 0     fluticasone-vilanterol (BREO ELLIPTA) 200-25 MCG/ACT inhaler Inhale 1 puff into the lungs daily 60 each 0     fluticasone-vilanterol (BREO ELLIPTA) 200-25 MCG/INH inhaler Inhale 1 puff into the lungs daily 60 each 1     losartan (COZAAR) 25 MG tablet Take 3 tablets (75 mg) by mouth daily 270 tablet 1     multivitamin w/minerals (THERA-VIT-M) tablet Take 1 tablet by mouth daily Century 55+       omega-3 fatty acids 1200 MG capsule Take 1 capsule by  mouth daily       oxymetazoline (AFRIN) 0.05 % nasal spray Spray 2 sprays into both nostrils At Bedtime       potassium chloride ER (K-TAB) 20 MEQ CR tablet Take 1 tablet (20 mEq) by mouth 2 times daily 180 tablet 1     rosuvastatin (CRESTOR) 40 MG tablet Take 1 tablet (40 mg) by mouth At Bedtime 90 tablet 0     tiotropium (SPIRIVA RESPIMAT) 2.5 MCG/ACT inhaler Inhale 2 puffs into the lungs daily 4 g 11     torsemide (DEMADEX) 20 MG tablet Take 2 tablets (40 mg) by mouth every morning AND 2 tablets (40 mg) daily (with lunch). 360 tablet 0     traZODone (DESYREL) 100 MG tablet Take 1 tablet (100 mg) by mouth nightly as needed for sleep 90 tablet 1     warfarin ANTICOAGULANT (COUMADIN ANTICOAGULANT) 5 MG tablet Take 2.5 mg on Mondays and Fridays, and 5 mg all other days, or as directed by Coumadin nurse. 100 tablet 3       Allergies   Allergen Reactions     Ambien Other (See Comments)     Parasomnia with sleep walking, injuries, delusions.  May have been in DTs at the same time.     Lisinopril Cough     Pt had cough     Seasonal Allergies         EXAM  /82 (BP Location: Left arm, Patient Position: Sitting, Cuff Size: Adult Large)   Pulse 65   Temp 97.6  F (36.4  C) (Skin)   Resp 15   Wt (!) 155.6 kg (343 lb)   SpO2 94%   BMI 47.84 kg/m    Gen: Alert, pleasant, NAD  COR: Irregularly irregular S1,S2, no murmur   Lungs: CTA bilaterally, no rhonchi, wheezes or rales  Left Foot: Purple toes, warm, but capillary refill 4 seconds. Normal sensation, no calluses or open areas on foot or ankle.  Ext: +1 to +2 edema behind ankle. +1 edema at the mid-shin.      Assessment:  (I87.8) Venous stasis  (primary encounter diagnosis)  Comment: Bilateral lower extremity edema, legally blind which makes it difficult for him to don compression stockings.  Plan: Referral was placed by cardiology to lymphedema clinic. Keep legs elevated if possible.     (J44.9) Chronic obstructive pulmonary disease, unspecified COPD type  (H)  Comment: Currently doing well, reports that he is not using Spiriva. This was ordered because Incruse inhaler was no longer on formulary.   Plan: fluticasone-vilanterol (BREO ELLIPTA) 200-25         MCG/ACT inhaler, tiotropium (SPIRIVA RESPIMAT)         2.5 MCG/ACT inhaler, albuterol (PROAIR         HFA/PROVENTIL HFA/VENTOLIN HFA) 108 (90 Base)         MCG/ACT inhaler        Recommend he fill his Spiriva prescription. Follow up with PharmD review meds and ensure coverage.    (F33.9) Chronic recurrent major depressive disorder (H)  Comment: Doing well on current medication.   Plan: FLUoxetine (PROZAC) 40 MG capsule        Medication refilled.     (G47.00) Insomnia, unspecified type  Comment: Doing well on current medication.   Plan: traZODone (DESYREL) 100 MG tablet        Medication refilled.     (I48.20) Chronic atrial fibrillation (H)  Comment: Doing well. No palpitations or chest pain.   Plan: warfarin ANTICOAGULANT (COUMADIN ANTICOAGULANT)        5 MG tablet        Medication refilled.     (R73.03) Prediabetes  Comment: Due for A1c check.  A1c has remained stable, prediabetic range.   Lab Results   Component Value Date    A1C 6.4 12/26/2022    A1C 6.4 04/12/2021     Plan: Hemoglobin A1c        RTC 6 months for next check    40 minutes spend on the date of this encounter doing chart review, history and exam, documentation and further activities as noted above.     RTC 6 months    Mali Patel MD  Internal Medicine/Pediatrics      I, Christina Chen, am serving as a scribe to document services personally performed by Dr. Mali Patel, based on data collection and the provider's statements to me. Dr. Patel has reviewed, edited, and approved the above note.

## 2022-12-26 NOTE — LETTER
January 2, 2023      Markie Shepard  [address]  [place] [address]        Dear Markie,   Enclosed are your test results.     Your A1c shows you have prediabetes. Your number is 6.4%. Criteria to diagnose diabetes is 6.5%. continue efforts with weight loss to help prevent development of diabetes.     I would like to see you again in 6 months.     Mali Patel MD   Internal Medicine/Pediatrics       Resulted Orders   Hemoglobin A1c   Result Value Ref Range    Hemoglobin A1C 6.4 (H) 0.0 - 5.6 %      Comment:      Normal <5.7%   Prediabetes 5.7-6.4%    Diabetes 6.5% or higher     Note: Adopted from ADA consensus guidelines.

## 2022-12-29 ENCOUNTER — VIRTUAL VISIT (OUTPATIENT)
Dept: FAMILY MEDICINE | Facility: CLINIC | Age: 72
End: 2022-12-29
Payer: COMMERCIAL

## 2022-12-29 DIAGNOSIS — J44.9 CHRONIC OBSTRUCTIVE PULMONARY DISEASE, UNSPECIFIED COPD TYPE (H): Primary | ICD-10-CM

## 2022-12-29 NOTE — PROGRESS NOTES
ASSESSMENT:    1. Condition - COPD: Adherence - Adherence: Does not understand instructions  Frequent use of rescue albuterol inhaler is inhibiting assessment of efficacy of maintenance therapy and increasing risk that rescue therapy won't work when needed. Education for appropriate use is beneficial to be able to assess efficacy and safety of inhaler regimen.       PLAN:  Medications comments/ concerns are:   1. Educated patient to only use albuterol when needed. -completed   2. Continue using Breo 1 puff daily. -completed     Follow up: 1 week to assess effectiveness of maintenance therapy       - - - - - - - - - - - - - - -  SUBJECTIVE:  Markie is a 72 year old male called for a follow up medication therapy management visit. Primary care provider is Mali Patel. Was referred by his provider for   Chief Complaint   Patient presents with     Medication Therapy Management   . Markie brought medications to the visit: no.     ----  Markie's MAIN CONCERN TODAY is cost of inhalers.  Allergies/ADRs: Reviewed in chart  Pt reports using the following medical devices: none  Pt reports the following barriers to care: none  Adverse reactions to medications: none  Concerns with medications: none    Social History     Tobacco Use     Smoking status: Former     Packs/day: 1.00     Years: 53.00     Pack years: 53.00     Types: Cigarettes     Quit date: 9/29/2019     Years since quitting: 3.2     Smokeless tobacco: Never     Tobacco comments:     1 ppd   Substance Use Topics     Alcohol use: No     Comment: quit drinking heavily 2013     Drug use: No       Medication Experience: comfortable with medications  Reports of cognitive concerns: no     COPD -   [facility] supplement update that Albuterol increase from tier 2 drug to tier 3 and will be $90/ month.     Prescribed Breo, Spiriva, Albuterol last spring by Mali Patel.   Dropped the spiriva because it didn't seem to be helping and cost was expensive.      Currently using Albuterol TID - using as a convenience, 2pm dose, 7pm, 11:30pm - not because he feels SOB or in distress. Evening dose is used mostly because he feels some congestion at night, using albuterol before putting CPAP on. Hasn't taken albuterol since Tuesday this week and has not felt any different.   Breo 1 puff in the morning - $94/month   Spiriva - $124/month   Has relationship with pharmacist at [facility] so wants to stay with this pharmacy vs switching to mail order. Open to exploring other options to reduce cost.       OBJECTIVE:   Patient Care Team:  Mali Patel MD as PCP - General (Internal Medicine)  Tal Trinidad MD as MD (Ophthalmology)  Val Jim MD as Assigned Sleep Provider  Mali Patel MD as Assigned PCP  Tanner Oliveira PA-C as Physician Assistant (Cardiovascular Disease)  Tanner Oliveira PA-C as Assigned Heart and Vascular Provider  Rufina Alves RPH as Assigned MTM Pharmacist  Current Outpatient Medications   Medication Sig Dispense Refill     acetaminophen (TYLENOL) 500 MG tablet Take 500-1,000 mg by mouth every 6 hours as needed for mild pain       albuterol (PROAIR HFA/PROVENTIL HFA/VENTOLIN HFA) 108 (90 Base) MCG/ACT inhaler Inhale 2 puffs into the lungs every 4 hours as needed for shortness of breath or wheezing 18 g 11     albuterol (PROAIR HFA/PROVENTIL HFA/VENTOLIN HFA) 108 (90 Base) MCG/ACT inhaler Inhale 2 puffs into the lungs every 4 hours as needed for shortness of breath / dyspnea 8.5 g 0     atenolol (TENORMIN) 100 MG tablet Take 1 tablet (100 mg) by mouth daily along with 50 mg tablet for total of 150 mg daily. 90 tablet 0     atenolol (TENORMIN) 50 MG tablet Take 1 tablet (50 mg) by mouth daily Along with 100 mg tablet for total of 150 mg daily. 90 tablet 1     FLUoxetine (PROZAC) 40 MG capsule Take 1 capsule (40 mg) by mouth daily 90 capsule 3     fluticasone (FLONASE) 50 MCG/ACT nasal spray  Spray 1 spray into both nostrils daily 9.9 mL 0     fluticasone-vilanterol (BREO ELLIPTA) 200-25 MCG/ACT inhaler Inhale 1 puff into the lungs daily 60 each 11     fluticasone-vilanterol (BREO ELLIPTA) 200-25 MCG/INH inhaler Inhale 1 puff into the lungs daily 60 each 1     losartan (COZAAR) 25 MG tablet Take 3 tablets (75 mg) by mouth daily 270 tablet 1     multivitamin w/minerals (THERA-VIT-M) tablet Take 1 tablet by mouth daily Century 55+       omega-3 fatty acids 1200 MG capsule Take 1 capsule by mouth daily       oxymetazoline (AFRIN) 0.05 % nasal spray Spray 2 sprays into both nostrils At Bedtime       potassium chloride ER (K-TAB) 20 MEQ CR tablet Take 1 tablet (20 mEq) by mouth 2 times daily 180 tablet 1     rosuvastatin (CRESTOR) 40 MG tablet Take 1 tablet (40 mg) by mouth At Bedtime 90 tablet 0     tiotropium (SPIRIVA RESPIMAT) 2.5 MCG/ACT inhaler Inhale 2 puffs into the lungs daily 4 g 11     torsemide (DEMADEX) 20 MG tablet Take 2 tablets (40 mg) by mouth every morning AND 2 tablets (40 mg) daily (with lunch). 360 tablet 0     traZODone (DESYREL) 100 MG tablet Take 1 tablet (100 mg) by mouth nightly as needed for sleep 90 tablet 1     warfarin ANTICOAGULANT (COUMADIN ANTICOAGULANT) 5 MG tablet Take 2.5 mg on Mondays and Fridays, and 5 mg all other days, or as directed by Coumadin nurse. 100 tablet 3     Patient Active Problem List   Diagnosis     CAD (coronary artery disease)     Hyperlipidemia LDL goal <70     Smoker     Essential hypertension with goal blood pressure less than 140/90     Chronic atrial fibrillation (H)     Obstructive sleep apnea     Mild major depression (H)     Low back pain     Mixed hyperlipidemia     Essential hypertension, benign     Long-term (current) use of anticoagulants [Z79.01]     Anisocoria     Prediabetes     Chronic obstructive pulmonary disease, unspecified COPD type (H)     Morbid obesity (H)     Chronic recurrent major depressive disorder (H)     Bilateral leg edema  "    Insomnia, unspecified type     Type 2 diabetes mellitus without complication, without long-term current use of insulin (H)     Heart failure with preserved ejection fraction, NYHA class II (H)     Nonrheumatic aortic valve stenosis       There were no vitals filed for this visit.  Estimated body mass index is 47.84 kg/m  as calculated from the following:    Height as of 12/8/22: 1.803 m (5' 11\").    Weight as of 12/26/22: 155.6 kg (343 lb).       - - - - - - - - - - - - - - -  Options for treatment and/or follow-up care were reviewed with the patient. Markie was engaged and actively involved in the decision making process. Markie verbalized understanding of the options discussed and was satisfied with the final plan. Markie was given a copy of these recommendations and an up to date medication list in an after visit summary. This report was sent to Mali Patel.     Rufina Alves, PharmD, BCACP  Medication Management (MTM) Pharmacist  [facility]      - - - - - - - - - - - - - - -  Flowsheet completed.  # of medical conditions reviewed: 1  # of medications reviewed: 3  # of DTP identified: 1  Time spent: 10 minutes  Level of service:2  "

## 2023-01-04 ENCOUNTER — TELEPHONE (OUTPATIENT)
Dept: SLEEP MEDICINE | Facility: CLINIC | Age: 73
End: 2023-01-04

## 2023-01-05 ENCOUNTER — VIRTUAL VISIT (OUTPATIENT)
Dept: FAMILY MEDICINE | Facility: CLINIC | Age: 73
End: 2023-01-05
Payer: COMMERCIAL

## 2023-01-05 DIAGNOSIS — J44.9 CHRONIC OBSTRUCTIVE PULMONARY DISEASE, UNSPECIFIED COPD TYPE (H): Primary | ICD-10-CM

## 2023-01-05 NOTE — PROGRESS NOTES
ASSESSMENT:    1. Condition - COPD: Indication - Needs additional medication: Synergistic therapy  Use of albuterol 4x during the past week indicates ineffective maintenance inhaler. Current Breo inhaler is at max dose. Given COPD diagnosis and increased phlegm production, a LAMA agent would be beneficial for patient to target the phlegm symptoms, although cost of previously prescribed Spiriva is a barrier for the patient. Combination inhaler with ICS/LABA/LAMA could a useful alternative to replace Breo and give patient access to efficacy of LAMA.   Montelukast could also be helpful for clearing phlegm and hopefully improving overall symptoms, although oral tablet would be not target to lungs and increases risk for side effects (further fatigue, dizziness).     PLAN:  Medications comments/ concerns are:   1. Switch Breo to Trelegy, 200/62.5/25 mcg, 1 puff once daily. New rx sent. -completed     Follow up: 1 week with PharmD to reassess breathing and address any barriers to getting the new inhaler      - - - - - - - - - - - - - - -  SUBJECTIVE:  Markie is a 72 year old male called for a follow up medication therapy management visit. Primary care provider is Mali Patel. Was referred by his provider for   Chief Complaint   Patient presents with     Medication Therapy Management   . Markie brought medications to the visit: no.     PLAN FROM LAST VISIT:   1. Educated patient to only use albuterol when needed. -completed   2. Continue using Breo 1 puff daily. -completed     ----  Markie's MAIN CONCERN TODAY is Breathing/inhaler use.  Allergies/ADRs: Reviewed in chart  Pt reports using the following medical devices: none  Pt reports the following barriers to care: none  Adverse reactions to medications: none  Concerns with medications: none    Social History     Tobacco Use     Smoking status: Former     Packs/day: 1.00     Years: 53.00     Pack years: 53.00     Types: Cigarettes     Quit date: 9/29/2019     Years  since quitting: 3.2     Smokeless tobacco: Never     Tobacco comments:     1 ppd   Substance Use Topics     Alcohol use: No     Comment: quit drinking heavily 2013     Drug use: No       Medication Experience: comfortable with medications, cost can be a concern and a barrier  Reports of cognitive concerns: no     COPD: Current medications: ICS/LABA- Breo 1 puff(s) once daily. Patient rinses their mouth after using steroid inhaler.    Patient is not experiencing side effects.   Patient reports the following symptoms: increased shortness of breath at rest and chest tightness.  Patient does not have an COPD Action Plan on file.   Has spirometry been completed: Doesn't know  Inhaler/device technique reviewed: using appropriate technique for Breo Ellipta     Kept journal of symptoms and albuterol use  12/29 - no albuterol  12/30 - 2:35p fatigue + phlegm  12/31 - none  1/1 - 6pm, phlegm + some fatigue  1/2 - none  1/3 - 6pm - phlegm + some fatigue  1/4 - 10pm - phlegm + fatigue, breath labored    Fatigue = heaviness and difficulty take a full breath  After Breo in the morning, clearing lots of phlegm. Then exercises between 10:30a-12:30p and breathing is ok. Phlegm and difficulty with full breath comes later afternoon, evening  Feel better after taking albuterol, brighter, more alert, able to breath fully.     Takes another set of medications around 2pm each day. Markie has 20 puffs left on his current Breo inhaler. Open to alternative inhaler, although concerned about increased cost, especially if it has to be on top of the Breo which is currently $94/month.       OBJECTIVE:   Patient Care Team:  Mali Patel MD as PCP - General (Internal Medicine)  Tal Trinidad MD as MD (Ophthalmology)  Val Jim MD as Assigned Sleep Provider  Mali Patel MD as Assigned PCP  Tanner Oliveira PA-C as Physician Assistant (Cardiovascular Disease)  Rufina Alves, [facility] as  Assigned MTM Pharmacist  Tanner Oliveira PA-C as Assigned Heart and Vascular Provider  Current Outpatient Medications   Medication Sig Dispense Refill     acetaminophen (TYLENOL) 500 MG tablet Take 500-1,000 mg by mouth every 6 hours as needed for mild pain       albuterol (PROAIR HFA/PROVENTIL HFA/VENTOLIN HFA) 108 (90 Base) MCG/ACT inhaler Inhale 2 puffs into the lungs every 4 hours as needed for shortness of breath or wheezing 18 g 11     atenolol (TENORMIN) 100 MG tablet Take 1 tablet (100 mg) by mouth daily along with 50 mg tablet for total of 150 mg daily. 90 tablet 0     atenolol (TENORMIN) 50 MG tablet Take 1 tablet (50 mg) by mouth daily Along with 100 mg tablet for total of 150 mg daily. 90 tablet 1     FLUoxetine (PROZAC) 40 MG capsule Take 1 capsule (40 mg) by mouth daily 90 capsule 3     fluticasone (FLONASE) 50 MCG/ACT nasal spray Spray 1 spray into both nostrils daily 9.9 mL 0     fluticasone-vilanterol (BREO ELLIPTA) 200-25 MCG/ACT inhaler Inhale 1 puff into the lungs daily 60 each 11     losartan (COZAAR) 25 MG tablet Take 3 tablets (75 mg) by mouth daily 270 tablet 1     multivitamin w/minerals (THERA-VIT-M) tablet Take 1 tablet by mouth daily Century 55+       omega-3 fatty acids 1200 MG capsule Take 1 capsule by mouth daily       oxymetazoline (AFRIN) 0.05 % nasal spray Spray 2 sprays into both nostrils At Bedtime       potassium chloride ER (K-TAB) 20 MEQ CR tablet Take 1 tablet (20 mEq) by mouth 2 times daily 180 tablet 1     rosuvastatin (CRESTOR) 40 MG tablet Take 1 tablet (40 mg) by mouth At Bedtime 90 tablet 0     torsemide (DEMADEX) 20 MG tablet Take 2 tablets (40 mg) by mouth every morning AND 2 tablets (40 mg) daily (with lunch). 360 tablet 0     traZODone (DESYREL) 100 MG tablet Take 1 tablet (100 mg) by mouth nightly as needed for sleep 90 tablet 1     warfarin ANTICOAGULANT (COUMADIN ANTICOAGULANT) 5 MG tablet Take 2.5 mg on Mondays and Fridays, and 5 mg all other days, or as  "directed by Coumadin nurse. 100 tablet 3     Patient Active Problem List   Diagnosis     CAD (coronary artery disease)     Hyperlipidemia LDL goal <70     Smoker     Essential hypertension with goal blood pressure less than 140/90     Chronic atrial fibrillation (H)     Obstructive sleep apnea     Mild major depression (H)     Low back pain     Mixed hyperlipidemia     Essential hypertension, benign     Long-term (current) use of anticoagulants [Z79.01]     Anisocoria     Prediabetes     Chronic obstructive pulmonary disease, unspecified COPD type (H)     Morbid obesity (H)     Chronic recurrent major depressive disorder (H)     Bilateral leg edema     Insomnia, unspecified type     Type 2 diabetes mellitus without complication, without long-term current use of insulin (H)     Heart failure with preserved ejection fraction, NYHA class II (H)     Nonrheumatic aortic valve stenosis       There were no vitals filed for this visit.  Estimated body mass index is 47.84 kg/m  as calculated from the following:    Height as of 12/8/22: 1.803 m (5' 11\").    Weight as of 12/26/22: 155.6 kg (343 lb).       - - - - - - - - - - - - - - -  Options for treatment and/or follow-up care were reviewed with the patient. Markie was engaged and actively involved in the decision making process. Markie verbalized understanding of the options discussed and was satisfied with the final plan. Markie was given a copy of these recommendations and an up to date medication list in an after visit summary. This report was sent to Mali Patel.     Rufina Alves, PharmD, BCACP  Medication Management (MTM) Pharmacist  [facility]      - - - - - - - - - - - - - - -  Flowsheet completed.  # of medical conditions reviewed: 1  # of medications reviewed: 2  # of DTP identified: 1  Time spent: 20 minutes  Level of service:2      TELEPHONE VISIT DETAILS and Consent  Can you please confirm what state you are currently located in? " "[place]  \"If you are located in a state other than [place] we cannot proceed with your visit.  This is due to state licensing laws that do not allow your provider to practice in another state.  This is not a billing or insurance issue. Please let me know if you need prescriptions filled or have other immediate concerns and I will get that message to your care team. I can also have you speak to our  to make an appointment when you are back in the [place].      Markie Shepard is a 72 year old male who is being evaluated via a billable telephone visit.      The patient has been notified of following:     \"This telephone visit will be conducted via a call between you and your physician/provider. We have found that certain health care needs can be provided without the need for a physical exam.  This service lets us provide the care you need with a short phone conversation.  If a prescription is necessary we can send it directly to your pharmacy.  If lab work is needed we can place an order for that and you can then stop by our lab to have the test done at a later time.    Telephone visits are billed at different rates depending on your insurance coverage. During this emergency period, for some insurers they may be billed the same as an in-person visit.  Please reach out to your insurance provider with any questions.    If during the course of the call the physician/provider feels a telephone visit is not appropriate, you will not be charged for this service.\"    Patient has given verbal consent for Telephone visit?  Yes    Distant site (telephone provider location): [facility]  Appointment start time: 4:02 PM  Appointment end time: 4:21 PM  Phone call duration:  19 minutes        "

## 2023-01-05 NOTE — Clinical Note
Luis Miguel Stauffer,   Here is my note for the patient you'll be following up with next week. He's scheduled with you on Thursday at 2:20pm.  Thanks again! Rufina

## 2023-01-06 RX ORDER — FLUTICASONE FUROATE, UMECLIDINIUM BROMIDE AND VILANTEROL TRIFENATATE 200; 62.5; 25 UG/1; UG/1; UG/1
1 POWDER RESPIRATORY (INHALATION) DAILY
Qty: 60 EACH | Refills: 11 | Status: SHIPPED | OUTPATIENT
Start: 2023-01-06 | End: 2023-06-28

## 2023-01-09 ENCOUNTER — HOSPITAL ENCOUNTER (OUTPATIENT)
Dept: PHYSICAL THERAPY | Facility: CLINIC | Age: 73
Setting detail: THERAPIES SERIES
Discharge: HOME | End: 2023-01-09
Attending: INTERNAL MEDICINE
Payer: COMMERCIAL

## 2023-01-09 DIAGNOSIS — R60.0 BILATERAL LOWER EXTREMITY EDEMA: ICD-10-CM

## 2023-01-09 PROCEDURE — 97535 SELF CARE MNGMENT TRAINING: CPT | Mod: GP | Performed by: PHYSICAL THERAPIST

## 2023-01-09 PROCEDURE — 97140 MANUAL THERAPY 1/> REGIONS: CPT | Mod: GP | Performed by: PHYSICAL THERAPIST

## 2023-01-09 PROCEDURE — 97161 PT EVAL LOW COMPLEX 20 MIN: CPT | Mod: GP | Performed by: PHYSICAL THERAPIST

## 2023-01-09 NOTE — DISCHARGE INSTRUCTIONS
EdemaWear Stockinette  [email or web address]  Size Small ([place]), they come in pairs. You could do open or closed down. You will fold the edges so that it does not roll      CoreSpun by Therafirm Socks 15-20mmHg, XL, regular length  [email or web address]  [email or web address][employer]  [email or web address]      Amazon.com  Truform Large Size Sock Del    Ideally right now, I would recommend daytime compression. The Edemawear could but used over night as well just make sure in the morning there are no wrinkles.

## 2023-01-30 ENCOUNTER — LAB (OUTPATIENT)
Dept: LAB | Facility: CLINIC | Age: 73
End: 2023-01-30
Payer: COMMERCIAL

## 2023-01-30 ENCOUNTER — ANTICOAGULATION THERAPY VISIT (OUTPATIENT)
Dept: ANTICOAGULATION | Facility: CLINIC | Age: 73
End: 2023-01-30

## 2023-01-30 DIAGNOSIS — I48.20 CHRONIC ATRIAL FIBRILLATION (H): ICD-10-CM

## 2023-01-30 DIAGNOSIS — Z79.01 LONG TERM CURRENT USE OF ANTICOAGULANT THERAPY: ICD-10-CM

## 2023-01-30 DIAGNOSIS — Z79.01 LONG TERM CURRENT USE OF ANTICOAGULANT THERAPY: Primary | ICD-10-CM

## 2023-01-30 LAB — INR BLD: 2.5 (ref 0.9–1.1)

## 2023-01-30 PROCEDURE — 36416 COLLJ CAPILLARY BLOOD SPEC: CPT

## 2023-01-30 NOTE — PROGRESS NOTES
ANTICOAGULATION MANAGEMENT     Markie Shepard 72 year old male is on warfarin with therapeutic INR result. (Goal INR 2.0-3.0)    Recent labs: (last 7 days)     01/30/23  1111   INR 2.5*       ASSESSMENT       Source(s): Chart Review and Patient/Caregiver Call       Warfarin doses taken: Warfarin taken as instructed    Diet: No new diet changes identified    New illness, injury, or hospitalization: No    Medication/supplement changes: medication change from Breo to Trelegy-no drug drug interaction found with Warfarin    Signs or symptoms of bleeding or clotting: No    Previous INR: Therapeutic last 2(+) visits    Additional findings: None       PLAN     Recommended plan for no diet, medication or health factor changes affecting INR     Dosing Instructions: Continue your current warfarin dose with next INR in 6 weeks       Summary  As of 1/30/2023    Full warfarin instructions:  2.5 mg every Mon, Fri; 5 mg all other days   Next INR check:  3/13/2023             Telephone call with Markie who verbalizes understanding and agrees to plan and who agrees to plan and repeated back plan correctly    Lab visit scheduled    Education provided:     Interaction IS NOT anticipated between warfarin and Trelegy    Contact [phone] with any changes, questions or concerns.     Plan made per [facility] anticoagulation protocol    TONYA GARY RN  Anticoagulation Clinic  1/30/2023    _______________________________________________________________________     Anticoagulation Episode Summary     Current INR goal:  2.0-3.0   TTR:  100.0 % (1 y)   Target end date:  Indefinite   Send INR reminders to:  [facility]    Indications    Long-term (current) use of anticoagulants [Z79.01] [Z79.01]  Chronic atrial fibrillation (H) [I48.20]           Comments:  PREFERS PETER!         Anticoagulation Care Providers     Provider Role Specialty Phone number    Mali Patel MD Referring Internal Medicine [phone]

## 2023-03-22 ENCOUNTER — VIRTUAL VISIT (OUTPATIENT)
Dept: SLEEP MEDICINE | Facility: CLINIC | Age: 73
End: 2023-03-22
Payer: COMMERCIAL

## 2023-03-22 VITALS — WEIGHT: 315 LBS | HEIGHT: 71 IN | BODY MASS INDEX: 44.1 KG/M2

## 2023-03-22 DIAGNOSIS — E66.9 OBESITY WITH SLEEP APNEA: ICD-10-CM

## 2023-03-22 DIAGNOSIS — G47.33 OSA ON CPAP: Primary | ICD-10-CM

## 2023-03-22 DIAGNOSIS — G47.30 OBESITY WITH SLEEP APNEA: ICD-10-CM

## 2023-03-22 PROCEDURE — 99214 OFFICE O/P EST MOD 30 MIN: CPT | Mod: 95 | Performed by: INTERNAL MEDICINE

## 2023-03-22 ASSESSMENT — SLEEP AND FATIGUE QUESTIONNAIRES
HOW LIKELY ARE YOU TO NOD OFF OR FALL ASLEEP WHILE SITTING AND READING: WOULD NEVER DOZE
HOW LIKELY ARE YOU TO NOD OFF OR FALL ASLEEP IN A CAR, WHILE STOPPED FOR A FEW MINUTES IN TRAFFIC: WOULD NEVER DOZE
HOW LIKELY ARE YOU TO NOD OFF OR FALL ASLEEP WHEN YOU ARE A PASSENGER IN A CAR FOR AN HOUR WITHOUT A BREAK: WOULD NEVER DOZE
HOW LIKELY ARE YOU TO NOD OFF OR FALL ASLEEP WHILE SITTING QUIETLY AFTER LUNCH WITHOUT ALCOHOL: WOULD NEVER DOZE
HOW LIKELY ARE YOU TO NOD OFF OR FALL ASLEEP WHILE SITTING AND TALKING TO SOMEONE: WOULD NEVER DOZE
HOW LIKELY ARE YOU TO NOD OFF OR FALL ASLEEP WHILE WATCHING TV: WOULD NEVER DOZE
HOW LIKELY ARE YOU TO NOD OFF OR FALL ASLEEP WHILE LYING DOWN TO REST IN THE AFTERNOON WHEN CIRCUMSTANCES PERMIT: WOULD NEVER DOZE
HOW LIKELY ARE YOU TO NOD OFF OR FALL ASLEEP WHILE SITTING INACTIVE IN A PUBLIC PLACE: WOULD NEVER DOZE

## 2023-03-22 NOTE — PROGRESS NOTES
Virtual Visit Details    Type of service:  Telephone Visit   Telephone visit start time: 2:06 PM  Telephone visit end time: 2:20 PM  Phone call duration: 14 minutes           [facility]  Sleep clinic follow-up visit note      Date on this visit: 3/22/23     Chief complaint:  Follow-up of EMORY and review CPAP compliance measures    Markie Shepard is a 73 year old male who presents to sleep clinic via telephone visit today for follow-up of EMORY and to review review CPAP compliance measures.  He  was set up at [place] on August 11, 2020. Patient received a Resmed AirSense 10 Auto. Pressures were set at 9 cm H2O.    He reports using the CPAP regularly during sleep.  He uses a fullface mask and reports that he has been replacing the supplies regularly.  He tries to secure the mask tight  to the best of his ability, but if  it is too tight it gets uncomfortable causing marks on his forehead.  He sleeps alone, hence there is not anyone to report whether or not he snores with the CPAP device.   He denies awakenings due to  gasping for air while using the CPAP device.    He reports feeling comfortable with the current pressure settings on the device.      He goes to bed shortly at 1 AM, falls asleep in 5 minutes. He wakes up at 4:15AM to use bathroom, goes back to bed and falls back asleep with the mask on and wakes up between 830-9AM. He reports waking up rested most mornings. He has been exercising regularly.  He doesn't drive since he is legally blind.     Previous sleep study reports:  PSG#1 diagnostic sleep study(September 18, 2012), He underwent an overnight diagnostic sleep study on September 18, 2012, at [facility].  At that time he weighed 308 pounds and was noted to have an AHI of 8.4/h and during that sleep study REM sleep was not observed.     PSG#2 all-night titration study(Oct 2012)He underwent a repeat sleep study in  October 2012 which was an all-night  "titration study using CPAP device and was prescribed CPAP at pressure setting of 9 cm of water.     CPAP Compliance download:  (From 2/12/2023 through 3/13/2023)         ResMed   CPAP 9.0 cmH2O 30 day usage data  100 % of days with > 4 hours of use. 0/30 days with no use.   Average use 7 hours and 23 minutes per day.   95%ile Leak 114.6 L/min.   Residual AHI 2.9 events per hour.      Past medical/surgical history, family history, social history, medications and allergies were reviewed.            Physical Examination:    Per EMR:Ht 1.803 m (5' 11\")   Wt (!) 157.9 kg (348 lb)   BMI 48.54 kg/m    General: Pleasant. Cooperative. In no apparent distress.  Pulmonary: Able to speak in full sentences easily. No cough or wheeze.   Neurologic: Alert, oriented x3.  Psychiatric: Mood euthymic. Affect congruent with full range and intensity.             Assessment and Plan:    Obstructive sleep apnea: Patient reports adequate compliance with CPAP treatment and reports positive benefits with device use.  Based on compliance data, EMORY is well controlled with CPAP at the current pressure settings.   Prescription was provided for renewal of CPAP supplies.   Interface concerns: Air leaks.  Patient was instructed to follow-up with [facility] medical [facility] provider to obtain help with mask fit optimization-either trying a different type of a fullface mask /possible addition of chinstrap.   Recommended to continue using CPAP regularly during sleep and instructed to get the supplies for the device replaced regularly. Patient was instructed  to bring CPAP with him if hospitalized and if anticipating procedure that requires sedation/surgery to be sure to discuss with the provider/surgeon that he has sleep apnea and uses CPAP therapy.    Obesity: We discussed weight management with diet and exercise.     Plan is to follow-up at sleep clinic in 1 year or sooner if any concerns.    The above note was dictated using voice recognition " "software. Although reviewed after completion, some word and grammatical error may remain . Please contact the author for any clarifications.      \" Total time spent was 30 minutes  for this appointment on this date of service which include time spent before, during and after the visit for chart review, patient care, counseling and coordination of care including documentation .\"       Val Jim MD  [facility]  [address] , [place] [address]     "

## 2023-03-22 NOTE — NURSING NOTE
Flu vaccine 10/10/2022    Is the patient currently in the state of [place]? YES    Visit mode:TELEPHONE    If the visit is dropped, the patient can be reconnected by: TELEPHONE VISIT: Phone number:     Will anyone else be joining the visit? NO      How would you like to obtain your AVS? Mail a copy    Are changes needed to the allergy or medication list? NO    Reason for visit: Follow Up    Marina Mcdonald

## 2023-03-27 ENCOUNTER — ANTICOAGULATION THERAPY VISIT (OUTPATIENT)
Dept: ANTICOAGULATION | Facility: CLINIC | Age: 73
End: 2023-03-27

## 2023-03-27 ENCOUNTER — LAB (OUTPATIENT)
Dept: LAB | Facility: CLINIC | Age: 73
End: 2023-03-27
Payer: COMMERCIAL

## 2023-03-27 DIAGNOSIS — I48.20 CHRONIC ATRIAL FIBRILLATION (H): ICD-10-CM

## 2023-03-27 DIAGNOSIS — Z79.01 LONG TERM CURRENT USE OF ANTICOAGULANT THERAPY: Primary | ICD-10-CM

## 2023-03-27 DIAGNOSIS — Z79.01 LONG TERM CURRENT USE OF ANTICOAGULANT THERAPY: ICD-10-CM

## 2023-03-27 LAB — INR BLD: 1.9 (ref 0.9–1.1)

## 2023-03-27 PROCEDURE — 36415 COLL VENOUS BLD VENIPUNCTURE: CPT

## 2023-03-27 NOTE — PROGRESS NOTES
ANTICOAGULATION MANAGEMENT     Markie Shepard 73 year old male is on warfarin with subtherapeutic INR result. (Goal INR 2.0-3.0)    Recent labs: (last 7 days)     03/27/23  1111   INR 1.9*       ASSESSMENT       Source(s): Chart Review and Patient/Caregiver Call       Warfarin doses taken: Warfarin taken as instructed    Diet: Increased greens/vitamin K in diet; plans to resume previous intake    New illness, injury, or hospitalization: No    Medication/supplement changes: None noted    Signs or symptoms of bleeding or clotting: No    Previous INR: Therapeutic last 2(+) visits    Additional findings: None         PLAN     Recommended plan for temporary change(s) affecting INR     Dosing Instructions: Continue your current warfarin dose with next INR in 2 weeks  - pt requested 3 weeks due to scheduling conflict      Summary  As of 3/27/2023    Full warfarin instructions:  2.5 mg every Mon, Fri; 5 mg all other days   Next INR check:  4/17/2023             Telephone call with Markie who verbalizes understanding and agrees to plan    Lab visit scheduled    Education provided:     Goal range and lab monitoring: goal range and significance of current result and Importance of therapeutic range    Dietary considerations: importance of consistent vitamin K intake    Plan made per [facility] anticoagulation protocol    Gunnar Brody RN  Anticoagulation Clinic  3/27/2023    _______________________________________________________________________     Anticoagulation Episode Summary     Current INR goal:  2.0-3.0   TTR:  97.4 % (1 y)   Target end date:  Indefinite   Send INR reminders to:  [facility]    Indications    Long-term (current) use of anticoagulants [Z79.01] [Z79.01]  Chronic atrial fibrillation (H) [I48.20]           Comments:  PREFERS PETER!         Anticoagulation Care Providers     Provider Role Specialty Phone number    Mali Patel MD Referring Internal Medicine [phone]

## 2023-03-29 DIAGNOSIS — J32.9 SINUSITIS, UNSPECIFIED CHRONICITY, UNSPECIFIED LOCATION: ICD-10-CM

## 2023-03-29 RX ORDER — FLUTICASONE PROPIONATE 50 MCG
1 SPRAY, SUSPENSION (ML) NASAL DAILY
Qty: 9.9 ML | Refills: 0 | Status: SHIPPED | OUTPATIENT
Start: 2023-03-29 | End: 2023-06-29

## 2023-03-29 NOTE — TELEPHONE ENCOUNTER
Medication requested: fluticasone (FLONASE) 50 MCG/ACT nasal spray  Last office visit: 12/26/22  [facility] appointments: none  Medication last refilled: 12/1/22; 9.9 mL + 0 refill  Last qualifying labs: N/A    Prescription approved per [facility] Refill Protocol.    Henry HORAN, RN  03/29/23 12:54 PM

## 2023-04-10 NOTE — NURSING NOTE
1 year appointment reminder message was created and will be sent out 2 - 3 months prior to next appointment due date. Via staff message.     AVS mailed out.

## 2023-04-14 NOTE — PROGRESS NOTES
/                                                                           [facility] Service    Outpatient Physical Therapy Discharge Note  Patient: Markie Shepard  : 1950    Beginning/End Dates of Reporting Period:  2023    Referring Provider: Dr. 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 Pt more concerned with color of legs    Objective Measurements:  Objective Measure: ankle/calf  Details: R 28.1, 42; L 26.7,41.1     Outcome Measures (most recent score):  Lymphedema Life Impact Scale (score range 0-72). A higher score indicates greater impairment.:  (not taken as pt not likely to return and reports no interfence/symptoms of swelling)    Goals:  Goal Identifier Risk Reduction   Goal Description Pt will recognize 3 things that he can do to best manage chronic dependent edema.   Target Date 23   Date Met  23   Progress (detail required for progress note): Pt librado add drinking more water, be sure to elevate, and watch his salt     Goal Identifier Management   Goal Description Pt will report improved understanding of how to find the right compression and tools that are available to help with donning for best management of edema   Target Date 23   Date Met  23   Progress (detail required for progress note): resources provided to dia     Goal Identifier Compression   Goal Description Pt will obtain well fitting compression and use when he is going out of the home and unable to elevate his legs for periods longer than 4 hours to prevent wound.   Target Date 23   Date Met      Progress (detail required for progress note):       Plan:  Discharge from therapy.    Discharge:    Reason for Discharge: 2/3 goals met at [facility], pt was only going to return if problems, final goal not assessed    Equipment Issued: none, just home management handout    Discharge Plan: Patient to continue home program.

## 2023-04-17 ENCOUNTER — ANTICOAGULATION THERAPY VISIT (OUTPATIENT)
Dept: ANTICOAGULATION | Facility: CLINIC | Age: 73
End: 2023-04-17

## 2023-04-17 ENCOUNTER — LAB (OUTPATIENT)
Dept: LAB | Facility: CLINIC | Age: 73
End: 2023-04-17
Payer: COMMERCIAL

## 2023-04-17 ENCOUNTER — TELEPHONE (OUTPATIENT)
Dept: FAMILY MEDICINE | Facility: CLINIC | Age: 73
End: 2023-04-17

## 2023-04-17 DIAGNOSIS — Z79.01 LONG TERM CURRENT USE OF ANTICOAGULANT THERAPY: Primary | ICD-10-CM

## 2023-04-17 DIAGNOSIS — I48.20 CHRONIC ATRIAL FIBRILLATION (H): ICD-10-CM

## 2023-04-17 DIAGNOSIS — Z79.01 LONG TERM CURRENT USE OF ANTICOAGULANT THERAPY: ICD-10-CM

## 2023-04-17 LAB — INR BLD: 2.2 (ref 0.9–1.1)

## 2023-04-17 PROCEDURE — 36415 COLL VENOUS BLD VENIPUNCTURE: CPT

## 2023-04-17 NOTE — PROGRESS NOTES
ANTICOAGULATION MANAGEMENT     Markie Shepard 73 year old male is on warfarin with therapeutic INR result. (Goal INR 2.0-3.0)    Recent labs: (last 7 days)     04/17/23  1145   INR 2.2*       ASSESSMENT       Source(s): Chart Review    Previous INR was Subtherapeutic    Medication, diet, health changes since last INR chart reviewed; none identified             PLAN     Recommended plan for no diet, medication or health factor changes affecting INR     Dosing Instructions: Continue your current warfarin dose with next INR in 7 weeks       Summary  As of 4/17/2023    Full warfarin instructions:  2.5 mg every Mon, Fri; 5 mg all other days   Next INR check:  6/5/2023             Detailed voice message left for Markie with dosing instructions and follow up date.     Contact [phone] to schedule and with any changes, questions or concerns.     Education provided:     Please call back if any changes to your diet, medications or how you've been taking warfarin    Contact [phone] with any changes, questions or concerns.     Plan made per [facility] anticoagulation protocol    Melissa Zee RN  Anticoagulation Clinic  4/17/2023    _______________________________________________________________________     Anticoagulation Episode Summary     Current INR goal:  2.0-3.0   TTR:  95.5 % (1 y)   Target end date:  Indefinite   Send INR reminders to:  [facility]    Indications    Long-term (current) use of anticoagulants [Z79.01] [Z79.01]  Chronic atrial fibrillation (H) [I48.20]           Comments:  PREFERS PETER!         Anticoagulation Care Providers     Provider Role Specialty Phone number    Mali Patel MD Referring Internal Medicine [phone]

## 2023-05-04 DIAGNOSIS — I50.33 ACUTE ON CHRONIC HEART FAILURE WITH PRESERVED EJECTION FRACTION (H): ICD-10-CM

## 2023-05-04 RX ORDER — TORSEMIDE 20 MG/1
TABLET ORAL
Qty: 360 TABLET | Refills: 2 | Status: SHIPPED | OUTPATIENT
Start: 2023-05-04 | End: 2024-02-21

## 2023-05-23 DIAGNOSIS — E78.5 DYSLIPIDEMIA: ICD-10-CM

## 2023-05-23 RX ORDER — ROSUVASTATIN CALCIUM 40 MG/1
40 TABLET, COATED ORAL AT BEDTIME
Qty: 90 TABLET | Refills: 1 | Status: SHIPPED | OUTPATIENT
Start: 2023-05-23 | End: 2023-11-14

## 2023-05-30 ENCOUNTER — OFFICE VISIT (OUTPATIENT)
Dept: CARDIOLOGY | Facility: CLINIC | Age: 73
End: 2023-05-30
Attending: INTERNAL MEDICINE
Payer: COMMERCIAL

## 2023-05-30 VITALS
HEART RATE: 79 BPM | HEIGHT: 71 IN | BODY MASS INDEX: 44.1 KG/M2 | DIASTOLIC BLOOD PRESSURE: 81 MMHG | SYSTOLIC BLOOD PRESSURE: 127 MMHG | WEIGHT: 315 LBS

## 2023-05-30 DIAGNOSIS — I50.30 HEART FAILURE WITH PRESERVED EJECTION FRACTION, NYHA CLASS II (H): Primary | ICD-10-CM

## 2023-05-30 DIAGNOSIS — I25.10 CORONARY ARTERY DISEASE INVOLVING NATIVE CORONARY ARTERY OF NATIVE HEART WITHOUT ANGINA PECTORIS: ICD-10-CM

## 2023-05-30 DIAGNOSIS — I35.0 NONRHEUMATIC AORTIC VALVE STENOSIS: ICD-10-CM

## 2023-05-30 DIAGNOSIS — E66.01 MORBID OBESITY (H): ICD-10-CM

## 2023-05-30 DIAGNOSIS — G47.33 OBSTRUCTIVE SLEEP APNEA: ICD-10-CM

## 2023-05-30 DIAGNOSIS — I48.20 CHRONIC ATRIAL FIBRILLATION (H): ICD-10-CM

## 2023-05-30 DIAGNOSIS — E78.2 MIXED HYPERLIPIDEMIA: ICD-10-CM

## 2023-05-30 DIAGNOSIS — I10 ESSENTIAL HYPERTENSION WITH GOAL BLOOD PRESSURE LESS THAN 140/90: ICD-10-CM

## 2023-05-30 PROCEDURE — 99214 OFFICE O/P EST MOD 30 MIN: CPT | Performed by: NURSE PRACTITIONER

## 2023-05-30 PROCEDURE — 93000 ELECTROCARDIOGRAM COMPLETE: CPT | Performed by: NURSE PRACTITIONER

## 2023-05-30 RX ORDER — TIOTROPIUM BROMIDE INHALATION SPRAY 3.12 UG/1
2 SPRAY, METERED RESPIRATORY (INHALATION) DAILY
COMMUNITY
Start: 2023-01-02 | End: 2023-07-03 | Stop reason: ALTCHOICE

## 2023-05-30 RX ORDER — ATENOLOL 100 MG/1
100 TABLET ORAL DAILY
Qty: 90 TABLET | Refills: 3 | Status: SHIPPED | OUTPATIENT
Start: 2023-05-30 | End: 2023-11-03

## 2023-05-30 RX ORDER — ATENOLOL 50 MG/1
50 TABLET ORAL DAILY
Qty: 90 TABLET | Refills: 3 | Status: SHIPPED | OUTPATIENT
Start: 2023-05-30 | End: 2023-11-03

## 2023-05-30 RX ORDER — FLUTICASONE FUROATE AND VILANTEROL 100; 25 UG/1; UG/1
1 POWDER RESPIRATORY (INHALATION) DAILY
COMMUNITY
End: 2023-07-03 | Stop reason: ALTCHOICE

## 2023-05-30 RX ORDER — LOSARTAN POTASSIUM 25 MG/1
75 TABLET ORAL DAILY
Qty: 270 TABLET | Refills: 3 | Status: SHIPPED | OUTPATIENT
Start: 2023-05-30 | End: 2023-11-03

## 2023-05-30 NOTE — PROGRESS NOTES
Cardiology Clinic Progress Note  Markie Shepard MRN# [id number]   YOB: 1950 Age: 73 year old   Primary Cardiologist: Dr. Lockett Reason for visit: Cardiology follow up            Assessment and Plan:   Markie Shepard is a very pleasant 73 year old male here for cardiology follow up.     1. HFpEF - chronic, stable, euvolemic   - Torsemide 40mg BID   - No noted heart failure admissions, if issues with heart failure in the future consider SGLT2i  2. Coronary artery disease - with known RCA , medically managed   - Continue BB and statin   - No aspirin given oral anticoagulation  3. Permanent atrial fibrillation - on warfarin for thromboembolic prophylaxis  4. EMORY - compliant with CPAP  5. Hypertension   - Continue atenolol and losartan  6. Chronic venous stasis  7. Morbid obesity  8. Vision impairment - legally blind  9. Mild aortic stenosis  10. COPD    I met patient for the first time today for cardiology follow up. He is doing well from a cardiac standpoint, compensated/euvolemic from a heart failure standpoint. NYHA class II. No anginal symptoms. At this time recommend continuing current medications.     Changes today: none    Follow up plan:     Follow up with Dr. Lockett in 6 months with echo and labs prior        History of Presenting Illness:    Markie Shepard is a very pleasant 73 year old male with a history of coronary artery disease, permanent atrial fibrillation (on warfarin), sleep apnea, HFpEF, legal blindness, obesity, prior tobacco use, COPD and chronic venous stasis.     He underwent a Lexiscan 6/27/2012 which showed inferior  infarct with moderate ischemia.  Subsequent coronary angiography demonstrated  of the RCA, with left-to-right collaterals. This was medically managed since he was asymptomatic, and had been doing well, the decision was made to not pursue high risk  PCI.    He has had some diuretic adjustments due to heart failure symptoms in the past.     Most recent echo 6/2021  showed normal LV size, LVEF 55%, RV not well visualized, mild aortic stenosis (mean 12mmHg, Vmax 2.4m/s)    He was most recently seen by Dr. Lockett in December 2022 at which time he was doing well, euvolemic, no anginal symptoms. No medication changes were made. Recommended KRYSTEN testing (normal results) and lymphedema referral.     Patient is here today for cardiology follow up.     Patient reports feeling good. Not monitoring weight at home. Notes he went to lymphedema clinic, tried a few various compression stockings but unable to tolerate. Overall reports swelling controlled, now has some light weight compression stockings that he wears. Denies shortness of breath at rest. Denies exertional dyspnea. Denies orthopnea or PND. Compliant with CPAP. Denies chest pain or chest tightness. Denies dizziness, lightheadedness or other presyncopal symptoms. Denies tachycardia or palpitations. Denies episodes of bleeding. Taking medications daily.     No recent labs. Blood pressure 127/81 and HR 79 in clinic today. EKG showed atrial fibrillation.      Appetite good. Breakfast banana/breakfast bar, sandwich and spinach salad for dinner, occasional microwave meal. Notes sweets are his weakness. Does a 20 min strength exercises in apartment 5 days a week.  Denies alcohol use. Denies tobacco use.         Social History    Living independently in an apartment.   Social History     Socioeconomic History     Marital status:      Spouse name: Not on file     Number of children: 2     Years of education: 19     Highest education level: Not on file   Occupational History     Occupation: actor     Employer: UNEMPLOYED     Employer: [employer]     Occupation:    Tobacco Use     Smoking status: Former     Packs/day: 1.00     Years: 53.00     Pack years: 53.00     Types: Cigarettes     Quit date: 9/29/2019     Years since quitting: 3.6     Smokeless tobacco: Never     Tobacco comments:     1 ppd   Vaping Use      Vaping status: Never Used   Substance and Sexual Activity     Alcohol use: No     Comment: quit drinking heavily 2013     Drug use: No     Sexual activity: Never   Other Topics Concern     Parent/sibling w/ CABG, MI or angioplasty before 65F 55M? Not Asked      Service Not Asked     Blood Transfusions Not Asked     Caffeine Concern Yes     Occupational Exposure Not Asked     Hobby Hazards Not Asked     Sleep Concern Not Asked     Stress Concern Not Asked     Weight Concern Not Asked     Special Diet No     Back Care Not Asked     Exercise Not Asked     Comment: 1 miles on stepper      Bike Helmet Not Asked     Seat Belt Not Asked     Self-Exams Not Asked   Social History Narrative    Balanced Diet - Yes    Osteoporosis Preventative measures-  Dairy servings per day: 1-3    Regular Exercise -  Yes Describe walk every day    Dental Exam up - YES - Date: 1/2/07    Eye Exam - YES - Date: 2 yrs    Self Testicular Exam -  One testicle removed yrs ago due to fallDo you have any concerns about STD's -  No    Abuse: Current or Past (Physical, Sexual or Emotional)- No    Do you feel safe in your environment - Yes    Guns stored in the home - No    Sunscreen used - Yes    Seatbelts used - Yes    Lipids - ?    Glucose -  ?    Colon Cancer Screening - No    Hemoccults - hemorrhoids    PSA - NO    Digital Rectal Exam - YES - Date: 1993    Immunizations reviewed and up to date - No    Tory NAILS     Social Determinants of Health     Financial Resource Strain: Not on file   Food Insecurity: Not on file   Transportation Needs: Not on file   Physical Activity: Not on file   Stress: Not on file   Social Connections: Not on file   Intimate Partner Violence: Not on file   Housing Stability: Not on file          Review of Systems:    ROS: 10 point ROS neg other than the symptoms noted above in the HPI.         Physical Exam:   Vitals: There were no vitals taken for this visit.   Wt Readings from Last 4 Encounters:   03/22/23  (!) 157.9 kg (348 lb)   12/26/22 (!) 155.6 kg (343 lb)   12/08/22 (!) 157.8 kg (347 lb 14.4 oz)   02/04/22 (!) 168.3 kg (371 lb)     GEN: well nourished, in no acute distress.  HEENT:  Pupils equal, round. Sclerae nonicteric.   NECK: Supple, no masses appreciated. JVP hard to assess due to body habitus  C/V:  Regular rate and rhythm, distant heart sounds  RESP: Respirations are unlabored. Clear to auscultation bilaterally without wheezing, rales, or rhonchi.  GI: Abdomen soft, obese, nontender.  EXTREM: No LE edema.  NEURO: Alert and oriented, cooperative.  SKIN: Warm and dry       Data:     LIPID RESULTS:  Lab Results   Component Value Date    CHOL 118 10/25/2021    CHOL 146 04/12/2021    HDL 52 10/25/2021    HDL 54 04/12/2021    LDL 44 10/25/2021    LDL 73 04/12/2021    TRIG 108 10/25/2021    TRIG 98 04/12/2021    CHOLHDLRATIO 2.8 02/17/2020     LIVER ENZYME RESULTS:  Lab Results   Component Value Date    AST 23 04/12/2021    ALT 30 10/25/2021    ALT 29 04/12/2021     CBC RESULTS:  Lab Results   Component Value Date    WBC 8.5 12/12/2022    WBC 8.3 04/12/2021    RBC 5.04 12/12/2022    RBC 5.41 04/12/2021    HGB 16.2 12/12/2022    HGB 17.8 (H) 04/12/2021    HCT 49.4 12/12/2022    HCT 53.2 (H) 04/12/2021    MCV 98 12/12/2022    MCV 98 04/12/2021    MCH 32.1 12/12/2022    MCH 32.9 04/12/2021    MCHC 32.8 12/12/2022    MCHC 33.5 04/12/2021    RDW 13.2 12/12/2022    RDW 12.9 04/12/2021     12/12/2022     04/12/2021     BMP RESULTS:  Lab Results   Component Value Date     12/12/2022     06/28/2021    POTASSIUM 3.9 12/12/2022    POTASSIUM 3.9 02/21/2022    POTASSIUM 4.0 06/28/2021    CHLORIDE 104 12/12/2022    CHLORIDE 105 02/21/2022    CHLORIDE 104 06/28/2021    CO2 26 12/12/2022    CO2 26 02/21/2022    CO2 26 06/28/2021    ANIONGAP 11 12/12/2022    ANIONGAP 8 02/21/2022    ANIONGAP 7 06/28/2021     (H) 12/12/2022     (H) 02/21/2022     (H) 06/28/2021    BUN 27.6 (H)  12/12/2022    BUN 27 02/21/2022    BUN 17 06/28/2021    CR 1.16 12/12/2022    CR 0.91 06/28/2021    GFRESTIMATED 67 12/12/2022    GFRESTIMATED 85 06/28/2021    GFRESTBLACK >90 06/28/2021    VICTOR M 9.7 12/12/2022    VICTOR M 9.4 06/28/2021      A1C RESULTS:  Lab Results   Component Value Date    A1C 6.4 (H) 12/26/2022    A1C 6.4 (H) 04/12/2021     INR RESULTS:  Lab Results   Component Value Date    INR 2.2 (H) 04/17/2023    INR 1.9 (H) 03/27/2023    INR 2.46 (H) 07/30/2021    INR 2.90 (H) 06/14/2021    INR 2.70 (H) 05/03/2021          Medications     Current Outpatient Medications   Medication Sig Dispense Refill     fluticasone (FLONASE) 50 MCG/ACT nasal spray Spray 1 spray into both nostrils daily 9.9 mL 0     acetaminophen (TYLENOL) 500 MG tablet Take 500-1,000 mg by mouth every 6 hours as needed for mild pain       albuterol (PROAIR HFA/PROVENTIL HFA/VENTOLIN HFA) 108 (90 Base) MCG/ACT inhaler Inhale 2 puffs into the lungs every 4 hours as needed for shortness of breath or wheezing 18 g 11     atenolol (TENORMIN) 100 MG tablet Take 1 tablet (100 mg) by mouth daily along with 50 mg tablet for total of 150 mg daily. 90 tablet 0     atenolol (TENORMIN) 50 MG tablet Take 1 tablet (50 mg) by mouth daily Along with 100 mg tablet for total of 150 mg daily. 90 tablet 1     FLUoxetine (PROZAC) 40 MG capsule Take 1 capsule (40 mg) by mouth daily 90 capsule 3     Fluticasone-Umeclidin-Vilanterol (TRELEGY ELLIPTA) 200-62.5-25 MCG/ACT oral inhaler Inhale 1 puff into the lungs daily 60 each 11     losartan (COZAAR) 25 MG tablet Take 3 tablets (75 mg) by mouth daily 270 tablet 1     multivitamin w/minerals (THERA-VIT-M) tablet Take 1 tablet by mouth daily Century 55+       omega-3 fatty acids 1200 MG capsule Take 1 capsule by mouth daily       oxymetazoline (AFRIN) 0.05 % nasal spray Spray 2 sprays into both nostrils At Bedtime       potassium chloride ER (K-TAB) 20 MEQ CR tablet Take 1 tablet (20 mEq) by mouth 2 times daily 180 tablet  1     rosuvastatin (CRESTOR) 40 MG tablet Take 1 tablet (40 mg) by mouth At Bedtime 90 tablet 1     torsemide (DEMADEX) 20 MG tablet Take 2 tablets (40 mg) by mouth every morning AND 2 tablets (40 mg) daily (with lunch). 360 tablet 2     traZODone (DESYREL) 100 MG tablet Take 1 tablet (100 mg) by mouth nightly as needed for sleep 90 tablet 1     warfarin ANTICOAGULANT (COUMADIN ANTICOAGULANT) 5 MG tablet Take 2.5 mg on Mondays and Fridays, and 5 mg all other days, or as directed by Coumadin nurse. 100 tablet 3        Past Medical History     Past Medical History:   Diagnosis Date     Adjustment disorder with depressed mood 06/17/2012     ALCOHOL ABUSE-UNSPEC 10/05/2007     Atrial fibrillation (H)      Chorioretinitis of both eyes 02/05/2012    Right eye worse than left. On Cellcept, steroid taper     Coronary atherosclerosis of unspecified type of vessel, native or graft      Diabetes mellitus type 2 in obese (H)      Essential hypertension, benign      Heart failure with preserved ejection fraction, NYHA class II (H)      Mixed hyperlipidemia 10/05/2007     Nonrheumatic aortic valve stenosis      Polycythemia      Sleep apnea      Tobacco use disorder 10/05/2007    Smoker - 1/2 ppd. Started at 16 years     Total retinal detachment of left eye 2009/2010     Past Surgical History:   Procedure Laterality Date     CARDIAC CATHERIZATION  07/03/2012    totally occluded RCA w/ mild left coronary disease     COLONOSCOPY  2016    repeat 2026     HEART CATH CORONARY ANGIOGRAM AND RIGHT HEART CATH  07/01/2012    RCA occlusion, IMI, no stent     PHACOEMULSIFICATION CLEAR CORNEA WITH STANDARD INTRAOCULAR LENS IMPLANT Right 06/04/2015    Procedure: PHACOEMULSIFICATION CLEAR CORNEA WITH STANDARD INTRAOCULAR LENS IMPLANT;  Surgeon: Tal Trinidad MD;  Location:  [facility]     RETINAL REATTACHMENT  11/09, 7/10    Left     TONSILLECTOMY       [facility] NONSPECIFIC PROCEDURE  01/01/1987    Right Epididymis Removed     [facility] OPEN RX ANKLE  DISLOCATN+FIXATN  12/01/2008    Right Ankle     Family History   Problem Relation Age of Onset     Arthritis Mother      Circulatory Mother         varicose veins     Eye Disorder Mother         detached retina     Thyroid Disease Mother      C.A.D. Father         Quadruple bypass     Gastrointestinal Disease Father         diverticulitis     Alzheimer Disease Father         demntia     Eye Disorder Father         cataract     Depression Sister         Post Partum      Alcoholism Sister      Neurologic Disorder Sister         Brain Aneurysm     Thyroid Disease Brother      Eye Disorder Brother         detached retina            Allergies   Lisinopril, Seasonal allergies, and Zolpidem tartrate    30 minutes spent on the date of the encounter doing chart review, history and exam, documentation and further activities as noted above      JOSE [facility]  Pager: [phone]

## 2023-05-30 NOTE — PATIENT INSTRUCTIONS
No medication changes  Follow up with Dr. Lockett in 6 months with echo and labs prior  Please call with any questions/concerns [phone]

## 2023-05-30 NOTE — LETTER
5/30/2023    Mali Patel MD  [address] [facility]  [facility] [address]    RE: Markie Shepard       Dear Colleague,     I had the pleasure of seeing Markie Shepard in the [facility].  Cardiology Clinic Progress Note  Markie Shepard MRN# [id number]   YOB: 1950 Age: 73 year old   Primary Cardiologist: Dr. Lockett Reason for visit: Cardiology follow up            Assessment and Plan:   Markie Shepard is a very pleasant 73 year old male here for cardiology follow up.     1. HFpEF - chronic, stable, euvolemic   - Torsemide 40mg BID   - No noted heart failure admissions, if issues with heart failure in the future consider SGLT2i  2. Coronary artery disease - with known RCA , medically managed   - Continue BB and statin   - No aspirin given oral anticoagulation  3. Permanent atrial fibrillation - on warfarin for thromboembolic prophylaxis  4. EMORY - compliant with CPAP  5. Hypertension   - Continue atenolol and losartan  6. Chronic venous stasis  7. Morbid obesity  8. Vision impairment - legally blind  9. Mild aortic stenosis  10. COPD    I met patient for the first time today for cardiology follow up. He is doing well from a cardiac standpoint, compensated/euvolemic from a heart failure standpoint. NYHA class II. No anginal symptoms. At this time recommend continuing current medications.     Changes today: none    Follow up plan:     Follow up with Dr. Lockett in 6 months with echo and labs prior        History of Presenting Illness:    Markie Shepard is a very pleasant 73 year old male with a history of coronary artery disease, permanent atrial fibrillation (on warfarin), sleep apnea, HFpEF, legal blindness, obesity, prior tobacco use, COPD and chronic venous stasis.     He underwent a Lexiscan 6/27/2012 which showed inferior  infarct with moderate ischemia.  Subsequent coronary angiography demonstrated  of the RCA, with left-to-right collaterals. This was medically managed  since he was asymptomatic, and had been doing well, the decision was made to not pursue high risk  PCI.    He has had some diuretic adjustments due to heart failure symptoms in the past.     Most recent echo 6/2021 showed normal LV size, LVEF 55%, RV not well visualized, mild aortic stenosis (mean 12mmHg, Vmax 2.4m/s)    He was most recently seen by Dr. Lockett in December 2022 at which time he was doing well, euvolemic, no anginal symptoms. No medication changes were made. Recommended KRYSTEN testing (normal results) and lymphedema referral.     Patient is here today for cardiology follow up.     Patient reports feeling good. Not monitoring weight at home. Notes he went to lymphedema clinic, tried a few various compression stockings but unable to tolerate. Overall reports swelling controlled, now has some light weight compression stockings that he wears. Denies shortness of breath at rest. Denies exertional dyspnea. Denies orthopnea or PND. Compliant with CPAP. Denies chest pain or chest tightness. Denies dizziness, lightheadedness or other presyncopal symptoms. Denies tachycardia or palpitations. Denies episodes of bleeding. Taking medications daily.     No recent labs. Blood pressure 127/81 and HR 79 in clinic today. EKG showed atrial fibrillation.      Appetite good. Breakfast banana/breakfast bar, sandwich and spinach salad for dinner, occasional microwave meal. Notes sweets are his weakness. Does a 20 min strength exercises in apartment 5 days a week.  Denies alcohol use. Denies tobacco use.         Social History    Living independently in an apartment.   Social History     Socioeconomic History    Marital status:      Spouse name: Not on file    Number of children: 2    Years of education: 19    Highest education level: Not on file   Occupational History    Occupation: actor     Employer: UNEMPLOYED     Employer: [employer]    Occupation:    Tobacco Use    Smoking status: Former      Packs/day: 1.00     Years: 53.00     Pack years: 53.00     Types: Cigarettes     Quit date: 9/29/2019     Years since quitting: 3.6    Smokeless tobacco: Never    Tobacco comments:     1 ppd   Vaping Use    Vaping status: Never Used   Substance and Sexual Activity    Alcohol use: No     Comment: quit drinking heavily 2013    Drug use: No    Sexual activity: Never   Other Topics Concern    Parent/sibling w/ CABG, MI or angioplasty before 65F 55M? Not Asked     Service Not Asked    Blood Transfusions Not Asked    Caffeine Concern Yes    Occupational Exposure Not Asked    Hobby Hazards Not Asked    Sleep Concern Not Asked    Stress Concern Not Asked    Weight Concern Not Asked    Special Diet No    Back Care Not Asked    Exercise Not Asked     Comment: 1 miles on stepper     Bike Helmet Not Asked    Seat Belt Not Asked    Self-Exams Not Asked   Social History Narrative    Balanced Diet - Yes    Osteoporosis Preventative measures-  Dairy servings per day: 1-3    Regular Exercise -  Yes Describe walk every day    Dental Exam up - YES - Date: 1/2/07    Eye Exam - YES - Date: 2 yrs    Self Testicular Exam -  One testicle removed yrs ago due to fallDo you have any concerns about STD's -  No    Abuse: Current or Past (Physical, Sexual or Emotional)- No    Do you feel safe in your environment - Yes    Guns stored in the home - No    Sunscreen used - Yes    Seatbelts used - Yes    Lipids - ?    Glucose -  ?    Colon Cancer Screening - No    Hemoccults - hemorrhoids    PSA - NO    Digital Rectal Exam - YES - Date: 1993    Immunizations reviewed and up to date - No    Tory NAILS     Social Determinants of Health     Financial Resource Strain: Not on file   Food Insecurity: Not on file   Transportation Needs: Not on file   Physical Activity: Not on file   Stress: Not on file   Social Connections: Not on file   Intimate Partner Violence: Not on file   Housing Stability: Not on file          Review of Systems:    ROS: 10  point ROS neg other than the symptoms noted above in the HPI.         Physical Exam:   Vitals: There were no vitals taken for this visit.   Wt Readings from Last 4 Encounters:   03/22/23 (!) 157.9 kg (348 lb)   12/26/22 (!) 155.6 kg (343 lb)   12/08/22 (!) 157.8 kg (347 lb 14.4 oz)   02/04/22 (!) 168.3 kg (371 lb)     GEN: well nourished, in no acute distress.  HEENT:  Pupils equal, round. Sclerae nonicteric.   NECK: Supple, no masses appreciated. JVP hard to assess due to body habitus  C/V:  Regular rate and rhythm, distant heart sounds  RESP: Respirations are unlabored. Clear to auscultation bilaterally without wheezing, rales, or rhonchi.  GI: Abdomen soft, obese, nontender.  EXTREM: No LE edema.  NEURO: Alert and oriented, cooperative.  SKIN: Warm and dry       Data:     LIPID RESULTS:  Lab Results   Component Value Date    CHOL 118 10/25/2021    CHOL 146 04/12/2021    HDL 52 10/25/2021    HDL 54 04/12/2021    LDL 44 10/25/2021    LDL 73 04/12/2021    TRIG 108 10/25/2021    TRIG 98 04/12/2021    CHOLHDLRATIO 2.8 02/17/2020     LIVER ENZYME RESULTS:  Lab Results   Component Value Date    AST 23 04/12/2021    ALT 30 10/25/2021    ALT 29 04/12/2021     CBC RESULTS:  Lab Results   Component Value Date    WBC 8.5 12/12/2022    WBC 8.3 04/12/2021    RBC 5.04 12/12/2022    RBC 5.41 04/12/2021    HGB 16.2 12/12/2022    HGB 17.8 (H) 04/12/2021    HCT 49.4 12/12/2022    HCT 53.2 (H) 04/12/2021    MCV 98 12/12/2022    MCV 98 04/12/2021    MCH 32.1 12/12/2022    MCH 32.9 04/12/2021    MCHC 32.8 12/12/2022    MCHC 33.5 04/12/2021    RDW 13.2 12/12/2022    RDW 12.9 04/12/2021     12/12/2022     04/12/2021     BMP RESULTS:  Lab Results   Component Value Date     12/12/2022     06/28/2021    POTASSIUM 3.9 12/12/2022    POTASSIUM 3.9 02/21/2022    POTASSIUM 4.0 06/28/2021    CHLORIDE 104 12/12/2022    CHLORIDE 105 02/21/2022    CHLORIDE 104 06/28/2021    CO2 26 12/12/2022    CO2 26 02/21/2022    CO2 26  06/28/2021    ANIONGAP 11 12/12/2022    ANIONGAP 8 02/21/2022    ANIONGAP 7 06/28/2021     (H) 12/12/2022     (H) 02/21/2022     (H) 06/28/2021    BUN 27.6 (H) 12/12/2022    BUN 27 02/21/2022    BUN 17 06/28/2021    CR 1.16 12/12/2022    CR 0.91 06/28/2021    GFRESTIMATED 67 12/12/2022    GFRESTIMATED 85 06/28/2021    GFRESTBLACK >90 06/28/2021    VICTOR M 9.7 12/12/2022    VICTOR M 9.4 06/28/2021      A1C RESULTS:  Lab Results   Component Value Date    A1C 6.4 (H) 12/26/2022    A1C 6.4 (H) 04/12/2021     INR RESULTS:  Lab Results   Component Value Date    INR 2.2 (H) 04/17/2023    INR 1.9 (H) 03/27/2023    INR 2.46 (H) 07/30/2021    INR 2.90 (H) 06/14/2021    INR 2.70 (H) 05/03/2021          Medications     Current Outpatient Medications   Medication Sig Dispense Refill    fluticasone (FLONASE) 50 MCG/ACT nasal spray Spray 1 spray into both nostrils daily 9.9 mL 0    acetaminophen (TYLENOL) 500 MG tablet Take 500-1,000 mg by mouth every 6 hours as needed for mild pain      albuterol (PROAIR HFA/PROVENTIL HFA/VENTOLIN HFA) 108 (90 Base) MCG/ACT inhaler Inhale 2 puffs into the lungs every 4 hours as needed for shortness of breath or wheezing 18 g 11    atenolol (TENORMIN) 100 MG tablet Take 1 tablet (100 mg) by mouth daily along with 50 mg tablet for total of 150 mg daily. 90 tablet 0    atenolol (TENORMIN) 50 MG tablet Take 1 tablet (50 mg) by mouth daily Along with 100 mg tablet for total of 150 mg daily. 90 tablet 1    FLUoxetine (PROZAC) 40 MG capsule Take 1 capsule (40 mg) by mouth daily 90 capsule 3    Fluticasone-Umeclidin-Vilanterol (TRELEGY ELLIPTA) 200-62.5-25 MCG/ACT oral inhaler Inhale 1 puff into the lungs daily 60 each 11    losartan (COZAAR) 25 MG tablet Take 3 tablets (75 mg) by mouth daily 270 tablet 1    multivitamin w/minerals (THERA-VIT-M) tablet Take 1 tablet by mouth daily Century 55+      omega-3 fatty acids 1200 MG capsule Take 1 capsule by mouth daily      oxymetazoline (AFRIN)  0.05 % nasal spray Spray 2 sprays into both nostrils At Bedtime      potassium chloride ER (K-TAB) 20 MEQ CR tablet Take 1 tablet (20 mEq) by mouth 2 times daily 180 tablet 1    rosuvastatin (CRESTOR) 40 MG tablet Take 1 tablet (40 mg) by mouth At Bedtime 90 tablet 1    torsemide (DEMADEX) 20 MG tablet Take 2 tablets (40 mg) by mouth every morning AND 2 tablets (40 mg) daily (with lunch). 360 tablet 2    traZODone (DESYREL) 100 MG tablet Take 1 tablet (100 mg) by mouth nightly as needed for sleep 90 tablet 1    warfarin ANTICOAGULANT (COUMADIN ANTICOAGULANT) 5 MG tablet Take 2.5 mg on Mondays and Fridays, and 5 mg all other days, or as directed by Coumadin nurse. 100 tablet 3        Past Medical History     Past Medical History:   Diagnosis Date    Adjustment disorder with depressed mood 06/17/2012    ALCOHOL ABUSE-UNSPEC 10/05/2007    Atrial fibrillation (H)     Chorioretinitis of both eyes 02/05/2012    Right eye worse than left. On Cellcept, steroid taper    Coronary atherosclerosis of unspecified type of vessel, native or graft     Diabetes mellitus type 2 in obese (H)     Essential hypertension, benign     Heart failure with preserved ejection fraction, NYHA class II (H)     Mixed hyperlipidemia 10/05/2007    Nonrheumatic aortic valve stenosis     Polycythemia     Sleep apnea     Tobacco use disorder 10/05/2007    Smoker - 1/2 ppd. Started at 16 years    Total retinal detachment of left eye 2009/2010     Past Surgical History:   Procedure Laterality Date    CARDIAC CATHERIZATION  07/03/2012    totally occluded RCA w/ mild left coronary disease    COLONOSCOPY  2016    repeat 2026    HEART CATH CORONARY ANGIOGRAM AND RIGHT HEART CATH  07/01/2012    RCA occlusion, IMI, no stent    PHACOEMULSIFICATION CLEAR CORNEA WITH STANDARD INTRAOCULAR LENS IMPLANT Right 06/04/2015    Procedure: PHACOEMULSIFICATION CLEAR CORNEA WITH STANDARD INTRAOCULAR LENS IMPLANT;  Surgeon: Tal Trinidad MD;  Location: [facility]  REATTACHMENT  11/09, 7/10    Left    TONSILLECTOMY      ZC NONSPECIFIC PROCEDURE  01/01/1987    Right Epididymis Removed    Z OPEN RX ANKLE DISLOCATN+FIXATN  12/01/2008    Right Ankle     Family History   Problem Relation Age of Onset    Arthritis Mother     Circulatory Mother         varicose veins    Eye Disorder Mother         detached retina    Thyroid Disease Mother     C.A.D. Father         Quadruple bypass    Gastrointestinal Disease Father         diverticulitis    Alzheimer Disease Father         demntia    Eye Disorder Father         cataract    Depression Sister         Post Partum     Alcoholism Sister     Neurologic Disorder Sister         Brain Aneurysm    Thyroid Disease Brother     Eye Disorder Brother         detached retina            Allergies   Lisinopril, Seasonal allergies, and Zolpidem tartrate    30 minutes spent on the date of the encounter doing chart review, history and exam, documentation and further activities as noted above      JOSE Arnold [facility]  Pager: [phone]        Thank you for allowing me to participate in the care of your patient.      Sincerely,     JOSE Arnold CNP     [facility]  cc:   Andrea Lockett MD  [address], [address]  [place] [address]

## 2023-06-05 ENCOUNTER — ANTICOAGULATION THERAPY VISIT (OUTPATIENT)
Dept: ANTICOAGULATION | Facility: CLINIC | Age: 73
End: 2023-06-05

## 2023-06-05 ENCOUNTER — LAB (OUTPATIENT)
Dept: LAB | Facility: CLINIC | Age: 73
End: 2023-06-05
Payer: COMMERCIAL

## 2023-06-05 DIAGNOSIS — I48.20 CHRONIC ATRIAL FIBRILLATION (H): ICD-10-CM

## 2023-06-05 DIAGNOSIS — Z79.01 LONG TERM CURRENT USE OF ANTICOAGULANT THERAPY: Primary | ICD-10-CM

## 2023-06-05 DIAGNOSIS — Z79.01 LONG TERM CURRENT USE OF ANTICOAGULANT THERAPY: ICD-10-CM

## 2023-06-05 LAB — INR BLD: 2.2 (ref 0.9–1.1)

## 2023-06-05 PROCEDURE — 36415 COLL VENOUS BLD VENIPUNCTURE: CPT

## 2023-06-05 NOTE — PROGRESS NOTES
ANTICOAGULATION MANAGEMENT     Markie Shepard 73 year old male is on warfarin with therapeutic INR result. (Goal INR 2.0-3.0)    Recent labs: (last 7 days)     06/05/23  1116   INR 2.2*       ASSESSMENT       Source(s): Chart Review and Patient/Caregiver Call       Warfarin doses taken: Warfarin taken as instructed    Diet: No new diet changes identified    Medication/supplement changes: None noted    New illness, injury, or hospitalization: No    Signs or symptoms of bleeding or clotting: No    Previous result: Therapeutic last 2(+) visits    Additional findings: None  Dental work between last encounter-took amoxicillin for a blister, blister healed.     PLAN     Recommended plan for no diet, medication or health factor changes affecting INR     Dosing Instructions: Continue your current warfarin dose with next INR in 8 weeks       Summary  As of 6/5/2023    Full warfarin instructions:  2.5 mg every Mon, Fri; 5 mg all other days   Next INR check:  7/31/2023             Telephone call with Markie who agrees to plan and repeated back plan correctly    Lab visit scheduled    Education provided:     Contact [phone] with any changes, questions or concerns.     Plan made per [facility] anticoagulation protocol    TONYA GARY RN  Anticoagulation Clinic  6/5/2023    _______________________________________________________________________     Anticoagulation Episode Summary     Current INR goal:  2.0-3.0   TTR:  95.5 % (1 y)   Target end date:  Indefinite   Send INR reminders to:  [facility]    Indications    Long-term (current) use of anticoagulants [Z79.01] [Z79.01]  Chronic atrial fibrillation (H) [I48.20]           Comments:  PREFERS PETER!         Anticoagulation Care Providers     Provider Role Specialty Phone number    Mali Patel MD Referring Internal Medicine [phone]

## 2023-06-27 DIAGNOSIS — G47.00 INSOMNIA, UNSPECIFIED TYPE: ICD-10-CM

## 2023-06-27 DIAGNOSIS — J32.9 SINUSITIS, UNSPECIFIED CHRONICITY, UNSPECIFIED LOCATION: ICD-10-CM

## 2023-06-27 RX ORDER — TRAZODONE HYDROCHLORIDE 100 MG/1
100 TABLET ORAL
Qty: 90 TABLET | Refills: 1 | Status: CANCELLED | OUTPATIENT
Start: 2023-06-27

## 2023-06-27 RX ORDER — FLUTICASONE PROPIONATE 50 MCG
1 SPRAY, SUSPENSION (ML) NASAL DAILY
Qty: 9.9 ML | Refills: 0 | Status: CANCELLED | OUTPATIENT
Start: 2023-06-27

## 2023-06-29 DIAGNOSIS — J32.9 SINUSITIS, UNSPECIFIED CHRONICITY, UNSPECIFIED LOCATION: ICD-10-CM

## 2023-06-29 RX ORDER — FLUTICASONE PROPIONATE 50 MCG
1 SPRAY, SUSPENSION (ML) NASAL DAILY
Qty: 9.9 ML | Refills: 0 | Status: SHIPPED | OUTPATIENT
Start: 2023-06-29 | End: 2023-10-10

## 2023-06-29 NOTE — TELEPHONE ENCOUNTER
Medication requested: fluticasone (FLONASE) 50 MCG/ACT nasal spray  Last office visit: 12/26/23  [facility] appointments: 7/12/23  Medication last refilled: 3/29/23; 9.9 mL + 0 refills  Last qualifying labs: N/A    Prescription approved per [facility] Refill Protocol.    Henry HORAN, RN  06/29/23 2:24 PM

## 2023-07-03 ENCOUNTER — TELEPHONE (OUTPATIENT)
Dept: FAMILY MEDICINE | Facility: CLINIC | Age: 73
End: 2023-07-03

## 2023-07-03 DIAGNOSIS — G47.00 INSOMNIA, UNSPECIFIED TYPE: ICD-10-CM

## 2023-07-03 DIAGNOSIS — J44.9 CHRONIC OBSTRUCTIVE PULMONARY DISEASE, UNSPECIFIED COPD TYPE (H): Primary | ICD-10-CM

## 2023-07-03 RX ORDER — FLUTICASONE FUROATE, UMECLIDINIUM BROMIDE AND VILANTEROL TRIFENATATE 200; 62.5; 25 UG/1; UG/1; UG/1
1 POWDER RESPIRATORY (INHALATION) DAILY
Qty: 60 EACH | Refills: 5 | Status: SHIPPED | OUTPATIENT
Start: 2023-07-03 | End: 2023-07-12

## 2023-07-03 RX ORDER — TRAZODONE HYDROCHLORIDE 100 MG/1
100 TABLET ORAL
Qty: 90 TABLET | Refills: 0 | Status: SHIPPED | OUTPATIENT
Start: 2023-07-03 | End: 2023-09-26

## 2023-07-03 NOTE — TELEPHONE ENCOUNTER
Trazodone (Desyrel) 100 mg    Last Office Visit: 12/26/22  Future [facility] Appointments: 7/12/23  Medication last refilled: 12/26/22 #90 with 1 refill(s)    Prescription approved per [facility] Refill Protocol.    Jessica Stein, FILOMENAN, RN, CCM

## 2023-07-03 NOTE — TELEPHONE ENCOUNTER
Patient called and picked up his medications at the pharmacy but pharmacy told him that his Trelegy inhaler prescription had been cancelled.  I explained we didn't cancel it, we cancelled his Breo when the Trelegy was prescribed.  Regardless, a new [facility] was sent [facility] so patient will not be out of medication.  FILOMENA MittalN, RN, CCM  RN Care Coordinator  [facility]  07/03/23  9:14 AM  Phone: [phone]

## 2023-07-12 ENCOUNTER — OFFICE VISIT (OUTPATIENT)
Dept: FAMILY MEDICINE | Facility: CLINIC | Age: 73
End: 2023-07-12
Payer: COMMERCIAL

## 2023-07-12 VITALS
HEIGHT: 71 IN | WEIGHT: 315 LBS | OXYGEN SATURATION: 92 % | RESPIRATION RATE: 15 BRPM | DIASTOLIC BLOOD PRESSURE: 89 MMHG | SYSTOLIC BLOOD PRESSURE: 133 MMHG | HEART RATE: 71 BPM | TEMPERATURE: 98.2 F | BODY MASS INDEX: 44.1 KG/M2

## 2023-07-12 DIAGNOSIS — J44.9 CHRONIC OBSTRUCTIVE PULMONARY DISEASE, UNSPECIFIED COPD TYPE (H): ICD-10-CM

## 2023-07-12 DIAGNOSIS — E11.9 TYPE 2 DIABETES MELLITUS WITHOUT COMPLICATION, WITHOUT LONG-TERM CURRENT USE OF INSULIN (H): Primary | ICD-10-CM

## 2023-07-12 LAB
CREAT UR-MCNC: 22.4 MG/DL
HBA1C MFR BLD: 6.5 % (ref 0–5.6)
MICROALBUMIN UR-MCNC: 30.5 MG/L
MICROALBUMIN/CREAT UR: 136.16 MG/G CR (ref 0–17)

## 2023-07-12 PROCEDURE — 82570 ASSAY OF URINE CREATININE: CPT | Mod: ORL | Performed by: INTERNAL MEDICINE

## 2023-07-12 RX ORDER — FLUTICASONE FUROATE, UMECLIDINIUM BROMIDE AND VILANTEROL TRIFENATATE 200; 62.5; 25 UG/1; UG/1; UG/1
1 POWDER RESPIRATORY (INHALATION) DAILY
Qty: 60 EACH | Refills: 11 | Status: SHIPPED | OUTPATIENT
Start: 2023-07-12 | End: 2024-07-07

## 2023-07-12 ASSESSMENT — ANXIETY QUESTIONNAIRES
2. NOT BEING ABLE TO STOP OR CONTROL WORRYING: NOT AT ALL
GAD7 TOTAL SCORE: 1
5. BEING SO RESTLESS THAT IT IS HARD TO SIT STILL: NOT AT ALL
IF YOU CHECKED OFF ANY PROBLEMS ON THIS QUESTIONNAIRE, HOW DIFFICULT HAVE THESE PROBLEMS MADE IT FOR YOU TO DO YOUR WORK, TAKE CARE OF THINGS AT HOME, OR GET ALONG WITH OTHER PEOPLE: NOT DIFFICULT AT ALL
7. FEELING AFRAID AS IF SOMETHING AWFUL MIGHT HAPPEN: NOT AT ALL
GAD7 TOTAL SCORE: 1
1. FEELING NERVOUS, ANXIOUS, OR ON EDGE: SEVERAL DAYS
3. WORRYING TOO MUCH ABOUT DIFFERENT THINGS: NOT AT ALL
6. BECOMING EASILY ANNOYED OR IRRITABLE: NOT AT ALL

## 2023-07-12 ASSESSMENT — ASTHMA QUESTIONNAIRES: ACT_TOTALSCORE: 22

## 2023-07-12 ASSESSMENT — PATIENT HEALTH QUESTIONNAIRE - PHQ9
5. POOR APPETITE OR OVEREATING: NOT AT ALL
SUM OF ALL RESPONSES TO PHQ QUESTIONS 1-9: 2

## 2023-07-12 NOTE — PROGRESS NOTES
Markie Shepard is a 72 year old male with a past medical history significant for legal blindness, morbid obesity, coronary artery disease status post PCI, permanent atrial fibrillation (on warfarin), sleep apnea (CPAP), significant prior smoking history, COPD, heart failure with preserved ejection fraction, and chronic venous stasis who presents for a recheck of:    PRE DIABETES  Here for prediabetes, diet controlled.  Markie is legally blind. He follows with a retinal specialist . Vision on R>L. Left eye detect bright light. No longer a candidate for injections.   Peripheral neuropathy: No  Weight: Down about 20 lbs over the last year.   Step exercises daily. Limits walking due to safety. No falls, not interested in services for the blind.   Diet is widely varied.   Wt Readings from Last 10 Encounters:   07/12/23 (!) 159.2 kg (351 lb)   05/30/23 (!) 158.5 kg (349 lb 8 oz)   02/04/22 (!) 168.3 kg (371 lb)     Candidiasis/skin issues: Scaling on right elbow, not itchy. Longstanding.   UTI/ yeast infections: urinary frequency, nocturia x1.  Foot care: Grandson does foot care.     Lab Results   Component Value Date    A1C 6.4 12/26/2022    A1C 6.4 04/12/2021    A1C 6.1 02/17/2020     No results found for: MICROALBUMIN      PMH, PSH, FH, medications, allergies and immunizations are updated this visit.      HYPERLIPIDEMIA  Recent Labs   Lab Test 10/25/21  1119 10/05/21  0951 04/12/21  1457 02/17/20  1601   CHOL 118 132   < > 152.0   HDL 52 55   < > 55.0   LDL 44 55   < > 74.0   TRIG 108 108   < > 116.0   CHOLHDLRATIO  --   --   --  2.8    < > = values in this interval not displayed.     LDL is in target range. Currently taking rosuvastatin 40 mg nightly. Compliant with med(s). No reported myalgias or other side effects.     HYPERTENSION  BP Readings from Last 3 Encounters:   07/12/23 133/89   05/30/23 127/81   12/26/22 117/82     Here for blood pressure check. He is currently on losartan 75 mg daily.   No current chest  "pain.  No LENZ. He is compliant with meds, no reported side effects. Blood pressure readings have been in target range.     Insomnia, unspecified type  Markie takes trazodone 100 mg nightly  for treatment of insomnia. Gets 7-8 hours/night. He uses a CPAP mask consistently. He gets his tubing and mask replaced about every 6 months.      Chronic obstructive pulmonary disease, unspecified COPD type (H)  Markie has mixed restrictive / obstructive pattern on PFTs. He is a former smoker. Currently using Trelegy inhaler 1 puff daily. He previously used Breo and Spiriva, but switched to Trelegy after the cost for Breo and Spiriva increased for him. He uses an albuterol rescue inhaler as needed, 4x in the last month. He notes that his breathing has been variable with he recent poor air quality.  He declines lung cancer screening.      Lymphedema  Markie follows with cardiology for management of hypertension, heart failure and atrial fibrillation.  He has extremity edema and purple toes. Wears compression stockings, but has difficulty getting them on and off. He was given a referral to a lymphedema clinic by his cardiologist. He tried a few various compression stockings but was unable to tolerate. At his most recent cardiology visit on 5/30/2023, he reported the swelling was overall controlled with light weight compression stockings. No current chest pain or palpitations. He has follow up with cardiology in 6 months. He will be getting an echocardiogram (11/3/2023) and labs prior to that visit.       EXAM  /89 (BP Location: Right arm, Patient Position: Sitting, Cuff Size: Adult Large)   Pulse 71   Temp 98.2  F (36.8  C) (Skin)   Resp 15   Ht 1.791 m (5' 10.5\")   Wt (!) 159.2 kg (351 lb)   SpO2 92%   BMI 49.65 kg/m    Gen: Alert, NAD, obese  Cor: Irregularly irregular S1S2, no murmur, distant heart sounds  Lungs: CTA bilaterally  Ext: +1 edema bilaterally, pulses are diminished bilaterally  Skin: Mild scaling over " extensor surface of right elbow. None on knees. + hemosiderin pigmentation over shins, ankles, and feet  Feet: no ulcerations on his left foot. Scabby sore on dorsum of 2nd toe of right foot. Callouses absent, sensation intact  Scaly area over his feet.     Assessment:  (E11.9) Type 2 diabetes mellitus without complication, without long-term current use of insulin (H)  (primary encounter diagnosis)  Comment: diet controlled DM in excellent control  Lab Results   Component Value Date    A1C 6.5 07/12/2023    A1C 6.4 12/26/2022   Plan: Hemoglobin A1c, [place] FOOT EXAM NO CHARGE, Albumin        Random Urine Quantitative with Creat Ratio       Continue efforts at weight loss. Activity as tolerated.    (J44.9) Chronic obstructive pulmonary disease, unspecified COPD type (H)  Comment: Doing very well on Trelegy. Rare use of albuterol.  He declines lung cancer screening.  Plan: Fluticasone-Umeclidin-Vilanterol (TRELEGY         ELLIPTA) 200-62.5-25 MCG/ACT oral inhaler        Refilled medication x1 year. He is up to date on vaccines.      32 minutes spend on the date of this encounter doing chart review, history and exam, documentation and further activities as noted above.       I have reviewed, edited and approved the above scribed note.    Mali Patel MD  Internal Medicine/Pediatrics      I, Christina Chen, am serving as a scribe to document services personally performed by Dr. Mali Patel, based on data collection and the provider's statements to me.

## 2023-07-12 NOTE — NURSING NOTE
"73 year old  Chief Complaint   Patient presents with     RECHECK     6 month follow up.        Blood pressure 133/89, pulse 71, temperature 98.2  F (36.8  C), temperature source Skin, resp. rate 15, height 1.791 m (5' 10.5\"), weight (!) 159.2 kg (351 lb), SpO2 92 %. Body mass index is 49.65 kg/m .  Patient Active Problem List   Diagnosis     CAD (coronary artery disease)     Hyperlipidemia LDL goal <70     Smoker     Essential hypertension with goal blood pressure less than 140/90     Chronic atrial fibrillation (H)     Obstructive sleep apnea     Mild major depression (H)     Low back pain     Mixed hyperlipidemia     Essential hypertension, benign     Long-term (current) use of anticoagulants [Z79.01]     Anisocoria     Prediabetes     Chronic obstructive pulmonary disease, unspecified COPD type (H)     Morbid obesity (H)     Chronic recurrent major depressive disorder (H)     Bilateral leg edema     Insomnia, unspecified type     Type 2 diabetes mellitus without complication, without long-term current use of insulin (H)     Heart failure with preserved ejection fraction, NYHA class II (H)     Nonrheumatic aortic valve stenosis       Wt Readings from Last 2 Encounters:   07/12/23 (!) 159.2 kg (351 lb)   05/30/23 (!) 158.5 kg (349 lb 8 oz)     BP Readings from Last 3 Encounters:   07/12/23 133/89   05/30/23 127/81   12/26/22 117/82         Current Outpatient Medications   Medication     albuterol (PROAIR HFA/PROVENTIL HFA/VENTOLIN HFA) 108 (90 Base) MCG/ACT inhaler     atenolol (TENORMIN) 100 MG tablet     atenolol (TENORMIN) 50 MG tablet     FLUoxetine (PROZAC) 40 MG capsule     fluticasone (FLONASE) 50 MCG/ACT nasal spray     Fluticasone-Umeclidin-Vilanterol (TRELEGY ELLIPTA) 200-62.5-25 MCG/ACT oral inhaler     losartan (COZAAR) 25 MG tablet     multivitamin w/minerals (THERA-VIT-M) tablet     potassium chloride ER (K-TAB) 20 MEQ CR tablet     rosuvastatin (CRESTOR) 40 MG tablet     torsemide (DEMADEX) 20 MG " tablet     traZODone (DESYREL) 100 MG tablet     warfarin ANTICOAGULANT (COUMADIN ANTICOAGULANT) 5 MG tablet     No current facility-administered medications for this visit.       Social History     Tobacco Use     Smoking status: Former     Packs/day: 1.00     Years: 53.00     Pack years: 53.00     Types: Cigarettes     Quit date: 9/29/2019     Years since quitting: 3.7     Smokeless tobacco: Never     Tobacco comments:     1 ppd   Vaping Use     Vaping Use: Never used   Substance Use Topics     Alcohol use: No     Comment: quit drinking heavily 2013     Drug use: No       Health Maintenance Due   Topic Date Due     HF ACTION PLAN  Never done     DIABETIC FOOT EXAM  Never done     COLORECTAL CANCER SCREENING  Never done     MICROALBUMIN  06/14/2013     EYE EXAM  06/29/2016     TSH W/FREE T4 REFLEX  10/03/2018     MEDICARE ANNUAL WELLNESS VISIT  04/12/2022     FALL RISK ASSESSMENT  06/28/2022     ALT  10/25/2022     LIPID  10/25/2022     A1C  03/26/2023     BMP  06/12/2023       No results found for: PAP      July 12, 2023 11:06 AM

## 2023-07-12 NOTE — LETTER
July 24, 2023      Markie Floresevabettinakumar  [address]  [place] [address]        Markie     Pooja are you lab results.   Your urine test indicates you are spilling some protein. We should monitor this level. It is currently 136. A level >300 would indicated need to add a class of medications called SGLT2 inhibitors. They can also help if you develop heart failure.     Your A1c has risen slightly from 6.4% to 6.5%. I am not going to start medication for you, but would recommend you continue to monitor your diet and maintain an exercise program.     I'd recommend rechecking labs every 6 months or so, which means a follow up visit in December or January.     Contact me for any questions.     Sincerely,     Mali Patel MD   Internal Medicine/Pediatrics     Resulted Orders   Hemoglobin A1c   Result Value Ref Range    Hemoglobin A1C 6.5 (H) 0.0 - 5.6 %      Comment:      Normal <5.7%   Prediabetes 5.7-6.4%    Diabetes 6.5% or higher     Note: Adopted from ADA consensus guidelines.   Albumin Random Urine Quantitative with Creat Ratio   Result Value Ref Range    Creatinine Urine mg/dL 22.4 mg/dL      Comment:      The reference ranges have not been established in urine creatinine. The results should be integrated into the clinical context for interpretation.    Albumin Urine mg/L 30.5 mg/L      Comment:      The reference ranges have not been established in urine albumin. The results should be integrated into the clinical context for interpretation.    Albumin Urine mg/g Cr 136.16 (H) 0.00 - 17.00 mg/g Cr      Comment:      Microalbuminuria is defined as an albumin:creatinine ratio of 17 to 299 for males and 25 to 299 for females. A ratio of albumin:creatinine of 300 or higher is indicative of overt proteinuria.  Due to biologic variability, positive results should be confirmed by a second, first-morning random or 24-hour timed urine specimen. If there is discrepancy, a third specimen is recommended. When 2 out  of 3 results are in the microalbuminuria range, this is evidence for incipient nephropathy and warrants increased efforts at glucose control, blood pressure control, and institution of therapy with an angiotensin-converting-enzyme (ACE) inhibitor (if the patient can tolerate it).         If you have any questions or concerns, please call the clinic at the number listed above.       Sincerely,      Mali Patel MD

## 2023-07-31 ENCOUNTER — ANTICOAGULATION THERAPY VISIT (OUTPATIENT)
Dept: ANTICOAGULATION | Facility: CLINIC | Age: 73
End: 2023-07-31

## 2023-07-31 ENCOUNTER — LAB (OUTPATIENT)
Dept: LAB | Facility: CLINIC | Age: 73
End: 2023-07-31
Payer: COMMERCIAL

## 2023-07-31 DIAGNOSIS — Z79.01 LONG TERM CURRENT USE OF ANTICOAGULANT THERAPY: ICD-10-CM

## 2023-07-31 DIAGNOSIS — I48.20 CHRONIC ATRIAL FIBRILLATION (H): ICD-10-CM

## 2023-07-31 DIAGNOSIS — Z79.01 LONG TERM CURRENT USE OF ANTICOAGULANT THERAPY: Primary | ICD-10-CM

## 2023-07-31 LAB — INR BLD: 2.1 (ref 0.9–1.1)

## 2023-07-31 PROCEDURE — 36415 COLL VENOUS BLD VENIPUNCTURE: CPT

## 2023-07-31 NOTE — PROGRESS NOTES
ANTICOAGULATION MANAGEMENT     Markie Shepard 73 year old male is on warfarin with therapeutic INR result. (Goal INR 2.0-3.0)    Recent labs: (last 7 days)     07/31/23  1114   INR 2.1*       ASSESSMENT     Source(s): Chart Review  Previous INR was Therapeutic last 2(+) visits  Medication, diet, health changes since last INR chart reviewed; none identified         PLAN     Recommended plan for no diet, medication or health factor changes affecting INR     Dosing Instructions: Continue your current warfarin dose with next INR in 8 weeks       Summary  As of 7/31/2023      Full warfarin instructions:  2.5 mg every Mon, Fri; 5 mg all other days   Next INR check:  9/25/2023               Detailed voice message left for Markie with dosing instructions and follow up date.     Contact [phone] to schedule and with any changes, questions or concerns.     Education provided:   Please call back if any changes to your diet, medications or how you've been taking warfarin  Contact [phone] with any changes, questions or concerns.     Plan made per [facility] anticoagulation protocol    Melissa Zee RN  Anticoagulation Clinic  7/31/2023    _______________________________________________________________________     Anticoagulation Episode Summary       Current INR goal:  2.0-3.0   TTR:  95.5 % (1 y)   Target end date:  Indefinite   Send INR reminders to:  [facility]    Indications    Long-term (current) use of anticoagulants [Z79.01] [Z79.01]  Chronic atrial fibrillation (H) [I48.20]             Comments:  PREFERS PETER!             Anticoagulation Care Providers       Provider Role Specialty Phone number    Mali Patel MD Referring Internal Medicine [phone]

## 2023-08-17 DIAGNOSIS — I50.30 HEART FAILURE WITH PRESERVED EJECTION FRACTION, NYHA CLASS II (H): ICD-10-CM

## 2023-08-17 RX ORDER — POTASSIUM CHLORIDE 1500 MG/1
20 TABLET, EXTENDED RELEASE ORAL 2 TIMES DAILY
Qty: 180 TABLET | Refills: 3 | Status: SHIPPED | OUTPATIENT
Start: 2023-08-17 | End: 2024-03-18

## 2023-09-25 ENCOUNTER — LAB (OUTPATIENT)
Dept: LAB | Facility: CLINIC | Age: 73
End: 2023-09-25
Payer: COMMERCIAL

## 2023-09-25 ENCOUNTER — ANTICOAGULATION THERAPY VISIT (OUTPATIENT)
Dept: ANTICOAGULATION | Facility: CLINIC | Age: 73
End: 2023-09-25

## 2023-09-25 DIAGNOSIS — Z79.01 LONG TERM CURRENT USE OF ANTICOAGULANT THERAPY: Primary | ICD-10-CM

## 2023-09-25 DIAGNOSIS — I48.20 CHRONIC ATRIAL FIBRILLATION (H): ICD-10-CM

## 2023-09-25 DIAGNOSIS — Z79.01 LONG TERM CURRENT USE OF ANTICOAGULANT THERAPY: ICD-10-CM

## 2023-09-25 LAB — INR BLD: 2.5 (ref 0.9–1.1)

## 2023-09-25 PROCEDURE — 36415 COLL VENOUS BLD VENIPUNCTURE: CPT

## 2023-09-25 NOTE — PROGRESS NOTES
ANTICOAGULATION MANAGEMENT     Markie Shepard 73 year old male is on warfarin with therapeutic INR result. (Goal INR 2.0-3.0)    Recent labs: (last 7 days)     09/25/23  1113   INR 2.5*       ASSESSMENT     Source(s): Patient/Caregiver Call     Warfarin doses taken: Warfarin taken as instructed  Diet: No new diet changes identified  Medication/supplement changes: None noted  New illness, injury, or hospitalization: No  Signs or symptoms of bleeding or clotting: No  Previous result: Therapeutic last 2(+) visits  Additional findings: None       PLAN     Recommended plan for no diet, medication or health factor changes affecting INR     Dosing Instructions: Continue your current warfarin dose with next INR in 8 weeks       Summary  As of 9/25/2023      Full warfarin instructions:  2.5 mg every Mon, Fri; 5 mg all other days   Next INR check:  11/20/2023               Telephone call with Markie who verbalizes understanding and agrees to plan and who agrees to plan and repeated back plan correctly    Lab visit scheduled    Education provided:   None required    Plan made per [facility] anticoagulation protocol    Melissa Zee RN  Anticoagulation Clinic  9/25/2023    _______________________________________________________________________     Anticoagulation Episode Summary       Current INR goal:  2.0-3.0   TTR:  95.5 % (1 y)   Target end date:  Indefinite   Send INR reminders to:  [facility]    Indications    Long-term (current) use of anticoagulants [Z79.01] [Z79.01]  Chronic atrial fibrillation (H) [I48.20]             Comments:               Anticoagulation Care Providers       Provider Role Specialty Phone number    Mali Patel MD Referring Internal Medicine [phone]

## 2023-09-26 ENCOUNTER — DOCUMENTATION ONLY (OUTPATIENT)
Dept: SLEEP MEDICINE | Facility: CLINIC | Age: 73
End: 2023-09-26
Payer: COMMERCIAL

## 2023-09-26 DIAGNOSIS — G47.00 INSOMNIA, UNSPECIFIED TYPE: ICD-10-CM

## 2023-09-26 NOTE — PROGRESS NOTES
9/26/2023-JL-  EXPLAINED TO NILSON HOW TO REMOVE WATER CHAMBER, HE IS LEGALLY BLIND AND HIS DAUGHTER IS COMING OVER TODAY AT 3:30 TO TRY AND REMOVE IT.SHE WILL CALL [facility] IF STILL UNABLE TO REMOVE IT.

## 2023-09-27 RX ORDER — TRAZODONE HYDROCHLORIDE 100 MG/1
100 TABLET ORAL
Qty: 90 TABLET | Refills: 0 | Status: SHIPPED | OUTPATIENT
Start: 2023-09-27 | End: 2024-01-02

## 2023-09-27 NOTE — TELEPHONE ENCOUNTER
Trazodone (Desyrel) 100 mg    Last Office Visit: 7/12/23  [place] [facility] [place]  Medication last refilled: 7/30/23 #90 with 0 refill(s)    Prescription approved per [facility] Refill Protocol.    Jessica Stein, FILOMENAN, RN, CCM

## 2023-10-10 DIAGNOSIS — J32.9 SINUSITIS, UNSPECIFIED CHRONICITY, UNSPECIFIED LOCATION: ICD-10-CM

## 2023-10-10 RX ORDER — FLUTICASONE PROPIONATE 50 MCG
1 SPRAY, SUSPENSION (ML) NASAL DAILY
Qty: 9.9 ML | Refills: 3 | Status: SHIPPED | OUTPATIENT
Start: 2023-10-10 | End: 2024-08-16

## 2023-10-10 NOTE — TELEPHONE ENCOUNTER
- elevated on admission, trended down on  but trended back up off of . Trending back down now on [place]   - monitor closely  - on amiodarone for AF   Fluticasone (Flonase) 50 mcg/ACT nasal spray    Last Office Visit: 7/12/23  [place] [facility] [place]  Medication last refilled: 6/29/23 #9.9 ml with 0 refill(s)    Prescription approved per [facility] Refill Protocol.    FILOMENA MittalN, RN, CCM

## 2023-11-03 ENCOUNTER — OFFICE VISIT (OUTPATIENT)
Dept: CARDIOLOGY | Facility: CLINIC | Age: 73
End: 2023-11-03
Attending: INTERNAL MEDICINE
Payer: COMMERCIAL

## 2023-11-03 ENCOUNTER — LAB (OUTPATIENT)
Dept: LAB | Facility: CLINIC | Age: 73
End: 2023-11-03
Attending: NURSE PRACTITIONER
Payer: COMMERCIAL

## 2023-11-03 ENCOUNTER — HOSPITAL ENCOUNTER (OUTPATIENT)
Dept: CARDIOLOGY | Facility: CLINIC | Age: 73
Discharge: HOME | End: 2023-11-03
Attending: NURSE PRACTITIONER
Payer: COMMERCIAL

## 2023-11-03 VITALS
SYSTOLIC BLOOD PRESSURE: 133 MMHG | WEIGHT: 315 LBS | HEART RATE: 69 BPM | HEIGHT: 71 IN | BODY MASS INDEX: 44.1 KG/M2 | OXYGEN SATURATION: 95 % | DIASTOLIC BLOOD PRESSURE: 82 MMHG

## 2023-11-03 DIAGNOSIS — I50.30 HEART FAILURE WITH PRESERVED EJECTION FRACTION, NYHA CLASS II (H): ICD-10-CM

## 2023-11-03 DIAGNOSIS — E78.2 MIXED HYPERLIPIDEMIA: ICD-10-CM

## 2023-11-03 LAB
ALT SERPL W P-5'-P-CCNC: 28 U/L (ref 0–70)
ANION GAP SERPL CALCULATED.3IONS-SCNC: 13 MMOL/L (ref 7–15)
BUN SERPL-MCNC: 28.9 MG/DL (ref 8–23)
CALCIUM SERPL-MCNC: 9.8 MG/DL (ref 8.8–10.2)
CHLORIDE SERPL-SCNC: 103 MMOL/L (ref 98–107)
CHOLEST SERPL-MCNC: 131 MG/DL
CREAT SERPL-MCNC: 1.19 MG/DL (ref 0.67–1.17)
DEPRECATED HCO3 PLAS-SCNC: 25 MMOL/L (ref 22–29)
EGFRCR SERPLBLD CKD-EPI 2021: 65 ML/MIN/1.73M2
GLUCOSE SERPL-MCNC: 125 MG/DL (ref 70–99)
HDLC SERPL-MCNC: 52 MG/DL
LDLC SERPL CALC-MCNC: 61 MG/DL
LVEF ECHO: NORMAL
NONHDLC SERPL-MCNC: 79 MG/DL
POTASSIUM SERPL-SCNC: 3.8 MMOL/L (ref 3.4–5.3)
SODIUM SERPL-SCNC: 141 MMOL/L (ref 135–145)
TRIGL SERPL-MCNC: 91 MG/DL

## 2023-11-03 PROCEDURE — 36415 COLL VENOUS BLD VENIPUNCTURE: CPT

## 2023-11-03 PROCEDURE — 999N000208 ECHOCARDIOGRAM COMPLETE

## 2023-11-03 PROCEDURE — 80061 LIPID PANEL: CPT

## 2023-11-03 PROCEDURE — 84460 ALANINE AMINO (ALT) (SGPT): CPT

## 2023-11-03 PROCEDURE — 255N000002 HC RX 255 OP 636: Performed by: NURSE PRACTITIONER

## 2023-11-03 PROCEDURE — 82374 ASSAY BLOOD CARBON DIOXIDE: CPT

## 2023-11-03 PROCEDURE — 82310 ASSAY OF CALCIUM: CPT

## 2023-11-03 PROCEDURE — 93306 TTE W/DOPPLER COMPLETE: CPT | Mod: 26 | Performed by: INTERNAL MEDICINE

## 2023-11-03 PROCEDURE — 99215 OFFICE O/P EST HI 40 MIN: CPT | Mod: 25 | Performed by: INTERNAL MEDICINE

## 2023-11-03 RX ORDER — LOSARTAN POTASSIUM 100 MG/1
100 TABLET ORAL DAILY
Qty: 90 TABLET | Refills: 3 | Status: SHIPPED | OUTPATIENT
Start: 2023-11-03 | End: 2024-03-21 | Stop reason: ALTCHOICE

## 2023-11-03 RX ORDER — METOPROLOL SUCCINATE 200 MG/1
200 TABLET, EXTENDED RELEASE ORAL DAILY
Qty: 90 TABLET | Refills: 3 | Status: SHIPPED | OUTPATIENT
Start: 2023-11-03

## 2023-11-03 RX ADMIN — HUMAN ALBUMIN MICROSPHERES AND PERFLUTREN 9 ML: 10; .22 INJECTION, SOLUTION INTRAVENOUS at 10:14

## 2023-11-03 NOTE — PATIENT INSTRUCTIONS
November 3, 2023    Thank you for allowing our Cardiology team to participate in your care.     Please note the following changes to your heart treatment plan:     Medication changes:   - change atenolol to metoprolol succinate 200 mg daily  - increase losartan 75 mg daily to 100 mg daily     Tests to be done:  - NON-fasting labs in about 2 weeks  - NON-fasting labs in 4 months    Follow up:  - Follow up in 4 months, or sooner as needed.      For scheduling, please call [phone].      Please contact our team through [employer] or our Nurse Team Voicemail service [phone], or the General Clinic [phone] for any questions or concerns.     If you are having a medical emergency, please call [phone].     Sincerely,    Andrea Lockett MD, [facility]  Cardiology    [facility] and [facility] - [facility] [place]

## 2023-11-03 NOTE — PROGRESS NOTES
Cardiology Clinic Progress Note:    November 3, 2023   Patient Name: Markie Shepard  Patient MRN: [id number]     Consult indication: CHF    HPI:    I had the opportunity to see patient Markie Shepard in cardiology clinic for a follow up visit. Patient is followed by our colleague Mali Patel MD with Primary Care.     As you know, patient is a pleasant 73-year-old male with a past medical history significant for legal blindness, morbid obesity, coronary artery disease status post PCI, permanent atrial fibrillation (on warfarin), sleep apnea, significant prior smoking history, COPD, heart failure with preserved ejection fraction, chronic venous stasis, who presents for follow-up.     He underwent a Lexiscan 6/27/2012 which showed inferior  infarct with moderate ischemia.  Subsequent coronary angiography demonstrated  of the RCA, with left-to-right collaterals. This was medically managed since he was asymptomatic, and had been doing well, the decision was made to not pursue high risk  PCI.     I had seen him last in clinic 12/8/2022.  At that time, he had been doing well, we referred him to Lymphedema clinic, also referred him for KRYSTEN testing.  KRYSTEN testing was normal, negative for evidence of PAD.  He has since been seen by my colleague Marla Simmons NP no changes were warranted to his cardiac regimen.    Since then, patient reports that overall he feels generally quite well.  He exercises regularly with a NuStep machine at home.  He has baseline dyspnea however this is unchanged.  He denies any chest pain or chest pressure.  Weight has been stable around 350 pounds.  He has no specific complaints or concerns.    TTE 11/3/2023 demonstrated moderate LV dysfunction with akinesis of the inferior/inferoseptal walls, LVEF 35 to 40%, mild to moderate aortic stenosis, mild MR.    He has several grandsons, he is proud of all of them, one is trained to become a , another is a , and  one is involved with social media/videogame live streaming.    Assessment and Plan/Recommendations:    # HFrEF LVEF 35-40%, new diagnosis, prior TTE 6/16/2021 LVEF 55%.  Etiology likely ischemic, with known RCA .  Suspect progression of disease. Euvolemic. NYHA II. No angina.   # Mild to moderate aortic stenosis   # COPD, on inhaler therapy   # Chronic venous stasis  # CAD with RCA .  Stable, denies symptoms concerning for angina.  # Permanent atrial fibrillation, on warfarin.   # Morbid obesity  # EMORY  # Significant vision impairment, legally blind     -Discussed findings of the echocardiogram demonstrating new LV dysfunction.  Discussed options for further management, including general recommendation for ischemic evaluation with coronary angiography and possible intervention, versus stress testing for risk stratification.  Alternatively discussed a conservative strategy with medical management.  Discussed advantages/limitations, risk/benefits of each strategy at length.  Patient states that he feels generally quite well, and already has disabilities including legal blindness, weighing risks/benefits, and his overall outlook on his health, he would like to be conservative, he understands the risks with this.  - For now, we will change atenolol to metoprolol succinate, and will increase losartan to 100 mg daily  - Discussed Entresto, due to financial concerns he would like to hold off  - Continue torsemide, rosuvastatin, potassium supplementation  - BMP in about 2 weeks  - Patient would like to follow-up in 4 months, will recheck labs at that time  - Advised to alert our team if he has any concerns in the interim      Thank you for allowing our team to participate in the care of Markie Shepard.  Please do not hesitate to call or page me with any questions or concerns.    Sincerely,     Andrea Lockett MD, [facility]  Cardiology  Text Page   November 3, 2023    Voice recognition software utilized.      Total time spent on this encounter today: Greater than 40 minutes, providing care in this encounter including, but not limited to, reviewing prior medical records, laboratory data, imaging studies, diagnostic studies, procedure notes, formulating an assessment and plan, recommendations, discussion and counseling with patient face to face, dictation.    Past Medical History:   The ASCVD Risk score (Sabrina POTTER, et al., 2019) failed to calculate for the following reasons:    The valid total cholesterol range is 130 to 320 mg/dL  Past Medical History:   Diagnosis Date    Adjustment disorder with depressed mood 06/17/2012    ALCOHOL ABUSE-UNSPEC 10/05/2007    Atrial fibrillation (H)     Chorioretinitis of both eyes 02/05/2012    Right eye worse than left. On Cellcept, steroid taper    Coronary atherosclerosis of unspecified type of vessel, native or graft     Diabetes mellitus type 2 in obese (H)     Essential hypertension, benign     Heart failure with preserved ejection fraction, NYHA class II (H)     Mixed hyperlipidemia 10/05/2007    Nonrheumatic aortic valve stenosis     Polycythemia     Sleep apnea     Tobacco use disorder 10/05/2007    Smoker - 1/2 ppd. Started at 16 years    Total retinal detachment of left eye 2009/2010        Past Surgical History:   Past Surgical History:   Procedure Laterality Date    CARDIAC CATHERIZATION  07/03/2012    totally occluded RCA w/ mild left coronary disease    COLONOSCOPY  2016    repeat 2026    HEART CATH CORONARY ANGIOGRAM AND RIGHT HEART CATH  07/01/2012    RCA occlusion, IMI, no stent    PHACOEMULSIFICATION CLEAR CORNEA WITH STANDARD INTRAOCULAR LENS IMPLANT Right 06/04/2015    Procedure: PHACOEMULSIFICATION CLEAR CORNEA WITH STANDARD INTRAOCULAR LENS IMPLANT;  Surgeon: Tal Trinidad MD;  Location:  [facility]    RETINAL REATTACHMENT  11/09, 7/10    Left    TONSILLECTOMY      [facility] NONSPECIFIC PROCEDURE  01/01/1987    Right Epididymis Removed    [facility] OPEN RX ANKLE  DISLOCATN+FIXATN  12/01/2008    Right Ankle       Medications (outpatient):  Current Outpatient Medications   Medication Sig Dispense Refill    albuterol (PROAIR HFA/PROVENTIL HFA/VENTOLIN HFA) 108 (90 Base) MCG/ACT inhaler Inhale 2 puffs into the lungs every 4 hours as needed for shortness of breath or wheezing 18 g 11    FLUoxetine (PROZAC) 40 MG capsule Take 1 capsule (40 mg) by mouth daily 90 capsule 3    fluticasone (FLONASE) 50 MCG/ACT nasal spray Spray 1 spray into both nostrils daily 9.9 mL 3    Fluticasone-Umeclidin-Vilanterol (TRELEGY ELLIPTA) 200-62.5-25 MCG/ACT oral inhaler Inhale 1 puff into the lungs daily 60 each 11    losartan (COZAAR) 100 MG tablet Take 1 tablet (100 mg) by mouth daily 90 tablet 3    metoprolol succinate ER (TOPROL XL) 200 MG 24 hr tablet Take 1 tablet (200 mg) by mouth daily 90 tablet 3    multivitamin w/minerals (THERA-VIT-M) tablet Take 1 tablet by mouth daily Century 55+      potassium chloride ER (K-TAB) 20 MEQ CR tablet Take 1 tablet (20 mEq) by mouth 2 times daily 180 tablet 3    rosuvastatin (CRESTOR) 40 MG tablet Take 1 tablet (40 mg) by mouth At Bedtime 90 tablet 1    torsemide (DEMADEX) 20 MG tablet Take 2 tablets (40 mg) by mouth every morning AND 2 tablets (40 mg) daily (with lunch). 360 tablet 2    traZODone (DESYREL) 100 MG tablet Take 1 tablet (100 mg) by mouth nightly as needed for sleep 90 tablet 0    warfarin ANTICOAGULANT (COUMADIN ANTICOAGULANT) 5 MG tablet Take 2.5 mg on Mondays and Fridays, and 5 mg all other days, or as directed by Coumadin nurse. 100 tablet 3       Allergies:  Allergies   Allergen Reactions    Lisinopril Cough     Pt had cough    Seasonal Allergies     Zolpidem Tartrate Other (See Comments)     Parasomnia with sleep walking, injuries, delusions.  May have been in DTs at the same time.       Social History:   History   Drug Use No      History   Smoking Status    Former    Packs/day: 1.00    Years: 53.00    Types: Cigarettes    Quit date:  "9/29/2019   Smokeless Tobacco    Never     Social History    Substance and Sexual Activity      Alcohol use: No        Comment: quit drinking heavily 2013       Family History:  Family History   Problem Relation Age of Onset    Arthritis Mother     Circulatory Mother         varicose veins    Eye Disorder Mother         detached retina    Thyroid Disease Mother     C.A.D. Father         Quadruple bypass    Gastrointestinal Disease Father         diverticulitis    Alzheimer Disease Father         demntia    Eye Disorder Father         cataract    Thyroid Disease Brother     Eye Disorder Brother         detached retina    Pacemaker Brother     Depression Sister         Post Partum     Alcoholism Sister     Neurologic Disorder Sister         Brain Aneurysm       Review of Systems:   A complete review of systems was negative except as mentioned in the History of Present Illness.     Objective & Physical Exam:  /82 (BP Location: Left arm, Patient Position: Sitting)   Pulse 69   Ht 1.791 m (5' 10.5\")   Wt (!) 158.9 kg (350 lb 4.8 oz)   SpO2 95%   BMI 49.55 kg/m    Wt Readings from Last 2 Encounters:   11/03/23 (!) 158.9 kg (350 lb 4.8 oz)   07/12/23 (!) 159.2 kg (351 lb)     Body mass index is 49.55 kg/m .   Body surface area is 2.81 meters squared.    Constitutional: appears stated age, in no apparent distress, overweight  Head: normocephalic, atraumatic  Neck: supple, trachea midline  Pulmonary: clear to auscultation bilaterally  Cardiovascular: JVP normal, distant heart sounds but RRR, trace BLE edema, no lower extremity edema  Gastrointestinal: no guarding, non-rigid   Neurologic: awake, alert, moves all extremities  Skin: no jaundice, warm on limited exam, chronic venous stasis changes bilat legs  Psychiatric: affect is normal, answers questions appropriately, oriented to self and place    Data reviewed:  Lab Results   Component Value Date    WBC 8.5 12/12/2022    WBC 8.3 04/12/2021    RBC 5.04 12/12/2022 "    RBC 5.41 04/12/2021    HGB 16.2 12/12/2022    HGB 17.8 (H) 04/12/2021    HCT 49.4 12/12/2022    HCT 53.2 (H) 04/12/2021    MCV 98 12/12/2022    MCV 98 04/12/2021    MCH 32.1 12/12/2022    MCH 32.9 04/12/2021    MCHC 32.8 12/12/2022    MCHC 33.5 04/12/2021    RDW 13.2 12/12/2022    RDW 12.9 04/12/2021     12/12/2022     04/12/2021     Sodium   Date Value Ref Range Status   11/03/2023 141 135 - 145 mmol/L Final     Comment:     Reference intervals for this test were updated on 09/26/2023 to more accurately reflect our healthy population. There may be differences in the flagging of prior results with similar values performed with this method. Interpretation of those prior results can be made in the context of the updated reference intervals.    06/28/2021 138 133 - 144 mmol/L Final     Potassium   Date Value Ref Range Status   11/03/2023 3.8 3.4 - 5.3 mmol/L Final   02/21/2022 3.9 3.4 - 5.3 mmol/L Final   06/28/2021 4.0 3.4 - 5.3 mmol/L Final     Chloride   Date Value Ref Range Status   11/03/2023 103 98 - 107 mmol/L Final   02/21/2022 105 94 - 109 mmol/L Final   06/28/2021 104 94 - 109 mmol/L Final     Carbon Dioxide   Date Value Ref Range Status   06/28/2021 26 20 - 32 mmol/L Final     Carbon Dioxide (CO2)   Date Value Ref Range Status   11/03/2023 25 22 - 29 mmol/L Final   02/21/2022 26 20 - 32 mmol/L Final     Anion Gap   Date Value Ref Range Status   11/03/2023 13 7 - 15 mmol/L Final   02/21/2022 8 3 - 14 mmol/L Final   06/28/2021 7 3 - 14 mmol/L Final     Glucose   Date Value Ref Range Status   11/03/2023 125 (H) 70 - 99 mg/dL Final   02/21/2022 144 (H) 70 - 99 mg/dL Final   06/28/2021 127 (H) 70 - 99 mg/dL Final     Urea Nitrogen   Date Value Ref Range Status   11/03/2023 28.9 (H) 8.0 - 23.0 mg/dL Final   02/21/2022 27 7 - 30 mg/dL Final   06/28/2021 17 7 - 30 mg/dL Final     Creatinine   Date Value Ref Range Status   11/03/2023 1.19 (H) 0.67 - 1.17 mg/dL Final   06/28/2021 0.91 0.66 - 1.25  mg/dL Final     GFR Estimate   Date Value Ref Range Status   11/03/2023 65 >60 mL/min/1.73m2 Final   06/28/2021 85 >60 mL/min/[1.73_m2] Final     Comment:     Non  GFR Calc  Starting 12/18/2018, serum creatinine based estimated GFR (eGFR) will be   calculated using the Chronic Kidney Disease Epidemiology Collaboration   (CKD-EPI) equation.       Calcium   Date Value Ref Range Status   11/03/2023 9.8 8.8 - 10.2 mg/dL Final   06/28/2021 9.4 8.5 - 10.1 mg/dL Final     Bilirubin Total   Date Value Ref Range Status   04/12/2021 0.9 0.2 - 1.3 mg/dL Final     Alkaline Phosphatase   Date Value Ref Range Status   04/12/2021 84 40 - 150 U/L Final     ALT   Date Value Ref Range Status   11/03/2023 28 0 - 70 U/L Final     Comment:     Reference intervals for this test were updated on 6/12/2023 to more accurately reflect our healthy population. There may be differences in the flagging of prior results with similar values performed with this method. Interpretation of those prior results can be made in the context of the updated reference intervals.     04/12/2021 29 0 - 70 U/L Final     AST   Date Value Ref Range Status   04/12/2021 23 0 - 45 U/L Final     Recent Labs   Lab Test 10/25/21  1119 10/05/21  0951 04/12/21  1457 02/17/20  1601   CHOL 118 132   < > 152.0   HDL 52 55   < > 55.0   LDL 44 55   < > 74.0   TRIG 108 108   < > 116.0   CHOLHDLRATIO  --   --   --  2.8    < > = values in this interval not displayed.      Lab Results   Component Value Date    A1C 6.5 07/12/2023    A1C 6.4 12/26/2022    A1C 6.4 04/12/2021    A1C 6.1 02/17/2020    A1C 6.1 02/11/2019    A1C 5.8 10/17/2016    A1C 5.9 03/18/2015        Recent Results (from the past 4320 hour(s))   Echocardiogram Complete   Result Value    LVEF  35-40%    Narrative    [id number]  [id number]  [id number]  [id number]^KVNG^[place] [facility] [place]^L     [facility]  [facility]  Echocardiography Laboratory  [address]  [place] [address]  Phone [phone]  Fax [phone]     Name: NILSON CASTILLO  MRN: [id number]  : 1950  Study Date: 2023 09:39 AM  Age: 73 yrs  Gender: Male  Patient Location: [facility]  Reason For Study: Heart failure with preserved ejection fraction  Ordering Physician: PRAMOD CALDERON  Referring Physician: Mali Patel  Performed By: Yun Kendall     BSA: 2.7 m2  Height: 70 in  Weight: 351 lb  HR: 84  BP: 133/89 mmHg  ______________________________________________________________________________  Procedure  Complete Echo Adult. Optison (NDC #3484-1292) given intravenously.     ______________________________________________________________________________  Interpretation Summary     The left ventricle is normal in size.  There is moderate global hypokinesia of the left ventricle. The  inferior/inferoseptal walls are akinetic.  The visual ejection fraction is 35-40%.  The right ventricle is normal in structure, function and size.  Mild to moderate valvular aortic stenosis. (Visually the AS appears to be more  severe than mild). Mean gradient 13 mmHg, FARA 1.4 cm2.  There is mild (1+) mitral regurgitation.  Technically difficult, suboptimal study. Compared to the previous study, LV  dysfunction is now identified. AS is unchanged.  ______________________________________________________________________________  Left Ventricle  The left ventricle is normal in size. There is normal left ventricular wall  thickness. Left ventricular systolic function is mild to moderately reduced.  The visual ejection fraction is 35-40%. Diastolic Doppler findings (E/E' ratio  and/or other parameters) suggest left ventricular filling pressures are  indeterminate. There is moderate global hypokinesia of the left ventricle.     Right Ventricle  The right ventricle is normal in structure, function and size.     Atria  The left atrium is mildly dilated. Right atrial size is normal. There is no  atrial shunt  seen.     Mitral Valve  There is mild (1+) mitral regurgitation.     Tricuspid Valve  The tricuspid valve is normal in structure and function. There is trace  tricuspid regurgitation. IVC diameter <2.1 cm collapsing >50% with sniff  suggests a normal RA pressure of 3 mmHg. Right ventricular systolic pressure  could not be approximated due to inadequate tricuspid regurgitation.     Aortic Valve  No aortic regurgitation is present. Mild to moderate valvular aortic stenosis.  The calculated aortic valve are is 1.4 cm^2. The peak AoV pressure gradient  is  23.8 mmHg. The mean AoV pressure gradient is 13.0 mmHg.     Pulmonic Valve  The pulmonic valve is not well seen, but is grossly normal. There is trace  pulmonic valvular regurgitation.     Vessels  Aortic root dilatation is present. The inferior vena cava was normal in size  with preserved respiratory variability.     Pericardium  There is no pericardial effusion.     Rhythm  The rhythm was atrial fibrillation. Confirm with ECG.     ______________________________________________________________________________  MMode/2D Measurements & Calculations  IVSd: 1.5 cm  LVIDd: 5.3 cm  LVIDs: 4.8 cm  LVPWd: 1.7 cm  FS: 9.4 %  LV mass(C)d: 387.7 grams  LV mass(C)dI: 146.2 grams/m2  Ao root diam: 4.0 cm  LA dimension: 5.1 cm     asc Aorta Diam: 3.9 cm  LA/Ao: 1.3  LVOT diam: 2.5 cm  LVOT area: 5.1 cm2  Ao root diam index Ht(cm/m): 2.2  Ao root diam index BSA (cm/m2): 1.5  Asc Ao diam index BSA (cm/m2): 1.5  Asc Ao diam index Ht(cm/m): 2.2  LA Volume (BP): 90.5 ml  LA Volume Index (BP): 34.2 ml/m2  RWT: 0.64     Doppler Measurements & Calculations  MV E max santo: 88.1 cm/sec  MV dec time: 0.17 sec  Ao V2 max: 244.0 cm/sec  Ao max P.8 mmHg  Ao V2 mean: 168.0 cm/sec  Ao mean P.0 mmHg  Ao V2 VTI: 48.5 cm  FARA(I,D): 1.4 cm2  FARA(V,D): 1.6 cm2  LV V1 max P.3 mmHg  LV V1 max: 76.3 cm/sec  LV V1 VTI: 13.3 cm  SV(LVOT): 67.8 ml  SI(LVOT): 25.6 ml/m2  PA acc time: 0.07  sec  AV Greyson Ratio (DI): 0.31  FARA Index (cm2/m2): 0.53  E/E' av.8  Lateral E/e': 6.9  Medial E/e': 10.7  RV S Greyson: 9.7 cm/sec     ______________________________________________________________________________  Report approved by: Pan Mitchell 2023 11:07 AM

## 2023-11-03 NOTE — LETTER
11/3/2023    Mali Patel MD  [address] [facility]  [facility] [address]    RE: Markie Shepard       Dear Colleague,     I had the pleasure of seeing Markie Shepard in the [facility].      Cardiology Clinic Progress Note:    November 3, 2023   Patient Name: Markie Shepard  Patient MRN: [id number]     Consult indication: CHF    HPI:    I had the opportunity to see patient Markie Shepard in cardiology clinic for a follow up visit. Patient is followed by our colleague Mali Patel MD with Primary Care.     As you know, patient is a pleasant 73-year-old male with a past medical history significant for legal blindness, morbid obesity, coronary artery disease status post PCI, permanent atrial fibrillation (on warfarin), sleep apnea, significant prior smoking history, COPD, heart failure with preserved ejection fraction, chronic venous stasis, who presents for follow-up.     He underwent a Lexiscan 6/27/2012 which showed inferior  infarct with moderate ischemia.  Subsequent coronary angiography demonstrated  of the RCA, with left-to-right collaterals. This was medically managed since he was asymptomatic, and had been doing well, the decision was made to not pursue high risk  PCI.     I had seen him last in clinic 12/8/2022.  At that time, he had been doing well, we referred him to Lymphedema clinic, also referred him for KRYSTEN testing.  KRYSTEN testing was normal, negative for evidence of PAD.  He has since been seen by my colleague Marla Simmons NP no changes were warranted to his cardiac regimen.    Since then, patient reports that overall he feels generally quite well.  He exercises regularly with a NuStep machine at home.  He has baseline dyspnea however this is unchanged.  He denies any chest pain or chest pressure.  Weight has been stable around 350 pounds.  He has no specific complaints or concerns.    TTE 11/3/2023 demonstrated moderate LV dysfunction with akinesis of the  inferior/inferoseptal walls, LVEF 35 to 40%, mild to moderate aortic stenosis, mild MR.    He has several grandsons, he is proud of all of them, one is trained to become a , another is a , and one is involved with social media/[employer] live streaming.    Assessment and Plan/Recommendations:    # HFrEF LVEF 35-40%, new diagnosis, prior TTE 6/16/2021 LVEF 55%.  Etiology likely ischemic, with known RCA .  Suspect progression of disease. Euvolemic. NYHA II. No angina.   # Mild to moderate aortic stenosis   # COPD, on inhaler therapy   # Chronic venous stasis  # CAD with RCA .  Stable, denies symptoms concerning for angina.  # Permanent atrial fibrillation, on warfarin.   # Morbid obesity  # EMORY  # Significant vision impairment, legally blind     -Discussed findings of the echocardiogram demonstrating new LV dysfunction.  Discussed options for further management, including general recommendation for ischemic evaluation with coronary angiography and possible intervention, versus stress testing for risk stratification.  Alternatively discussed a conservative strategy with medical management.  Discussed advantages/limitations, risk/benefits of each strategy at length.  Patient states that he feels generally quite well, and already has disabilities including legal blindness, weighing risks/benefits, and his overall outlook on his health, he would like to be conservative, he understands the risks with this.  - For now, we will change atenolol to metoprolol succinate, and will increase losartan to 100 mg daily  - Discussed Entresto, due to financial concerns he would like to hold off  - Continue torsemide, rosuvastatin, potassium supplementation  - BMP in about 2 weeks  - Patient would like to follow-up in 4 months, will recheck labs at that time  - Advised to alert our team if he has any concerns in the interim      Thank you for allowing our team to participate in the care of Markie Shepard.   Please do not hesitate to call or page me with any questions or concerns.    Sincerely,     Andrea Lockett MD, [facility]  Cardiology  Text Page   November 3, 2023    Voice recognition software utilized.     Total time spent on this encounter today: Greater than 40 minutes, providing care in this encounter including, but not limited to, reviewing prior medical records, laboratory data, imaging studies, diagnostic studies, procedure notes, formulating an assessment and plan, recommendations, discussion and counseling with patient face to face, dictation.    Past Medical History:   The ASCVD Risk score (Sabrina DK, et al., 2019) failed to calculate for the following reasons:    The valid total cholesterol range is 130 to 320 mg/dL  Past Medical History:   Diagnosis Date    Adjustment disorder with depressed mood 06/17/2012    ALCOHOL ABUSE-UNSPEC 10/05/2007    Atrial fibrillation (H)     Chorioretinitis of both eyes 02/05/2012    Right eye worse than left. On Cellcept, steroid taper    Coronary atherosclerosis of unspecified type of vessel, native or graft     Diabetes mellitus type 2 in obese (H)     Essential hypertension, benign     Heart failure with preserved ejection fraction, NYHA class II (H)     Mixed hyperlipidemia 10/05/2007    Nonrheumatic aortic valve stenosis     Polycythemia     Sleep apnea     Tobacco use disorder 10/05/2007    Smoker - 1/2 ppd. Started at 16 years    Total retinal detachment of left eye 2009/2010        Past Surgical History:   Past Surgical History:   Procedure Laterality Date    CARDIAC CATHERIZATION  07/03/2012    totally occluded RCA w/ mild left coronary disease    COLONOSCOPY  2016    repeat 2026    HEART CATH CORONARY ANGIOGRAM AND RIGHT HEART CATH  07/01/2012    RCA occlusion, IMI, no stent    PHACOEMULSIFICATION CLEAR CORNEA WITH STANDARD INTRAOCULAR LENS IMPLANT Right 06/04/2015    Procedure: PHACOEMULSIFICATION CLEAR CORNEA WITH STANDARD INTRAOCULAR LENS IMPLANT;   Surgeon: Tal Trinidad MD;  Location:  [facility]    RETINAL REATTACHMENT  11/09, 7/10    Left    TONSILLECTOMY      Z NONSPECIFIC PROCEDURE  01/01/1987    Right Epididymis Removed    [facility] OPEN RX ANKLE DISLOCATN+FIXATN  12/01/2008    Right Ankle       Medications (outpatient):  Current Outpatient Medications   Medication Sig Dispense Refill    albuterol (PROAIR HFA/PROVENTIL HFA/VENTOLIN HFA) 108 (90 Base) MCG/ACT inhaler Inhale 2 puffs into the lungs every 4 hours as needed for shortness of breath or wheezing 18 g 11    FLUoxetine (PROZAC) 40 MG capsule Take 1 capsule (40 mg) by mouth daily 90 capsule 3    fluticasone (FLONASE) 50 MCG/ACT nasal spray Spray 1 spray into both nostrils daily 9.9 mL 3    Fluticasone-Umeclidin-Vilanterol (TRELEGY ELLIPTA) 200-62.5-25 MCG/ACT oral inhaler Inhale 1 puff into the lungs daily 60 each 11    losartan (COZAAR) 100 MG tablet Take 1 tablet (100 mg) by mouth daily 90 tablet 3    metoprolol succinate ER (TOPROL XL) 200 MG 24 hr tablet Take 1 tablet (200 mg) by mouth daily 90 tablet 3    multivitamin w/minerals (THERA-VIT-M) tablet Take 1 tablet by mouth daily Century 55+      potassium chloride ER (K-TAB) 20 MEQ CR tablet Take 1 tablet (20 mEq) by mouth 2 times daily 180 tablet 3    rosuvastatin (CRESTOR) 40 MG tablet Take 1 tablet (40 mg) by mouth At Bedtime 90 tablet 1    torsemide (DEMADEX) 20 MG tablet Take 2 tablets (40 mg) by mouth every morning AND 2 tablets (40 mg) daily (with lunch). 360 tablet 2    traZODone (DESYREL) 100 MG tablet Take 1 tablet (100 mg) by mouth nightly as needed for sleep 90 tablet 0    warfarin ANTICOAGULANT (COUMADIN ANTICOAGULANT) 5 MG tablet Take 2.5 mg on Mondays and Fridays, and 5 mg all other days, or as directed by Coumadin nurse. 100 tablet 3       Allergies:  Allergies   Allergen Reactions    Lisinopril Cough     Pt had cough    Seasonal Allergies     Zolpidem Tartrate Other (See Comments)     Parasomnia with sleep walking, injuries,  "delusions.  May have been in DTs at the same time.       Social History:   History   Drug Use No      History   Smoking Status    Former    Packs/day: 1.00    Years: 53.00    Types: Cigarettes    Quit date: 9/29/2019   Smokeless Tobacco    Never     Social History    Substance and Sexual Activity      Alcohol use: No        Comment: quit drinking heavily 2013       Family History:  Family History   Problem Relation Age of Onset    Arthritis Mother     Circulatory Mother         varicose veins    Eye Disorder Mother         detached retina    Thyroid Disease Mother     C.A.D. Father         Quadruple bypass    Gastrointestinal Disease Father         diverticulitis    Alzheimer Disease Father         demntia    Eye Disorder Father         cataract    Thyroid Disease Brother     Eye Disorder Brother         detached retina    Pacemaker Brother     Depression Sister         Post Partum     Alcoholism Sister     Neurologic Disorder Sister         Brain Aneurysm       Review of Systems:   A complete review of systems was negative except as mentioned in the History of Present Illness.     Objective & Physical Exam:  /82 (BP Location: Left arm, Patient Position: Sitting)   Pulse 69   Ht 1.791 m (5' 10.5\")   Wt (!) 158.9 kg (350 lb 4.8 oz)   SpO2 95%   BMI 49.55 kg/m    Wt Readings from Last 2 Encounters:   11/03/23 (!) 158.9 kg (350 lb 4.8 oz)   07/12/23 (!) 159.2 kg (351 lb)     Body mass index is 49.55 kg/m .   Body surface area is 2.81 meters squared.    Constitutional: appears stated age, in no apparent distress, overweight  Head: normocephalic, atraumatic  Neck: supple, trachea midline  Pulmonary: clear to auscultation bilaterally  Cardiovascular: JVP normal, distant heart sounds but RRR, trace BLE edema, no lower extremity edema  Gastrointestinal: no guarding, non-rigid   Neurologic: awake, alert, moves all extremities  Skin: no jaundice, warm on limited exam, chronic venous stasis changes bilat " legs  Psychiatric: affect is normal, answers questions appropriately, oriented to self and place    Data reviewed:  Lab Results   Component Value Date    WBC 8.5 12/12/2022    WBC 8.3 04/12/2021    RBC 5.04 12/12/2022    RBC 5.41 04/12/2021    HGB 16.2 12/12/2022    HGB 17.8 (H) 04/12/2021    HCT 49.4 12/12/2022    HCT 53.2 (H) 04/12/2021    MCV 98 12/12/2022    MCV 98 04/12/2021    MCH 32.1 12/12/2022    MCH 32.9 04/12/2021    MCHC 32.8 12/12/2022    MCHC 33.5 04/12/2021    RDW 13.2 12/12/2022    RDW 12.9 04/12/2021     12/12/2022     04/12/2021     Sodium   Date Value Ref Range Status   11/03/2023 141 135 - 145 mmol/L Final     Comment:     Reference intervals for this test were updated on 09/26/2023 to more accurately reflect our healthy population. There may be differences in the flagging of prior results with similar values performed with this method. Interpretation of those prior results can be made in the context of the updated reference intervals.    06/28/2021 138 133 - 144 mmol/L Final     Potassium   Date Value Ref Range Status   11/03/2023 3.8 3.4 - 5.3 mmol/L Final   02/21/2022 3.9 3.4 - 5.3 mmol/L Final   06/28/2021 4.0 3.4 - 5.3 mmol/L Final     Chloride   Date Value Ref Range Status   11/03/2023 103 98 - 107 mmol/L Final   02/21/2022 105 94 - 109 mmol/L Final   06/28/2021 104 94 - 109 mmol/L Final     Carbon Dioxide   Date Value Ref Range Status   06/28/2021 26 20 - 32 mmol/L Final     Carbon Dioxide (CO2)   Date Value Ref Range Status   11/03/2023 25 22 - 29 mmol/L Final   02/21/2022 26 20 - 32 mmol/L Final     Anion Gap   Date Value Ref Range Status   11/03/2023 13 7 - 15 mmol/L Final   02/21/2022 8 3 - 14 mmol/L Final   06/28/2021 7 3 - 14 mmol/L Final     Glucose   Date Value Ref Range Status   11/03/2023 125 (H) 70 - 99 mg/dL Final   02/21/2022 144 (H) 70 - 99 mg/dL Final   06/28/2021 127 (H) 70 - 99 mg/dL Final     Urea Nitrogen   Date Value Ref Range Status   11/03/2023 28.9 (H)  8.0 - 23.0 mg/dL Final   02/21/2022 27 7 - 30 mg/dL Final   06/28/2021 17 7 - 30 mg/dL Final     Creatinine   Date Value Ref Range Status   11/03/2023 1.19 (H) 0.67 - 1.17 mg/dL Final   06/28/2021 0.91 0.66 - 1.25 mg/dL Final     GFR Estimate   Date Value Ref Range Status   11/03/2023 65 >60 mL/min/1.73m2 Final   06/28/2021 85 >60 mL/min/[1.73_m2] Final     Comment:     Non  GFR Calc  Starting 12/18/2018, serum creatinine based estimated GFR (eGFR) will be   calculated using the Chronic Kidney Disease Epidemiology Collaboration   (CKD-EPI) equation.       Calcium   Date Value Ref Range Status   11/03/2023 9.8 8.8 - 10.2 mg/dL Final   06/28/2021 9.4 8.5 - 10.1 mg/dL Final     Bilirubin Total   Date Value Ref Range Status   04/12/2021 0.9 0.2 - 1.3 mg/dL Final     Alkaline Phosphatase   Date Value Ref Range Status   04/12/2021 84 40 - 150 U/L Final     ALT   Date Value Ref Range Status   11/03/2023 28 0 - 70 U/L Final     Comment:     Reference intervals for this test were updated on 6/12/2023 to more accurately reflect our healthy population. There may be differences in the flagging of prior results with similar values performed with this method. Interpretation of those prior results can be made in the context of the updated reference intervals.     04/12/2021 29 0 - 70 U/L Final     AST   Date Value Ref Range Status   04/12/2021 23 0 - 45 U/L Final     Recent Labs   Lab Test 10/25/21  1119 10/05/21  0951 04/12/21  1457 02/17/20  1601   CHOL 118 132   < > 152.0   HDL 52 55   < > 55.0   LDL 44 55   < > 74.0   TRIG 108 108   < > 116.0   CHOLHDLRATIO  --   --   --  2.8    < > = values in this interval not displayed.      Lab Results   Component Value Date    A1C 6.5 07/12/2023    A1C 6.4 12/26/2022    A1C 6.4 04/12/2021    A1C 6.1 02/17/2020    A1C 6.1 02/11/2019    A1C 5.8 10/17/2016    A1C 5.9 03/18/2015        Recent Results (from the past 4320 hour(s))   Echocardiogram Complete   Result Value    LVEF   35-40%    [facility]    [id number]  [id number]  [id number]  [id number]^KVNG^PRAMOD^L     [facility]  [facility]  Echocardiography Laboratory  [address]  [address]  [place] [address]  Phone [phone]  Fax [phone]     Name: NILSON CASTILLO  MRN: [id number]  : 1950  Study Date: 2023 09:39 AM  Age: 73 yrs  Gender: Male  Patient Location: [facility]  Reason For Study: Heart failure with preserved ejection fraction  Ordering Physician: PRAMOD CALDERON  Referring Physician: Mali Patel  Performed By: Yun Kendall     BSA: 2.7 m2  Height: 70 in  Weight: 351 lb  HR: 84  BP: 133/89 mmHg  ______________________________________________________________________________  Procedure  Complete Echo Adult. Optison (NDC #[id number]) given intravenously.     ______________________________________________________________________________  Interpretation Summary     The left ventricle is normal in size.  There is moderate global hypokinesia of the left ventricle. The  inferior/inferoseptal walls are akinetic.  The visual ejection fraction is 35-40%.  The right ventricle is normal in structure, function and size.  Mild to moderate valvular aortic stenosis. (Visually the AS appears to be more  severe than mild). Mean gradient 13 mmHg, FARA 1.4 cm2.  There is mild (1+) mitral regurgitation.  Technically difficult, suboptimal study. Compared to the previous study, LV  dysfunction is now identified. AS is unchanged.  ______________________________________________________________________________  Left Ventricle  The left ventricle is normal in size. There is normal left ventricular wall  thickness. Left ventricular systolic function is mild to moderately reduced.  The visual ejection fraction is 35-40%. Diastolic Doppler findings (E/E' ratio  and/or other parameters) suggest left ventricular filling pressures are  indeterminate. There is moderate global hypokinesia of the  left ventricle.     Right Ventricle  The right ventricle is normal in structure, function and size.     Atria  The left atrium is mildly dilated. Right atrial size is normal. There is no  atrial shunt seen.     Mitral Valve  There is mild (1+) mitral regurgitation.     Tricuspid Valve  The tricuspid valve is normal in structure and function. There is trace  tricuspid regurgitation. IVC diameter <2.1 cm collapsing >50% with sniff  suggests a normal RA pressure of 3 mmHg. Right ventricular systolic pressure  could not be approximated due to inadequate tricuspid regurgitation.     Aortic Valve  No aortic regurgitation is present. Mild to moderate valvular aortic stenosis.  The calculated aortic valve are is 1.4 cm^2. The peak AoV pressure gradient  is  23.8 mmHg. The mean AoV pressure gradient is 13.0 mmHg.     Pulmonic Valve  The pulmonic valve is not well seen, but is grossly normal. There is trace  pulmonic valvular regurgitation.     Vessels  Aortic root dilatation is present. The inferior vena cava was normal in size  with preserved respiratory variability.     Pericardium  There is no pericardial effusion.     Rhythm  The rhythm was atrial fibrillation. Confirm with ECG.     ______________________________________________________________________________  MMode/2D Measurements & Calculations  IVSd: 1.5 cm  LVIDd: 5.3 cm  LVIDs: 4.8 cm  LVPWd: 1.7 cm  FS: 9.4 %  LV mass(C)d: 387.7 grams  LV mass(C)dI: 146.2 grams/m2  Ao root diam: 4.0 cm  LA dimension: 5.1 cm     asc Aorta Diam: 3.9 cm  LA/Ao: 1.3  LVOT diam: 2.5 cm  LVOT area: 5.1 cm2  Ao root diam index Ht(cm/m): 2.2  Ao root diam index BSA (cm/m2): 1.5  Asc Ao diam index BSA (cm/m2): 1.5  Asc Ao diam index Ht(cm/m): 2.2  LA Volume (BP): 90.5 ml  LA Volume Index (BP): 34.2 ml/m2  RWT: 0.64     Doppler Measurements & Calculations  MV E max santo: 88.1 cm/sec  MV dec time: 0.17 sec  Ao V2 max: 244.0 cm/sec  Ao max P.8 mmHg  Ao V2 mean: 168.0 cm/sec  Ao mean PG:  13.0 mmHg  Ao V2 VTI: 48.5 cm  FARA(I,D): 1.4 cm2  FARA(V,D): 1.6 cm2  LV V1 max P.3 mmHg  LV V1 max: 76.3 cm/sec  LV V1 VTI: 13.3 cm  SV(LVOT): 67.8 ml  SI(LVOT): 25.6 ml/m2  PA acc time: 0.07 sec  AV Greyson Ratio (DI): 0.31  FARA Index (cm2/m2): 0.53  E/E' av.8  Lateral E/e': 6.9  Medial E/e': 10.7  RV S Greyson: 9.7 cm/sec     ______________________________________________________________________________  Report approved by: Pan Mitchell 2023 11:07 AM                Thank you for allowing me to participate in the care of your patient.      Sincerely,     Andrea Lockett MD     [facility]  cc:   Andrea Lockett MD  [address], [address]  [place] [address]

## 2023-11-09 ENCOUNTER — DOCUMENTATION ONLY (OUTPATIENT)
Dept: ANTICOAGULATION | Facility: CLINIC | Age: 73
End: 2023-11-09
Payer: COMMERCIAL

## 2023-11-09 ENCOUNTER — DOCUMENTATION ONLY (OUTPATIENT)
Dept: FAMILY MEDICINE | Facility: CLINIC | Age: 73
End: 2023-11-09

## 2023-11-09 DIAGNOSIS — I48.20 CHRONIC ATRIAL FIBRILLATION (H): Primary | ICD-10-CM

## 2023-11-09 NOTE — PROGRESS NOTES
ANTICOAGULATION CLINIC REFERRAL RENEWAL REQUEST       An annual renewal order is required for all patients referred to [facility].?  Please review and sign the pended referral order for Markie Shepard.       ANTICOAGULATION SUMMARY      Warfarin indication(s)   Atrial Fibrillation    Mechanical heart valve present?  NO       Current goal range   INR: 2.0-3.0     Goal appropriate for indication? Goal INR 2-3, standard for indication(s) above     Time in Therapeutic Range (TTR)  (Goal > 60%) 95.5%       Office visit with referring provider's group within last year yes on 12/26/22       Karen Whitehead RN  [facility]

## 2023-11-09 NOTE — PROGRESS NOTES
Markie MERLE Shepard has an upcoming lab appointment.        Future Appointments   Date Time Provider Department Center   11/20/2023 11:30 AM [facility]   1/8/2024  2:20 PM Mali Patel MD [facility]       The appointment note says: INR    There is no Lab order available.      Please review and place future orders as appropriate.  If no Lab order will be placed, please advise patient.      Also for consideration: [facility]    Health Maintenance Due   Topic    ANNUAL REVIEW OF HM ORDERS     A1C        Thanks,    Nataly Foster

## 2023-11-14 DIAGNOSIS — E78.5 DYSLIPIDEMIA: ICD-10-CM

## 2023-11-14 RX ORDER — ROSUVASTATIN CALCIUM 40 MG/1
40 TABLET, COATED ORAL AT BEDTIME
Qty: 90 TABLET | Refills: 3 | Status: SHIPPED | OUTPATIENT
Start: 2023-11-14

## 2023-11-20 ENCOUNTER — LAB (OUTPATIENT)
Dept: LAB | Facility: CLINIC | Age: 73
End: 2023-11-20
Payer: COMMERCIAL

## 2023-11-20 ENCOUNTER — ANTICOAGULATION THERAPY VISIT (OUTPATIENT)
Dept: ANTICOAGULATION | Facility: CLINIC | Age: 73
End: 2023-11-20

## 2023-11-20 DIAGNOSIS — I50.30 HEART FAILURE WITH PRESERVED EJECTION FRACTION, NYHA CLASS II (H): ICD-10-CM

## 2023-11-20 DIAGNOSIS — Z79.01 LONG TERM CURRENT USE OF ANTICOAGULANT THERAPY: Primary | ICD-10-CM

## 2023-11-20 DIAGNOSIS — I48.20 CHRONIC ATRIAL FIBRILLATION (H): ICD-10-CM

## 2023-11-20 LAB
ANION GAP SERPL CALCULATED.3IONS-SCNC: 11 MMOL/L (ref 7–15)
BUN SERPL-MCNC: 20.9 MG/DL (ref 8–23)
CALCIUM SERPL-MCNC: 9.7 MG/DL (ref 8.8–10.2)
CHLORIDE SERPL-SCNC: 102 MMOL/L (ref 98–107)
CREAT SERPL-MCNC: 1.23 MG/DL (ref 0.67–1.17)
DEPRECATED HCO3 PLAS-SCNC: 29 MMOL/L (ref 22–29)
EGFRCR SERPLBLD CKD-EPI 2021: 62 ML/MIN/1.73M2
GLUCOSE SERPL-MCNC: 149 MG/DL (ref 70–99)
INR BLD: 2.3 (ref 0.9–1.1)
POTASSIUM SERPL-SCNC: 4.2 MMOL/L (ref 3.4–5.3)
SODIUM SERPL-SCNC: 142 MMOL/L (ref 135–145)

## 2023-11-20 PROCEDURE — 36415 COLL VENOUS BLD VENIPUNCTURE: CPT

## 2023-11-20 PROCEDURE — 85610 PROTHROMBIN TIME: CPT

## 2023-11-20 PROCEDURE — 80048 BASIC METABOLIC PNL TOTAL CA: CPT

## 2023-11-20 NOTE — PROGRESS NOTES
ANTICOAGULATION MANAGEMENT     Markie Shepard 73 year old male is on warfarin with therapeutic INR result. (Goal INR 2.0-3.0)    Recent labs: (last 7 days)     11/20/23  1111   INR 2.3*       ASSESSMENT     Source(s): Chart Review and Patient/Caregiver Call     Warfarin doses taken: Warfarin taken as instructed  Diet: No new diet changes identified  Medication/supplement changes: None noted  New illness, injury, or hospitalization: No  Signs or symptoms of bleeding or clotting: No  Previous result: Therapeutic last 2(+) visits  Additional findings: None       PLAN     Recommended plan for no diet, medication or health factor changes affecting INR     Dosing Instructions: Continue your current warfarin dose with next INR in 8 weeks       Summary  As of 11/20/2023      Full warfarin instructions:  2.5 mg every Mon, Fri; 5 mg all other days   Next INR check:  1/8/2024               Telephone call with Markie who verbalizes understanding and agrees to plan and who agrees to plan and repeated back plan correctly    Lab visit scheduled    Education provided:   None required    Plan made per [facility] anticoagulation protocol    Saurabh Mar, RN  Anticoagulation Clinic  11/20/2023    _______________________________________________________________________     Anticoagulation Episode Summary       Current INR goal:  2.0-3.0   TTR:  95.5% (1 y)   Target end date:  Indefinite   Send INR reminders to:  [facility]    Indications    Long-term (current) use of anticoagulants [Z79.01] [Z79.01]  Chronic atrial fibrillation (H) [I48.20]             Comments:               Anticoagulation Care Providers       Provider Role Specialty Phone number    Mali Patel MD Referring Internal Medicine [phone]

## 2023-11-27 ENCOUNTER — TELEPHONE (OUTPATIENT)
Dept: CARDIOLOGY | Facility: CLINIC | Age: 73
End: 2023-11-27
Payer: COMMERCIAL

## 2023-11-27 DIAGNOSIS — I25.10 CORONARY ARTERY DISEASE INVOLVING NATIVE CORONARY ARTERY OF NATIVE HEART WITHOUT ANGINA PECTORIS: Primary | ICD-10-CM

## 2023-11-27 DIAGNOSIS — E78.5 HYPERLIPIDEMIA LDL GOAL <70: ICD-10-CM

## 2023-11-27 DIAGNOSIS — I10 ESSENTIAL HYPERTENSION WITH GOAL BLOOD PRESSURE LESS THAN 140/90: ICD-10-CM

## 2023-11-27 NOTE — TELEPHONE ENCOUNTER
----- Message from Andrea Lockett MD sent at 11/27/2023  5:37 AM CST -----  Overall results are stable from before, renal function is slightly worse than before but I think this is probably chronic for him and related to higher BPs and chronic diuretic therapy which he has needed for his CHF and management of the peripheral edema. Recommend BMP and NTproBNP with CLAUDIA visit in 2-3 months thanks!

## 2023-11-27 NOTE — TELEPHONE ENCOUNTER
Spoke with patient. BMP results reviewed.  Dr. Lockett results reply reviewed.    Patient agrees with repeat labs and CLAUDIA OV in 2-3 months - orders placed.   Patient verbalized understanding.     Patient states he is feeling well and will call back with any questions or concerns.     BMP done 11-20-23.

## 2023-11-27 NOTE — RESULT ENCOUNTER NOTE
Overall results are stable from before, renal function is slightly worse than before but I think this is probably chronic for him and related to higher BPs and chronic diuretic therapy which he has needed for his CHF and management of the peripheral edema. Recommend BMP and NTproBNP with CLAUDIA visit in 2-3 months thanks!

## 2024-01-02 DIAGNOSIS — F33.9 CHRONIC RECURRENT MAJOR DEPRESSIVE DISORDER (H): ICD-10-CM

## 2024-01-02 DIAGNOSIS — G47.00 INSOMNIA, UNSPECIFIED TYPE: ICD-10-CM

## 2024-01-02 DIAGNOSIS — J44.9 CHRONIC OBSTRUCTIVE PULMONARY DISEASE, UNSPECIFIED COPD TYPE (H): ICD-10-CM

## 2024-01-05 RX ORDER — FLUOXETINE 40 MG/1
40 CAPSULE ORAL DAILY
Qty: 90 CAPSULE | Refills: 1 | Status: SHIPPED | OUTPATIENT
Start: 2024-01-05 | End: 2024-06-20

## 2024-01-05 RX ORDER — ALBUTEROL SULFATE 90 UG/1
2 AEROSOL, METERED RESPIRATORY (INHALATION) EVERY 4 HOURS PRN
Qty: 18 G | Refills: 5 | Status: SHIPPED | OUTPATIENT
Start: 2024-01-05 | End: 2024-07-07

## 2024-01-05 RX ORDER — TRAZODONE HYDROCHLORIDE 100 MG/1
100 TABLET ORAL
Qty: 90 TABLET | Refills: 1 | Status: SHIPPED | OUTPATIENT
Start: 2024-01-05 | End: 2024-06-20

## 2024-01-05 NOTE — TELEPHONE ENCOUNTER
Medication requested: FLUoxetine (PROZAC) 40 MG capsule   Last office visit: 7/12/23  [facility] appointments: 1/8/24  Medication last refilled: 12/26/22; 90 + 3 refills  Last qualifying labs:    7/12/2023  10:58 AM   PHQ-9 / YAAKOV-7 Scores 8/2015 to present    YAAKOV-7 Score DocFlow 1    PHQ-9 Score DocFlow 2      Medication requested: traZODone (DESYREL) 100 MG tablet   Medication last refilled: 9/27/23; 90 + 0 refills  Last qualifying labs: N/A    Medication requested: albuterol (PROAIR HFA/PROVENTIL HFA/VENTOLIN HFA) 108 (90 Base) MCG/ACT inhaler   Medication last refilled: 12/26/22; 18 g + 11 refills  Last qualifying labs:    7/12/2023  10:58 AM   Asthma Control Test    ACT Total Score (Goal Greater than or Equal to 20) 22      Prescription approved per [facility] Refill Protocol.    Henry HORAN, RN  01/05/24 8:59 AM

## 2024-01-08 ENCOUNTER — TELEPHONE (OUTPATIENT)
Dept: CARDIOLOGY | Facility: CLINIC | Age: 74
End: 2024-01-08

## 2024-01-08 ENCOUNTER — OFFICE VISIT (OUTPATIENT)
Dept: FAMILY MEDICINE | Facility: CLINIC | Age: 74
End: 2024-01-08
Payer: COMMERCIAL

## 2024-01-08 VITALS
SYSTOLIC BLOOD PRESSURE: 128 MMHG | OXYGEN SATURATION: 96 % | HEART RATE: 62 BPM | TEMPERATURE: 96.7 F | DIASTOLIC BLOOD PRESSURE: 86 MMHG

## 2024-01-08 DIAGNOSIS — I50.20 HEART FAILURE WITH REDUCED EJECTION FRACTION (H): ICD-10-CM

## 2024-01-08 DIAGNOSIS — Z79.01 LONG TERM CURRENT USE OF ANTICOAGULANT THERAPY: ICD-10-CM

## 2024-01-08 DIAGNOSIS — I48.20 CHRONIC ATRIAL FIBRILLATION (H): ICD-10-CM

## 2024-01-08 DIAGNOSIS — E11.9 TYPE 2 DIABETES MELLITUS WITHOUT COMPLICATION, WITHOUT LONG-TERM CURRENT USE OF INSULIN (H): Primary | ICD-10-CM

## 2024-01-08 DIAGNOSIS — I10 ESSENTIAL HYPERTENSION WITH GOAL BLOOD PRESSURE LESS THAN 140/90: ICD-10-CM

## 2024-01-08 LAB
CREAT UR-MCNC: 17.5 MG/DL
ERYTHROCYTE [DISTWIDTH] IN BLOOD BY AUTOMATED COUNT: 13 % (ref 10–15)
HBA1C MFR BLD: 6.5 % (ref 0–5.6)
HCT VFR BLD AUTO: 50.6 % (ref 40–53)
HGB BLD-MCNC: 16.3 G/DL (ref 13.3–17.7)
INR PPP: 2.6 (ref 0.85–1.15)
MCH RBC QN AUTO: 32.5 PG (ref 26.5–33)
MCHC RBC AUTO-ENTMCNC: 32.2 G/DL (ref 31.5–36.5)
MCV RBC AUTO: 101 FL (ref 78–100)
MICROALBUMIN UR-MCNC: 35.3 MG/L
MICROALBUMIN/CREAT UR: 201.71 MG/G CR (ref 0–17)
PLATELET # BLD AUTO: 163 10E3/UL (ref 150–450)
RBC # BLD AUTO: 5.01 10E6/UL (ref 4.4–5.9)
WBC # BLD AUTO: 10.3 10E3/UL (ref 4–11)

## 2024-01-08 PROCEDURE — 80061 LIPID PANEL: CPT | Mod: ORL | Performed by: INTERNAL MEDICINE

## 2024-01-08 PROCEDURE — 83880 ASSAY OF NATRIURETIC PEPTIDE: CPT | Mod: ORL | Performed by: INTERNAL MEDICINE

## 2024-01-08 PROCEDURE — 80048 BASIC METABOLIC PNL TOTAL CA: CPT | Mod: ORL | Performed by: INTERNAL MEDICINE

## 2024-01-08 PROCEDURE — 82570 ASSAY OF URINE CREATININE: CPT | Mod: ORL | Performed by: INTERNAL MEDICINE

## 2024-01-08 PROCEDURE — 85610 PROTHROMBIN TIME: CPT | Mod: ORL | Performed by: INTERNAL MEDICINE

## 2024-01-08 ASSESSMENT — PATIENT HEALTH QUESTIONNAIRE - PHQ9: SUM OF ALL RESPONSES TO PHQ QUESTIONS 1-9: 4

## 2024-01-08 NOTE — NURSING NOTE
73 year old  Chief Complaint   Patient presents with    Diabetes       Blood pressure 128/86, pulse 62, temperature (!) 96.7  F (35.9  C), temperature source Skin, SpO2 96%. There is no height or weight on file to calculate BMI.  Patient Active Problem List   Diagnosis    CAD (coronary artery disease)    Hyperlipidemia LDL goal <70    Smoker    Essential hypertension with goal blood pressure less than 140/90    Chronic atrial fibrillation (H)    Obstructive sleep apnea    Low back pain    Mixed hyperlipidemia    Long-term (current) use of anticoagulants [Z79.01]    Anisocoria    Chronic obstructive pulmonary disease, unspecified COPD type (H)    Morbid obesity (H)    Chronic recurrent major depressive disorder (H24)    Bilateral leg edema    Insomnia, unspecified type    Type 2 diabetes mellitus without complication, without long-term current use of insulin (H)    Heart failure with preserved ejection fraction, NYHA class II (H)    Nonrheumatic aortic valve stenosis       Wt Readings from Last 2 Encounters:   11/03/23 (!) 158.9 kg (350 lb 4.8 oz)   07/12/23 (!) 159.2 kg (351 lb)     BP Readings from Last 3 Encounters:   01/08/24 128/86   11/03/23 133/82   07/12/23 133/89         Current Outpatient Medications   Medication    albuterol (PROAIR HFA/PROVENTIL HFA/VENTOLIN HFA) 108 (90 Base) MCG/ACT inhaler    FLUoxetine (PROZAC) 40 MG capsule    fluticasone (FLONASE) 50 MCG/ACT nasal spray    Fluticasone-Umeclidin-Vilanterol (TRELEGY ELLIPTA) 200-62.5-25 MCG/ACT oral inhaler    losartan (COZAAR) 100 MG tablet    metoprolol succinate ER (TOPROL XL) 200 MG 24 hr tablet    multivitamin w/minerals (THERA-VIT-M) tablet    potassium chloride ER (K-TAB) 20 MEQ CR tablet    rosuvastatin (CRESTOR) 40 MG tablet    torsemide (DEMADEX) 20 MG tablet    traZODone (DESYREL) 100 MG tablet    warfarin ANTICOAGULANT (COUMADIN ANTICOAGULANT) 5 MG tablet     No current facility-administered medications for this visit.       Social History  "    Tobacco Use    Smoking status: Former     Packs/day: 1.00     Years: 53.00     Additional pack years: 0.00     Total pack years: 53.00     Types: Cigarettes     Quit date: 2019     Years since quittin.2    Smokeless tobacco: Never    Tobacco comments:     1 ppd   Vaping Use    Vaping Use: Never used   Substance Use Topics    Alcohol use: No     Comment: quit drinking heavily     Drug use: No       Health Maintenance Due   Topic Date Due    HF ACTION PLAN  Never done    COLORECTAL CANCER SCREENING  Never done    RSV VACCINE (Pregnancy & 60+) (1 - 1-dose 60+ series) Never done    EYE EXAM  2016    MEDICARE ANNUAL WELLNESS VISIT  2022    FALL RISK ASSESSMENT  2022    A1C  10/12/2023    CBC  2023       No results found for: \"PAP\"      2024 1:55 PM   "

## 2024-01-08 NOTE — PROGRESS NOTES
"Markie Shepard is a 73 year old male with a past medical history significant for legal blindness, morbid obesity, coronary artery disease status post PCI, permanent atrial fibrillation (on warfarin), sleep apnea (CPAP), significant prior smoking history, COPD, heart failure with preserved ejection fraction, and chronic venous stasis who presents for a medication check.     PRE-DIABETES  Diet controlled  Last eye exam -follows with vitreo retinal surgery. Macular degeneration, chorioretinitis, Legal blindness--no longer a candidate for injections  Peripheral neuropathy: no  Weight: unchanged  Stepper at home 3x per week, , resistance band, stretches  1000 steps but needing to stop 700-800  Wt Readings from Last 3 Encounters:   11/03/23 (!) 158.9 kg (350 lb 4.8 oz)   07/12/23 (!) 159.2 kg (351 lb)   05/30/23 (!) 158.5 kg (349 lb 8 oz)     Lab Results   Component Value Date    A1C 6.5 07/12/2023    A1C 6.4 12/26/2022    A1C 6.4 04/12/2021    A1C 6.1 02/17/2020     No results found for: \"MICROALBUMIN\"    DM Meds: diet controlled  Aspirin Use: none,on Warfarin for chronic afib  Surinder provides foot care  Wears compression hose due to lymphedema, had ABIs 2022, normal    Assistance at home, lives alone  Groceries, mail, grandpillo, Jayme comes every Monday  Other friends transport him to pharmacy, lunch/activities 3 x per week    COPD  Former smoker  Mixed restrictive obstructive pattern on PFTs  Trelegy 1 puff once daily  Albuterol --now using more consistently, in the past month or two. Uses med 2x-3 x per day. Can use at rest or before exercises  No nocturnal symptoms  \"100 day cough with some phlegm\" improving, no fever  He declines screening for lung cancer with low dose CT scanning    Mental health   Doing OK  On fluoxetine 40mg daily, feeling it is working well  Had been out of medication for several days due to insurance, cost, donut hole  Anxiety started to flare, improving since he resumed med  Using trazodone " for insomnia, working well      12/26/2022     2:56 PM 7/12/2023    10:58 AM 1/8/2024     1:50 PM   PHQ   PHQ-9 Total Score 1 2 4   Q9: Thoughts of better off dead/self-harm past 2 weeks Not at all Not at all Not at all         12/1/2021     3:53 PM 12/26/2022     2:59 PM 7/12/2023    10:58 AM   YAAKOV-[id number] SCORE   Total Score 3 2 1       Coronary artery disease/ progressive heart failure/chronic a fib   Hx of CAD, Lexiscan 2012 showed inferior infarct with moderate ischemia. Coronary angio showed occluded RCA with collaterals  Noted more shortness of breath  TTE 11/3/2023 demonstrated moderate LV dysfunction with akinesis of the inferior/inferoseptal walls, LVEF 35 to 40%, mild to moderate aortic stenosis, mild MR. , this was a dramatic change compared to 2021 ECHO when EF 55%  Change atenolol to metoprolol succinate, has some initial dizziness, no resolved  Increase losartan to 100mg  Continue torsemide, rosuvastatin  Reports occasional zinger chest pain, at rest or with exertion , but overall asympotmatic  Peter declined angiogram or invasive testing/provedures (Cardiology visit 11/2023)  No resting SOB or chest pain, in the past month, improving  No PND/ orthopnea, lies on one pillow  Has sleep apnea and uses CPAP    Atrial fibrillation  Chronic A fib, lifelong anticoagulation with Warfarin  Due for INR today      HYPERLIPIDEMIA  Recent Labs   Lab Test 11/03/23  1104 10/25/21  1119 04/12/21  1457 02/17/20  1601   CHOL 131 118   < > 152.0   HDL 52 52   < > 55.0   LDL 61 44   < > 74.0   TRIG 91 108   < > 116.0   CHOLHDLRATIO  --   --   --  2.8    < > = values in this interval not displayed.     LDL is in target range. Currently taking rosuvastatin 40mg daily. Compliant with med(s). No reported myalgias or other side effects.     HYPERTENSION  BP Readings from Last 3 Encounters:   01/08/24 128/86   11/03/23 133/82   07/12/23 133/89     Here for blood pressure check. He is currently on losartan 100mg daily, metoprolol  succinate XL 200mg daily, KCl 20meq bid, Torsemide, 20mg pill, takes 2 daily.  .   Denies current chest pain.  + LENZ. Blood pressure readings have  been in target range.     EXAM  /86 (BP Location: Left arm, Patient Position: Sitting, Cuff Size: Adult Large)   Pulse 62   Temp (!) 96.7  F (35.9  C) (Skin)   SpO2 96%   Gen: Alert, NAD, appears comfortable at rest  Cor: S1S2, irreg irreg, distant heart sounds.   Lungs: CTA bilaterally, no rhonchi / wheezes  Ext: +compression socks bilaterally +1 edema    Assessment:  (E11.9) Type 2 diabetes mellitus without complication, without long-term current use of insulin (H)  (primary encounter diagnosis)  Comment: A1c remains stable at 6.5%, diet controlled  Lab Results   Component Value Date    A1C 6.5 01/08/2024    A1C 6.5 07/12/2023    A1C 6.4 12/26/2022   Plan: Hemoglobin A1c, Lipid Profile, CBC with         platelets, Albumin Random Urine Quantitative         with Creat Ratio, CANCELED: FL FOOT EXAM NO         CHARGE        No need for medications at this time, recheck in 6 mos    (Z79.01) Long-term (current) use of anticoagulants [Z79.01]  Comment: hx of chronic afib, lifelong anticoagulation  Plan: INR        Check INR today    (I10) Essential hypertension with goal blood pressure less than 140/90  Comment: BP in target range   Plan: continue meds prescribed by cardiology    (I50.30) Heart failure with reduced ejection fraction, NYHA class II (H)  Comment: significant drop in EF from 55% to 35-40% in the past year, he experiences more shortness of breath with exertion. Wears compression hose, medications recently adjusted by cardiology. He reports gradual improvement in symptoms, no PND, orthopnea  Plan: Basic metabolic panel and BNP, ordered by cardiology.    Addendum: BNP very high though he appeared euvolemic in clinic .                     Marked proteinuria. He has normal kidney function, Cr 1.08/GFR 73                     Will message cardiologist about  consideration for Dapagliflozin (Farxiga)    RTC 6 months    Mali Patel MD  Internal Medicine/Pediatrics

## 2024-01-08 NOTE — LETTER
January 10, 2024      Markie Shepard  [address]  [facility] [address]        Dear Markie,     Enclosed are you lab results.      You are spilling protein in your urine. You might benefit from a medication called Dapagliflozin, which may help your kidneys and your heart failure, but I will send a note to your cardiologist to review.     You creatinine level  appears normal, which is good.  Your losartan dose was increased to 100mg in November which should help. I'd recheck numbers in 6 months.     Your A1c remains unchanged at 6.5%.     Your blood counts look fine, no anemia or worrisome findings.     Your cholesterol panel is in perfect range.  Continue rosuvastatin 40mg dose each day.     I'd like to see you back in clinic in 6 months.     Sincerely,   Mali Patel MD   Internal Medicine/Pediatrics     Resulted Orders   Hemoglobin A1c   Result Value Ref Range    Hemoglobin A1C 6.5 (H) 0.0 - 5.6 %      Comment:      Normal <5.7%   Prediabetes 5.7-6.4%    Diabetes 6.5% or higher     Note: Adopted from ADA consensus guidelines.   Lipid Profile   Result Value Ref Range    Cholesterol 132 <200 mg/dL    Triglycerides 127 <150 mg/dL    Direct Measure HDL 48 >=40 mg/dL    LDL Cholesterol Calculated 59 <=100 mg/dL    Non HDL Cholesterol 84 <130 mg/dL    Patient Fasting > 8hrs? No     Narrative    Cholesterol  Desirable:  <200 mg/dL    Triglycerides  Normal:  Less than 150 mg/dL  Borderline High:  150-199 mg/dL  High:  200-499 mg/dL  Very High:  Greater than or equal to 500 mg/dL    Direct Measure HDL  Female:  Greater than or equal to 50 mg/dL   Male:  Greater than or equal to 40 mg/dL    LDL Cholesterol  Desirable:  <100mg/dL  Above Desirable:  100-129 mg/dL   Borderline High:  130-159 mg/dL   High:  160-189 mg/dL   Very High:  >= 190 mg/dL    Non HDL Cholesterol  Desirable:  130 mg/dL  Above Desirable:  130-159 mg/dL  Borderline High:  160-189 mg/dL  High:  190-219 mg/dL  Very High:  Greater than or equal to  220 mg/dL   CBC with platelets   Result Value Ref Range    WBC Count 10.3 4.0 - 11.0 10e3/uL    RBC Count 5.01 4.40 - 5.90 10e6/uL    Hemoglobin 16.3 13.3 - 17.7 g/dL    Hematocrit 50.6 40.0 - 53.0 %     (H) 78 - 100 fL    MCH 32.5 26.5 - 33.0 pg    MCHC 32.2 31.5 - 36.5 g/dL    RDW 13.0 10.0 - 15.0 %    Platelet Count 163 150 - 450 10e3/uL   Albumin Random Urine Quantitative with Creat Ratio   Result Value Ref Range    Creatinine Urine mg/dL 17.5 mg/dL      Comment:      The reference ranges have not been established in urine creatinine. The results should be integrated into the clinical context for interpretation.    Albumin Urine mg/L 35.3 mg/L      Comment:      The reference ranges have not been established in urine albumin. The results should be integrated into the clinical context for interpretation.    Albumin Urine mg/g Cr 201.71 (H) 0.00 - 17.00 mg/g Cr      Comment:      Microalbuminuria is defined as an albumin:creatinine ratio of 17 to 299 for males and 25 to 299 for females. A ratio of albumin:creatinine of 300 or higher is indicative of overt proteinuria.  Due to biologic variability, positive results should be confirmed by a second, first-morning random or 24-hour timed urine specimen. If there is discrepancy, a third specimen is recommended. When 2 out of 3 results are in the microalbuminuria range, this is evidence for incipient nephropathy and warrants increased efforts at glucose control, blood pressure control, and institution of therapy with an angiotensin-converting-enzyme (ACE) inhibitor (if the patient can tolerate it).         If you have any questions or concerns, please call the clinic at the number listed above.

## 2024-01-08 NOTE — TELEPHONE ENCOUNTER
Health Call Center    Phone Message    May a detailed message be left on voicemail: yes     Reason for Call: Other: Pt is calling to report that the PTO BNP could not be completed today as he was told this test would not be covered by medicare.  Please call pt to discuss options.     Action Taken: Other: cardio    Travel Screening: Not Applicable    Thank you!  [facility]

## 2024-01-08 NOTE — TELEPHONE ENCOUNTER
Message left to return call - regarding N T Pro BNP draw today.     Message from Scheduling -    Pt is calling to report that the PTO BNP could not be completed today as he was told this test would not be covered by medicare     Phone note 11-27-23  from Dr. Lockett -   Overall results are stable from before, renal function is slightly worse than before but I think this is probably chronic for him and related to higher BPs and chronic diuretic therapy which he has needed for his CHF and management of the peripheral edema. Recommend BMP and NTproBNP with CLAUDIA visit in 2-3 months thanks!       Last Dr. Lockett OV 11-3-23   Assessment and Plan/Recommendations:  # HFrEF LVEF 35-40%, new diagnosis, prior TTE 6/16/2021 LVEF 55%.  Etiology likely ischemic, with known RCA .  Suspect progression of disease. Euvolemic. NYHA II. No angina.   # Mild to moderate aortic stenosis   # COPD, on inhaler therapy   # Chronic venous stasis  # CAD with RCA .  Stable, denies symptoms concerning for angina.  # Permanent atrial fibrillation, on warfarin.   # Morbid obesity  # EMORY  # Significant vision impairment, legally blind      -Discussed findings of the echocardiogram demonstrating new LV dysfunction.  Discussed options for further management, including general recommendation for ischemic evaluation with coronary angiography and possible intervention, versus stress testing for risk stratification.  Alternatively discussed a conservative strategy with medical management.  Discussed advantages/limitations, risk/benefits of each strategy at length.  Patient states that he feels generally quite well, and already has disabilities including legal blindness, weighing risks/benefits, and his overall outlook on his health, he would like to be conservative, he understands the risks with this.  - For now, we will change atenolol to metoprolol succinate, and will increase losartan to 100 mg daily  - Discussed Entresto, due to financial concerns he  would like to hold off  - Continue torsemide, rosuvastatin, potassium supplementation  - BMP in about 2 weeks  - Patient would like to follow-up in 4 months, will recheck labs at that time  - Advised to alert our team if he has any concerns in the interim

## 2024-01-09 ENCOUNTER — ANTICOAGULATION THERAPY VISIT (OUTPATIENT)
Dept: ANTICOAGULATION | Facility: CLINIC | Age: 74
End: 2024-01-09
Payer: COMMERCIAL

## 2024-01-09 ENCOUNTER — TELEPHONE (OUTPATIENT)
Dept: CARDIOLOGY | Facility: CLINIC | Age: 74
End: 2024-01-09
Payer: COMMERCIAL

## 2024-01-09 DIAGNOSIS — Z79.01 LONG TERM CURRENT USE OF ANTICOAGULANT THERAPY: Primary | ICD-10-CM

## 2024-01-09 DIAGNOSIS — I50.30 HEART FAILURE WITH PRESERVED EJECTION FRACTION, NYHA CLASS II (H): ICD-10-CM

## 2024-01-09 DIAGNOSIS — I25.10 CORONARY ARTERY DISEASE INVOLVING NATIVE CORONARY ARTERY OF NATIVE HEART WITHOUT ANGINA PECTORIS: Primary | ICD-10-CM

## 2024-01-09 DIAGNOSIS — I10 ESSENTIAL HYPERTENSION WITH GOAL BLOOD PRESSURE LESS THAN 140/90: ICD-10-CM

## 2024-01-09 DIAGNOSIS — I48.20 CHRONIC ATRIAL FIBRILLATION (H): ICD-10-CM

## 2024-01-09 LAB
ANION GAP SERPL CALCULATED.3IONS-SCNC: 10 MMOL/L (ref 7–15)
BUN SERPL-MCNC: 20.8 MG/DL (ref 8–23)
CALCIUM SERPL-MCNC: 9.9 MG/DL (ref 8.8–10.2)
CHLORIDE SERPL-SCNC: 101 MMOL/L (ref 98–107)
CHOLEST SERPL-MCNC: 132 MG/DL
CREAT SERPL-MCNC: 1.08 MG/DL (ref 0.67–1.17)
DEPRECATED HCO3 PLAS-SCNC: 29 MMOL/L (ref 22–29)
EGFRCR SERPLBLD CKD-EPI 2021: 73 ML/MIN/1.73M2
FASTING STATUS PATIENT QL REPORTED: NO
GLUCOSE SERPL-MCNC: 119 MG/DL (ref 70–99)
HDLC SERPL-MCNC: 48 MG/DL
LDLC SERPL CALC-MCNC: 59 MG/DL
NONHDLC SERPL-MCNC: 84 MG/DL
NT-PROBNP SERPL-MCNC: 2532 PG/ML (ref 0–900)
POTASSIUM SERPL-SCNC: 4.1 MMOL/L (ref 3.4–5.3)
SODIUM SERPL-SCNC: 140 MMOL/L (ref 135–145)
TRIGL SERPL-MCNC: 127 MG/DL

## 2024-01-09 NOTE — TELEPHONE ENCOUNTER
F/Up OV appointment placed for March or early April 2024 per Patient's request.     Spoke with Patient and NT Pro BNP result reviewed.  Patient agrees with CLAUDIA F/Up in March or early April.   Patient to call scheduling to arrange but also asks is scheduling can call also  as he is legally blind,  Message sent to scheduling.    Patient saw PCP yesterday and states he is doing well.     N T Pro BNP result 1-8-24 was reviewed by Dr. Marcos,     Last Dr. MARCOS OV 11-3-23  Patient would like to follow-up in 4 months, will recheck labs at that time

## 2024-01-09 NOTE — PROGRESS NOTES
ANTICOAGULATION MANAGEMENT     Markie Shepard 73 year old male is on warfarin with therapeutic INR result. (Goal INR 2.0-3.0)    Recent labs: (last 7 days)     01/08/24  1431   INR 2.60*       ASSESSMENT     Source(s): Chart Review and Patient/Caregiver Call     Warfarin doses taken: Warfarin taken as instructed  Diet: No new diet changes identified  Medication/supplement changes: None noted  New illness, injury, or hospitalization: No  Signs or symptoms of bleeding or clotting: No  Previous result: Therapeutic last 2(+) visits  Additional findings: None       PLAN     Recommended plan for no diet, medication or health factor changes affecting INR     Dosing Instructions: Continue your current warfarin dose with next INR in 8 weeks       Summary  As of 1/9/2024      Full warfarin instructions:  2.5 mg every Mon, Fri; 5 mg all other days   Next INR check:  3/4/2024               Telephone call with Markie who agrees to plan and repeated back plan correctly    Lab visit scheduled    Education provided:   Goal range and lab monitoring: goal range and significance of current result and Importance of following up at instructed interval  Contact [phone] with any changes, questions or concerns.     Plan made per [facility] anticoagulation protocol    TONYA GARY RN  Anticoagulation Clinic  1/9/2024    _______________________________________________________________________     Anticoagulation Episode Summary       Current INR goal:  2.0-3.0   TTR:  95.5% (1 y)   Target end date:  Indefinite   Send INR reminders to:  [facility]    Indications    Long-term (current) use of anticoagulants [Z79.01] [Z79.01]  Chronic atrial fibrillation (H) [I48.20]             Comments:               Anticoagulation Care Providers       Provider Role Specialty Phone number    Mali Patel MD Referring Internal Medicine [phone]

## 2024-01-09 NOTE — RESULT ENCOUNTER NOTE
Results reviewed, recommendation was made in November for the labs to be drawn with CLAUDIA follow up, no change to recommendations

## 2024-01-09 NOTE — TELEPHONE ENCOUNTER
----- Message from Andrea Lockett MD sent at 1/9/2024 11:13 AM CST -----  Results reviewed, recommendation was made in November for the labs to be drawn with CLAUDIA follow up, no change to recommendations

## 2024-01-10 ENCOUNTER — TELEPHONE (OUTPATIENT)
Dept: CARDIOLOGY | Facility: CLINIC | Age: 74
End: 2024-01-10
Payer: COMMERCIAL

## 2024-01-10 DIAGNOSIS — I50.30 HEART FAILURE WITH PRESERVED EJECTION FRACTION, NYHA CLASS II (H): Primary | ICD-10-CM

## 2024-01-10 RX ORDER — DAPAGLIFLOZIN 5 MG/1
TABLET, FILM COATED ORAL
Qty: 40 TABLET | Refills: 3 | Status: SHIPPED | OUTPATIENT
Start: 2024-01-10 | End: 2024-03-18

## 2024-01-10 NOTE — TELEPHONE ENCOUNTER
Reply from Dr. Lockett:  We can start farxiga 10mg daily, but for the first week I would recommend he just do 5mg daily to see if it is well tolerated. Please let him know the importance of good hygiene/daily showers if he is on this, due to the risk of severe  infection and UTI since he will be urinating a lot of glucose. If he is concerned about this, I would recommend we not start this medication. If he is agreeable, then we should recheck a BMP in 2 weeks. He should monitor for hypotension while on this      Farxiga has 2 sizes: 5mg and 10mg, pills should not be broken.    1508 called patient to discuss update from Dr. Lockett. Patient is willing to try the medication. Ordering #40 tabs: 7 days of 5mg and then change to 2 tabs daily for 10mg dose.  Rx escripted  Patient will call his local clinic @ [facility] to setup a bmp. He has a ride on 1/29/2024 (patient is legally blind).  Bmp order placed    Patient does not have a BP cuff at home due to his sight issues. He will call if he has problems with lightheadedness or dizziness.

## 2024-01-10 NOTE — TELEPHONE ENCOUNTER
----- Message from Andrea Lockett MD sent at 1/10/2024  7:37 AM CST -----  Regarding: FW: follow up  Hi could we please look into coverage for SGLT2i? Thanks!  ----- Message -----  From: Mali Patel MD  Sent: 1/9/2024   8:36 PM CST  To: Andrea Lockett MD  Subject: follow up                                        I saw Markie in clinic yesterday, he reported some decreased exercise tolerance, 700 steps instead of his usual 1000.  Overall, he looked euvolemic, lungs clear. He reports no orthopnea.    I saw his BNP was high. Creatinine looked good!    Surprisingly, his A1c remains unchanged, 6.5% on no meds.  However he has heart failure and proteinuria.    Wondering if we should consider Dapagliflozin (Farxiga) if no significant drug interactions.     He is on a limited budget and admitted to me he went without some COPD meds due to wanting the donut hole reset Jan 1. So not sure it would be affordable.     Thanks    Mali

## 2024-01-10 NOTE — TELEPHONE ENCOUNTER
What dose for Farxiga to start Patient on.?   Patient would nancie to start with a 30 day quanity with refills.     Per Cover My Med's messaged back regarding cost the both Jardiance or Farxiga. Both equal in cost.     Spoke with Patient  regarding Farxiga .  Patient agrees with starting Farxiga if Dr. Lockett thinks it will help him.    [facility].     See thread -     Last Dr. Lockett OV 11-3-23 -   Assessment and Plan/Recommendations:     # HFrEF LVEF 35-40%, new diagnosis, prior TTE 6/16/2021 LVEF 55%.  Etiology likely ischemic, with known RCA .  Suspect progression of disease. Euvolemic. NYHA II. No angina.   # Mild to moderate aortic stenosis   # COPD, on inhaler therapy   # Chronic venous stasis  # CAD with RCA .  Stable, denies symptoms concerning for angina.  # Permanent atrial fibrillation, on warfarin.   # Morbid obesity  # EMORY  # Significant vision impairment, legally blind      -Discussed findings of the echocardiogram demonstrating new LV dysfunction.  Discussed options for further management, including general recommendation for ischemic evaluation with coronary angiography and possible intervention, versus stress testing for risk stratification.  Alternatively discussed a conservative strategy with medical management.  Discussed advantages/limitations, risk/benefits of each strategy at length.  Patient states that he feels generally quite well, and already has disabilities including legal blindness, weighing risks/benefits, and his overall outlook on his health, he would like to be conservative, he understands the risks with this.  - For now, we will change atenolol to metoprolol succinate, and will increase losartan to 100 mg daily  - Discussed Entresto, due to financial concerns he would like to hold off  - Continue torsemide, rosuvastatin, potassium supplementation  - BMP in about 2 weeks  - Patient would like to follow-up in 4 months, will recheck labs at that time

## 2024-01-10 NOTE — TELEPHONE ENCOUNTER
Message from  1-10-24  could we please look into coverage for SGLT2  ?   ...However he has heart failure and proteinuria.     Wondering if we should consider Dapagliflozin (Farxiga) if no significant drug interactions.      He is on a limited budget and admitted to me he went without some COPD meds due to wanting the donut hole reset Jan 1. So not sure it would be affordable.

## 2024-01-15 ENCOUNTER — NURSE TRIAGE (OUTPATIENT)
Dept: CARDIOLOGY | Facility: CLINIC | Age: 74
End: 2024-01-15

## 2024-01-15 NOTE — TELEPHONE ENCOUNTER
"Received a direct call from Markie who says he was recently started on farxiga, and is going to the dentist a week from today, and says there will likely be \"some drilling\". He is wondering if there is anything he needs to know about taking this medication and going to the dentist.  Advised there are no special precautions and to take as usual. He says he is taking warfarin and knows to contact his PCP with questions regarding this and mentions \"he has been to the dentist many times while on warfarin\" and \"knows what to do\".  He verbalized agreement and understanding and has no further questions.      "

## 2024-01-17 ENCOUNTER — TELEPHONE (OUTPATIENT)
Dept: FAMILY MEDICINE | Facility: CLINIC | Age: 74
End: 2024-01-17

## 2024-01-17 NOTE — TELEPHONE ENCOUNTER
"Markie called because he received a letter from [employer] \"saying my Albuterol is wrong.\"  Reports he has current supply which will last until mid-February.  [facility] would like his pharmacy to order \"Albuterol code #[id number],\" in the future as his current Rx is no longer in formulary.    Called [facility] at [address] [place] [address] in [place] at [phone] and spoke with Aliza  who attempted to refill the inhaler to see if it would go through. It was approved with a $23 copay due at [facility].    Called Markie back to let him know he has a refill available at [facility]. He will pick it up when able.    AUNG Marie, RN  01/17/24, 11:22 AM    "

## 2024-01-29 ENCOUNTER — LAB (OUTPATIENT)
Dept: LAB | Facility: CLINIC | Age: 74
End: 2024-01-29
Attending: INTERNAL MEDICINE
Payer: COMMERCIAL

## 2024-01-29 DIAGNOSIS — I50.30 HEART FAILURE WITH PRESERVED EJECTION FRACTION, NYHA CLASS II (H): ICD-10-CM

## 2024-01-29 LAB
ANION GAP SERPL CALCULATED.3IONS-SCNC: 12 MMOL/L (ref 7–15)
BUN SERPL-MCNC: 22.9 MG/DL (ref 8–23)
CALCIUM SERPL-MCNC: 9.8 MG/DL (ref 8.8–10.2)
CHLORIDE SERPL-SCNC: 103 MMOL/L (ref 98–107)
CREAT SERPL-MCNC: 1.25 MG/DL (ref 0.67–1.17)
DEPRECATED HCO3 PLAS-SCNC: 28 MMOL/L (ref 22–29)
EGFRCR SERPLBLD CKD-EPI 2021: 61 ML/MIN/1.73M2
GLUCOSE SERPL-MCNC: 140 MG/DL (ref 70–99)
POTASSIUM SERPL-SCNC: 3.9 MMOL/L (ref 3.4–5.3)
SODIUM SERPL-SCNC: 143 MMOL/L (ref 135–145)

## 2024-01-29 PROCEDURE — 36415 COLL VENOUS BLD VENIPUNCTURE: CPT

## 2024-01-29 PROCEDURE — 80048 BASIC METABOLIC PNL TOTAL CA: CPT

## 2024-02-05 ENCOUNTER — TELEPHONE (OUTPATIENT)
Dept: CARDIOLOGY | Facility: CLINIC | Age: 74
End: 2024-02-05
Payer: COMMERCIAL

## 2024-02-05 DIAGNOSIS — I25.10 CORONARY ARTERY DISEASE INVOLVING NATIVE CORONARY ARTERY OF NATIVE HEART WITHOUT ANGINA PECTORIS: Primary | ICD-10-CM

## 2024-02-05 DIAGNOSIS — I10 ESSENTIAL HYPERTENSION WITH GOAL BLOOD PRESSURE LESS THAN 140/90: ICD-10-CM

## 2024-02-05 NOTE — TELEPHONE ENCOUNTER
Spoke with Patient and BMP results reviewed. Patient agrees with repeat in about 1 month.  Orders placed.  [facility] has a [facility] on [facility]24 and will ask about having the BMP done also at that time.   Patient agrees with plan.     ---- Message from Andrea Lockett MD sent at 2/4/2024  3:59 PM CST -----  Results reviewed, stable, recommend BMP at follow up visit in a month thanks    BMP 1-29 24    Next CLAUDIA OV 3-18-24.

## 2024-02-21 DIAGNOSIS — I48.20 CHRONIC ATRIAL FIBRILLATION (H): ICD-10-CM

## 2024-02-21 DIAGNOSIS — I50.33 ACUTE ON CHRONIC HEART FAILURE WITH PRESERVED EJECTION FRACTION (H): ICD-10-CM

## 2024-02-21 RX ORDER — WARFARIN SODIUM 5 MG/1
TABLET ORAL
Qty: 100 TABLET | Refills: 1 | Status: SHIPPED | OUTPATIENT
Start: 2024-02-21 | End: 2024-08-16

## 2024-02-21 RX ORDER — TORSEMIDE 20 MG/1
TABLET ORAL
Qty: 360 TABLET | Refills: 1 | Status: SHIPPED | OUTPATIENT
Start: 2024-02-21 | End: 2024-08-21

## 2024-02-21 NOTE — TELEPHONE ENCOUNTER
ANTICOAGULATION MANAGEMENT:  Medication Refill    Anticoagulation Summary  As of 1/9/2024      Warfarin maintenance plan:  2.5 mg (5 mg x 0.5) every Mon, Fri; 5 mg (5 mg x 1) all other days   Next INR check:  3/4/2024   Target end date:  Indefinite    Indications    Long-term (current) use of anticoagulants [Z79.01] [Z79.01]  Chronic atrial fibrillation (H) [I48.20]                 Anticoagulation Care Providers       Provider Role Specialty Phone number    Mali Patel MD Referring Internal Medicine [phone]            Refill Criteria    Visit with referring provider/group: Meets criteria: office visit within referring provider group in the last 1 year on 1/8/24    [facility] referral signed last signed: 11/09/2023; within last year: Yes    Lab monitoring not exceeding 2 weeks overdue: Yes    Markie meets all criteria for refill. Rx instructions and quantity supplied updated to match patient's current dosing plan. Warfarin 90 day supply with 1 refill granted per [facility] protocol     TONYA GARY RN  Anticoagulation Clinic

## 2024-02-21 NOTE — TELEPHONE ENCOUNTER
Received refill request from [facility] pharmacy for Torsemide 20mg tab, take 2 tabs am and afternoon.    Last OV  11\3\24   Dr. Andrea Lockett  Last labwork   1\29\24  K  3.9   creat 1.25    [facility] Refill Guideline reviewed.  Medication meets criteria for refill.    Darlene Watson, RN on 2/21/2024 at 4:00 PM

## 2024-02-25 ENCOUNTER — TELEPHONE (OUTPATIENT)
Dept: NURSING | Facility: CLINIC | Age: 74
End: 2024-02-25
Payer: COMMERCIAL

## 2024-02-25 NOTE — TELEPHONE ENCOUNTER
Pt calling with concerns about;     [facility] did not get RX for Torsemide   Pt is currently out of this medication    Writer called pharmacy to give verbal ok to fill per Andrea Lockett     Pharmacist, Reynaldo will fill now.    Pt is notified.    Grace Clarke RN, Nurse Advisor 12:35 PM 2/25/2024

## 2024-03-04 ENCOUNTER — ANTICOAGULATION THERAPY VISIT (OUTPATIENT)
Dept: ANTICOAGULATION | Facility: CLINIC | Age: 74
End: 2024-03-04

## 2024-03-04 ENCOUNTER — LAB (OUTPATIENT)
Dept: LAB | Facility: CLINIC | Age: 74
End: 2024-03-04
Payer: COMMERCIAL

## 2024-03-04 DIAGNOSIS — I48.20 CHRONIC ATRIAL FIBRILLATION (H): ICD-10-CM

## 2024-03-04 DIAGNOSIS — I50.30 HEART FAILURE WITH PRESERVED EJECTION FRACTION, NYHA CLASS II (H): ICD-10-CM

## 2024-03-04 DIAGNOSIS — I10 ESSENTIAL HYPERTENSION WITH GOAL BLOOD PRESSURE LESS THAN 140/90: ICD-10-CM

## 2024-03-04 DIAGNOSIS — Z79.01 LONG TERM CURRENT USE OF ANTICOAGULANT THERAPY: Primary | ICD-10-CM

## 2024-03-04 DIAGNOSIS — I25.10 CORONARY ARTERY DISEASE INVOLVING NATIVE CORONARY ARTERY OF NATIVE HEART WITHOUT ANGINA PECTORIS: ICD-10-CM

## 2024-03-04 LAB
ANION GAP SERPL CALCULATED.3IONS-SCNC: 12 MMOL/L (ref 7–15)
BUN SERPL-MCNC: 27.4 MG/DL (ref 8–23)
CALCIUM SERPL-MCNC: 10 MG/DL (ref 8.8–10.2)
CHLORIDE SERPL-SCNC: 102 MMOL/L (ref 98–107)
CREAT SERPL-MCNC: 1.42 MG/DL (ref 0.67–1.17)
DEPRECATED HCO3 PLAS-SCNC: 30 MMOL/L (ref 22–29)
EGFRCR SERPLBLD CKD-EPI 2021: 53 ML/MIN/1.73M2
GLUCOSE SERPL-MCNC: 112 MG/DL (ref 70–99)
INR BLD: 3.9 (ref 0.9–1.1)
POTASSIUM SERPL-SCNC: 4.4 MMOL/L (ref 3.4–5.3)
SODIUM SERPL-SCNC: 144 MMOL/L (ref 135–145)

## 2024-03-04 PROCEDURE — 80048 BASIC METABOLIC PNL TOTAL CA: CPT

## 2024-03-04 PROCEDURE — 85610 PROTHROMBIN TIME: CPT

## 2024-03-04 PROCEDURE — 36415 COLL VENOUS BLD VENIPUNCTURE: CPT

## 2024-03-04 NOTE — PROGRESS NOTES
ANTICOAGULATION MANAGEMENT     Markie Shepard 73 year old male is on warfarin with supratherapeutic INR result. (Goal INR 2.0-3.0)    Recent labs: (last 7 days)     03/04/24  1122   INR 3.9*       ASSESSMENT     Source(s): Chart Review and Patient/Caregiver Call     Warfarin doses taken: Warfarin taken as instructed  Diet: No new diet changes identified  Medication/supplement changes: None noted  New illness, injury, or hospitalization: No  Signs or symptoms of bleeding or clotting: No  Previous result: Therapeutic last 2(+) visits  Additional findings:  Markie has no s/s of bleeding or bruising. He was been stable on this dosing for quite some time and does not want to make any changes to his maintenance dose at this time. He will hold today and recheck in 2 weeks. If he is still elevated, maintenance dose will need to be decreased going forward.        PLAN     Recommended plan for no diet, medication or health factor changes affecting INR     Dosing Instructions: hold dose then continue your current warfarin dose with next INR in 2 weeks       Summary  As of 3/4/2024      Full warfarin instructions:  3/4: Hold; Otherwise 2.5 mg every Mon, Fri; 5 mg all other days   Next INR check:  3/18/2024               Telephone call with Markie who agrees to plan and repeated back plan correctly    Check at provider office visit-INR added to lab note    Education provided:   Taking warfarin: Importance of taking warfarin as instructed  Goal range and lab monitoring: goal range and significance of current result and Importance of following up at instructed interval  Symptom monitoring: monitoring for bleeding signs and symptoms and when to seek medical attention/emergency care  Contact [phone] with any changes, questions or concerns.     Plan made per [facility] anticoagulation protocol    TONYA GARY, JAQUI  Anticoagulation Clinic  3/4/2024    _______________________________________________________________________      Anticoagulation Episode Summary       Current INR goal:  2.0-3.0   TTR:  84.9% (1 y)   Target end date:  Indefinite   Send INR reminders to:  [facility]    Indications    Long-term (current) use of anticoagulants [Z79.01] [Z79.01]  Chronic atrial fibrillation (H) [I48.20]             Comments:               Anticoagulation Care Providers       Provider Role Specialty Phone number    Mali Patel MD Referring Internal Medicine [phone]

## 2024-03-05 ENCOUNTER — TELEPHONE (OUTPATIENT)
Dept: CARDIOLOGY | Facility: CLINIC | Age: 74
End: 2024-03-05
Payer: COMMERCIAL

## 2024-03-05 DIAGNOSIS — I50.30 HEART FAILURE WITH PRESERVED EJECTION FRACTION, NYHA CLASS II (H): Primary | ICD-10-CM

## 2024-03-05 NOTE — RESULT ENCOUNTER NOTE
Results reviewed, recommend stopping Farxiga, repeat BMP at follow up with Wen ALEJADNRE 3/18 thanks

## 2024-03-05 NOTE — TELEPHONE ENCOUNTER
Message from Dr. Lockett re: Andrea Pope MD [facility] Heart Team 2  Cc: Lucero, Wen Scales PA-C  Results reviewed, recommend stopping Farxiga, repeat BMP at follow up with Wen ALEJANDRE 3/18    [id number] attempted to contact patient to review bmp results and Dr. Lockett's recommendation to stop farxiga and check bmp before OV on 3/18/2024. Left message for patient to call back to Team 2 R.N.s @ [phone] [id number] spoke with patient. He will hold the Farxiga for now.   Discussed bmp plan: patient is legally blind, transportation is done by his grandson who drives from [place] - Monday appointments only.    Message to scheduling to work on finding a spot on 3/18 before the OV but after 10 due to his transportation issues. He also want to have his INR drawn on the 18th. His INR team is sending an order.

## 2024-03-18 ENCOUNTER — ANTICOAGULATION THERAPY VISIT (OUTPATIENT)
Dept: ANTICOAGULATION | Facility: CLINIC | Age: 74
End: 2024-03-18

## 2024-03-18 ENCOUNTER — LAB (OUTPATIENT)
Dept: LAB | Facility: CLINIC | Age: 74
End: 2024-03-18
Payer: COMMERCIAL

## 2024-03-18 ENCOUNTER — OFFICE VISIT (OUTPATIENT)
Dept: CARDIOLOGY | Facility: CLINIC | Age: 74
End: 2024-03-18
Payer: COMMERCIAL

## 2024-03-18 VITALS
OXYGEN SATURATION: 100 % | SYSTOLIC BLOOD PRESSURE: 100 MMHG | HEART RATE: 65 BPM | BODY MASS INDEX: 45.1 KG/M2 | RESPIRATION RATE: 25 BRPM | HEIGHT: 70 IN | WEIGHT: 315 LBS | DIASTOLIC BLOOD PRESSURE: 60 MMHG

## 2024-03-18 DIAGNOSIS — I48.20 CHRONIC ATRIAL FIBRILLATION (H): ICD-10-CM

## 2024-03-18 DIAGNOSIS — Z79.01 LONG TERM CURRENT USE OF ANTICOAGULANT THERAPY: Primary | ICD-10-CM

## 2024-03-18 DIAGNOSIS — I48.20 CHRONIC ATRIAL FIBRILLATION (H): Primary | ICD-10-CM

## 2024-03-18 DIAGNOSIS — I10 ESSENTIAL HYPERTENSION WITH GOAL BLOOD PRESSURE LESS THAN 140/90: ICD-10-CM

## 2024-03-18 DIAGNOSIS — I25.10 CORONARY ARTERY DISEASE INVOLVING NATIVE CORONARY ARTERY OF NATIVE HEART WITHOUT ANGINA PECTORIS: ICD-10-CM

## 2024-03-18 DIAGNOSIS — I50.30 HEART FAILURE WITH PRESERVED EJECTION FRACTION, NYHA CLASS II (H): ICD-10-CM

## 2024-03-18 DIAGNOSIS — I50.23 ACUTE ON CHRONIC SYSTOLIC HEART FAILURE (H): ICD-10-CM

## 2024-03-18 LAB
ANION GAP SERPL CALCULATED.3IONS-SCNC: 10 MMOL/L (ref 7–15)
BUN SERPL-MCNC: 21.9 MG/DL (ref 8–23)
CALCIUM SERPL-MCNC: 9.9 MG/DL (ref 8.8–10.2)
CHLORIDE SERPL-SCNC: 100 MMOL/L (ref 98–107)
CREAT SERPL-MCNC: 1.22 MG/DL (ref 0.67–1.17)
DEPRECATED HCO3 PLAS-SCNC: 29 MMOL/L (ref 22–29)
EGFRCR SERPLBLD CKD-EPI 2021: 63 ML/MIN/1.73M2
GLUCOSE SERPL-MCNC: 137 MG/DL (ref 70–99)
INR PPP: 2.87 (ref 0.85–1.15)
POTASSIUM SERPL-SCNC: 4.4 MMOL/L (ref 3.4–5.3)
SODIUM SERPL-SCNC: 139 MMOL/L (ref 135–145)

## 2024-03-18 PROCEDURE — 99215 OFFICE O/P EST HI 40 MIN: CPT | Performed by: PHYSICIAN ASSISTANT

## 2024-03-18 PROCEDURE — 80048 BASIC METABOLIC PNL TOTAL CA: CPT | Performed by: INTERNAL MEDICINE

## 2024-03-18 PROCEDURE — 36415 COLL VENOUS BLD VENIPUNCTURE: CPT | Performed by: INTERNAL MEDICINE

## 2024-03-18 PROCEDURE — 93000 ELECTROCARDIOGRAM COMPLETE: CPT | Performed by: PHYSICIAN ASSISTANT

## 2024-03-18 PROCEDURE — 85610 PROTHROMBIN TIME: CPT | Performed by: PHYSICIAN ASSISTANT

## 2024-03-18 RX ORDER — SPIRONOLACTONE 25 MG/1
12.5 TABLET ORAL DAILY
Qty: 45 TABLET | Refills: 3 | Status: SHIPPED | OUTPATIENT
Start: 2024-03-18

## 2024-03-18 RX ORDER — POTASSIUM CHLORIDE 1500 MG/1
20 TABLET, EXTENDED RELEASE ORAL DAILY
Qty: 90 TABLET | Refills: 3 | Status: SHIPPED | OUTPATIENT
Start: 2024-03-18

## 2024-03-18 NOTE — LETTER
3/18/2024    Mali Patel MD  [address] [facility] [address]  [facility] [address]    RE: Markie Shepard       Dear Colleague,     I had the pleasure of seeing Markie Shepard in the [facility].    Cardiology Clinic Progress Note    Service Date: 2024    Primary Cardiologist: Dr. Lockett      Reason for Visit: HFrEF    HPI:   Markie Shepard is a pleasant 74-year-old gentleman with past medical history significant for the followin.  Heart failure with with reduced ejection fraction EF 35 to 40%, historically has been preserved ejection fraction, patient presents for conservative management when seen by Dr. Navarrete   2.  Coronary disease with known occluded RCA  3.  Permanent A-fib on warfarin  4.  Sleep apnea  5.  Hypertension  6.  Obesity  7.  Legally blind  8.  COPD    Markie comes in today for follow-up of these events.  He states that on Farxiga he felt terrible would cause lightheadedness dizziness and overall unwell.  He did not have any falls but he had some closer calls.  He took his last dose 3 6 under her direction and has been feeling better since then.  He has brief short pains that pain through his chest that do not even happen every day and are short.  They are not with exertion.  But he does develop acute sudden onset shortness of breath that improves if he sits forward or moves around.  This lasts just 5 to 15 seconds but makes him feel very panicky.  This happens 3-4 times a day.  He denies bo chest pain or chest tightness.  Overall he thinks he is almost back to his baseline.  He intermittently feels like he is retaining fluid he does not particularly feel that way today.  He does urinate frequently and takes his medications as directed.    Social History:  Comes in today with his grandson Jayme who helps him with medications and if needs be.  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   "Being legally blind he misses this greatly.    Physical Exam:  /60   Pulse 65   Resp 25   Ht 1.778 m (5' 10\")   Wt (!) 159.2 kg (351 lb)   SpO2 100%   BMI 50.36 kg/m     Well-developed well-nourished gentleman in no acute distress.  Normocephalic atraumatic.  Heart is irregularly irregular, I do not appreciate murmur rub or gallop.  Lungs are clear without wheezes rales or rhonchi.  Trace peripheral edema.  Venous stasis skin changes noted.    Data reviewed:  Echocardiogram, last BMP has N-terminal proBNP    EKG today shows A-fib with left bundle branch block and frequent PVCs.    Assessment and Plan:  1.  Heart failure with reduced ejection fraction EF 35 to 40% for previously normal likely ischemic.  We again discussed the option of angiogram versus conservative management and he continues to want to pursue her conservative management.  Farxiga actually made him feel worse and he is just returned to his baseline.  I am unclear if his shortness of breath represents short COPD exacerbations, angina or other but given their brief could also be volume.  I will have him start spironolactone both for reverse remodeling, hold on the potassium and treatment of his heart failure.  This will just be 12.5 mg daily.  Also get pricing on Entresto and if this is affordable I would stop his losartan and put him on mid dose Entresto.  He will need a BMP again in about 1 week and again in 1 month if he gets a INR done again in 1 week we repeated then otherwise given he does not drive I think it is reasonable with normal kidney function just repeat a BMP in 1 month.  He otherwise would be a candidate potentially for a BiV pacemaker that may help with reverse remodeling but of course would want angiogram first at this point we will start with medications.    2.  Coronary artery disease with occluded RCA, nonocclusive disease in the remainder of his coronary tree as above conservative management    3.  Permanent A-fib on " warfarin rates appear controlled with a heart rate of 75 today    4.  COPD on inhalers and dedicated to that    5.  Sleep apnea, treated.    6.  Mild mitral regurg mild to moderate aortic stenosis.  Will continue to follow with serial echoes.    It is my great pleasure to participate in this delightful patient's care, we will follow-up in 1 month with repeat echocardiogram, BMP and visit with Dr. Lockett.  He will call sooner with concerns.    Wen Barker PA-C  [facility]     This note was written using Dragon voice recognition system, please excuse any misspelled names, or nonsensical words and call with any questions.      45 total minutes was spent today including chart review, precharting, history and exam, post visit documentation, and reviewing studies as outlined above.   Orders this visit:  Orders Placed This Encounter   Procedures    INR    Basic metabolic panel    Basic metabolic panel    Follow-Up with Cardiology    EKG 12-lead complete w/read - Clinics (performed today)    Echocardiogram Complete     Orders Placed This Encounter   Medications    spironolactone (ALDACTONE) 25 MG tablet     Sig: Take 0.5 tablets (12.5 mg) by mouth daily     Dispense:  45 tablet     Refill:  3    potassium chloride ER (K-TAB) 20 MEQ CR tablet     Sig: Take 1 tablet (20 mEq) by mouth daily     Dispense:  90 tablet     Refill:  3     Medications Discontinued During This Encounter   Medication Reason    dapagliflozin (FARXIGA) 5 MG TABS tablet     potassium chloride ER (K-TAB) 20 MEQ CR tablet      Encounter Diagnoses   Name Primary?    Coronary artery disease involving native coronary artery of native heart without angina pectoris     Essential hypertension with goal blood pressure less than 140/90     Heart failure with preserved ejection fraction, NYHA class II (H)     Chronic atrial fibrillation (H) Yes    Acute on chronic systolic heart failure (H)        Current Medications:  Current Outpatient Medications    Medication Sig Dispense Refill    albuterol (PROAIR HFA/PROVENTIL HFA/VENTOLIN HFA) 108 (90 Base) MCG/ACT inhaler Inhale 2 puffs into the lungs every 4 hours as needed for shortness of breath or wheezing 18 g 5    FLUoxetine (PROZAC) 40 MG capsule Take 1 capsule (40 mg) by mouth daily 90 capsule 1    fluticasone (FLONASE) 50 MCG/ACT nasal spray Spray 1 spray into both nostrils daily 9.9 mL 3    Fluticasone-Umeclidin-Vilanterol (TRELEGY ELLIPTA) 200-62.5-25 MCG/ACT oral inhaler Inhale 1 puff into the lungs daily 60 each 11    losartan (COZAAR) 100 MG tablet Take 1 tablet (100 mg) by mouth daily 90 tablet 3    metoprolol succinate ER (TOPROL XL) 200 MG 24 hr tablet Take 1 tablet (200 mg) by mouth daily 90 tablet 3    multivitamin w/minerals (THERA-VIT-M) tablet Take 1 tablet by mouth daily Century 55+      potassium chloride ER (K-TAB) 20 MEQ CR tablet Take 1 tablet (20 mEq) by mouth daily 90 tablet 3    rosuvastatin (CRESTOR) 40 MG tablet Take 1 tablet (40 mg) by mouth at bedtime 90 tablet 3    spironolactone (ALDACTONE) 25 MG tablet Take 0.5 tablets (12.5 mg) by mouth daily 45 tablet 3    torsemide (DEMADEX) 20 MG tablet Take 2 tablets (40 mg) by mouth every morning AND 2 tablets (40 mg) daily (with lunch). 360 tablet 1    traZODone (DESYREL) 100 MG tablet Take 1 tablet (100 mg) by mouth nightly as needed for sleep 90 tablet 1    warfarin ANTICOAGULANT (COUMADIN ANTICOAGULANT) 5 MG tablet Take 2.5 mg on Mondays and Fridays, and 5 mg all other days, or as directed by Coumadin nurse. 100 tablet 1       Allergies Reviewed and Updated:  Allergies   Allergen Reactions    Lisinopril Cough     Pt had cough    Seasonal Allergies     Zolpidem Tartrate Other (See Comments)     Parasomnia with sleep walking, injuries, delusions.  May have been in DTs at the same time.       Review of Systems:  Skin:  not assessed     Eyes:  not assessed    ENT:  not assessed    Respiratory:  Positive for dyspnea on exertion;shortness of  breath;sleep apnea;CPAP  Cardiovascular:    Positive for;edema;palpitations;chest pain  Gastroenterology: not assessed    Genitourinary:  not assessed    Musculoskeletal:  not assessed    Neurologic:  Negative    Psychiatric:  not assessed    Heme/Lymph/Imm:  not assessed    Endocrine:  not assessed       Pertinent Past Medical, Surgical and Family History Reviewed and noted above.     CC  Andrea Lockett MD  [address]  [place] [address]      Thank you for allowing me to participate in the care of your patient.      Sincerely,     LICHA Sequeira [facility]

## 2024-03-18 NOTE — PROGRESS NOTES
"  Cardiology Clinic Progress Note    Service Date: 2024    Primary Cardiologist: Dr. Lockett      Reason for Visit: HFrEF    HPI:   Markie Shepard is a pleasant 74-year-old gentleman with past medical history significant for the followin.  Heart failure with with reduced ejection fraction EF 35 to 40%, historically has been preserved ejection fraction, patient presents for conservative management when seen by Dr. Navarrete   2.  Coronary disease with known occluded RCA  3.  Permanent A-fib on warfarin  4.  Sleep apnea  5.  Hypertension  6.  Obesity  7.  Legally blind  8.  COPD    Markie comes in today for follow-up of these events.  He states that on Farxiga he felt terrible would cause lightheadedness dizziness and overall unwell.  He did not have any falls but he had some closer calls.  He took his last dose 3 6 under her direction and has been feeling better since then.  He has brief short pains that pain through his chest that do not even happen every day and are short.  They are not with exertion.  But he does develop acute sudden onset shortness of breath that improves if he sits forward or moves around.  This lasts just 5 to 15 seconds but makes him feel very panicky.  This happens 3-4 times a day.  He denies bo chest pain or chest tightness.  Overall he thinks he is almost back to his baseline.  He intermittently feels like he is retaining fluid he does not particularly feel that way today.  He does urinate frequently and takes his medications as directed.    Social History:  Comes in today with his grandson Jayme who helps him with medications and if needs be.  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    Physical Exam:  /60   Pulse 65   Resp 25   Ht 1.778 m (5' 10\")   Wt (!) 159.2 kg (351 lb)   SpO2 100%   BMI 50.36 kg/m     Well-developed well-nourished " gentleman in no acute distress.  Normocephalic atraumatic.  Heart is irregularly irregular, I do not appreciate murmur rub or gallop.  Lungs are clear without wheezes rales or rhonchi.  Trace peripheral edema.  Venous stasis skin changes noted.    Data reviewed:  Echocardiogram, last BMP has N-terminal proBNP    EKG today shows A-fib with left bundle branch block and frequent PVCs.    Assessment and Plan:  1.  Heart failure with reduced ejection fraction EF 35 to 40% for previously normal likely ischemic.  We again discussed the option of angiogram versus conservative management and he continues to want to pursue her conservative management.  Farxiga actually made him feel worse and he is just returned to his baseline.  I am unclear if his shortness of breath represents short COPD exacerbations, angina or other but given their brief could also be volume.  I will have him start spironolactone both for reverse remodeling, hold on the potassium and treatment of his heart failure.  This will just be 12.5 mg daily.  Also get pricing on Entresto and if this is affordable I would stop his losartan and put him on mid dose Entresto.  He will need a BMP again in about 1 week and again in 1 month if he gets a INR done again in 1 week we repeated then otherwise given he does not drive I think it is reasonable with normal kidney function just repeat a BMP in 1 month.  He otherwise would be a candidate potentially for a BiV pacemaker that may help with reverse remodeling but of course would want angiogram first at this point we will start with medications.    2.  Coronary artery disease with occluded RCA, nonocclusive disease in the remainder of his coronary tree as above conservative management    3.  Permanent A-fib on warfarin rates appear controlled with a heart rate of 75 today    4.  COPD on inhalers and dedicated to that    5.  Sleep apnea, treated.    6.  Mild mitral regurg mild to moderate aortic stenosis.  Will continue  to follow with serial echoes.    It is my great pleasure to participate in this delightful patient's care, we will follow-up in 1 month with repeat echocardiogram, BMP and visit with Dr. Lockett.  He will call sooner with concerns.    Wen Barker PA-C  [facility]     This note was written using Dragon voice recognition system, please excuse any misspelled names, or nonsensical words and call with any questions.      45 total minutes was spent today including chart review, precharting, history and exam, post visit documentation, and reviewing studies as outlined above.   Orders this visit:  Orders Placed This Encounter   Procedures    INR    Basic metabolic panel    Basic metabolic panel    Follow-Up with Cardiology    EKG 12-lead complete w/read - Clinics (performed today)    Echocardiogram Complete     Orders Placed This Encounter   Medications    spironolactone (ALDACTONE) 25 MG tablet     Sig: Take 0.5 tablets (12.5 mg) by mouth daily     Dispense:  45 tablet     Refill:  3    potassium chloride ER (K-TAB) 20 MEQ CR tablet     Sig: Take 1 tablet (20 mEq) by mouth daily     Dispense:  90 tablet     Refill:  3     Medications Discontinued During This Encounter   Medication Reason    dapagliflozin (FARXIGA) 5 MG TABS tablet     potassium chloride ER (K-TAB) 20 MEQ CR tablet      Encounter Diagnoses   Name Primary?    Coronary artery disease involving native coronary artery of native heart without angina pectoris     Essential hypertension with goal blood pressure less than 140/90     Heart failure with preserved ejection fraction, NYHA class II (H)     Chronic atrial fibrillation (H) Yes    Acute on chronic systolic heart failure (H)        Current Medications:  Current Outpatient Medications   Medication Sig Dispense Refill    albuterol (PROAIR HFA/PROVENTIL HFA/VENTOLIN HFA) 108 (90 Base) MCG/ACT inhaler Inhale 2 puffs into the lungs every 4 hours as needed for shortness of breath or wheezing 18 g 5     FLUoxetine (PROZAC) 40 MG capsule Take 1 capsule (40 mg) by mouth daily 90 capsule 1    fluticasone (FLONASE) 50 MCG/ACT nasal spray Spray 1 spray into both nostrils daily 9.9 mL 3    Fluticasone-Umeclidin-Vilanterol (TRELEGY ELLIPTA) 200-62.5-25 MCG/ACT oral inhaler Inhale 1 puff into the lungs daily 60 each 11    losartan (COZAAR) 100 MG tablet Take 1 tablet (100 mg) by mouth daily 90 tablet 3    metoprolol succinate ER (TOPROL XL) 200 MG 24 hr tablet Take 1 tablet (200 mg) by mouth daily 90 tablet 3    multivitamin w/minerals (THERA-VIT-M) tablet Take 1 tablet by mouth daily Century 55+      potassium chloride ER (K-TAB) 20 MEQ CR tablet Take 1 tablet (20 mEq) by mouth daily 90 tablet 3    rosuvastatin (CRESTOR) 40 MG tablet Take 1 tablet (40 mg) by mouth at bedtime 90 tablet 3    spironolactone (ALDACTONE) 25 MG tablet Take 0.5 tablets (12.5 mg) by mouth daily 45 tablet 3    torsemide (DEMADEX) 20 MG tablet Take 2 tablets (40 mg) by mouth every morning AND 2 tablets (40 mg) daily (with lunch). 360 tablet 1    traZODone (DESYREL) 100 MG tablet Take 1 tablet (100 mg) by mouth nightly as needed for sleep 90 tablet 1    warfarin ANTICOAGULANT (COUMADIN ANTICOAGULANT) 5 MG tablet Take 2.5 mg on Mondays and Fridays, and 5 mg all other days, or as directed by Coumadin nurse. 100 tablet 1       Allergies Reviewed and Updated:  Allergies   Allergen Reactions    Lisinopril Cough     Pt had cough    Seasonal Allergies     Zolpidem Tartrate Other (See Comments)     Parasomnia with sleep walking, injuries, delusions.  May have been in DTs at the same time.       Review of Systems:  Skin:  not assessed     Eyes:  not assessed    ENT:  not assessed    Respiratory:  Positive for dyspnea on exertion;shortness of breath;sleep apnea;CPAP  Cardiovascular:    Positive for;edema;palpitations;chest pain  Gastroenterology: not assessed    Genitourinary:  not assessed    Musculoskeletal:  not assessed    Neurologic:  Negative     Psychiatric:  not assessed    Heme/Lymph/Imm:  not assessed    Endocrine:  not assessed       Pertinent Past Medical, Surgical and Family History Reviewed and noted above.     CC  Andrea Lockett MD  [address], [address]  [place] [id number]

## 2024-03-18 NOTE — PROGRESS NOTES
ANTICOAGULATION MANAGEMENT     Markie Shepard 74 year old male is on warfarin with therapeutic INR result. (Goal INR 2.0-3.0)    Recent labs: (last 7 days)     03/18/24  0945   INR 2.87*       ASSESSMENT     Source(s): Chart Review and Patient/Caregiver Call     Warfarin doses taken: Warfarin taken as instructed  Diet: No new diet changes identified  Medication/supplement changes:  Cardiology office visit today: Start taking spironolactone 1/2 pill once a day in the morning.  Decrease potassium to 1 pill once a day-This will be 20 mEq. Stay off of Farxiga.  Concurrent use of WARFARIN and SPIRONOLACTONE may result in decreased anticoagulant effectiveness.  New illness, injury, or hospitalization: No  Signs or symptoms of bleeding or clotting: No  Previous result: Supratherapeutic  Additional findings:  Discussed with Markie rechecking an INR earlier  with starting new medication spironolactone and potential interaction with warfarin-Markie declines rechecking an INR earlier then next scheduled lab appt.      PLAN     Recommended plan for ongoing change(s) affecting INR     Dosing Instructions: Continue your current warfarin dose with next INR in 6 weeks       Summary  As of 3/18/2024      Full warfarin instructions:  2.5 mg every Mon, Fri; 5 mg all other days   Next INR check:  4/29/2024               Telephone call with Markie who agrees to plan and repeated back plan correctly    Lab visit scheduled    Education provided:   Goal range and lab monitoring: goal range and significance of current result and Importance of following up at instructed interval  Interaction IS anticipated between warfarin and spironolactone  Contact [phone] with any changes, questions or concerns.     Plan made per [facility] anticoagulation protocol    TONYA GARY RN  Anticoagulation Clinic  3/18/2024    _______________________________________________________________________     Anticoagulation Episode Summary       Current INR goal:   2.0-3.0   TTR:  81.8% (1 y)   Target end date:  Indefinite   Send INR reminders to:  [facility]    Indications    Long-term (current) use of anticoagulants [Z79.01] [Z79.01]  Chronic atrial fibrillation (H) [I48.20]             Comments:               Anticoagulation Care Providers       Provider Role Specialty Phone number    Mali Patel MD Referring Internal Medicine [phone]

## 2024-03-18 NOTE — PATIENT INSTRUCTIONS
Thank you for visiting with me today.    We discussed:   I'm glad you're feeling better off of Farxiga, but we'll keep working on getting your feeling better.      Medication changes:   Start taking spironolactone 1/2 pill once a day in the morning.    Decrease potassium to 1 pill once a day.  This will be 20 mEq.    Stay off of Farxiga.   Continue other medications for now.    I'll get pricing on Entresto and if it's affordable, we can consider stopping losartan and replacing it with Entresto.      Next Steps:   Repeat labs in 1 month, repeat an echocardiogram and clinic visit with Dr. Lockett at that time.      Please call my nurses, Audra and Jaz, directly with any questions at  [phone].      *If you have concerns after hours, please call [phone], option 2 to speak with on call Cardiologist.

## 2024-03-19 ENCOUNTER — TELEPHONE (OUTPATIENT)
Dept: CARDIOLOGY | Facility: CLINIC | Age: 74
End: 2024-03-19
Payer: COMMERCIAL

## 2024-03-19 DIAGNOSIS — I50.9 HEART FAILURE (H): Primary | ICD-10-CM

## 2024-03-19 NOTE — TELEPHONE ENCOUNTER
Pls see if Markie is wiliing to stop losartan and start Entresto at 24/26 mg bid based on this pricing.  If so, would continue to uptitrate entresto as a outpt.

## 2024-03-19 NOTE — TELEPHONE ENCOUNTER
----- Message from Myriam Salomon sent at 3/18/2024 11:48 AM CDT -----  Regarding: RE: entresto pricing  Patient has Medicare Advantage through [employer].    Entresto:  --$47/mo  --lf/when total drug costs exceed $5,030, price will increase to a 25% coinsurance, equivalent to $164/mo.    Myriam Salomon  Pharmacy Technician/Liaison, Discharge Pharmacy   [phone] (voice or text)  [email or web address]  Available on Vocera and Teams    ----- Message -----  From: Wen Barker PA-C  Sent: 3/18/2024  11:36 AM CDT  To: Rx Coverage Check For Heart Clinics  Subject: entresto pricing                                 [place],     I'd like Markie Shepard to start Entresto.  Could you please help us figure out pricing/ prior auth?    Thank you!    Wen Barker PA-C  11:36 AM 3/18/2024   [facility] Cardiology

## 2024-03-21 NOTE — TELEPHONE ENCOUNTER
Call out to pt to review plan per Wen's 3/18/24 OV note:     I will have him start spironolactone both for reverse remodeling, hold on the potassium and treatment of his heart failure. This will just be 12.5 mg daily. Also get pricing on Entresto and if this is affordable I would stop his losartan and put him on mid dose Entresto. He will need a BMP again in about 1 week and again in 1 month if he gets a INR done again in 1 week we repeated then otherwise given he does not drive I think it is reasonable with normal kidney function just repeat a BMP in 1 month.     Offered pt option to change to Entresto and estimated OOP cost to pt. Pt had some questions on the indications and use of spironolactone and Entresto. Reviewed their uses for GDMT in HF. Pt said he will start the spironolactone on Saturday. Pt agrees start Entresto 24-26 mg BID. He understands he will stop losartan. Pt has a lab recheck scheduled for a month after these changes are made. He said he still doesn't want to undergo an angiogram yet. He wants to see if his symptoms improve on these new meds first and then will consider that procedure in about a month. Advised that if his shortness of breath doesn't improve or other symptoms develop to contact us right away and he should consider the angiogram. Pt verbalized understanding. Entresto Rx sent to his local pharmacy. Update to PILI Carver. Jaz Hopkins RN on 3/21/2024 at 3:54 PM      More, PILI Merlos-C2 days ago       Pls see if Markie is wiliing to stop losartan and start Entresto at 24/26 mg bid based on this pricing.  If so, would continue to uptitrate entresto as a outpt.           Note     More, PILI Merlos-C2 days ago       ----- Message from Myriam Salomon sent at 3/18/2024 11:48 AM CDT -----  Regarding: RE: entresto pricing  Patient has Medicare Advantage through [employer].     Entresto:  --$47/mo  --lf/when total drug costs exceed $5,030, price will increase to a 25%  coinsurance, equivalent to $164/mo.     Myriam Salomon  Pharmacy Technician/Liaison, Discharge Pharmacy   [phone] (voice or text)  [email or web address]  Available on [employer] and Teams     ----- Message -----  From: Wen Barker PA-C  Sent: 3/18/2024  11:36 AM CDT  To: Rx Coverage Check For Heart Clinics  Subject: entresto pricing                                  [place],      I'd like Markie Shepard to start Entresto.  Could you please help us figure out pricing/ prior auth?     Thank you!     Wen Barker PA-C  11:36 AM 3/18/2024   [facility] Cardiology

## 2024-03-22 DIAGNOSIS — I50.9 HEART FAILURE (H): ICD-10-CM

## 2024-04-03 DIAGNOSIS — G47.33 OBSTRUCTIVE SLEEP APNEA (ADULT) (PEDIATRIC): Primary | ICD-10-CM

## 2024-04-29 ENCOUNTER — HOSPITAL ENCOUNTER (OUTPATIENT)
Dept: CARDIOLOGY | Facility: CLINIC | Age: 74
Discharge: HOME | End: 2024-04-29
Attending: PHYSICIAN ASSISTANT | Admitting: PHYSICIAN ASSISTANT
Payer: COMMERCIAL

## 2024-04-29 ENCOUNTER — LAB (OUTPATIENT)
Dept: LAB | Facility: CLINIC | Age: 74
End: 2024-04-29
Payer: COMMERCIAL

## 2024-04-29 DIAGNOSIS — I50.30 HEART FAILURE WITH PRESERVED EJECTION FRACTION, NYHA CLASS II (H): ICD-10-CM

## 2024-04-29 DIAGNOSIS — I25.10 CAD (CORONARY ARTERY DISEASE): Primary | ICD-10-CM

## 2024-04-29 LAB
ANION GAP SERPL CALCULATED.3IONS-SCNC: 12 MMOL/L (ref 7–15)
BUN SERPL-MCNC: 28.1 MG/DL (ref 8–23)
CALCIUM SERPL-MCNC: 9.3 MG/DL (ref 8.8–10.2)
CHLORIDE SERPL-SCNC: 102 MMOL/L (ref 98–107)
CREAT SERPL-MCNC: 1.14 MG/DL (ref 0.67–1.17)
DEPRECATED HCO3 PLAS-SCNC: 24 MMOL/L (ref 22–29)
EGFRCR SERPLBLD CKD-EPI 2021: 68 ML/MIN/1.73M2
GLUCOSE SERPL-MCNC: 119 MG/DL (ref 70–99)
LVEF ECHO: NORMAL
POTASSIUM SERPL-SCNC: 4.4 MMOL/L (ref 3.4–5.3)
SODIUM SERPL-SCNC: 138 MMOL/L (ref 135–145)

## 2024-04-29 PROCEDURE — 36415 COLL VENOUS BLD VENIPUNCTURE: CPT | Performed by: PHYSICIAN ASSISTANT

## 2024-04-29 PROCEDURE — 80048 BASIC METABOLIC PNL TOTAL CA: CPT | Performed by: PHYSICIAN ASSISTANT

## 2024-04-29 PROCEDURE — 93306 TTE W/DOPPLER COMPLETE: CPT | Mod: 26 | Performed by: INTERNAL MEDICINE

## 2024-04-29 PROCEDURE — C8929 TTE W OR WO FOL WCON,DOPPLER: HCPCS

## 2024-04-29 PROCEDURE — 255N000002 HC RX 255 OP 636: Performed by: PHYSICIAN ASSISTANT

## 2024-04-29 PROCEDURE — 999N000208 ECHOCARDIOGRAM COMPLETE

## 2024-04-29 RX ADMIN — HUMAN ALBUMIN MICROSPHERES AND PERFLUTREN 9 ML: 10; .22 INJECTION, SOLUTION INTRAVENOUS at 11:50

## 2024-04-30 ENCOUNTER — CARE COORDINATION (OUTPATIENT)
Dept: CARDIOLOGY | Facility: CLINIC | Age: 74
End: 2024-04-30
Payer: COMMERCIAL

## 2024-04-30 ENCOUNTER — DOCUMENTATION ONLY (OUTPATIENT)
Dept: CARDIOLOGY | Facility: CLINIC | Age: 74
End: 2024-04-30
Payer: COMMERCIAL

## 2024-04-30 DIAGNOSIS — I50.9 HEART FAILURE (H): ICD-10-CM

## 2024-04-30 NOTE — PROGRESS NOTES
Liza Milian LPN  You18 minutes ago (3:11 PM)     MM  I just spoke w/ him, he makes under the eligibility amount, so I mailed him out the application-Liza Milian Lpn

## 2024-04-30 NOTE — PROGRESS NOTES
4- Per req of Nursing Team, I sent to pt the [employer] assistance program application, income eligibility and My contact information.I will take care of the provider portion  Liza Milian lpn

## 2024-04-30 NOTE — PROGRESS NOTES
Wen Barker PA-C [facility] Heart Team 7  Normal K on spironolactone, BUN slightly elevated but not concerning.  Recommend he continue current meds.  EF stable at 40-45% on echocardiogram.      I called pt with lab and echo results, and update from Wen Barker PA-C. Pt said he is feeling better since starting spironolactone 12.5 mg daily and entresto 24-26 mg BID. Pt said he has occasional mild lightheadedness. He is legally blind so he is unable to check his own BP at home. Pt said he will monitor how often he has those episodes and discuss further at his phone visit with  on 5/9.    Pt said the entresto is 150.00/month, which is expensive for him. [facility] I told pt there is a pt assistance program through [employer] and pt said he would be interested in applying for it. I will send an update to our nurse Liza who helps with that application process. Taylor NAILS April 30, 2024, 2:35 PM

## 2024-05-01 ENCOUNTER — VIRTUAL VISIT (OUTPATIENT)
Dept: SLEEP MEDICINE | Facility: CLINIC | Age: 74
End: 2024-05-01
Payer: COMMERCIAL

## 2024-05-01 VITALS — WEIGHT: 315 LBS | BODY MASS INDEX: 44.1 KG/M2 | HEIGHT: 71 IN

## 2024-05-01 DIAGNOSIS — G47.33 OSA ON CPAP: Primary | ICD-10-CM

## 2024-05-01 DIAGNOSIS — I50.20 HEART FAILURE WITH REDUCED EJECTION FRACTION (H): ICD-10-CM

## 2024-05-01 DIAGNOSIS — E66.01 MORBID OBESITY (H): ICD-10-CM

## 2024-05-01 PROCEDURE — 99443 PR PHYSICIAN TELEPHONE EVALUATION 21-30 MIN: CPT | Mod: 93 | Performed by: INTERNAL MEDICINE

## 2024-05-01 ASSESSMENT — SLEEP AND FATIGUE QUESTIONNAIRES
HOW LIKELY ARE YOU TO NOD OFF OR FALL ASLEEP WHILE SITTING INACTIVE IN A PUBLIC PLACE: WOULD NEVER DOZE
HOW LIKELY ARE YOU TO NOD OFF OR FALL ASLEEP WHILE SITTING QUIETLY AFTER LUNCH WITHOUT ALCOHOL: WOULD NEVER DOZE
HOW LIKELY ARE YOU TO NOD OFF OR FALL ASLEEP WHILE WATCHING TV: WOULD NEVER DOZE
HOW LIKELY ARE YOU TO NOD OFF OR FALL ASLEEP IN A CAR, WHILE STOPPED FOR A FEW MINUTES IN TRAFFIC: WOULD NEVER DOZE
HOW LIKELY ARE YOU TO NOD OFF OR FALL ASLEEP WHEN YOU ARE A PASSENGER IN A CAR FOR AN HOUR WITHOUT A BREAK: WOULD NEVER DOZE
HOW LIKELY ARE YOU TO NOD OFF OR FALL ASLEEP WHILE LYING DOWN TO REST IN THE AFTERNOON WHEN CIRCUMSTANCES PERMIT: WOULD NEVER DOZE
HOW LIKELY ARE YOU TO NOD OFF OR FALL ASLEEP WHILE SITTING AND TALKING TO SOMEONE: WOULD NEVER DOZE
HOW LIKELY ARE YOU TO NOD OFF OR FALL ASLEEP WHILE SITTING AND READING: WOULD NEVER DOZE

## 2024-05-01 ASSESSMENT — PAIN SCALES - GENERAL: PAINLEVEL: NO PAIN (0)

## 2024-05-01 NOTE — PROGRESS NOTES
Telephone visit Details   Patient was initially scheduled for video visit . He did not want a video visit since he does not have a computer and is legally blind and preferred to do a telephone visit instead.    Telephone number [phone]  Telephone visit start time: 3:34 PM  Telephone visit end time: 3:49 PM     Val Jim MD         [facility]  Sleep clinic follow-up visit note      Date on this visit: May 1, 2024    Chief complaint:  Follow-up of EMORY and review CPAP compliance measures     Markie Shepard is a 73 year old male who presents to sleep clinic via telephone visit today for follow-up of EMORY and to review review CPAP compliance measures. His medical history is significant for Heart failure with  reduced ejection fraction EF 35 to 40% (historically has been preserved ejection fraction), Coronary disease with known occluded RCA, Permanent A-fib on warfarin, Hypertension, Obesity, Legally blind, COPD.    He was set up at [place] on August 11, 2020. Patient received a Resmed AirSense 10 Auto. Pressures were set at 9 cm H2O.    He reports using the CPAP regularly during sleep.  He uses a fullface mask and reports that he has been replacing the supplies regularly.  He tries to secure the mask tight  to the best of his ability, but if  it is too tight it gets uncomfortable causing marks on his forehead.  He tried a chinstrap but did not find that comfortable.    He sleeps alone, hence there is not anyone to report whether or not he snores /has apnea episodes with the CPAP device.   He denies awakenings due to  gasping for air while using the CPAP device.    He reports feeling comfortable with the current pressure settings on the device.    He does not sleep without his CPAP.  He goes to bed between 1230 to 1 AM. He wakes up between 330 to 4 AM to use the bathroom , goes back to bed and falls back asleep with the mask on and wakes up between 7-8 AM. He reports waking up  rested most mornings.  He reports occasional fatigue which he attributed to his heart disease.  He denies excessive daytime sleepiness.  He endorses an Waldorf sleepiness score of 0 out of 24.  He doesn't drive since he is legally blind.     Previous sleep study reports:  PSG#1 diagnostic sleep study(September 18, 2012), He underwent an overnight diagnostic sleep study on September 18, 2012, at [facility].  At that time he weighed 308 pounds and was noted to have an AHI of 8.4/h and during that sleep study REM sleep was not observed.     PSG#2 all-night titration study(Oct 2012)He underwent a repeat sleep study in  October 2012 which was an all-night titration study using CPAP device and was prescribed CPAP at pressure setting of 9 cm of water.     CPAP Compliance download:  (From March 26, 2024  through April 24, 2024)         ResMed   CPAP 9.0 cmH2O 30 day usage data  97 % of days with >= 4 hours of use. 0/30 days with no use.   Average use 7 hours and 6 minutes per day.   95%ile Leak 112.3 L/min.   Residual AHI 1.4 events per hour       Past medical/surgical history, family history, social history, medications and allergies were reviewed.      Problem list:  Patient Active Problem List   Diagnosis    CAD (coronary artery disease)    Hyperlipidemia LDL goal <70    Smoker    Essential hypertension with goal blood pressure less than 140/90    Chronic atrial fibrillation (H)    Obstructive sleep apnea    Low back pain    Mixed hyperlipidemia    Long-term (current) use of anticoagulants [Z79.01]    Anisocoria    Chronic obstructive pulmonary disease, unspecified COPD type (H)    Morbid obesity (H)    Chronic recurrent major depressive disorder (H24)    Bilateral leg edema    Insomnia, unspecified type    Type 2 diabetes mellitus without complication, without long-term current use of insulin (H)    Heart failure with preserved ejection fraction, NYHA class II (H)    Nonrheumatic aortic  valve stenosis               Physical Examination:   General: Pleasant. Cooperative. In no apparent distress.  Pulmonary: Able to speak in full sentences easily. No cough or wheeze.   Neurologic: Alert, oriented x3.  Psychiatric: Mood euthymic. Affect congruent with full range and intensity.          Other tests:     Echocardiogram 4/29/2024  Interpretation Summary:  Technically difficult study due to body habitus.  Contrast administered for left ventricular opacification.  Mildly decreased left ventricular systolic function.  Visually estimated LVEF 40-45%.  Mild global hypokinesis.  The right ventricle, atria and cardiac valves were not well visualized.  Mild aortic valve stenosis based on Doppler data.  Normal inferior vena cava.  Compared to prior study, there is no significant change.      ASSESSMENT/PLAN:  Obstructive sleep apnea: Pt reports adequate compliance with CPAP and reports positive benefits with CPAP treatment. Based on compliance measures, EMORY is adequately controlled with CPAP at the current settings.    DME orders were generated for renewal of CPAP supplies. Patient was instructed to schedule an appointment with the [facility] DME provider to obtain mask fit optimization to alleviate the air leaks.  Recommended to continue using the CPAP regularly during sleep and getting the supplies for the CPAP replaced regularly.   Patient instructed to remember to bring PAP with him if hospitalized and if anticipating procedure that requires sedation/surgery to be sure to discuss with the provider/surgeon that he has sleep apnea and uses PAP therapy.     Heart failure with  reduced ejection fraction EF 35 to 40% (historically has been preserved ejection fraction), Coronary disease with known occluded RCA, Permanent A-fib on warfarin, Hypertension:He is scheduled to follow-up with cardiology on May 9, 2024.    Obesity: We discussed weight management with healthy diet, and exercise.     Patient was  "strongly advised to avoid driving, operating any heavy machinery or other hazardous situations while drowsy or sleepy.  Patient was counseled on the importance of driving while alert, to pull over if drowsy, or nap before getting into the vehicle if sleepy.      Follow-up with sleep clinic in 1 year via or sooner if any concerns.     The above note was dictated using voice recognition software. Although reviewed after completion, some word and grammatical error may remain . Please contact the author for any clarifications.      \" Total time spent was 30 minutes for this appointment on this date of service which include time spent before, during and after the visit for chart review, patient care, counseling and coordination of care. Including documentation\"      Val Jim MD  [facility]  [address] , [place] [address]            "

## 2024-05-01 NOTE — NURSING NOTE
Is the patient currently in the state of [place]? YES    Visit mode:VIDEO    If the visit is dropped, the patient can be reconnected by: TELEPHONE VISIT: Phone number:   No relevant phone numbers on file.       Will anyone else be joining the visit? NO  (If patient encounters technical issues they should call [phone]    How would you like to obtain your AVS? MyChart    Are changes needed to the allergy or medication list? No    Are refills needed on medications prescribed by this physician? NO    Reason for visit: RECHKANU ALVAREZ      
Quality 130: Documentation Of Current Medications In The Medical Record: Current Medications Documented
Detail Level: Detailed
Quality 110: Preventive Care And Screening: Influenza Immunization: Influenza Immunization not Administered because Patient Refused.

## 2024-05-09 ENCOUNTER — VIRTUAL VISIT (OUTPATIENT)
Dept: CARDIOLOGY | Facility: CLINIC | Age: 74
End: 2024-05-09
Attending: PHYSICIAN ASSISTANT
Payer: COMMERCIAL

## 2024-05-09 DIAGNOSIS — I50.22 CHRONIC SYSTOLIC HEART FAILURE (H): Primary | ICD-10-CM

## 2024-05-09 PROCEDURE — 99442 PR PHYSICIAN TELEPHONE EVALUATION 11-20 MIN: CPT | Mod: 93 | Performed by: INTERNAL MEDICINE

## 2024-05-09 NOTE — PROGRESS NOTES
"Markie is a 74 year old who is being evaluated via a billable telephone visit.    What phone number would you like to be contacted at? [phone]  How would you like to obtain your AVS? Mail a copy  Originating Location (pt. Location): Home    Distant Location (provider location):  On-site  Phone call duration: 14 minutes    Vitals - Patient Reported  Weight (Patient Reported): 158.8 kg (350 lb)  Height (Patient Reported): 180.3 cm (5' 11\")  BMI (Based on Pt Reported Ht/Wt): 48.81      Review Of Systems:  Respiratory: SOB     Cardiovascular:     Chest pain - Patient stated they had fleeting chest pain 3 weeks ago, felt like a quick stab and then it was gone.    Fatigue - patient stated it feels like a roller coaster. In a single day they can go from feeling really good to very tired and back to feeling good.     Light headedness- Felt on occasion and passes quickly     Endocrine:  NEGATIVE    Telephone number of patient: [phone]    Gurjit 05/09/24    CARDIOLOGY CLINIC VIRTUAL TELEPHONE VISIT NOTE:    Past medical history, social history, family history, medication list, allergies, most recent labs, and additional data, all personally reviewed.     HPI:     I had the opportunity to speak to Markie Shepard today through a virtual encounter for a cardiology clinic visit.      As you know, patient is a pleasant 74-year-old male with a past medical history significant for legal blindness, morbid obesity, coronary artery disease status post PCI, permanent atrial fibrillation (on warfarin), sleep apnea, significant prior smoking history, COPD, heart failure with reduced ejection fraction, chronic venous stasis, who presents for follow-up.      He underwent a Lexiscan 6/27/2012 which showed inferior  infarct with moderate ischemia.  Subsequent coronary angiography demonstrated  of the RCA, with left-to-right collaterals. This was medically managed since he was asymptomatic, and had been doing well, the decision " was made to not pursue high risk  PCI.  Clinic visit 11/2023, at that time a recent echocardiogram demonstrated LVEF 35 to 40%, akinesis of the inferior/inferoseptal wall segments.  We discussed options for further evaluation and management, patient declined cardiac catheterization, wanted to manage this medically.    Last clinic visit with my colleague PILI Phillip 3/18/2024 patient reports that overall he feels generally well.  He had significant dizziness/lightheadedness on Farxiga, this has since been discontinued, with resolution of his symptoms.  He was started on Entresto which she has been tolerating well..  He continues to exercise daily, denies any chest pain, chest pressure.  Dyspnea on exertion is stable.  Denies any palpitations.  He uses a CPAP every night for sleep apnea, unfortunately has been dealing with significant leaking of his mask, he is scheduled to have a new appliance fitting tomorrow.  Due to his disabilities he is unable to weigh himself or check his blood pressures at home.    He has several grandsons, he is proud of all of them, one is trained to become a , another is a , and one is involved with social media/[employer] live streaming.     TTE 4/29/2024  Interpretation Summary     Technically difficult study due to body habitus.  Contrast administered for left ventricular opacification.  Mildly decreased left ventricular systolic function.  Visually estimated LVEF 40-45%.  Mild global hypokinesis.  The right ventricle, atria and cardiac valves were not well visualized.  Mild aortic valve stenosis based on Doppler data.  Normal inferior vena cava.     Compared to prior study, there is no significant change.    Assessment and Plan:     # HFrEF LVEF 35-40%.  Etiology likely ischemic, with known RCA .  Suspect progression of disease. Euvolemic. NYHA II. No angina.  Medically managed.  # Mild to moderate aortic stenosis   # COPD, on inhaler therapy   # Chronic  venous stasis  # CAD with RCA .  Stable, denies symptoms concerning for angina.  Medically managed.  # Permanent atrial fibrillation, on warfarin.   # Morbid obesity  # EMORY  # Significant vision impairment, legally blind     -Overall patient is doing well from a cardiac perspective, with near complete resolution of his dizziness/lightheadedness symptoms while he had been on Farxiga.  He is exercising regularly without symptoms concerning for angina or decompensated heart failure  - Recommend continuing current cardiac regimen  - Follow-up in 1 year with PILI Phillip with labs, anticipate repeat TTE in 2 years, or sooner as needed    Thank you for allowing our team to participate in the care of this patient. Please do not hesitate to page or call me with any questions or concerns.    Andrea Lockett MD, [facility]  Cardiology  Pager:  [phone]  Text Page   May 9, 2024      Past Medical History:     The ASCVD Risk score (Sabrina DK, et al., 2019) failed to calculate for the following reasons:    The systolic blood pressure is missing  Past Medical History:   Diagnosis Date    Adjustment disorder with depressed mood 06/17/2012    ALCOHOL ABUSE-UNSPEC 10/05/2007    Atrial fibrillation (H)     Chorioretinitis of both eyes 02/05/2012    Right eye worse than left. On Cellcept, steroid taper    Coronary atherosclerosis of unspecified type of vessel, native or graft     Diabetes mellitus type 2 in obese     Essential hypertension, benign     Heart failure with preserved ejection fraction, NYHA class II (H)     Mixed hyperlipidemia 10/05/2007    Nonrheumatic aortic valve stenosis     Polycythemia     Sleep apnea     Tobacco use disorder 10/05/2007    Smoker - 1/2 ppd. Started at 16 years    Total retinal detachment of left eye 2009/2010        Past Surgical History:   Past Surgical History:   Procedure Laterality Date    CARDIAC CATHERIZATION  07/03/2012    totally occluded RCA w/ mild left coronary disease     COLONOSCOPY  2016    repeat 2026    HEART CATH CORONARY ANGIOGRAM AND RIGHT HEART CATH  07/01/2012    RCA occlusion, IMI, no stent    PHACOEMULSIFICATION CLEAR CORNEA WITH STANDARD INTRAOCULAR LENS IMPLANT Right 06/04/2015    Procedure: PHACOEMULSIFICATION CLEAR CORNEA WITH STANDARD INTRAOCULAR LENS IMPLANT;  Surgeon: Tal Trinidad MD;  Location: [facility]    RETINAL REATTACHMENT  11/09, 7/10    Left    TONSILLECTOMY      Z NONSPECIFIC PROCEDURE  01/01/1987    Right Epididymis Removed    [facility] OPEN RX ANKLE DISLOCATN+FIXATN  12/01/2008    Right Ankle       Medications (outpatient):  Current Outpatient Medications   Medication Sig Dispense Refill    albuterol (PROAIR HFA/PROVENTIL HFA/VENTOLIN HFA) 108 (90 Base) MCG/ACT inhaler Inhale 2 puffs into the lungs every 4 hours as needed for shortness of breath or wheezing 18 g 5    FLUoxetine (PROZAC) 40 MG capsule Take 1 capsule (40 mg) by mouth daily 90 capsule 1    fluticasone (FLONASE) 50 MCG/ACT nasal spray Spray 1 spray into both nostrils daily 9.9 mL 3    Fluticasone-Umeclidin-Vilanterol (TRELEGY ELLIPTA) 200-62.5-25 MCG/ACT oral inhaler Inhale 1 puff into the lungs daily 60 each 11    metoprolol succinate ER (TOPROL XL) 200 MG 24 hr tablet Take 1 tablet (200 mg) by mouth daily 90 tablet 3    multivitamin w/minerals (THERA-VIT-M) tablet Take 1 tablet by mouth daily Century 55+      potassium chloride ER (K-TAB) 20 MEQ CR tablet Take 1 tablet (20 mEq) by mouth daily 90 tablet 3    rosuvastatin (CRESTOR) 40 MG tablet Take 1 tablet (40 mg) by mouth at bedtime 90 tablet 3    sacubitril-valsartan (ENTRESTO) 24-26 MG per tablet Take 1 tablet by mouth 2 times daily 180 tablet 3    spironolactone (ALDACTONE) 25 MG tablet Take 0.5 tablets (12.5 mg) by mouth daily 45 tablet 3    torsemide (DEMADEX) 20 MG tablet Take 2 tablets (40 mg) by mouth every morning AND 2 tablets (40 mg) daily (with lunch). 360 tablet 1    traZODone (DESYREL) 100 MG tablet Take 1 tablet (100 mg) by  mouth nightly as needed for sleep 90 tablet 1    warfarin ANTICOAGULANT (COUMADIN ANTICOAGULANT) 5 MG tablet Take 2.5 mg on Mondays and Fridays, and 5 mg all other days, or as directed by Coumadin nurse. 100 tablet 1       Allergies:  Allergies   Allergen Reactions    Lisinopril Cough     Pt had cough    Seasonal Allergies     Zolpidem Tartrate Other (See Comments)     Parasomnia with sleep walking, injuries, delusions.  May have been in DTs at the same time.       Social History:   History   Drug Use No      History   Smoking Status    Former    Types: Cigarettes   Smokeless Tobacco    Never     Social History    Substance and Sexual Activity      Alcohol use: No        Comment: quit drinking heavily 2013       Family History:  Family History   Problem Relation Age of Onset    Arthritis Mother     Circulatory Mother         varicose veins    Eye Disorder Mother         detached retina    Thyroid Disease Mother     C.A.D. Father         Quadruple bypass    Gastrointestinal Disease Father         diverticulitis    Alzheimer Disease Father         demntia    Eye Disorder Father         cataract    Thyroid Disease Brother     Eye Disorder Brother         detached retina    Pacemaker Brother     Depression Sister         Post Partum     Alcoholism Sister     Neurologic Disorder Sister         Brain Aneurysm       Physical exam:  Gen: Alert, oriented, in no acute distress  Pulm: Speaking in full sentences without difficulty, no obvious wheezing appreciated   Psych: Answers questions appropriately, engaging in conversation appropriately    Labs reviewed:  Lab Results   Component Value Date    WBC 10.3 01/08/2024    WBC 8.3 04/12/2021    RBC 5.01 01/08/2024    RBC 5.41 04/12/2021    HGB 16.3 01/08/2024    HGB 17.8 (H) 04/12/2021    HCT 50.6 01/08/2024    HCT 53.2 (H) 04/12/2021     (H) 01/08/2024    MCV 98 04/12/2021    MCH 32.5 01/08/2024    MCH 32.9 04/12/2021    MCHC 32.2 01/08/2024    MCHC 33.5 04/12/2021    RDW  13.0 01/08/2024    RDW 12.9 04/12/2021     01/08/2024     04/12/2021     Sodium   Date Value Ref Range Status   04/29/2024 138 135 - 145 mmol/L Final     Comment:     Reference intervals for this test were updated on 09/26/2023 to more accurately reflect our healthy population. There may be differences in the flagging of prior results with similar values performed with this method. Interpretation of those prior results can be made in the context of the updated reference intervals.    06/28/2021 138 133 - 144 mmol/L Final     Potassium   Date Value Ref Range Status   04/29/2024 4.4 3.4 - 5.3 mmol/L Final   02/21/2022 3.9 3.4 - 5.3 mmol/L Final   06/28/2021 4.0 3.4 - 5.3 mmol/L Final     Chloride   Date Value Ref Range Status   04/29/2024 102 98 - 107 mmol/L Final   02/21/2022 105 94 - 109 mmol/L Final   06/28/2021 104 94 - 109 mmol/L Final     Carbon Dioxide   Date Value Ref Range Status   06/28/2021 26 20 - 32 mmol/L Final     Carbon Dioxide (CO2)   Date Value Ref Range Status   04/29/2024 24 22 - 29 mmol/L Final   02/21/2022 26 20 - 32 mmol/L Final     Anion Gap   Date Value Ref Range Status   04/29/2024 12 7 - 15 mmol/L Final   02/21/2022 8 3 - 14 mmol/L Final   06/28/2021 7 3 - 14 mmol/L Final     Glucose   Date Value Ref Range Status   04/29/2024 119 (H) 70 - 99 mg/dL Final   02/21/2022 144 (H) 70 - 99 mg/dL Final   06/28/2021 127 (H) 70 - 99 mg/dL Final     Urea Nitrogen   Date Value Ref Range Status   04/29/2024 28.1 (H) 8.0 - 23.0 mg/dL Final   02/21/2022 27 7 - 30 mg/dL Final   06/28/2021 17 7 - 30 mg/dL Final     Creatinine   Date Value Ref Range Status   04/29/2024 1.14 0.67 - 1.17 mg/dL Final   06/28/2021 0.91 0.66 - 1.25 mg/dL Final     GFR Estimate   Date Value Ref Range Status   04/29/2024 68 >60 mL/min/1.73m2 Final   06/28/2021 85 >60 mL/min/[1.73_m2] Final     Comment:     Non  GFR Calc  Starting 12/18/2018, serum creatinine based estimated GFR (eGFR) will be    calculated using the Chronic Kidney Disease Epidemiology Collaboration   (CKD-EPI) equation.       Calcium   Date Value Ref Range Status   2024 9.3 8.8 - 10.2 mg/dL Final   2021 9.4 8.5 - 10.1 mg/dL Final     Bilirubin Total   Date Value Ref Range Status   2021 0.9 0.2 - 1.3 mg/dL Final     Alkaline Phosphatase   Date Value Ref Range Status   2021 84 40 - 150 U/L Final     ALT   Date Value Ref Range Status   2023 28 0 - 70 U/L Final     Comment:     Reference intervals for this test were updated on 2023 to more accurately reflect our healthy population. There may be differences in the flagging of prior results with similar values performed with this method. Interpretation of those prior results can be made in the context of the updated reference intervals.     2021 29 0 - 70 U/L Final     AST   Date Value Ref Range Status   2021 23 0 - 45 U/L Final     Recent Labs   Lab Test 24  1431 23  1104 21  1457 20  1601   CHOL 132 131   < > 152.0   HDL 48 52   < > 55.0   LDL 59 61   < > 74.0   TRIG 127 91   < > 116.0   CHOLHDLRATIO  --   --   --  2.8    < > = values in this interval not displayed.      Lab Results   Component Value Date    A1C 6.5 2024    A1C 6.5 2023    A1C 6.4 2022    A1C 6.4 2021    A1C 6.1 2020    A1C 6.1 2019    A1C 5.8 10/17/2016    A1C 5.9 2015        Prior select studies:  Recent Results (from the past 4320 hour(s))   Echocardiogram Complete   Result Value    LVEF  40-45%    Narrative    [id number]  [id number]  [id number]  [id number]^GWENDOLYN^ARTEMIO^MITCHELL BLEVINS     [facility]  [facility]chocardiography Laboratory  [address]  [address]  [place] [address]  Phone [phone]  Fax [phone]     Name: NILSON CASTILLO  MRN: [id number]  : 1950  Study Date: 2024 10:47 AM  Age: 74 yrs  Gender: Male  Patient Location: [facility]  Reason  For Study: Heart failure with preserved ejection fraction, NYHA class  II (H  Ordering Physician: ARTEMIO SNOW  Referring Physician: ARTEMIO SNOW  Performed By: Tiffany Kohler     BSA: 2.7 m2  Height: 70 in  Weight: 351 lb  HR: 72  ______________________________________________________________________________  Procedure  Complete Echo Adult. Optison (NDC #9433-8838) given intravenously. Technically  difficult study.  ______________________________________________________________________________  Interpretation Summary     Technically difficult study due to body habitus.  Contrast administered for left ventricular opacification.  Mildly decreased left ventricular systolic function.  Visually estimated LVEF 40-45%.  Mild global hypokinesis.  The right ventricle, atria and cardiac valves were not well visualized.  Mild aortic valve stenosis based on Doppler data.  Normal inferior vena cava.     Compared to prior study, there is no significant change.  ______________________________________________________________________________  Left Ventricle  The left ventricle is not well visualized. The left ventricle is normal in  size. There is normal left ventricular wall thickness. Mildly decreased left  ventricular systolic function. The visual ejection fraction is 40-45%. Left  ventricular diastolic function is not assessable. There is mild global  hypokinesia of the left ventricle.     Right Ventricle  Right ventricular function cannot be assessed due to poor image quality.     Atria  The left atrium is not well visualized. Right atrium not well visualized.     Mitral Valve  The mitral valve is not well visualized. There is trace to mild mitral  regurgitation.     Tricuspid Valve  The tricuspid valve is not well visualized.     Aortic Valve  The aortic valve is not well visualized. Mild valvular aortic stenosis. The  mean AoV pressure gradient is 14.0 mmHg.     Pulmonic Valve  The pulmonic valve is not  well visualized.     Vessels  The aortic root is not well visualized. Ascending aorta not well visualized.  The inferior vena cava is normal.     Pericardium  There is no pericardial effusion.     Rhythm  Sinus rhythm was noted.     ______________________________________________________________________________  Doppler Measurements & Calculations  Ao V2 max: 244.0 cm/sec  Ao max P.0 mmHg  Ao V2 mean: 171.5 cm/sec  Ao mean P.0 mmHg  Ao V2 VTI: 47.9 cm     LV V1 max P.5 mmHg  LV V1 max: 60.4 cm/sec  LV V1 VTI: 9.6 cm  AV Greyson Ratio (DI): 0.25     ______________________________________________________________________________  Report approved by: Dr Dick Lisa 2024 01:17 PM

## 2024-05-09 NOTE — LETTER
"5/9/2024    Mali Patel MD  [address] [place] [id number]    RE: Markie Munsonkumar       Dear Colleague,     I had the pleasure of seeing Markie Shepard in the [facility].  Markie is a 74 year old who is being evaluated via a billable telephone visit.    What phone number would you like to be contacted at? [phone]  How would you like to obtain your AVS? Mail a copy  Originating Location (pt. Location): Home    Distant Location (provider location):  On-site  Phone call duration: 14 minutes    Vitals - Patient Reported  Weight (Patient Reported): 158.8 kg (350 lb)  Height (Patient Reported): 180.3 cm (5' 11\")  BMI (Based on Pt Reported Ht/Wt): 48.81      Review Of Systems:  Respiratory: SOB     Cardiovascular:     Chest pain - Patient stated they had fleeting chest pain 3 weeks ago, felt like a quick stab and then it was gone.    Fatigue - patient stated it feels like a roller coaster. In a single day they can go from feeling really good to very tired and back to feeling good.     Light headedness- Felt on occasion and passes quickly     Endocrine:  NEGATIVE    Telephone number of patient: [phone]    Gurjit 05/09/24    CARDIOLOGY CLINIC VIRTUAL TELEPHONE VISIT NOTE:    Past medical history, social history, family history, medication list, allergies, most recent labs, and additional data, all personally reviewed.     HPI:     I had the opportunity to speak to Markie Shepard today through a virtual encounter for a cardiology clinic visit.      As you know, patient is a pleasant 74-year-old male with a past medical history significant for legal blindness, morbid obesity, coronary artery disease status post PCI, permanent atrial fibrillation (on warfarin), sleep apnea, significant prior smoking history, COPD, heart failure with reduced ejection fraction, chronic venous stasis, who presents for follow-up.      He underwent a Lexiscan 6/27/2012 which showed inferior  " infarct with moderate ischemia.  Subsequent coronary angiography demonstrated  of the RCA, with left-to-right collaterals. This was medically managed since he was asymptomatic, and had been doing well, the decision was made to not pursue high risk  PCI.  Clinic visit 11/2023, at that time a recent echocardiogram demonstrated LVEF 35 to 40%, akinesis of the inferior/inferoseptal wall segments.  We discussed options for further evaluation and management, patient declined cardiac catheterization, wanted to manage this medically.    Last clinic visit with my colleague PILI Phillip 3/18/2024 patient reports that overall he feels generally well.  He had significant dizziness/lightheadedness on Farxiga, this has since been discontinued, with resolution of his symptoms.  He was started on Entresto which she has been tolerating well..  He continues to exercise daily, denies any chest pain, chest pressure.  Dyspnea on exertion is stable.  Denies any palpitations.  He uses a CPAP every night for sleep apnea, unfortunately has been dealing with significant leaking of his mask, he is scheduled to have a new appliance fitting tomorrow.  Due to his disabilities he is unable to weigh himself or check his blood pressures at home.    He has several grandsons, he is proud of all of them, one is trained to become a , another is a , and one is involved with social media/videogame live streaming.     TTE 4/29/2024  Interpretation Summary     Technically difficult study due to body habitus.  Contrast administered for left ventricular opacification.  Mildly decreased left ventricular systolic function.  Visually estimated LVEF 40-45%.  Mild global hypokinesis.  The right ventricle, atria and cardiac valves were not well visualized.  Mild aortic valve stenosis based on Doppler data.  Normal inferior vena cava.     Compared to prior study, there is no significant change.    Assessment and Plan:     # HFrEF  LVEF 35-40%.  Etiology likely ischemic, with known RCA .  Suspect progression of disease. Euvolemic. NYHA II. No angina.  Medically managed.  # Mild to moderate aortic stenosis   # COPD, on inhaler therapy   # Chronic venous stasis  # CAD with RCA .  Stable, denies symptoms concerning for angina.  Medically managed.  # Permanent atrial fibrillation, on warfarin.   # Morbid obesity  # EMORY  # Significant vision impairment, legally blind     -Overall patient is doing well from a cardiac perspective, with near complete resolution of his dizziness/lightheadedness symptoms while he had been on Farxiga.  He is exercising regularly without symptoms concerning for angina or decompensated heart failure  - Recommend continuing current cardiac regimen  - Follow-up in 1 year with PILI Phillip with labs, anticipate repeat TTE in 2 years, or sooner as needed    Thank you for allowing our team to participate in the care of this patient. Please do not hesitate to page or call me with any questions or concerns.    Andrea Lockett MD, [facility]  Cardiology  Pager:  [phone]  Text Page   May 9, 2024      Past Medical History:     The ASCVD Risk score (Sabrina DK, et al., 2019) failed to calculate for the following reasons:    The systolic blood pressure is missing  Past Medical History:   Diagnosis Date    Adjustment disorder with depressed mood 06/17/2012    ALCOHOL ABUSE-UNSPEC 10/05/2007    Atrial fibrillation (H)     Chorioretinitis of both eyes 02/05/2012    Right eye worse than left. On Cellcept, steroid taper    Coronary atherosclerosis of unspecified type of vessel, native or graft     Diabetes mellitus type 2 in obese     Essential hypertension, benign     Heart failure with preserved ejection fraction, NYHA class II (H)     Mixed hyperlipidemia 10/05/2007    Nonrheumatic aortic valve stenosis     Polycythemia     Sleep apnea     Tobacco use disorder 10/05/2007    Smoker - 1/2 ppd. Started at 16 years     Total retinal detachment of left eye 2009/2010        Past Surgical History:   Past Surgical History:   Procedure Laterality Date    CARDIAC CATHERIZATION  07/03/2012    totally occluded RCA w/ mild left coronary disease    COLONOSCOPY  2016    repeat 2026    HEART CATH CORONARY ANGIOGRAM AND RIGHT HEART CATH  07/01/2012    RCA occlusion, IMI, no stent    PHACOEMULSIFICATION CLEAR CORNEA WITH STANDARD INTRAOCULAR LENS IMPLANT Right 06/04/2015    Procedure: PHACOEMULSIFICATION CLEAR CORNEA WITH STANDARD INTRAOCULAR LENS IMPLANT;  Surgeon: Tal Trinidad MD;  Location:  [facility]    RETINAL REATTACHMENT  11/09, 7/10    Left    TONSILLECTOMY      [facility] NONSPECIFIC PROCEDURE  01/01/1987    Right Epididymis Removed    [facility] OPEN RX ANKLE DISLOCATN+FIXATN  12/01/2008    Right Ankle       Medications (outpatient):  Current Outpatient Medications   Medication Sig Dispense Refill    albuterol (PROAIR HFA/PROVENTIL HFA/VENTOLIN HFA) 108 (90 Base) MCG/ACT inhaler Inhale 2 puffs into the lungs every 4 hours as needed for shortness of breath or wheezing 18 g 5    FLUoxetine (PROZAC) 40 MG capsule Take 1 capsule (40 mg) by mouth daily 90 capsule 1    fluticasone (FLONASE) 50 MCG/ACT nasal spray Spray 1 spray into both nostrils daily 9.9 mL 3    Fluticasone-Umeclidin-Vilanterol (TRELEGY ELLIPTA) 200-62.5-25 MCG/ACT oral inhaler Inhale 1 puff into the lungs daily 60 each 11    metoprolol succinate ER (TOPROL XL) 200 MG 24 hr tablet Take 1 tablet (200 mg) by mouth daily 90 tablet 3    multivitamin w/minerals (THERA-VIT-M) tablet Take 1 tablet by mouth daily Century 55+      potassium chloride ER (K-TAB) 20 MEQ CR tablet Take 1 tablet (20 mEq) by mouth daily 90 tablet 3    rosuvastatin (CRESTOR) 40 MG tablet Take 1 tablet (40 mg) by mouth at bedtime 90 tablet 3    sacubitril-valsartan (ENTRESTO) 24-26 MG per tablet Take 1 tablet by mouth 2 times daily 180 tablet 3    spironolactone (ALDACTONE) 25 MG tablet Take 0.5 tablets (12.5 mg) by  mouth daily 45 tablet 3    torsemide (DEMADEX) 20 MG tablet Take 2 tablets (40 mg) by mouth every morning AND 2 tablets (40 mg) daily (with lunch). 360 tablet 1    traZODone (DESYREL) 100 MG tablet Take 1 tablet (100 mg) by mouth nightly as needed for sleep 90 tablet 1    warfarin ANTICOAGULANT (COUMADIN ANTICOAGULANT) 5 MG tablet Take 2.5 mg on Mondays and Fridays, and 5 mg all other days, or as directed by Coumadin nurse. 100 tablet 1       Allergies:  Allergies   Allergen Reactions    Lisinopril Cough     Pt had cough    Seasonal Allergies     Zolpidem Tartrate Other (See Comments)     Parasomnia with sleep walking, injuries, delusions.  May have been in DTs at the same time.       Social History:   History   Drug Use No      History   Smoking Status    Former    Types: Cigarettes   Smokeless Tobacco    Never     Social History    Substance and Sexual Activity      Alcohol use: No        Comment: quit drinking heavily 2013       Family History:  Family History   Problem Relation Age of Onset    Arthritis Mother     Circulatory Mother         varicose veins    Eye Disorder Mother         detached retina    Thyroid Disease Mother     C.A.D. Father         Quadruple bypass    Gastrointestinal Disease Father         diverticulitis    Alzheimer Disease Father         demntia    Eye Disorder Father         cataract    Thyroid Disease Brother     Eye Disorder Brother         detached retina    Pacemaker Brother     Depression Sister         Post Partum     Alcoholism Sister     Neurologic Disorder Sister         Brain Aneurysm       Physical exam:  Gen: Alert, oriented, in no acute distress  Pulm: Speaking in full sentences without difficulty, no obvious wheezing appreciated   Psych: Answers questions appropriately, engaging in conversation appropriately    Labs reviewed:  Lab Results   Component Value Date    WBC 10.3 01/08/2024    WBC 8.3 04/12/2021    RBC 5.01 01/08/2024    RBC 5.41 04/12/2021    HGB 16.3 01/08/2024     HGB 17.8 (H) 04/12/2021    HCT 50.6 01/08/2024    HCT 53.2 (H) 04/12/2021     (H) 01/08/2024    MCV 98 04/12/2021    MCH 32.5 01/08/2024    MCH 32.9 04/12/2021    MCHC 32.2 01/08/2024    MCHC 33.5 04/12/2021    RDW 13.0 01/08/2024    RDW 12.9 04/12/2021     01/08/2024     04/12/2021     Sodium   Date Value Ref Range Status   04/29/2024 138 135 - 145 mmol/L Final     Comment:     Reference intervals for this test were updated on 09/26/2023 to more accurately reflect our healthy population. There may be differences in the flagging of prior results with similar values performed with this method. Interpretation of those prior results can be made in the context of the updated reference intervals.    06/28/2021 138 133 - 144 mmol/L Final     Potassium   Date Value Ref Range Status   04/29/2024 4.4 3.4 - 5.3 mmol/L Final   02/21/2022 3.9 3.4 - 5.3 mmol/L Final   06/28/2021 4.0 3.4 - 5.3 mmol/L Final     Chloride   Date Value Ref Range Status   04/29/2024 102 98 - 107 mmol/L Final   02/21/2022 105 94 - 109 mmol/L Final   06/28/2021 104 94 - 109 mmol/L Final     Carbon Dioxide   Date Value Ref Range Status   06/28/2021 26 20 - 32 mmol/L Final     Carbon Dioxide (CO2)   Date Value Ref Range Status   04/29/2024 24 22 - 29 mmol/L Final   02/21/2022 26 20 - 32 mmol/L Final     Anion Gap   Date Value Ref Range Status   04/29/2024 12 7 - 15 mmol/L Final   02/21/2022 8 3 - 14 mmol/L Final   06/28/2021 7 3 - 14 mmol/L Final     Glucose   Date Value Ref Range Status   04/29/2024 119 (H) 70 - 99 mg/dL Final   02/21/2022 144 (H) 70 - 99 mg/dL Final   06/28/2021 127 (H) 70 - 99 mg/dL Final     Urea Nitrogen   Date Value Ref Range Status   04/29/2024 28.1 (H) 8.0 - 23.0 mg/dL Final   02/21/2022 27 7 - 30 mg/dL Final   06/28/2021 17 7 - 30 mg/dL Final     Creatinine   Date Value Ref Range Status   04/29/2024 1.14 0.67 - 1.17 mg/dL Final   06/28/2021 0.91 0.66 - 1.25 mg/dL Final     GFR Estimate   Date Value Ref  Range Status   04/29/2024 68 >60 mL/min/1.73m2 Final   06/28/2021 85 >60 mL/min/[1.73_m2] Final     Comment:     Non  GFR Calc  Starting 12/18/2018, serum creatinine based estimated GFR (eGFR) will be   calculated using the Chronic Kidney Disease Epidemiology Collaboration   (CKD-EPI) equation.       Calcium   Date Value Ref Range Status   04/29/2024 9.3 8.8 - 10.2 mg/dL Final   06/28/2021 9.4 8.5 - 10.1 mg/dL Final     Bilirubin Total   Date Value Ref Range Status   04/12/2021 0.9 0.2 - 1.3 mg/dL Final     Alkaline Phosphatase   Date Value Ref Range Status   04/12/2021 84 40 - 150 U/L Final     ALT   Date Value Ref Range Status   11/03/2023 28 0 - 70 U/L Final     Comment:     Reference intervals for this test were updated on 6/12/2023 to more accurately reflect our healthy population. There may be differences in the flagging of prior results with similar values performed with this method. Interpretation of those prior results can be made in the context of the updated reference intervals.     04/12/2021 29 0 - 70 U/L Final     AST   Date Value Ref Range Status   04/12/2021 23 0 - 45 U/L Final     Recent Labs   Lab Test 01/08/24  1431 11/03/23  1104 04/12/21  1457 02/17/20  1601   CHOL 132 131   < > 152.0   HDL 48 52   < > 55.0   LDL 59 61   < > 74.0   TRIG 127 91   < > 116.0   CHOLHDLRATIO  --   --   --  2.8    < > = values in this interval not displayed.      Lab Results   Component Value Date    A1C 6.5 01/08/2024    A1C 6.5 07/12/2023    A1C 6.4 12/26/2022    A1C 6.4 04/12/2021    A1C 6.1 02/17/2020    A1C 6.1 02/11/2019    A1C 5.8 10/17/2016    A1C 5.9 03/18/2015        Prior select studies:  Recent Results (from the past 4320 hour(s))   Echocardiogram Complete   Result Value    LVEF  40-45%    Narrative    [id number]  [id number]  [id number]  [id number]^MORE^ARTEMIO^MITCHELL [facility]  [facility]  Echocardiography Laboratory  [address]  [place] [address]  Phone [phone]  Fax [phone]     Name: NILSON CASTILLO  MRN: [id number]  : 1950  Study Date: 2024 10:47 AM  Age: 74 yrs  Gender: Male  Patient Location: [facility]  Reason For Study: Heart failure with preserved ejection fraction, NYHA class  II (H  Ordering Physician: ARTEMIO SNOW  Referring Physician: ARTEMIO SNOW  Performed By: Tiffany Kohler     BSA: 2.7 m2  Height: 70 in  Weight: 351 lb  HR: 72  ______________________________________________________________________________  Procedure  Complete Echo Adult. Optison (NDC #1583-3403) given intravenously. Technically  difficult study.  ______________________________________________________________________________  Interpretation Summary     Technically difficult study due to body habitus.  Contrast administered for left ventricular opacification.  Mildly decreased left ventricular systolic function.  Visually estimated LVEF 40-45%.  Mild global hypokinesis.  The right ventricle, atria and cardiac valves were not well visualized.  Mild aortic valve stenosis based on Doppler data.  Normal inferior vena cava.     Compared to prior study, there is no significant change.  ______________________________________________________________________________  Left Ventricle  The left ventricle is not well visualized. The left ventricle is normal in  size. There is normal left ventricular wall thickness. Mildly decreased left  ventricular systolic function. The visual ejection fraction is 40-45%. Left  ventricular diastolic function is not assessable. There is mild global  hypokinesia of the left ventricle.     Right Ventricle  Right ventricular function cannot be assessed due to poor image quality.     Atria  The left atrium is not well visualized. Right atrium not well visualized.     Mitral Valve  The mitral valve is not well visualized. There is trace to mild mitral  regurgitation.     Tricuspid Valve  The  tricuspid valve is not well visualized.     Aortic Valve  The aortic valve is not well visualized. Mild valvular aortic stenosis. The  mean AoV pressure gradient is 14.0 mmHg.     Pulmonic Valve  The pulmonic valve is not well visualized.     Vessels  The aortic root is not well visualized. Ascending aorta not well visualized.  The inferior vena cava is normal.     Pericardium  There is no pericardial effusion.     Rhythm  Sinus rhythm was noted.     ______________________________________________________________________________  Doppler Measurements & Calculations  Ao V2 max: 244.0 cm/sec  Ao max P.0 mmHg  Ao V2 mean: 171.5 cm/sec  Ao mean P.0 mmHg  Ao V2 VTI: 47.9 cm     LV V1 max P.5 mmHg  LV V1 max: 60.4 cm/sec  LV V1 VTI: 9.6 cm  AV Greyson Ratio (DI): 0.25     ______________________________________________________________________________  Report approved by: Dr Dick Lisa 2024 01:17 PM                Thank you for allowing me to participate in the care of your patient.      Sincerely,     Andrea Lockett MD     [facility]  cc:   Wen Barker, PA-C  [address]  [place] [address]       79

## 2024-05-13 NOTE — PROGRESS NOTES
5- pt called to let me know he did just get his portion of the application mailed in. He also thanked me for the assistance Help  Liza Milian Lpn

## 2024-05-21 ENCOUNTER — DOCUMENTATION ONLY (OUTPATIENT)
Dept: ANTICOAGULATION | Facility: CLINIC | Age: 74
End: 2024-05-21
Payer: COMMERCIAL

## 2024-05-21 NOTE — PROGRESS NOTES
ANTICOAGULATION     Markie Shepard is overdue for an INR check.     Spoke with Markie and scheduled lab appointment on 6/3/24    Pt reports that he can only go to lab on Mondays since he relies on his grand son to come from [place] every Monday to bring groceries and run errands for him. Next Monday 5/27/24 is a holiday so clinics will not be open. Pt scheduled for the Monday after that which is June 3rd. Pt reports that he has been continuing to take warfarin as directed.    Laurence Rogel RN

## 2024-05-21 NOTE — PROGRESS NOTES
5- I received a fax from [employer] today-pt is Approved for Entresto effective 5-21-24-thru 12-. ID # [id number]  I called pt, gave him Instructions to call [facility] to verify address and get his delivery set up. He was most appreciative, thanking all the staff for the assistance.  Will message Team 7 , ([facility])  Liza Milian lpn

## 2024-05-23 ENCOUNTER — TELEPHONE (OUTPATIENT)
Dept: CARDIOLOGY | Facility: CLINIC | Age: 74
End: 2024-05-23
Payer: COMMERCIAL

## 2024-05-23 DIAGNOSIS — I50.22 CHRONIC SYSTOLIC HEART FAILURE (H): Primary | ICD-10-CM

## 2024-05-23 NOTE — TELEPHONE ENCOUNTER
"1041-Pt arrived from OR. Report received.    1110- Pt anxious to get home. Pt removing BP cuff and attempted to remove oxygen probe. Pt stating \"You don't understand. My ass hurts from sitting in this damn gurney for so long\" RN provided education to pt explaining to pt the importance of keeping of the pt safe and monitoring pt.    1118- Discharge instructions discussed with pts .  expresses understaing. Handoff report to GARRY Cedillo.    1134- Assumed care of pt. Report from GARRY Cedillo.    1140- Pt changing into clothes from home.    1145- IV removed. Pt discharged home to . All personal belongings with pt.  " [facility]    Phone Message    May a detailed message be left on voicemail: yes     Reason for Call: Other: Pt called wanting to speak with care team regarding the cancellation of his medication Entresto, pt wants a 5 days suply until he speaks with the manufactuer of the medication. Please call pt back for further discussion.     Action Taken: Other: cardiology    Travel Screening: Not Applicable    Thank you!  [facility]

## 2024-05-24 RX ORDER — SACUBITRIL AND VALSARTAN 24; 26 MG/1; MG/1
1 TABLET, FILM COATED ORAL 2 TIMES DAILY
Qty: 10 TABLET | Refills: 0 | Status: SHIPPED | OUTPATIENT
Start: 2024-05-24 | End: 2024-05-29

## 2024-05-24 NOTE — TELEPHONE ENCOUNTER
I called pt. His [employer] pt assistance program application for entresto coverage has been approved, but he won't get his shipment for another week or so. Will send a temporary 5 day prescription to pt's [facility] pharmacy. Tyalor NAILS May 24, 2024, 10:04 AM

## 2024-05-29 RX ORDER — SACUBITRIL AND VALSARTAN 24; 26 MG/1; MG/1
1 TABLET, FILM COATED ORAL 2 TIMES DAILY
Qty: 20 TABLET | Refills: 0 | Status: SHIPPED | OUTPATIENT
Start: 2024-05-29

## 2024-05-29 NOTE — TELEPHONE ENCOUNTER
[facility]    Phone Message    May a detailed message be left on voicemail: yes     Reason for Call: Other: Pt would like a call back asap as his supply for entresto is behind and was told to call and get a 20 tab supply sent to [facility] pharmacy [address], please reach out to pt to discuss      Action Taken: Other: Cardio    Travel Screening: Not Applicable

## 2024-05-29 NOTE — TELEPHONE ENCOUNTER
I called pt back. He said that there is still a delay in getting his entresto from [facility] in the mail so he needs another temporary refill to be sent to [facility] pharmacy on [address]. His normal [facility] location doesn't have the size entresto tablet that he needs. Pt requested a refill for 20 tablets be sent to the [facility] pharmacy. He will pick them up on Saturday. Taylor NAILS May 29, 2024, 11:26 AM

## 2024-06-03 ENCOUNTER — LAB (OUTPATIENT)
Dept: LAB | Facility: CLINIC | Age: 74
End: 2024-06-03
Payer: COMMERCIAL

## 2024-06-03 ENCOUNTER — ANTICOAGULATION THERAPY VISIT (OUTPATIENT)
Dept: ANTICOAGULATION | Facility: CLINIC | Age: 74
End: 2024-06-03

## 2024-06-03 DIAGNOSIS — I48.20 CHRONIC ATRIAL FIBRILLATION (H): ICD-10-CM

## 2024-06-03 DIAGNOSIS — Z79.01 LONG TERM CURRENT USE OF ANTICOAGULANT THERAPY: Primary | ICD-10-CM

## 2024-06-03 LAB — INR BLD: 2.6 (ref 0.9–1.1)

## 2024-06-03 PROCEDURE — 85610 PROTHROMBIN TIME: CPT

## 2024-06-03 PROCEDURE — 36416 COLLJ CAPILLARY BLOOD SPEC: CPT

## 2024-06-03 NOTE — PROGRESS NOTES
ANTICOAGULATION MANAGEMENT     Markie Shepard 74 year old male is on warfarin with therapeutic INR result. (Goal INR 2.0-3.0)    Recent labs: (last 7 days)     06/03/24  1146   INR 2.6*       ASSESSMENT     Source(s): Chart Review and Patient/Caregiver Call     Warfarin doses taken: Warfarin taken as instructed  Diet: No new diet changes identified  Medication/supplement changes: None noted  New illness, injury, or hospitalization: No  Signs or symptoms of bleeding or clotting: No  Previous result: Therapeutic last visit; previously outside of goal range  Additional findings: None       PLAN     Recommended plan for no diet, medication or health factor changes affecting INR     Dosing Instructions: Continue your current warfarin dose with next INR in 6 weeks       Summary  As of 6/3/2024      Full warfarin instructions:  2.5 mg every Mon, Fri; 5 mg all other days   Next INR check:  7/15/2024               Telephone call with Markie who verbalizes understanding and agrees to plan    Lab visit scheduled    Education provided:   Goal range and lab monitoring: goal range and significance of current result and Importance of therapeutic range    Plan made per [facility] anticoagulation protocol    Gunnar Brody RN  Anticoagulation Clinic  6/3/2024    _______________________________________________________________________     Anticoagulation Episode Summary       Current INR goal:  2.0-3.0   TTR:  86.1% (1 y)   Target end date:  Indefinite   Send INR reminders to:  [facility]    Indications    Long-term (current) use of anticoagulants [Z79.01] [Z79.01]  Chronic atrial fibrillation (H) [I48.20]             Comments:               Anticoagulation Care Providers       Provider Role Specialty Phone number    Mali Patel MD Referring Internal Medicine [phone]

## 2024-06-07 ENCOUNTER — TELEPHONE (OUTPATIENT)
Dept: CARDIOLOGY | Facility: CLINIC | Age: 74
End: 2024-06-07
Payer: COMMERCIAL

## 2024-06-07 NOTE — TELEPHONE ENCOUNTER
Noted pt received Entresto.     JAQUI Vicente   3:34 PM 6/7/2024    CORE nurse line M-F 8a-4p: [phone]

## 2024-06-07 NOTE — TELEPHONE ENCOUNTER
[facility]    Received Right Fax from [email or web address] indicating the pt had an adverse event to Entresto. Spoke with Markie who states he did not have an adverse event infact he is waiting on his delivery from [facility] that is to arrive today from 10:30 AM-2:30 PM and the Entresto has not arrived yet.     When talking to [email or web address] they were only able to review the adverse event and could not tell me anything regarding delivery as he did not have access to it.     Mag Krishnamurthy RN BAN   2:47 PM 6/7/2024    [facility] nurse line M-F 8a-4p: [phone]

## 2024-06-07 NOTE — TELEPHONE ENCOUNTER
Markie called the [facility] phone line regarding his issue getting Entresto filled. From the notes in his chart it appears Mag Krishnamurthy RN is working on this. The [facility] transferred the patient to me here at the [facility] which is the wrong location. Either way, it appears the patient has still not received the Entresto as of 3:00 PM today and is worried about getting this refilled as he will run out by 6/9/24. This message is mostly being sent to inform the [facility] that he has called back and is still waiting for a solution.

## 2024-06-07 NOTE — TELEPHONE ENCOUNTER
[facility] Call Center    Phone Message    May a detailed message be left on voicemail: yes     Reason for Call: Other: pt is calling to notify team he did receive his sacubitril-valsartan (ENTRESTO) 24-26 MG per tablet [[id number]] (Order [id number].      Action Taken: Other: cardiology     Travel Screening: Not Applicable     Thank you!  [facility]

## 2024-06-11 ENCOUNTER — PATIENT OUTREACH (OUTPATIENT)
Dept: CARDIOLOGY | Facility: CLINIC | Age: 74
End: 2024-06-11
Payer: COMMERCIAL

## 2024-06-11 NOTE — PROGRESS NOTES
"[facility]   Received fax from [employer] that pt reported adverse effect from Entresto. Reported adverse event: \"blindness\".     Reviewed chart, pt is legally blind and has been, with diagnosis in 2012. Pt just started [employer] this spring end of 4/2024, beginning of 5/2024.     Called [employer], reviewed above. They took verbal with the information and will update the file.     Case# if needed: [id number]    Celeste Linder RN, BSN  06/11/24 at 3:26 PM   "

## 2024-06-20 DIAGNOSIS — G47.00 INSOMNIA, UNSPECIFIED TYPE: ICD-10-CM

## 2024-06-20 DIAGNOSIS — F33.9 CHRONIC RECURRENT MAJOR DEPRESSIVE DISORDER (H): ICD-10-CM

## 2024-06-20 RX ORDER — TRAZODONE HYDROCHLORIDE 100 MG/1
100 TABLET ORAL
Qty: 30 TABLET | Refills: 0 | Status: SHIPPED | OUTPATIENT
Start: 2024-06-20 | End: 2024-07-23

## 2024-06-20 RX ORDER — FLUOXETINE 40 MG/1
40 CAPSULE ORAL DAILY
Qty: 90 CAPSULE | Refills: 1 | Status: CANCELLED | OUTPATIENT
Start: 2024-06-20

## 2024-06-20 RX ORDER — FLUOXETINE 40 MG/1
40 CAPSULE ORAL DAILY
Qty: 30 CAPSULE | Refills: 0 | Status: SHIPPED | OUTPATIENT
Start: 2024-06-20 | End: 2024-07-07

## 2024-06-20 NOTE — TELEPHONE ENCOUNTER
Fluoxetine (Prozac) 40 mg    Last Office Visit: 1/8/24  [place] [facility] [place]  Medication last refilled: 1/5/24 #90 with 1 refill(s)    PHQ-9 / YAAKOV-7 Scores 12/26/22 7/12/23 1/8/24   YAAKOV-7 Score DocFlow 2 1 1   PHQ-9 Score DocFlow 1 2 4     Medication is being filled for 1 time refill only due to:  Patient needs to be seen because due for mental health follow up in July.    Message sent to scheduling to contact patient to and schedule Peter for a return appointment.    Jessica Stein, BSN, RN, CCM

## 2024-06-20 NOTE — TELEPHONE ENCOUNTER
Trazodone (Desyrel) 100 mg    Last Office Visit: 1/8/24  Future [facility] Appointments: None  Medication last refilled: 1/5/24 #90 with 1 refill(s)    Medication is being filled for 1 time refill only due to:  Patient needs to be seen because due for 6 month follow up.    [employer] message sent to patient to call and schedule appointment.    FILOMENA MittalN, RN, CCM

## 2024-07-08 NOTE — PROGRESS NOTES
[facility] [address], [address]  [facility] [place] [address]  Phone: [phone]  Fax: [phone]    Patient:  Markie Shepard, Date of birth 1950  Date of Visit:  07/07/2024  Referring Provider Referred Self      Assessment & Plan    (E11.9) Type 2 diabetes mellitus without complication, without long-term current use of insulin (H)  (primary encounter diagnosis)  Comment: diet controlled, last A1c 6.5%. no longer on Farxiga  Plan: Hemoglobin A1c            (F33.9) Chronic recurrent major depressive disorder (H24)  Comment: doing well with Fluoxetine, occasional panic attack but 60mg dose blunted mood  Plan: FLUoxetine (PROZAC) 40 MG capsule        Refilled fluoxetine 40mg daily    (J44.9) Chronic obstructive pulmonary disease, unspecified COPD type (H)  Comment: currently using albuterol more frequently in summer months, humidity can also trigger.  Plan: Fluticasone-Umeclidin-Vilanterol (TRELEGY         ELLIPTA) 200-62.5-25 MCG/ACT oral inhaler,         albuterol (PROAIR HFA/PROVENTIL HFA/VENTOLIN         HFA) 108 (90 Base) MCG/ACT inhaler        No change in dose, refilled meds for one year    (I48.20) Chronic atrial fibrillation (H)  Comment: followed by cardiology, rate controlled, on chronic anticoagulation  Plan: continue medications, he will go for INR lab next week    (I50.20) Heart failure with reduced ejection fraction (H)  (I25.10) Coronary artery disease involving native coronary artery of native heart without angina pectoris  Comment: followed by cardiology, he recently started on Entresto and is feeling better, breathing better.   Plan: continue medications    (E78.5) Hyperlipidemia LDL goal <70  Comment: on Rosuvastatin 40mg daily, LDL in target range  Has occasional twinges of chest pain, fleeting  Recent Labs   Lab Test 01/08/24  1431 11/03/23  1104 04/12/21  1457 02/17/20  1601   CHOL 132 131   < > 152.0   HDL 48 52   < > 55.0   LDL 59 61    "< > 74.0   TRIG 127 91   < > 116.0   CHOLHDLRATIO  --   --   --  2.8    < > = values in this interval not displayed.   Plan: continue Crestor 40mg daily    38 minutes spend on the date of this encounter doing chart review, history and exam, documentation and further activities as noted above.       Mali Patel MD             Markie Shepard is a 74 year old male with a past medical history significant for legal blindness, morbid obesity, coronary artery disease status post PCI, permanent atrial fibrillation (on warfarin), sleep apnea (CPAP), significant prior smoking history, COPD, heart failure with reduced ejection fraction, anxiety and chronic venous stasis who presents for a medication check     DIABETES  Here for Type II diabetes.  Legally blind  No longer seeing retinal specialist, \"nothing more to do\"  Advanced Macular degeneration despite treatment  Left eye \"no good\"  Right eye, can see clearly, then foggy by end of day  Thera tears for dry eyes  Peripheral neuropathy: no  Weight: not weighed today  Wt Readings from Last 3 Encounters:   05/01/24 (!) 158.8 kg (350 lb)   03/18/24 (!) 159.2 kg (351 lb)   11/03/23 (!) 158.9 kg (350 lb 4.8 oz)     Exercise: stepper  Candidiasis/skin issues: no  UTI/ yeast infections: no.    Medications: NONE    Lab Results   Component Value Date    A1C 6.5 01/08/2024    A1C 6.5 07/12/2023    A1C 6.4 04/12/2021    A1C 6.1 02/17/2020       Aspirin Use: none, on warfarin     COPD  Fluticasone-Umeclidin-Vilanterol 200-62.5-24mcg/act, 1 inhalation daily  Albuterol MDI 2 puffs q 4 hr   2 mos ago using albuterol once daily  Now using albuterol 4x per day---he thinks humidity, summer allergies triggering, gets relief with medication    Depression  Fluoxetine 40mg daily  Anxiety flare 2 wks ago, panic attack, lasted 2 hr, exercise helps  Fluoxetine 60mg dose was not helpful---blunted mood  Trazodone 100mg q hs working well  Most nights 12:30 - 7 am  Some nights gets up 1-2x to " "void, usually can go back to sleep  No issues with hesitancy or dampened stream  No burning       7/12/2023    10:58 AM 1/8/2024     1:50 PM 7/9/2024    11:24 AM   PHQ   PHQ-9 Total Score 2 4 4   Q9: Thoughts of better off dead/self-harm past 2 weeks Not at all Not at all Not at all         12/26/2022     2:59 PM 7/12/2023    10:58 AM 7/9/2024    11:24 AM   YAAKOV-7 SCORE   Total Score 2 1 3       Chronic atrial fibrillation (H)  Lifelong anticoagulation with warfarin  Rate controlled, metoprolol XL 200mg daily  Occasional twinges of chest pain, fleeting    Heart failure with reduced ejection fraction (H)  Recent ECHO with 40-45% EF  Managed with torsemide 40mg BID  Spironolactone 12.5mg daily  Sacubitril-valsartan 24-26mg  bid recently added, feels it is helping  Medication assistance program thru [employer]  Farxiga has been discontinued. Took x 3 mos \"hell\" affected urine and bowel symptoms      Coronary artery disease involving native coronary artery of native heart without angina pectoris  Hyperlipidemia LDL goal <70    Rosuvastatin 40mg daily  LDL in target range  Recent Labs   Lab Test 01/08/24  1431 11/03/23  1104 04/12/21  1457 02/17/20  1601   CHOL 132 131   < > 152.0   HDL 48 52   < > 55.0   LDL 59 61   < > 74.0   TRIG 127 91   < > 116.0   CHOLHDLRATIO  --   --   --  2.8    < > = values in this interval not displayed.         EXAM  /71 (BP Location: Right arm, Patient Position: Sitting, Cuff Size: Adult Large)   Pulse 63   Temp (!) 95.9  F (35.5  C) (Skin)   Resp 20   SpO2 95%   Gen: Alert, NAD, heavy set man, pleasant  Cor: S1S2, irregularly irregular, distant  Lungs: CTA bilaterally  Ext: +1 edema  Skin: hemosiderin pigmentation at lower shins      "

## 2024-07-09 ENCOUNTER — OFFICE VISIT (OUTPATIENT)
Dept: FAMILY MEDICINE | Facility: CLINIC | Age: 74
End: 2024-07-09
Payer: COMMERCIAL

## 2024-07-09 VITALS
RESPIRATION RATE: 20 BRPM | OXYGEN SATURATION: 95 % | TEMPERATURE: 95.9 F | HEART RATE: 63 BPM | DIASTOLIC BLOOD PRESSURE: 71 MMHG | SYSTOLIC BLOOD PRESSURE: 101 MMHG

## 2024-07-09 DIAGNOSIS — E11.9 TYPE 2 DIABETES MELLITUS WITHOUT COMPLICATION, WITHOUT LONG-TERM CURRENT USE OF INSULIN (H): Primary | ICD-10-CM

## 2024-07-09 DIAGNOSIS — J44.9 CHRONIC OBSTRUCTIVE PULMONARY DISEASE, UNSPECIFIED COPD TYPE (H): ICD-10-CM

## 2024-07-09 DIAGNOSIS — I48.20 CHRONIC ATRIAL FIBRILLATION (H): ICD-10-CM

## 2024-07-09 DIAGNOSIS — I50.20 HEART FAILURE WITH REDUCED EJECTION FRACTION (H): ICD-10-CM

## 2024-07-09 DIAGNOSIS — F33.9 CHRONIC RECURRENT MAJOR DEPRESSIVE DISORDER (H): ICD-10-CM

## 2024-07-09 DIAGNOSIS — E78.5 HYPERLIPIDEMIA LDL GOAL <70: ICD-10-CM

## 2024-07-09 DIAGNOSIS — I25.10 CORONARY ARTERY DISEASE INVOLVING NATIVE CORONARY ARTERY OF NATIVE HEART WITHOUT ANGINA PECTORIS: ICD-10-CM

## 2024-07-09 LAB — HBA1C MFR BLD: 6.6 % (ref 0–5.6)

## 2024-07-09 RX ORDER — FLUOXETINE 40 MG/1
40 CAPSULE ORAL DAILY
Qty: 90 CAPSULE | Refills: 3 | Status: SHIPPED | OUTPATIENT
Start: 2024-07-09

## 2024-07-09 RX ORDER — FLUTICASONE FUROATE, UMECLIDINIUM BROMIDE AND VILANTEROL TRIFENATATE 200; 62.5; 25 UG/1; UG/1; UG/1
1 POWDER RESPIRATORY (INHALATION) DAILY
Qty: 60 EACH | Refills: 11 | Status: SHIPPED | OUTPATIENT
Start: 2024-07-09

## 2024-07-09 RX ORDER — ALBUTEROL SULFATE 90 UG/1
2 AEROSOL, METERED RESPIRATORY (INHALATION) EVERY 4 HOURS PRN
Qty: 18 G | Refills: 11 | Status: SHIPPED | OUTPATIENT
Start: 2024-07-09

## 2024-07-09 ASSESSMENT — PATIENT HEALTH QUESTIONNAIRE - PHQ9
5. POOR APPETITE OR OVEREATING: NOT AT ALL
SUM OF ALL RESPONSES TO PHQ QUESTIONS 1-9: 4

## 2024-07-09 ASSESSMENT — ANXIETY QUESTIONNAIRES
GAD7 TOTAL SCORE: 3
7. FEELING AFRAID AS IF SOMETHING AWFUL MIGHT HAPPEN: SEVERAL DAYS
5. BEING SO RESTLESS THAT IT IS HARD TO SIT STILL: NOT AT ALL
3. WORRYING TOO MUCH ABOUT DIFFERENT THINGS: NOT AT ALL
2. NOT BEING ABLE TO STOP OR CONTROL WORRYING: NOT AT ALL
GAD7 TOTAL SCORE: 3
6. BECOMING EASILY ANNOYED OR IRRITABLE: SEVERAL DAYS
1. FEELING NERVOUS, ANXIOUS, OR ON EDGE: SEVERAL DAYS
IF YOU CHECKED OFF ANY PROBLEMS ON THIS QUESTIONNAIRE, HOW DIFFICULT HAVE THESE PROBLEMS MADE IT FOR YOU TO DO YOUR WORK, TAKE CARE OF THINGS AT HOME, OR GET ALONG WITH OTHER PEOPLE: SOMEWHAT DIFFICULT

## 2024-07-09 ASSESSMENT — ASTHMA QUESTIONNAIRES: ACT_TOTALSCORE: 13

## 2024-07-09 NOTE — NURSING NOTE
74 year old  Chief Complaint   Patient presents with     Follow Up     Mental health follow up       Blood pressure 101/71, pulse 63, temperature (!) 95.9  F (35.5  C), temperature source Skin, resp. rate 20, SpO2 95%. There is no height or weight on file to calculate BMI.  Patient Active Problem List   Diagnosis    CAD (coronary artery disease)    Hyperlipidemia LDL goal <70    Smoker    Essential hypertension with goal blood pressure less than 140/90    Chronic atrial fibrillation (H)    Obstructive sleep apnea    Low back pain    Mixed hyperlipidemia    Long-term (current) use of anticoagulants [Z79.01]    Anisocoria    Chronic obstructive pulmonary disease, unspecified COPD type (H)    Morbid obesity (H)    Chronic recurrent major depressive disorder (H24)    Bilateral leg edema    Insomnia, unspecified type    Type 2 diabetes mellitus without complication, without long-term current use of insulin (H)    Heart failure with preserved ejection fraction, NYHA class II (H)    Nonrheumatic aortic valve stenosis       Wt Readings from Last 2 Encounters:   05/01/24 (!) 158.8 kg (350 lb)   03/18/24 (!) 159.2 kg (351 lb)     BP Readings from Last 3 Encounters:   07/09/24 101/71   03/18/24 100/60   01/08/24 128/86         Current Outpatient Medications   Medication Sig Dispense Refill    albuterol (PROAIR HFA/PROVENTIL HFA/VENTOLIN HFA) 108 (90 Base) MCG/ACT inhaler Inhale 2 puffs into the lungs every 4 hours as needed for shortness of breath or wheezing 18 g 5    FLUoxetine (PROZAC) 40 MG capsule Take 1 capsule (40 mg) by mouth daily Due for appointment 30 capsule 0    fluticasone (FLONASE) 50 MCG/ACT nasal spray Spray 1 spray into both nostrils daily 9.9 mL 3    Fluticasone-Umeclidin-Vilanterol (TRELEGY ELLIPTA) 200-62.5-25 MCG/ACT oral inhaler Inhale 1 puff into the lungs daily 60 each 11    metoprolol succinate ER (TOPROL XL) 200 MG 24 hr tablet Take 1 tablet (200 mg) by mouth daily 90 tablet 3    multivitamin  w/minerals (THERA-VIT-M) tablet Take 1 tablet by mouth daily Century 55+      potassium chloride ER (K-TAB) 20 MEQ CR tablet Take 1 tablet (20 mEq) by mouth daily 90 tablet 3    rosuvastatin (CRESTOR) 40 MG tablet Take 1 tablet (40 mg) by mouth at bedtime 90 tablet 3    sacubitril-valsartan (ENTRESTO) 24-26 MG per tablet Take 1 tablet by mouth 2 times daily 20 tablet 0    sacubitril-valsartan (ENTRESTO) 24-26 MG per tablet Take 1 tablet by mouth 2 times daily 180 tablet 3    spironolactone (ALDACTONE) 25 MG tablet Take 0.5 tablets (12.5 mg) by mouth daily 45 tablet 3    torsemide (DEMADEX) 20 MG tablet Take 2 tablets (40 mg) by mouth every morning AND 2 tablets (40 mg) daily (with lunch). 360 tablet 1    traZODone (DESYREL) 100 MG tablet Take 1 tablet (100 mg) by mouth nightly as needed for sleep Due for appointment 30 tablet 0    warfarin ANTICOAGULANT (COUMADIN ANTICOAGULANT) 5 MG tablet Take 2.5 mg on  and , and 5 mg all other days, or as directed by Coumadin nurse. 100 tablet 1     No current facility-administered medications for this visit.       Social History     Tobacco Use    Smoking status: Former     Current packs/day: 0.00     Average packs/day: 1 pack/day for 53.0 years (53.0 ttl pk-yrs)     Types: Cigarettes     Start date: 1966     Quit date: 2019     Years since quittin.7    Smokeless tobacco: Never    Tobacco comments:     1 ppd   Vaping Use    Vaping status: Never Used   Substance Use Topics    Alcohol use: No     Comment: quit drinking heavily     Drug use: No       Health Maintenance Due   Topic Date Due    HF ACTION PLAN  Never done    COLORECTAL CANCER SCREENING  Never done    RSV VACCINE (Pregnancy & 60+) (1 - 1-dose 60+ series) Never done    EYE EXAM  2016    MEDICARE ANNUAL WELLNESS VISIT  2022    FALL RISK ASSESSMENT  2022    Medicare Annual MTM Pharmacist Visit (once per calendar year)  Never done    A1C  2024    PHQ-9  2024     "DIABETIC FOOT EXAM  07/12/2024       No results found for: \"PAP\"      July 9, 2024 11:27 AM   "

## 2024-07-09 NOTE — LETTER
July 9, 2024      Markie Shepard  [address]  [place] [address]          Dear Markie,     Enclosed are you lab results.      Your A1c remains in very good range at 6.6%.   I'd recommend rechecking again in 6 months.     Sincerely,     Mali Patel MD   Internal Medicine/Pediatrics     Resulted Orders   Hemoglobin A1c   Result Value Ref Range    Hemoglobin A1C 6.6 (H) 0.0 - 5.6 %      Comment:      Normal <5.7%   Prediabetes 5.7-6.4%    Diabetes 6.5% or higher     Note: Adopted from ADA consensus guidelines.       If you have any questions or concerns, please call the clinic at the number listed above.       Sincerely,      Mali Patel MD

## 2024-07-15 ENCOUNTER — ANTICOAGULATION THERAPY VISIT (OUTPATIENT)
Dept: ANTICOAGULATION | Facility: CLINIC | Age: 74
End: 2024-07-15

## 2024-07-15 ENCOUNTER — LAB (OUTPATIENT)
Dept: LAB | Facility: CLINIC | Age: 74
End: 2024-07-15
Payer: COMMERCIAL

## 2024-07-15 DIAGNOSIS — Z79.01 LONG TERM CURRENT USE OF ANTICOAGULANT THERAPY: Primary | ICD-10-CM

## 2024-07-15 DIAGNOSIS — I48.20 CHRONIC ATRIAL FIBRILLATION (H): ICD-10-CM

## 2024-07-15 LAB — INR BLD: 2.8 (ref 0.9–1.1)

## 2024-07-15 PROCEDURE — 36416 COLLJ CAPILLARY BLOOD SPEC: CPT

## 2024-07-15 PROCEDURE — 85610 PROTHROMBIN TIME: CPT

## 2024-07-15 NOTE — PROGRESS NOTES
ANTICOAGULATION MANAGEMENT     Markie Shepard 74 year old male is on warfarin with therapeutic INR result. (Goal INR 2.0-3.0)    Recent labs: (last 7 days)     07/15/24  1114   INR 2.8*       ASSESSMENT     Source(s): Chart Review  Previous INR was Therapeutic last 2(+) visits  Medication, diet, health changes since last INR chart reviewed; none identified    I have called pt many times today and his line has been busy. MyChart is not active. Will need to try again tomorrow.      PLAN     Unable to reach pt today.    No instructions provided. Unable to leave voicemail.    Follow up required to confirm warfarin dose taken and assess for changes    Elena West RN  Anticoagulation Clinic  7/15/2024

## 2024-07-16 NOTE — PROGRESS NOTES
Writer attempted to reach Markie again today, busy signal continues, unable to leave .     Writer called emergency contact, patient's daughter, who reported that Markie's phone power is out and he will not have a phone again until tomorrow.     She will inform Markie that we were attempting to reach him. She reports no concerns that she is aware of. He will continue current dosing with a recheck recommended in 6 weeks. Encouraged for patient to call [facility], when power is back on, with any questions or concerns.     TONYA GARY RN  Anticoagulation Clinic  [phone]    ANTICOAGULATION MANAGEMENT     Markie Shepard 74 year old male is on warfarin with therapeutic INR result. (Goal INR 2.0-3.0)    Recent labs: (last 7 days)     07/15/24  1114   INR 2.8*       ASSESSMENT     Source(s): Chart Review  Previous INR was Therapeutic last 2(+) visits  Medication, diet, health changes since last INR chart reviewed; none identified         PLAN     Recommended plan for no diet, medication or health factor changes affecting INR     Dosing Instructions: Continue your current warfarin dose with next INR in 6 weeks       Summary  As of 7/15/2024      Full warfarin instructions:  2.5 mg every Mon, Fri; 5 mg all other days   Next INR check:  8/26/2024               Telephone call with emergency contact/daughter Leann  who verbalizes understanding and agrees to plan    Contact [phone] to schedule and with any changes, questions or concerns.     Education provided: Goal range and lab monitoring: goal range and significance of current result and Importance of following up at instructed interval  Contact [phone] with any changes, questions or concerns.     Plan made per [facility] anticoagulation protocol    TONYA GARY RN  Anticoagulation Clinic  7/16/2024    _______________________________________________________________________     Anticoagulation Episode Summary       Current INR goal:  2.0-3.0   TTR:  86.0% (1 y)   Target  end date:  Indefinite   Send INR reminders to:  [facility]    Indications    Long-term (current) use of anticoagulants [Z79.01] [Z79.01]  Chronic atrial fibrillation (H) [I48.20]             Comments:               Anticoagulation Care Providers       Provider Role Specialty Phone number    Mali Patel MD Referring Internal Medicine [phone]

## 2024-07-23 DIAGNOSIS — G47.00 INSOMNIA, UNSPECIFIED TYPE: ICD-10-CM

## 2024-07-25 RX ORDER — TRAZODONE HYDROCHLORIDE 100 MG/1
100 TABLET ORAL
Qty: 90 TABLET | Refills: 3 | Status: SHIPPED | OUTPATIENT
Start: 2024-07-25

## 2024-07-25 NOTE — TELEPHONE ENCOUNTER
Medication requested: traZODone (DESYREL) 100 MG tablet   Last office visit: 7/9/24  [facility] appointments: none  Medication last refilled: 6/20/24; 30 + 0 refills  Last qualifying labs: N/A    Prescription approved per [facility] Refill Protocol.    Henry HORAN, RN  07/25/24 10:34 AM

## 2024-08-12 ENCOUNTER — TELEPHONE (OUTPATIENT)
Dept: CARDIOLOGY | Facility: CLINIC | Age: 74
End: 2024-08-12
Payer: COMMERCIAL

## 2024-08-12 NOTE — TELEPHONE ENCOUNTER
[facility] Call Center    Phone Message    May a detailed message be left on voicemail: yes     Reason for Call: Symptoms or Concerns     If patient has red-flag symptoms, warm transfer to triage line    Current symptom or concern: Fatigue     Symptoms have been present for:  couple of  week(s)    Has patient previously been seen for this? No      Are there any new or worsening symptoms? Yes: Patient stats he changed when he taking medications. Patient stats that he feels fatigue. With the medication change feels a little better. Patient thought it was cause of the heat but thinks it is the medications. Please reach out.     Action Taken: Other: cardiology     Travel Screening: Not Applicable    Thank you!  Special[facility]       Date of Service:

## 2024-08-12 NOTE — TELEPHONE ENCOUNTER
[facility]   Attempted to call pt to discuss re:concerns of fatigue. No answer, LVM for call back to discuss.     Celeste Linder RN, BSN  08/12/24 at 12:30 PM

## 2024-08-12 NOTE — TELEPHONE ENCOUNTER
[facility]   Pt returned call. He notes a few weeks ago when it was hot/humid, he was feeling more fatigued. He attributed to the heat/allergies. Since it has gotten cool, he is still feeling fatigued more than usual. He thought it was related to his diuretics so he has cut back on his own. Starting on Saturday, he went down from Torsemide 40mg BID to 40mg once daily. He also is still taking his Spironolactone. Pt has done that for 3 days and is feeling a little less fatigued. He is unable to weigh as he is blind. He doesn't think he is dehydrated, he is drinking 1 quart of tap water, 5-12 oz sparkling dobbs every day, plus milk/coffee each day. Denies other symptoms other than feeling a little achy.     Discussed he is due for follow up, missed visit/no showe 5/9/24 with Dr. Lockett. Pt notes he had that visit, it was a telephone visit. Found Dr. Lockett's note in chart, told pt I would send update to Dr. Lockett and team and have them follow up with further recommendations. Will also route to clinic manager to get 5/9/24 visit marked as completed and not a no show.     Future Appointments   Date Time Provider Department Center   8/26/2024 11:15 AM [facility]       Celeste Linder, RN, BSN  08/12/24 at 2:31 PM

## 2024-08-13 NOTE — TELEPHONE ENCOUNTER
Reply from Dr. Lockett:  Fatigue alone without other symptoms of fluid overload doesn't seem like it would be related to his diuretics or cardiac etiology, recommend he follow up with PCP     0915 attempted to contact patient to discuss returning to torsemide 40mg BID and discussing fatigue concerns with his PCP.   Left message for patient to call back to Team 2 R.N.s @ [phone] 0940 spoke with patient to review Dr. Lockett's recommendations. Patient will resume his previous torsemide dosing. He will call his PCP to discuss how he is feeling.

## 2024-08-15 ENCOUNTER — TELEPHONE (OUTPATIENT)
Dept: PHARMACY | Facility: OTHER | Age: 74
End: 2024-08-15
Payer: COMMERCIAL

## 2024-08-15 NOTE — TELEPHONE ENCOUNTER
MTM Recruitment: [employer]     Referral outreach attempt #1 on August 15, 2024      Outcome: left voicemail- Call back number [phone]    Miguel Baird, PatrickD, [facility]  Medication Therapy Management Pharmacist  Voicemail: [phone]

## 2024-08-16 DIAGNOSIS — J32.9 SINUSITIS, UNSPECIFIED CHRONICITY, UNSPECIFIED LOCATION: ICD-10-CM

## 2024-08-16 DIAGNOSIS — I48.20 CHRONIC ATRIAL FIBRILLATION (H): ICD-10-CM

## 2024-08-16 RX ORDER — WARFARIN SODIUM 5 MG/1
TABLET ORAL
Qty: 90 TABLET | Refills: 1 | Status: SHIPPED | OUTPATIENT
Start: 2024-08-16

## 2024-08-16 RX ORDER — FLUTICASONE PROPIONATE 50 MCG
1 SPRAY, SUSPENSION (ML) NASAL DAILY
Qty: 9.9 ML | Refills: 3 | Status: SHIPPED | OUTPATIENT
Start: 2024-08-16

## 2024-08-16 NOTE — TELEPHONE ENCOUNTER
Medication requested: fluticasone (FLONASE) 50 MCG/ACT nasal spray   Last office visit: 7/9/24  [facility] appointments: none  Medication last refilled: 10/10/23; 9.9 mL + 3 refills  Last qualifying labs: N/A    Prescription approved per [facility] Refill Protocol.    Henry HORAN, RN  08/16/24 2:10 PM

## 2024-08-19 ENCOUNTER — OFFICE VISIT (OUTPATIENT)
Dept: DERMATOLOGY | Facility: CLINIC | Age: 74
End: 2024-08-19
Payer: COMMERCIAL

## 2024-08-19 DIAGNOSIS — H61.001 CHONDRODERMATITIS NODULARIS HELICIS OF RIGHT EAR: Primary | ICD-10-CM

## 2024-08-19 PROCEDURE — 99203 OFFICE O/P NEW LOW 30 MIN: CPT | Mod: 25 | Performed by: STUDENT IN AN ORGANIZED HEALTH CARE EDUCATION/TRAINING PROGRAM

## 2024-08-19 PROCEDURE — 17110 DESTRUCTION B9 LES UP TO 14: CPT | Performed by: STUDENT IN AN ORGANIZED HEALTH CARE EDUCATION/TRAINING PROGRAM

## 2024-08-19 NOTE — LETTER
8/19/2024      Markie Shepard  [address]  [facility] [address]      Dear Colleague,    Thank you for referring your patient, Markie Shepard, to the [facility]. Please see a copy of my visit note below.    [facility] Dermatology Note    Encounter Date: Aug 19, 2024    Dermatology Problem List:    ______________________________________    Impression/Plan:  Markie was seen today for derm problem.    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 Post cryotherapy instructions were provided.     Follow-up PRN.       Staff Involved:  Staff Only    Terence Oleary MD   of Dermatology  Department of Dermatology  [facility]      CC:   Chief Complaint   Patient presents with     Derm Problem     Lesion on the right ear - pain and discomfort on July 1st, only feel pain when pt sleep on the right ear, felt like a crust in the area        History of Present Illness:  Mr. Markie Shepard is a 74 year old male who presents as a new patient.    Pain and discomfort on ear. Only feels pain when on the ear when sleeping. Favors R side. Wonders whether it is a pressure sore or NMSC     Labs:      Physical exam:  Vitals: There were no vitals taken for this visit.  GEN: well developed, well-nourished, in no acute distress, in a pleasant mood.     SKIN: Winters phototype 1  - Focused examination of the R ear was performed.  - thin erosion on helix  w/o induration  - No other lesions of concern on areas examined.     Past Medical History:   Past Medical History:   Diagnosis Date     Adjustment disorder with depressed mood 06/17/2012     ALCOHOL ABUSE-UNSPEC 10/05/2007     Atrial fibrillation (H)      Chorioretinitis of both eyes 02/05/2012    Right eye worse than left. On Cellcept, steroid taper     Coronary atherosclerosis of unspecified type of vessel, native or graft      Diabetes mellitus type 2 in obese      Essential hypertension, benign      Heart failure with preserved ejection fraction, NYHA class II (H)      Mixed hyperlipidemia 10/05/2007     Nonrheumatic aortic valve stenosis      Polycythemia      Sleep apnea      Tobacco use disorder 10/05/2007    Smoker - 1/2 ppd. Started at 16 years     Total retinal detachment of left eye 2009/2010     Past Surgical History:   Procedure Laterality Date     CARDIAC CATHERIZATION  07/03/2012    totally occluded RCA w/ mild left coronary disease     COLONOSCOPY  2016    repeat 2026     HEART CATH CORONARY ANGIOGRAM AND RIGHT HEART CATH  07/01/2012    RCA occlusion, IMI, no stent     PHACOEMULSIFICATION CLEAR CORNEA WITH STANDARD INTRAOCULAR LENS IMPLANT Right 06/04/2015    Procedure: PHACOEMULSIFICATION CLEAR CORNEA WITH STANDARD INTRAOCULAR LENS IMPLANT;  Surgeon: Tal Trinidad MD;  [facility]     RETINAL REATTACHMENT  11/09, 7/10    Left     TONSILLECTOMY       Z NONSPECIFIC PROCEDURE  01/01/1987    Right Epididymis Removed     [facility] OPEN RX ANKLE DISLOCATN+FIXATN  12/01/2008    Right Ankle       Social History:   reports that he quit smoking about 4 years ago. His smoking use included cigarettes. He started smoking about 57 years ago. He has a 53 pack-year smoking history. He has never used smokeless tobacco. He reports that he does not drink alcohol and does not use drugs.    Family History:  Family History   Problem Relation Age of Onset     Arthritis Mother      Circulatory Mother         varicose  veins     Eye Disorder Mother         detached retina     Thyroid Disease Mother      C.A.D. Father         Quadruple bypass     Gastrointestinal Disease Father         diverticulitis     Alzheimer Disease Father         demntia     Eye Disorder Father         cataract     Thyroid Disease Brother      Eye Disorder Brother         detached retina     Pacemaker Brother      Depression Sister         Post Partum      Alcoholism Sister      Neurologic Disorder Sister         Brain Aneurysm       Medications:  Current Outpatient Medications   Medication Sig Dispense Refill     albuterol (PROAIR HFA/PROVENTIL HFA/VENTOLIN HFA) 108 (90 Base) MCG/ACT inhaler Inhale 2 puffs into the lungs every 4 hours as needed for shortness of breath or wheezing 18 g 11     FLUoxetine (PROZAC) 40 MG capsule Take 1 capsule (40 mg) by mouth daily 90 capsule 3     fluticasone (FLONASE) 50 MCG/ACT nasal spray Spray 1 spray into both nostrils daily 9.9 mL 3     Fluticasone-Umeclidin-Vilanterol (TRELEGY ELLIPTA) 200-62.5-25 MCG/ACT oral inhaler Inhale 1 puff into the lungs daily 60 each 11     metoprolol succinate ER (TOPROL XL) 200 MG 24 hr tablet Take 1 tablet (200 mg) by mouth daily 90 tablet 3     multivitamin w/minerals (THERA-VIT-M) tablet Take 1 tablet by mouth daily [place] 55+       potassium chloride ER (K-TAB) 20 MEQ CR tablet Take 1 tablet (20 mEq) by mouth daily 90 tablet 3     rosuvastatin (CRESTOR) 40 MG tablet Take 1 tablet (40 mg) by mouth at bedtime 90 tablet 3     sacubitril-valsartan (ENTRESTO) 24-26 MG per tablet Take 1 tablet by mouth 2 times daily 20 tablet 0     sacubitril-valsartan (ENTRESTO) 24-26 MG per tablet Take 1 tablet by mouth 2 times daily 180 tablet 3     spironolactone (ALDACTONE) 25 MG tablet Take 0.5 tablets (12.5 mg) by mouth daily 45 tablet 3     torsemide (DEMADEX) 20 MG tablet Take 2 tablets (40 mg) by mouth every morning AND 2 tablets (40 mg) daily (with lunch). 360 tablet 1     traZODone (DESYREL) 100  MG tablet Take 1 tablet (100 mg) by mouth nightly as needed for sleep 90 tablet 3     warfarin ANTICOAGULANT (COUMADIN ANTICOAGULANT) 5 MG tablet Take 2.5 mg on Mondays and Fridays, and 5 mg all other days, or as directed by Coumadin nurse. 90 tablet 1     Allergies   Allergen Reactions     Lisinopril Cough     Pt had cough     Seasonal Allergies      Zolpidem Tartrate Other (See Comments)     Parasomnia with sleep walking, injuries, delusions.  May have been in DTs at the same time.               Again, thank you for allowing me to participate in the care of your patient.        Sincerely,        Terence Oleary MD

## 2024-08-19 NOTE — PROGRESS NOTES
[facility] Dermatology Note    Encounter Date: Aug 19, 2024    Dermatology Problem List:    ______________________________________    Impression/Plan:  Markie was seen today for derm problem.    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 Post cryotherapy instructions were provided.     Follow-up PRN.       Staff Involved:  Staff Only    Terence Oleary MD   of Dermatology  Department of Dermatology  [facility]      CC:   Chief Complaint   Patient presents with    Derm Problem     Lesion on the right ear - pain and discomfort on July 1st, only feel pain when pt sleep on the right ear, felt like a crust in the area        History of Present Illness:  Mr. Markie Shepard is a 74 year old male who presents as a new patient.    Pain and discomfort on ear. Only feels pain when on the ear when sleeping. Favors R side. Wonders whether it is a pressure sore or NMSC     Labs:      Physical exam:  Vitals: There were no vitals taken for this visit.  GEN: well developed, well-nourished, in no acute distress, in a pleasant mood.     SKIN: Winters phototype 1  - Focused examination of the R ear was performed.  - thin erosion on helix w/o induration  - No other lesions of concern on areas examined.     Past Medical History:   Past Medical History:   Diagnosis Date    Adjustment disorder with depressed mood 06/17/2012    ALCOHOL ABUSE-UNSPEC 10/05/2007    Atrial fibrillation (H)      Chorioretinitis of both eyes 02/05/2012    Right eye worse than left. On Cellcept, steroid taper    Coronary atherosclerosis of unspecified type of vessel, native or graft     Diabetes mellitus type 2 in obese     Essential hypertension, benign     Heart failure with preserved ejection fraction, NYHA class II (H)     Mixed hyperlipidemia 10/05/2007    Nonrheumatic aortic valve stenosis     Polycythemia     Sleep apnea     Tobacco use disorder 10/05/2007    Smoker - 1/2 ppd. Started at 16 years    Total retinal detachment of left eye 2009/2010     Past Surgical History:   Procedure Laterality Date    CARDIAC CATHERIZATION  07/03/2012    totally occluded RCA w/ mild left coronary disease    COLONOSCOPY  2016    repeat 2026    HEART CATH CORONARY ANGIOGRAM AND RIGHT HEART CATH  07/01/2012    RCA occlusion, IMI, no stent    PHACOEMULSIFICATION CLEAR CORNEA WITH STANDARD INTRAOCULAR LENS IMPLANT Right 06/04/2015    Procedure: PHACOEMULSIFICATION CLEAR CORNEA WITH STANDARD INTRAOCULAR LENS IMPLANT;  Surgeon: Tal Trinidad MD;  Location:  [facility]    RETINAL REATTACHMENT  11/09, 7/10    Left    TONSILLECTOMY      [facility] NONSPECIFIC PROCEDURE  01/01/1987    Right Epididymis Removed    [facility] OPEN RX ANKLE DISLOCATN+FIXATN  12/01/2008    Right Ankle       Social History:   reports that he quit smoking about 4 years ago. His smoking use included cigarettes. He started smoking about 57 years ago. He has a 53 pack-year smoking history. He has never used smokeless tobacco. He reports that he does not drink alcohol and does not use drugs.    Family History:  Family History   Problem Relation Age of Onset    Arthritis Mother     Circulatory Mother         varicose veins    Eye Disorder Mother         detached retina    Thyroid Disease Mother     C.A.D. Father         Quadruple bypass    Gastrointestinal Disease Father         diverticulitis    Alzheimer Disease Father         demntia    Eye Disorder Father         cataract    Thyroid  Disease Brother     Eye Disorder Brother         detached retina    Pacemaker Brother     Depression Sister         Post Partum     Alcoholism Sister     Neurologic Disorder Sister         Brain Aneurysm       Medications:  Current Outpatient Medications   Medication Sig Dispense Refill    albuterol (PROAIR HFA/PROVENTIL HFA/VENTOLIN HFA) 108 (90 Base) MCG/ACT inhaler Inhale 2 puffs into the lungs every 4 hours as needed for shortness of breath or wheezing 18 g 11    FLUoxetine (PROZAC) 40 MG capsule Take 1 capsule (40 mg) by mouth daily 90 capsule 3    fluticasone (FLONASE) 50 MCG/ACT nasal spray Spray 1 spray into both nostrils daily 9.9 mL 3    Fluticasone-Umeclidin-Vilanterol (TRELEGY ELLIPTA) 200-62.5-25 MCG/ACT oral inhaler Inhale 1 puff into the lungs daily 60 each 11    metoprolol succinate ER (TOPROL XL) 200 MG 24 hr tablet Take 1 tablet (200 mg) by mouth daily 90 tablet 3    multivitamin w/minerals (THERA-VIT-M) tablet Take 1 tablet by mouth daily [place] 55+      potassium chloride ER (K-TAB) 20 MEQ CR tablet Take 1 tablet (20 mEq) by mouth daily 90 tablet 3    rosuvastatin (CRESTOR) 40 MG tablet Take 1 tablet (40 mg) by mouth at bedtime 90 tablet 3    sacubitril-valsartan (ENTRESTO) 24-26 MG per tablet Take 1 tablet by mouth 2 times daily 20 tablet 0    sacubitril-valsartan (ENTRESTO) 24-26 MG per tablet Take 1 tablet by mouth 2 times daily 180 tablet 3    spironolactone (ALDACTONE) 25 MG tablet Take 0.5 tablets (12.5 mg) by mouth daily 45 tablet 3    torsemide (DEMADEX) 20 MG tablet Take 2 tablets (40 mg) by mouth every morning AND 2 tablets (40 mg) daily (with lunch). 360 tablet 1    traZODone (DESYREL) 100 MG tablet Take 1 tablet (100 mg) by mouth nightly as needed for sleep 90 tablet 3    warfarin ANTICOAGULANT (COUMADIN ANTICOAGULANT) 5 MG tablet Take 2.5 mg on Mondays and Fridays, and 5 mg all other days, or as directed by Coumadin nurse. 90 tablet 1     Allergies   Allergen Reactions    Lisinopril  Cough     Pt had cough    Seasonal Allergies     Zolpidem Tartrate Other (See Comments)     Parasomnia with sleep walking, injuries, delusions.  May have been in DTs at the same time.

## 2024-08-19 NOTE — PATIENT INSTRUCTIONS
The condition on your ear is called chondrodermatitis nodularis helicis. It is caused by pressure.     Try offloading pressure during sleep using a donut pillow for ears

## 2024-08-19 NOTE — PROGRESS NOTES
[facility] Dermatology Note    Encounter Date: Aug 19, 2024    Dermatology Problem List:  - ***    Major PMHx  -   ______________________________________    Impression/Plan:  There are no diagnoses linked to this encounter.    {Procedure:665480}    Follow-up in ***.       Staff Involved:  Staff Only    Terence Oleary MD   of Dermatology  Department of Dermatology  [facility]  [facility]      CC:   Chief Complaint   Patient presents with    Derm Problem     Lesion on the right ear - pain and discomfort on July 1st, only feel pain when pt sleep on the right ear, felt like a crust in the area        History of Present Illness:  Mr. Markie Shepard is a 74 year old male who presents as a *** patient.    ***    Labs:      Physical exam:  Vitals: There were no vitals taken for this visit.  GEN: well developed, well-nourished, in no acute distress, in a pleasant mood.     SKIN: Winters phototype ***  - {Skin Exam:977237}  ***  - No other lesions of concern on areas examined.     Past Medical History:   Past Medical History:   Diagnosis Date    Adjustment disorder with depressed mood 06/17/2012    ALCOHOL ABUSE-UNSPEC 10/05/2007    Atrial fibrillation (H)     Chorioretinitis of both eyes 02/05/2012    Right eye worse than left. On Cellcept, steroid taper    Coronary atherosclerosis of unspecified type of vessel, native or graft     Diabetes mellitus type 2 in obese     Essential hypertension, benign     Heart failure with preserved ejection fraction, NYHA class II (H)     Mixed hyperlipidemia 10/05/2007    Nonrheumatic aortic valve stenosis     Polycythemia     Sleep apnea     Tobacco use disorder 10/05/2007    Smoker - 1/2 ppd. Started at 16 years    Total retinal detachment of left eye 2009/2010     Past Surgical History:   Procedure Laterality Date    CARDIAC CATHERIZATION  07/03/2012    totally occluded RCA w/ mild left coronary disease    COLONOSCOPY  2016     repeat 2026    HEART CATH CORONARY ANGIOGRAM AND RIGHT HEART CATH  07/01/2012    RCA occlusion, IMI, no stent    PHACOEMULSIFICATION CLEAR CORNEA WITH STANDARD INTRAOCULAR LENS IMPLANT Right 06/04/2015    Procedure: PHACOEMULSIFICATION CLEAR CORNEA WITH STANDARD INTRAOCULAR LENS IMPLANT;  Surgeon: Tal Trinidad MD;  Location:  [facility]    RETINAL REATTACHMENT  11/09, 7/10    Left    TONSILLECTOMY      Z NONSPECIFIC PROCEDURE  01/01/1987    Right Epididymis Removed    [facility] OPEN RX ANKLE DISLOCATN+FIXATN  12/01/2008    Right Ankle       Social History:   reports that he quit smoking about 4 years ago. His smoking use included cigarettes. He started smoking about 57 years ago. He has a 53 pack-year smoking history. He has never used smokeless tobacco. He reports that he does not drink alcohol and does not use drugs.    Family History:  Family History   Problem Relation Age of Onset    Arthritis Mother     Circulatory Mother         varicose veins    Eye Disorder Mother         detached retina    Thyroid Disease Mother     C.A.D. Father         Quadruple bypass    Gastrointestinal Disease Father         diverticulitis    Alzheimer Disease Father         demntia    Eye Disorder Father         cataract    Thyroid Disease Brother     Eye Disorder Brother         detached retina    Pacemaker Brother     Depression Sister         Post Partum     Alcoholism Sister     Neurologic Disorder Sister         Brain Aneurysm       Medications:  Current Outpatient Medications   Medication Sig Dispense Refill    albuterol (PROAIR HFA/PROVENTIL HFA/VENTOLIN HFA) 108 (90 Base) MCG/ACT inhaler Inhale 2 puffs into the lungs every 4 hours as needed for shortness of breath or wheezing 18 g 11    FLUoxetine (PROZAC) 40 MG capsule Take 1 capsule (40 mg) by mouth daily 90 capsule 3    fluticasone (FLONASE) 50 MCG/ACT nasal spray Spray 1 spray into both nostrils daily 9.9 mL 3    Fluticasone-Umeclidin-Vilanterol (TRELEGY ELLIPTA) 200-62.5-25  MCG/ACT oral inhaler Inhale 1 puff into the lungs daily 60 each 11    metoprolol succinate ER (TOPROL XL) 200 MG 24 hr tablet Take 1 tablet (200 mg) by mouth daily 90 tablet 3    multivitamin w/minerals (THERA-VIT-M) tablet Take 1 tablet by mouth daily [place] 55+      potassium chloride ER (K-TAB) 20 MEQ CR tablet Take 1 tablet (20 mEq) by mouth daily 90 tablet 3    rosuvastatin (CRESTOR) 40 MG tablet Take 1 tablet (40 mg) by mouth at bedtime 90 tablet 3    sacubitril-valsartan (ENTRESTO) 24-26 MG per tablet Take 1 tablet by mouth 2 times daily 20 tablet 0    sacubitril-valsartan (ENTRESTO) 24-26 MG per tablet Take 1 tablet by mouth 2 times daily 180 tablet 3    spironolactone (ALDACTONE) 25 MG tablet Take 0.5 tablets (12.5 mg) by mouth daily 45 tablet 3    torsemide (DEMADEX) 20 MG tablet Take 2 tablets (40 mg) by mouth every morning AND 2 tablets (40 mg) daily (with lunch). 360 tablet 1    traZODone (DESYREL) 100 MG tablet Take 1 tablet (100 mg) by mouth nightly as needed for sleep 90 tablet 3    warfarin ANTICOAGULANT (COUMADIN ANTICOAGULANT) 5 MG tablet Take 2.5 mg on Mondays and Fridays, and 5 mg all other days, or as directed by Coumadin nurse. 90 tablet 1     Allergies   Allergen Reactions    Lisinopril Cough     Pt had cough    Seasonal Allergies     Zolpidem Tartrate Other (See Comments)     Parasomnia with sleep walking, injuries, delusions.  May have been in DTs at the same time.

## 2024-08-21 DIAGNOSIS — I50.33 ACUTE ON CHRONIC HEART FAILURE WITH PRESERVED EJECTION FRACTION (H): ICD-10-CM

## 2024-08-21 RX ORDER — TORSEMIDE 20 MG/1
TABLET ORAL
Qty: 360 TABLET | Refills: 3 | Status: SHIPPED | OUTPATIENT
Start: 2024-08-21

## 2024-08-26 ENCOUNTER — ANTICOAGULATION THERAPY VISIT (OUTPATIENT)
Dept: ANTICOAGULATION | Facility: CLINIC | Age: 74
End: 2024-08-26

## 2024-08-26 ENCOUNTER — LAB (OUTPATIENT)
Dept: LAB | Facility: CLINIC | Age: 74
End: 2024-08-26
Payer: COMMERCIAL

## 2024-08-26 DIAGNOSIS — I48.20 CHRONIC ATRIAL FIBRILLATION (H): ICD-10-CM

## 2024-08-26 DIAGNOSIS — Z79.01 LONG TERM CURRENT USE OF ANTICOAGULANT THERAPY: Primary | ICD-10-CM

## 2024-08-26 LAB — INR BLD: 3.2 (ref 0.9–1.1)

## 2024-08-26 PROCEDURE — 36416 COLLJ CAPILLARY BLOOD SPEC: CPT

## 2024-08-26 PROCEDURE — 85610 PROTHROMBIN TIME: CPT

## 2024-08-26 NOTE — PROGRESS NOTES
ANTICOAGULATION MANAGEMENT     Markie Shepard 74 year old male is on warfarin with supratherapeutic INR result. (Goal INR 2.0-3.0)    Recent labs: (last 7 days)     08/26/24  1123   INR 3.2*       ASSESSMENT     Source(s): Chart Review and Patient/Caregiver Call     Warfarin doses taken: Warfarin taken as instructed  Diet: Decreased greens/vitamin K in diet; ongoing change. He got tired of eating spinach so he stopped eating it and started to eat spring mix lettuce  Medication/supplement changes: None noted  New illness, injury, or hospitalization: No  Signs or symptoms of bleeding or clotting: No  Previous result: Therapeutic last 2(+) visits  Additional findings: None       PLAN     Recommended plan for ongoing change(s) affecting INR     Dosing Instructions: decrease your warfarin dose (8.3% change) with next INR in 2 weeks       Summary  As of 8/26/2024      Full warfarin instructions:  2.5 mg every Mon, Wed, Fri; 5 mg all other days   Next INR check:  9/9/2024                 Tal called back.  Adjusted warfarin dosing for his ongoing diet changes.  He verbalizes understanding and agrees with plan.  Lab appointment scheduled.      Contact [phone] to schedule and with any changes, questions or concerns.     Education provided: Please call back if any changes to your diet, medications or how you've been taking warfarin  Contact [phone] with any changes, questions or concerns.     Plan made per [facility] anticoagulation protocol    Mckenzie Bergeron RN  Anticoagulation Clinic  8/26/2024    _______________________________________________________________________     Anticoagulation Episode Summary       Current INR goal:  2.0-3.0   TTR:  80.3% (1 y)   Target end date:  Indefinite   Send INR reminders to:  [facility]    Indications    Long-term (current) use of anticoagulants [Z79.01] [Z79.01]  Chronic atrial fibrillation (H) [I48.20]             Comments:               Anticoagulation Care Providers        Provider Role Specialty Phone number    Mali Patel MD Referring Internal Medicine [phone]

## 2024-08-26 NOTE — PROGRESS NOTES
8--pt called me today. He just ordered the last Refill for the Entresto program.I told him [facility] usually sends out the re enrollment letters late October,early November, we will start them as early as [facility] will allow-Liza Milian Lpn

## 2024-09-09 ENCOUNTER — ANTICOAGULATION THERAPY VISIT (OUTPATIENT)
Dept: ANTICOAGULATION | Facility: CLINIC | Age: 74
End: 2024-09-09

## 2024-09-09 ENCOUNTER — LAB (OUTPATIENT)
Dept: LAB | Facility: CLINIC | Age: 74
End: 2024-09-09
Payer: COMMERCIAL

## 2024-09-09 DIAGNOSIS — Z79.01 LONG TERM CURRENT USE OF ANTICOAGULANT THERAPY: Primary | ICD-10-CM

## 2024-09-09 DIAGNOSIS — I48.20 CHRONIC ATRIAL FIBRILLATION (H): ICD-10-CM

## 2024-09-09 LAB — INR BLD: 2.1 (ref 0.9–1.1)

## 2024-09-09 PROCEDURE — 36416 COLLJ CAPILLARY BLOOD SPEC: CPT

## 2024-09-09 PROCEDURE — 85610 PROTHROMBIN TIME: CPT

## 2024-09-09 NOTE — PROGRESS NOTES
ANTICOAGULATION MANAGEMENT     Markie Shepard 74 year old male is on warfarin with therapeutic INR result. (Goal INR 2.0-3.0)    Recent labs: (last 7 days)     09/09/24  1122   INR 2.1*       ASSESSMENT     Source(s): Chart Review and Patient/Caregiver Call     Warfarin doses taken: Warfarin taken as instructed  Diet: No new diet changes identified  Medication/supplement changes: None noted  New illness, injury, or hospitalization: No  Signs or symptoms of bleeding or clotting: No  Previous result: Supratherapeutic-8.3% maintenance dose decrease last encounter  Additional findings:  Very difficult getting into the clinic due to transportation issues, patient is also blind. He is requesting a recheck in 6 weeks-lab scheduled for patient-encouraged to call [facility] with any questions or concerns.      PLAN     Recommended plan for no diet, medication or health factor changes affecting INR     Dosing Instructions: Continue your current warfarin dose with next INR in 6 weeks       Summary  As of 9/9/2024      Full warfarin instructions:  2.5 mg every Mon, Wed, Fri; 5 mg all other days   Next INR check:  10/21/2024               Telephone call with Markie who agrees to plan and repeated back plan correctly    Lab visit scheduled    Education provided: Goal range and lab monitoring: goal range and significance of current result and Importance of following up at instructed interval  Importance of notifying anticoagulation clinic for: changes in medications; a sooner lab recheck maybe needed and diarrhea, nausea/vomiting, reduced intake, cold/flu, and/or infections; a sooner lab recheck maybe needed  Contact [phone] with any changes, questions or concerns.     Plan made per [facility] anticoagulation protocol    TONYA GARY RN  Anticoagulation Clinic  9/9/2024    _______________________________________________________________________     Anticoagulation Episode Summary       Current INR goal:  2.0-3.0   TTR:  79.6% (1 y)    Target end date:  Indefinite   Send INR reminders to:  [facility]    Indications    Long-term (current) use of anticoagulants [Z79.01] [Z79.01]  Chronic atrial fibrillation (H) [I48.20]             Comments:               Anticoagulation Care Providers       Provider Role Specialty Phone number    Mali Patle MD Referring Internal Medicine [phone]

## 2024-10-21 ENCOUNTER — ANTICOAGULATION THERAPY VISIT (OUTPATIENT)
Dept: ANTICOAGULATION | Facility: CLINIC | Age: 74
End: 2024-10-21

## 2024-10-21 ENCOUNTER — LAB (OUTPATIENT)
Dept: LAB | Facility: CLINIC | Age: 74
End: 2024-10-21
Payer: COMMERCIAL

## 2024-10-21 DIAGNOSIS — Z79.01 LONG TERM CURRENT USE OF ANTICOAGULANT THERAPY: Primary | ICD-10-CM

## 2024-10-21 DIAGNOSIS — I48.20 CHRONIC ATRIAL FIBRILLATION (H): ICD-10-CM

## 2024-10-21 LAB — INR BLD: 1.9 (ref 0.9–1.1)

## 2024-10-21 PROCEDURE — 36416 COLLJ CAPILLARY BLOOD SPEC: CPT

## 2024-10-21 PROCEDURE — 85610 PROTHROMBIN TIME: CPT

## 2024-10-21 NOTE — PROGRESS NOTES
ANTICOAGULATION MANAGEMENT     Markie Shepard 74 year old male is on warfarin with subtherapeutic INR result. (Goal INR 2.0-3.0)    Recent labs: (last 7 days)     10/21/24  1113   INR 1.9*         ASSESSMENT     Source(s): Chart Review and Patient/Caregiver Call     Warfarin doses taken: Warfarin taken as instructed  Diet: Increased greens/vitamin K in diet; ongoing change using spinach mix for salads instead of spring mix. (Was doing spring mix 5 days a week) now doing spinacgh mix 3 days a week  Medication/supplement changes: None noted  New illness, injury, or hospitalization: No  Signs or symptoms of bleeding or clotting: No  Previous result: Therapeutic last visit; previously outside of goal range  Additional findings:  protocol to recheck inr in 2 weeks, Markie states he will come back for inr in 6 weeks       PLAN     Recommended plan for ongoing change(s) affecting INR     Dosing Instructions: Increase your warfarin dose (9.1% change) with next INR in 2 weeks       Summary  As of 10/21/2024      Full warfarin instructions:  2.5 mg every Mon, Fri; 5 mg all other days   Next INR check:  12/2/2024               Telephone call with Markie who verbalizes understanding and agrees to plan    Lab visit scheduled    Education provided: Please call back if any changes to your diet, medications or how you've been taking warfarin  Goal range and lab monitoring: goal range and significance of current result and Importance of following up at instructed interval    Plan made per [facility] anticoagulation protocol    Stephanie Robertson, RN  10/21/2024  Anticoagulation Clinic  [facility] for routing messages: p [facility] patient phone line: [phone]        _______________________________________________________________________     Anticoagulation Episode Summary       Current INR goal:  2.0-3.0   TTR:  73.9% (1 y)   Target end date:  Indefinite   Send INR reminders to:  [facility]    Indications    Long-term  (current) use of anticoagulants [Z79.01] [Z79.01]  Chronic atrial fibrillation (H) [I48.20]             Comments:               Anticoagulation Care Providers       Provider Role Specialty Phone number    Mali Patel MD Referring Internal Medicine [phone]

## 2024-10-22 DIAGNOSIS — I50.30 HEART FAILURE WITH PRESERVED EJECTION FRACTION, NYHA CLASS II (H): ICD-10-CM

## 2024-10-22 RX ORDER — METOPROLOL SUCCINATE 200 MG/1
200 TABLET, EXTENDED RELEASE ORAL DAILY
Qty: 90 TABLET | Refills: 3 | Status: SHIPPED | OUTPATIENT
Start: 2024-10-22

## 2024-10-30 DIAGNOSIS — E78.5 DYSLIPIDEMIA: ICD-10-CM

## 2024-10-30 RX ORDER — ROSUVASTATIN CALCIUM 40 MG/1
40 TABLET, COATED ORAL AT BEDTIME
Qty: 90 TABLET | Refills: 2 | Status: SHIPPED | OUTPATIENT
Start: 2024-10-30

## 2024-11-05 ENCOUNTER — DOCUMENTATION ONLY (OUTPATIENT)
Dept: CARDIOLOGY | Facility: CLINIC | Age: 74
End: 2024-11-05
Payer: COMMERCIAL

## 2024-11-05 DIAGNOSIS — I50.9 HEART FAILURE (H): ICD-10-CM

## 2024-11-05 NOTE — PROGRESS NOTES
11-5-2024 I spoke w/ pt last week, he has received his terra from [employer], I am working on the provider portion of the application  ID# [id number] provider Wen Barker will need to sign the RX  Sent to [employer]

## 2024-11-05 NOTE — PROGRESS NOTES
11-5-2024 RX printed for saulo More to sign-RX is to accompany the re enrollment application  [facility]

## 2024-12-02 ENCOUNTER — LAB (OUTPATIENT)
Dept: LAB | Facility: CLINIC | Age: 74
End: 2024-12-02
Payer: COMMERCIAL

## 2024-12-02 ENCOUNTER — ANTICOAGULATION THERAPY VISIT (OUTPATIENT)
Dept: ANTICOAGULATION | Facility: CLINIC | Age: 74
End: 2024-12-02

## 2024-12-02 ENCOUNTER — DOCUMENTATION ONLY (OUTPATIENT)
Dept: ANTICOAGULATION | Facility: CLINIC | Age: 74
End: 2024-12-02

## 2024-12-02 DIAGNOSIS — I48.20 CHRONIC ATRIAL FIBRILLATION (H): ICD-10-CM

## 2024-12-02 DIAGNOSIS — Z79.01 LONG TERM CURRENT USE OF ANTICOAGULANT THERAPY: Primary | ICD-10-CM

## 2024-12-02 LAB — INR BLD: 2 (ref 0.9–1.1)

## 2024-12-02 PROCEDURE — 85610 PROTHROMBIN TIME: CPT

## 2024-12-02 PROCEDURE — 36416 COLLJ CAPILLARY BLOOD SPEC: CPT

## 2024-12-02 NOTE — PROGRESS NOTES
ANTICOAGULATION MANAGEMENT     Markie Shepard 74 year old male is on warfarin with therapeutic INR result. (Goal INR 2.0-3.0)    Recent labs: (last 7 days)     12/02/24  1120   INR 2.0*       ASSESSMENT     Source(s): Chart Review and Patient/Caregiver Call     Warfarin doses taken: Warfarin taken as instructed  Diet: No new diet changes identified  Medication/supplement changes: None noted  New illness, injury, or hospitalization: No  Signs or symptoms of bleeding or clotting: No  Previous result: Subtherapeutic  Additional findings:  Markie states he will check INR again in 6 weeks       PLAN     Recommended plan for no diet, medication or health factor changes affecting INR     Dosing Instructions: Continue your current warfarin dose with next INR in 6 weeks       Summary  As of 12/2/2024      Full warfarin instructions:  2.5 mg every Mon, Fri; 5 mg all other days   Next INR check:  1/13/2025               Telephone call with Markie who verbalizes understanding and agrees to plan and who agrees to plan and repeated back plan correctly    Lab visit scheduled    Education provided: Contact [phone] with any changes, questions or concerns.     Plan made per [facility] anticoagulation protocol    Mckenzie Bergeron RN  12/2/2024  Anticoagulation Clinic  [employer] for routing messages: [facility] patient phone line: [phone]        _______________________________________________________________________     Anticoagulation Episode Summary       Current INR goal:  2.0-3.0   TTR:  62.4% (1 y)   Target end date:  Indefinite   Send INR reminders to:  [facility]    Indications    Long-term (current) use of anticoagulants [Z79.01] [Z79.01]  Chronic atrial fibrillation (H) [I48.20]             Comments:  --             Anticoagulation Care Providers       Provider Role Specialty Phone number    Mali Patel MD Referring Internal Medicine [phone]

## 2024-12-02 NOTE — PROGRESS NOTES
ANTICOAGULATION CLINIC REFERRAL RENEWAL REQUEST       An annual renewal order is required for all patients referred to [facility].?  Please review and sign the pended referral order for Markie Shepard.       ANTICOAGULATION SUMMARY      Warfarin indication(s)   Atrial Fibrillation    Mechanical heart valve present?  NO       Current goal range   INR: 2.0-3.0     Goal appropriate for indication? Goal INR 2-3, standard for indication(s) above     Time in Therapeutic Range (TTR)  (Goal > 60%) 62.5%       Office visit with referring provider's group within last year yes on 7/9/24       Mckenzie Bergeron RN  [facility]

## 2025-01-13 ENCOUNTER — LAB (OUTPATIENT)
Dept: LAB | Facility: CLINIC | Age: 75
End: 2025-01-13
Payer: COMMERCIAL

## 2025-01-13 ENCOUNTER — ANTICOAGULATION THERAPY VISIT (OUTPATIENT)
Dept: ANTICOAGULATION | Facility: CLINIC | Age: 75
End: 2025-01-13

## 2025-01-13 DIAGNOSIS — I48.20 CHRONIC ATRIAL FIBRILLATION (H): ICD-10-CM

## 2025-01-13 DIAGNOSIS — Z79.01 LONG TERM CURRENT USE OF ANTICOAGULANT THERAPY: Primary | ICD-10-CM

## 2025-01-13 DIAGNOSIS — Z79.01 LONG TERM CURRENT USE OF ANTICOAGULANT THERAPY: ICD-10-CM

## 2025-01-13 LAB — INR BLD: 2.3 (ref 0.9–1.1)

## 2025-01-13 PROCEDURE — 85610 PROTHROMBIN TIME: CPT

## 2025-01-13 PROCEDURE — 36416 COLLJ CAPILLARY BLOOD SPEC: CPT

## 2025-01-13 NOTE — PROGRESS NOTES
ANTICOAGULATION MANAGEMENT     Markie Shepard 74 year old male is on warfarin with therapeutic INR result. (Goal INR 2.0-3.0)    Recent labs: (last 7 days)     01/13/25  1136   INR 2.3*       ASSESSMENT     Source(s): Chart Review and Patient/Caregiver Call     Warfarin doses taken: Warfarin taken as instructed  Diet: No new diet changes identified  Medication/supplement changes: None noted  New illness, injury, or hospitalization: No  Signs or symptoms of bleeding or clotting: No  Previous result: Therapeutic last 2(+) visits  Additional findings: None       PLAN     Recommended plan for no diet, medication or health factor changes affecting INR     Dosing Instructions: Continue your current warfarin dose with next INR in 6 weeks       Summary  As of 1/13/2025      Full warfarin instructions:  2.5 mg every Mon, Fri; 5 mg all other days   Next INR check:  2/24/2025               Telephone call with Markie who verbalizes understanding and agrees to plan and who agrees to plan and repeated back plan correctly    Lab visit scheduled    Education provided: Please call back if any changes to your diet, medications or how you've been taking warfarin    Plan made per [facility] anticoagulation protocol    Anca Muller RN  1/13/2025  Anticoagulation Clinic  [employer] for routing messages: fide [facility] patient phone line: [phone]        _______________________________________________________________________     Anticoagulation Episode Summary       Current INR goal:  2.0-3.0   TTR:  62.4% (1 y)   Target end date:  Indefinite   Send INR reminders to:  [facility]    Indications    Long-term (current) use of anticoagulants [Z79.01] [Z79.01]  Chronic atrial fibrillation (H) [I48.20]             Comments:  --             Anticoagulation Care Providers       Provider Role Specialty Phone number    Mali Patel MD Referring Internal Medicine [phone]

## 2025-02-24 ENCOUNTER — ANTICOAGULATION THERAPY VISIT (OUTPATIENT)
Dept: ANTICOAGULATION | Facility: CLINIC | Age: 75
End: 2025-02-24

## 2025-02-24 ENCOUNTER — LAB (OUTPATIENT)
Dept: LAB | Facility: CLINIC | Age: 75
End: 2025-02-24
Payer: COMMERCIAL

## 2025-02-24 DIAGNOSIS — Z79.01 LONG TERM CURRENT USE OF ANTICOAGULANT THERAPY: Primary | ICD-10-CM

## 2025-02-24 DIAGNOSIS — I48.20 CHRONIC ATRIAL FIBRILLATION (H): ICD-10-CM

## 2025-02-24 DIAGNOSIS — Z79.01 LONG TERM CURRENT USE OF ANTICOAGULANT THERAPY: ICD-10-CM

## 2025-02-24 LAB — INR BLD: 2.3 (ref 0.9–1.1)

## 2025-02-24 PROCEDURE — 36416 COLLJ CAPILLARY BLOOD SPEC: CPT

## 2025-02-24 PROCEDURE — 85610 PROTHROMBIN TIME: CPT

## 2025-02-24 NOTE — PROGRESS NOTES
ANTICOAGULATION MANAGEMENT     Markie Shepard 74 year old male is on warfarin with therapeutic INR result. (Goal INR 2.0-3.0)    Recent labs: (last 7 days)     02/24/25  1140   INR 2.3*       ASSESSMENT     Source(s): Chart Review and Patient/Caregiver Call     Warfarin doses taken: Warfarin taken as instructed  Diet: No new diet changes identified  Medication/supplement changes: None noted  New illness, injury, or hospitalization: No  Signs or symptoms of bleeding or clotting: No  Previous result: Therapeutic last 2(+) visits  Additional findings: None       PLAN     Recommended plan for no diet, medication or health factor changes affecting INR     Dosing Instructions: Continue your current warfarin dose with next INR in 6 weeks       Summary  As of 2/24/2025      Full warfarin instructions:  2.5 mg every Mon, Fri; 5 mg all other days   Next INR check:  4/7/2025               Telephone call with Markie who verbalizes understanding and agrees to plan    Lab visit scheduled    Education provided: Goal range and lab monitoring: goal range and significance of current result and Importance of therapeutic range    Plan made per [facility] anticoagulation protocol    Gunnar Brody RN  2/24/2025  Anticoagulation Clinic  [employer] for routing messages: fide [facility] patient phone line: [phone]        _______________________________________________________________________     Anticoagulation Episode Summary       Current INR goal:  2.0-3.0   TTR:  70.7% (1 y)   Target end date:  Indefinite   Send INR reminders to:  [facility]    Indications    Long-term (current) use of anticoagulants [Z79.01] [Z79.01]  Chronic atrial fibrillation (H) [I48.20]             Comments:  --             Anticoagulation Care Providers       Provider Role Specialty Phone number    Mali Patel MD Referring Internal Medicine [phone]

## 2025-03-18 DIAGNOSIS — I50.23 ACUTE ON CHRONIC SYSTOLIC HEART FAILURE (H): ICD-10-CM

## 2025-03-18 DIAGNOSIS — I50.30 HEART FAILURE WITH PRESERVED EJECTION FRACTION, NYHA CLASS II (H): ICD-10-CM

## 2025-03-18 RX ORDER — SPIRONOLACTONE 25 MG/1
12.5 TABLET ORAL DAILY
Qty: 45 TABLET | Refills: 0 | Status: SHIPPED | OUTPATIENT
Start: 2025-03-18

## 2025-03-18 RX ORDER — POTASSIUM CHLORIDE 1500 MG/1
20 TABLET, EXTENDED RELEASE ORAL DAILY
Qty: 90 TABLET | Refills: 0 | Status: SHIPPED | OUTPATIENT
Start: 2025-03-18

## 2025-03-18 NOTE — LETTER
March 18, 2025       TO: Markie Shepard  [address]  [place] [address]       Dear Markie Shepard,    We recently received a call from your pharmacy requesting a refill of your medication(s).    Our records indicate that you are due for follow-up with your Heart Care Provider. We will refill your medications for 3 months which will allow you enough time to be seen.    Please call [phone] to schedule your appointment.    Thank you for allowing [facility] to be a part of your health care team and we look forward to seeing you soon.    Thank you,    [facility]

## 2025-04-07 ENCOUNTER — ANTICOAGULATION THERAPY VISIT (OUTPATIENT)
Dept: ANTICOAGULATION | Facility: CLINIC | Age: 75
End: 2025-04-07

## 2025-04-07 ENCOUNTER — LAB (OUTPATIENT)
Dept: LAB | Facility: CLINIC | Age: 75
End: 2025-04-07
Payer: COMMERCIAL

## 2025-04-07 DIAGNOSIS — I48.20 CHRONIC ATRIAL FIBRILLATION (H): ICD-10-CM

## 2025-04-07 DIAGNOSIS — Z79.01 LONG TERM CURRENT USE OF ANTICOAGULANT THERAPY: Primary | ICD-10-CM

## 2025-04-07 DIAGNOSIS — I50.22 CHRONIC SYSTOLIC HEART FAILURE (H): ICD-10-CM

## 2025-04-07 DIAGNOSIS — Z79.01 LONG TERM CURRENT USE OF ANTICOAGULANT THERAPY: ICD-10-CM

## 2025-04-07 LAB
ANION GAP SERPL CALCULATED.3IONS-SCNC: 13 MMOL/L (ref 7–15)
BUN SERPL-MCNC: 31.2 MG/DL (ref 8–23)
CALCIUM SERPL-MCNC: 9.9 MG/DL (ref 8.8–10.4)
CHLORIDE SERPL-SCNC: 101 MMOL/L (ref 98–107)
CREAT SERPL-MCNC: 1.31 MG/DL (ref 0.67–1.17)
EGFRCR SERPLBLD CKD-EPI 2021: 57 ML/MIN/1.73M2
GLUCOSE SERPL-MCNC: 146 MG/DL (ref 70–99)
HCO3 SERPL-SCNC: 27 MMOL/L (ref 22–29)
INR BLD: 2.6 (ref 0.9–1.1)
POTASSIUM SERPL-SCNC: 5 MMOL/L (ref 3.4–5.3)
SODIUM SERPL-SCNC: 141 MMOL/L (ref 135–145)

## 2025-04-07 PROCEDURE — 36415 COLL VENOUS BLD VENIPUNCTURE: CPT

## 2025-04-07 PROCEDURE — 80048 BASIC METABOLIC PNL TOTAL CA: CPT

## 2025-04-07 PROCEDURE — 85610 PROTHROMBIN TIME: CPT

## 2025-04-07 NOTE — PROGRESS NOTES
ANTICOAGULATION MANAGEMENT     Markie Shepard 75 year old male is on warfarin with therapeutic INR result. (Goal INR 2.0-3.0)    Recent labs: (last 7 days)     04/07/25  1140   INR 2.6*       ASSESSMENT     Source(s): Chart Review  Previous INR was Therapeutic last 2(+) visits  Medication, diet, health changes since last INR chart reviewed; none identified         PLAN     Recommended plan for no diet, medication or health factor changes and previous 2 INR results within goal affecting INR     Dosing Instructions: Continue your current warfarin dose with next INR in 6 weeks       Summary  As of 4/7/2025      Full warfarin instructions:  2.5 mg every Mon, Fri; 5 mg all other days   Next INR check:  5/19/2025               Detailed voice message left for Markie with dosing instructions and follow up date.     Contact [phone] to schedule and with any changes, questions or concerns.     Education provided: Please call back if any changes to your diet, medications or how you've been taking warfarin  Goal range and lab monitoring: goal range and significance of current result  Importance of notifying anticoagulation clinic for: health changes  Contact [phone] with any changes, questions or concerns.     Plan made per [facility] anticoagulation protocol    Yoselyn Shin RN  4/7/2025  Anticoagulation Clinic  [employer] for routing messages: fide [facility] patient phone line: [phone]        _______________________________________________________________________     Anticoagulation Episode Summary       Current INR goal:  2.0-3.0   TTR:  76.3% (1 y)   Target end date:  Indefinite   Send INR reminders to:  [facility]    Indications    Long-term (current) use of anticoagulants [Z79.01] [Z79.01]  Chronic atrial fibrillation (H) [I48.20]             Comments:  --             Anticoagulation Care Providers       Provider Role Specialty Phone number    Mali Patel MD Referring Internal  Medicine [phone]

## 2025-05-08 ENCOUNTER — LAB (OUTPATIENT)
Dept: LAB | Facility: CLINIC | Age: 75
End: 2025-05-08
Payer: COMMERCIAL

## 2025-05-08 ENCOUNTER — OFFICE VISIT (OUTPATIENT)
Dept: CARDIOLOGY | Facility: CLINIC | Age: 75
End: 2025-05-08
Payer: COMMERCIAL

## 2025-05-08 VITALS
SYSTOLIC BLOOD PRESSURE: 100 MMHG | HEART RATE: 95 BPM | HEIGHT: 70 IN | DIASTOLIC BLOOD PRESSURE: 71 MMHG | OXYGEN SATURATION: 96 % | BODY MASS INDEX: 45.1 KG/M2 | WEIGHT: 315 LBS

## 2025-05-08 DIAGNOSIS — I50.33 ACUTE ON CHRONIC HEART FAILURE WITH PRESERVED EJECTION FRACTION (H): ICD-10-CM

## 2025-05-08 DIAGNOSIS — I50.23 ACUTE ON CHRONIC SYSTOLIC HEART FAILURE (H): ICD-10-CM

## 2025-05-08 DIAGNOSIS — R73.09 ELEVATED GLUCOSE: ICD-10-CM

## 2025-05-08 DIAGNOSIS — I50.22 CHRONIC SYSTOLIC HEART FAILURE (H): ICD-10-CM

## 2025-05-08 DIAGNOSIS — E66.813 CLASS 3 SEVERE OBESITY WITH SERIOUS COMORBIDITY AND BODY MASS INDEX (BMI) OF 50.0 TO 59.9 IN ADULT, UNSPECIFIED OBESITY TYPE (H): ICD-10-CM

## 2025-05-08 DIAGNOSIS — E78.5 DYSLIPIDEMIA: ICD-10-CM

## 2025-05-08 DIAGNOSIS — I50.30 HEART FAILURE WITH PRESERVED EJECTION FRACTION, NYHA CLASS II (H): ICD-10-CM

## 2025-05-08 DIAGNOSIS — I48.20 CHRONIC ATRIAL FIBRILLATION (H): Primary | ICD-10-CM

## 2025-05-08 LAB
BASOPHILS # BLD AUTO: 0.1 10E3/UL (ref 0–0.2)
BASOPHILS NFR BLD AUTO: 1 %
CHOLEST SERPL-MCNC: 136 MG/DL
EOSINOPHIL # BLD AUTO: 0.2 10E3/UL (ref 0–0.7)
EOSINOPHIL NFR BLD AUTO: 3 %
ERYTHROCYTE [DISTWIDTH] IN BLOOD BY AUTOMATED COUNT: 12.8 % (ref 10–15)
EST. AVERAGE GLUCOSE BLD GHB EST-MCNC: 148 MG/DL
FASTING STATUS PATIENT QL REPORTED: NO
HBA1C MFR BLD: 6.8 %
HCT VFR BLD AUTO: 50.2 % (ref 40–53)
HDLC SERPL-MCNC: 48 MG/DL
HGB BLD-MCNC: 16.3 G/DL (ref 13.3–17.7)
IMM GRANULOCYTES # BLD: 0 10E3/UL
IMM GRANULOCYTES NFR BLD: 0 %
LDLC SERPL CALC-MCNC: 63 MG/DL
LYMPHOCYTES # BLD AUTO: 1.9 10E3/UL (ref 0.8–5.3)
LYMPHOCYTES NFR BLD AUTO: 23 %
MCH RBC QN AUTO: 33 PG (ref 26.5–33)
MCHC RBC AUTO-ENTMCNC: 32.5 G/DL (ref 31.5–36.5)
MCV RBC AUTO: 102 FL (ref 78–100)
MONOCYTES # BLD AUTO: 0.9 10E3/UL (ref 0–1.3)
MONOCYTES NFR BLD AUTO: 10 %
NEUTROPHILS # BLD AUTO: 5.1 10E3/UL (ref 1.6–8.3)
NEUTROPHILS NFR BLD AUTO: 63 %
NONHDLC SERPL-MCNC: 88 MG/DL
NRBC # BLD AUTO: 0 10E3/UL
NRBC BLD AUTO-RTO: 0 /100
NT-PROBNP SERPL-MCNC: 2107 PG/ML (ref 0–229)
PLATELET # BLD AUTO: 174 10E3/UL (ref 150–450)
RBC # BLD AUTO: 4.94 10E6/UL (ref 4.4–5.9)
TRIGL SERPL-MCNC: 124 MG/DL
TSH SERPL DL<=0.005 MIU/L-ACNC: 1.26 UIU/ML (ref 0.3–4.2)
WBC # BLD AUTO: 8.2 10E3/UL (ref 4–11)

## 2025-05-08 RX ORDER — TORSEMIDE 20 MG/1
TABLET ORAL
Qty: 360 TABLET | Refills: 3 | Status: SHIPPED | OUTPATIENT
Start: 2025-05-08

## 2025-05-08 RX ORDER — POTASSIUM CHLORIDE 1500 MG/1
20 TABLET, EXTENDED RELEASE ORAL DAILY
Qty: 90 TABLET | Refills: 3 | Status: SHIPPED | OUTPATIENT
Start: 2025-05-08

## 2025-05-08 RX ORDER — SPIRONOLACTONE 25 MG/1
12.5 TABLET ORAL DAILY
Qty: 45 TABLET | Refills: 3 | Status: SHIPPED | OUTPATIENT
Start: 2025-05-08

## 2025-05-08 NOTE — PATIENT INSTRUCTIONS
Thank you for visiting with me today.    We discussed:   We'll do labs today and mail out a heart monitor to start looking into your shortness of breath.  Depending on what those show, we'll decide on next steps.      Medication changes:   Continue current medications.      Next Steps:   We'll set up a visit with Dr. Lockett for the fall.    I'll have a pharmacist see you to discuss weight loss medications.    We'll decide other things when your labs are back.      Please call my nurses, Stephanie Alonso Stacy, or Isabel at [phone] with questions.      *If you have concerns after hours, please call [phone], option 2 to speak with on call Cardiologist.

## 2025-05-08 NOTE — PROGRESS NOTES
Cardiology Clinic Progress Note    Service Date: 2025     Primary Cardiologist: Dr. Lockett      Reason for Visit: HFrEF    HPI:   Markie Shepard is a pleasant 75-year-old gentleman with past medical history significant for the followin.  Heart failure with with reduced ejection fraction with EF as low as 35% on echo , improved to 40-45% on echo , managed conservatively per pt preference.      2.  Coronary disease with known occluded RCA  3.  Permanent A-fib on warfarin  4.  Sleep apnea  5.  Hypertension  6.  Obesity  7.  Legally blind  8.  COPD  9.  Mild to mod aortic stenosis    History of Present Illness-  Patient states experiencing fatigue that has worsened over the past 4 to 5 weeks. He attributes this fatigue to his COPD, heart condition, or allergies, as he has a longstanding history of allergies. Patient states feeling imbalanced and unable to carry a gallon of milk down the steps last Monday, although he denies experiencing chest pain during this episode. He states he has been getting winded more frequently and noted a significant episode of heavy breathing after carrying a laundry basket into his apartment. Despite these symptoms, patient continues to engage in outings with family and friends three times a week without difficulty.    Patient states having occasional palpitations over the last five weeks, describing his heart as racing and pumping hard, particularly after exertion such as carrying laundry. He experiences a persistent cough, especially in the mornings when he coughs up phlegm, which improves throughout the day. He associates this cough with his COPD. Patient denies experiencing any new swelling in his legs but reports feelings of heaviness in his legs accompanying the fatigue.    Patient states he had a fall in September or October last year while using a stair climber at home, resulting in an injury to his tailbone and requiring assistance from the fire department. Since  "then, he denies having any further falls. Patient uses a CPAP machine at night and denies issues with breathing during sleep. He occasionally feels lightheaded but noted improvement over the last two weeks.    Patient is legally blind and relies on others for transportation. He has been using his inhaler preemptively throughout the day, which helps alleviate shortness of breath. He is concerned about the cost of newer weight loss medications but is open to discussing them further.      Social History:  Comes in today with his grandson Jayme who helps him with medications.  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    Physical Exam:  /71 (BP Location: Left arm, Patient Position: Sitting, Cuff Size: Adult Large)   Pulse 95   Ht 1.778 m (5' 10\")   Wt (!) 164.3 kg (362 lb 4.8 oz)   SpO2 96%   BMI 51.98 kg/m     Physical Exam  - CARDIOVASCULAR: Atrial fibrillation present, 2/6 systolic murmur at the right sternal border, hypotension with blood pressure 100/71 mmHg.  - LUNGS: Clear breath sounds, no wheezing, no crackles.  - SKIN: Venous stasis changes observed.  - EXTREMITIES: 1+ edema in lower extremities.      Results- INR: 2.6  BMP reviewed, echo reviewed    Assessment & Plan  Assessment  1. Dyspnea on exertion, unclear if cardiac or COPD related, with some suggestion of COPD given inhalers are helpful. Long cardiac history including mild aortic stenosis, heart failure with reduced ejection fraction, and coronary artery disease potentially contributing to symptoms. Risk of anemia due to Coumadin use. Ideally, would get echo and stress test but pt is reluctant given his dependence on transportation.      2. Coronary artery disease with known occluded right coronary artery, currently managed with Coumadin and statin. No further anticoagulation needed.    3. Permanent atrial " fibrillation on Coumadin with goal INR 2.0 to 3.0.    4. Heart failure with reduced ejection fraction (EF 40-45%), currently presenting class III stage C heart failure symptoms.    5. Obesity with BMI of 52. Patient has agreeable discussion of weight loss medications and an MTM consult has been put in for that.    Plan  - Perform laboratory tests today, including BMP, BNP, CBC with differential, TSH, and lipid panel, to assess potential causes of symptoms such as dyspnea and fatigue.  - Send a Zio patch heart monitor to the patient's home for a 7-day monitoring period to evaluate heart rhythm and detect any abnormalities in heart rate.  - Consider scheduling an echocardiogram to evaluate heart function and valve status, depending on lab results and patient preference for conservative management.  - Schedule a follow-up appointment with Dr. Lockett in the fall to review ongoing management and any new findings from tests.  - Arrange a medical therapy management (MTM) consultation to discuss potential weight loss medications, considering the patient's interest and insurance coverage.  - Continue current medication regimen, including the use of inhalers, as they are beneficial for managing symptoms.      It is my great pleasure to participate in this delightful patient's care.    Wen Barker PA-C  [facility]     The longitudinal plan of care for the diagnosis(es)/condition(s) as documented were addressed during this visit. Due to the added complexity in care, I will continue to support Markie in the subsequent management and with ongoing continuity of care.      40 total minutes was spent today including chart review, precharting, history and exam, post visit documentation, and reviewing studies as outlined above.   Orders this visit:  Orders Placed This Encounter   Procedures    Hemoglobin A1c    TSH with free T4 reflex    NT-proBNP    Lipid panel reflex to direct LDL Non-fasting    CBC with platelets     Basic metabolic panel    Follow-Up with Cardiology    Med Therapy Management Referral    CBC with platelets differential     Orders Placed This Encounter   Medications    spironolactone (ALDACTONE) 25 MG tablet     Sig: Take 0.5 tablets (12.5 mg) by mouth daily.     Dispense:  45 tablet     Refill:  3    torsemide (DEMADEX) 20 MG tablet     Sig: Take 2 tablets (40 mg) by mouth every morning AND 2 tablets (40 mg) daily (with lunch).     Dispense:  360 tablet     Refill:  3    potassium chloride ER (K-TAB) 20 MEQ CR tablet     Sig: Take 1 tablet (20 mEq) by mouth daily.     Dispense:  90 tablet     Refill:  3     Medications Discontinued During This Encounter   Medication Reason    sacubitril-valsartan (ENTRESTO) 24-26 MG per tablet     torsemide (DEMADEX) 20 MG tablet Reorder (No AVS)    spironolactone (ALDACTONE) 25 MG tablet     potassium chloride ER (K-TAB) 20 MEQ CR tablet        Encounter Diagnoses   Name Primary?    Chronic systolic heart failure (H)     Chronic atrial fibrillation (H) Yes    Elevated glucose     Acute on chronic systolic heart failure (H)     Acute on chronic heart failure with preserved ejection fraction (H)     Heart failure with preserved ejection fraction, NYHA class II (H)     Dyslipidemia     Class 3 severe obesity with serious comorbidity and body mass index (BMI) of 50.0 to 59.9 in adult, unspecified obesity type (H)        Current Medications:  Current Outpatient Medications   Medication Sig Dispense Refill    albuterol (PROAIR HFA/PROVENTIL HFA/VENTOLIN HFA) 108 (90 Base) MCG/ACT inhaler Inhale 2 puffs into the lungs every 4 hours as needed for shortness of breath or wheezing 18 g 11    FLUoxetine (PROZAC) 40 MG capsule Take 1 capsule (40 mg) by mouth daily 90 capsule 3    fluticasone (FLONASE) 50 MCG/ACT nasal spray Spray 1 spray into both nostrils daily 9.9 mL 3    Fluticasone-Umeclidin-Vilanterol (TRELEGY ELLIPTA) 200-62.5-25 MCG/ACT oral inhaler Inhale 1 puff into the lungs daily  60 each 11    metoprolol succinate ER (TOPROL XL) 200 MG 24 hr tablet Take 1 tablet (200 mg) by mouth daily. 90 tablet 3    multivitamin w/minerals (THERA-VIT-M) tablet Take 1 tablet by mouth daily Century 55+      potassium chloride ER (K-TAB) 20 MEQ CR tablet Take 1 tablet (20 mEq) by mouth daily. 90 tablet 3    rosuvastatin (CRESTOR) 40 MG tablet Take 1 tablet (40 mg) by mouth at bedtime. 90 tablet 2    sacubitril-valsartan (ENTRESTO) 24-26 MG per tablet Take 1 tablet by mouth 2 times daily. 180 tablet 3    spironolactone (ALDACTONE) 25 MG tablet Take 0.5 tablets (12.5 mg) by mouth daily. 45 tablet 3    torsemide (DEMADEX) 20 MG tablet Take 2 tablets (40 mg) by mouth every morning AND 2 tablets (40 mg) daily (with lunch). 360 tablet 3    traZODone (DESYREL) 100 MG tablet Take 1 tablet (100 mg) by mouth nightly as needed for sleep 90 tablet 3    warfarin ANTICOAGULANT (COUMADIN ANTICOAGULANT) 5 MG tablet Take 2.5 mg on Mondays and Fridays, and 5 mg all other days, or as directed by Coumadin nurse. 90 tablet 1       Allergies Reviewed and Updated:  Allergies   Allergen Reactions    Lisinopril Cough     Pt had cough    Seasonal Allergies     Zolpidem Tartrate Other (See Comments)     Parasomnia with sleep walking, injuries, delusions.  May have been in DTs at the same time.       Review of Systems:  Skin:  not assessed     Eyes:  Positive for visual blurring  ENT:  not assessed    Respiratory:  Positive for dyspnea on exertion  Cardiovascular:  Negative, palpitations, chest pain, dizziness, lightheadedness    Gastroenterology: not assessed    Genitourinary:  not assessed    Musculoskeletal:  not assessed    Neurologic:  not assessed    Psychiatric:  Positive for anxiety, depression  Heme/Lymph/Imm:  Positive for allergies  Endocrine:  Negative       Pertinent Past Medical, Surgical and Family History Reviewed and noted above.     CC  Andrea Lockett MD  [address], [place] [address]  [place] [id number]

## 2025-05-08 NOTE — LETTER
2025    Mali Patel MD  [address] [facility] [address]    RE: Markie Shepard       Dear Colleague,     I had the pleasure of seeing Markie Shepard in the [facility].    Cardiology Clinic Progress Note    Service Date: 2025     Primary Cardiologist: Dr. Lockett      Reason for Visit: HFrEF    HPI:   Markie Shepard is a pleasant 75-year-old gentleman with past medical history significant for the followin.  Heart failure with with reduced ejection fraction with EF as low as 35% on echo , improved to 40-45% on echo , managed conservatively per pt preference.      2.  Coronary disease with known occluded RCA  3.  Permanent A-fib on warfarin  4.  Sleep apnea  5.  Hypertension  6.  Obesity  7.  Legally blind  8.  COPD  9.  Mild to mod aortic stenosis    History of Present Illness-  Patient states experiencing fatigue that has worsened over the past 4 to 5 weeks. He attributes this fatigue to his COPD, heart condition, or allergies, as he has a longstanding history of allergies. Patient states feeling imbalanced and unable to carry a gallon of milk down the steps last Monday, although he denies experiencing chest pain during this episode. He states he has been getting winded more frequently and noted a significant episode of heavy breathing after carrying a laundry basket into his apartment. Despite these symptoms, patient continues to engage in outings with family and friends three times a week without difficulty.    Patient states having occasional palpitations over the last five weeks, describing his heart as racing and pumping hard, particularly after exertion such as carrying laundry. He experiences a persistent cough, especially in the mornings when he coughs up phlegm, which improves throughout the day. He associates this cough with his COPD. Patient denies experiencing any new swelling in his legs but reports feelings of heaviness in his legs accompanying  "the fatigue.    Patient states he had a fall in September or October last year while using a stair climber at home, resulting in an injury to his tailbone and requiring assistance from the fire department. Since then, he denies having any further falls. Patient uses a CPAP machine at night and denies issues with breathing during sleep. He occasionally feels lightheaded but noted improvement over the last two weeks.    Patient is legally blind and relies on others for transportation. He has been using his inhaler preemptively throughout the day, which helps alleviate shortness of breath. He is concerned about the cost of newer weight loss medications but is open to discussing them further.      Social History:  Comes in today with his grandson Jayme who helps him with medications.  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    Physical Exam:  /71 (BP Location: Left arm, Patient Position: Sitting, Cuff Size: Adult Large)   Pulse 95   Ht 1.778 m (5' 10\")   Wt (!) 164.3 kg (362 lb 4.8 oz)   SpO2 96%   BMI 51.98 kg/m     Physical Exam  - CARDIOVASCULAR: Atrial fibrillation present, 2/6 systolic murmur at the right sternal border, hypotension with blood pressure 100/71 mmHg.  - LUNGS: Clear breath sounds, no wheezing, no crackles.  - SKIN: Venous stasis changes observed.  - EXTREMITIES: 1+ edema in lower extremities.      Results- INR: 2.6  BMP reviewed, echo reviewed    Assessment & Plan  Assessment  1. Dyspnea on exertion, unclear if cardiac or COPD related, with some suggestion of COPD given inhalers are helpful. Long cardiac history including mild aortic stenosis, heart failure with reduced ejection fraction, and coronary artery disease potentially contributing to symptoms. Risk of anemia due to Coumadin use. Ideally, would get echo and stress test but pt is reluctant given his " dependence on transportation.      2. Coronary artery disease with known occluded right coronary artery, currently managed with Coumadin and statin. No further anticoagulation needed.    3. Permanent atrial fibrillation on Coumadin with goal INR 2.0 to 3.0.    4. Heart failure with reduced ejection fraction (EF 40-45%), currently presenting class III stage C heart failure symptoms.    5. Obesity with BMI of 52. Patient has agreeable discussion of weight loss medications and an MTM consult has been put in for that.    Plan  - Perform laboratory tests today, including BMP, BNP, CBC with differential, TSH, and lipid panel, to assess potential causes of symptoms such as dyspnea and fatigue.  - Send a Zio patch heart monitor to the patient's home for a 7-day monitoring period to evaluate heart rhythm and detect any abnormalities in heart rate.  - Consider scheduling an echocardiogram to evaluate heart function and valve status, depending on lab results and patient preference for conservative management.  - Schedule a follow-up appointment with Dr. Lockett in the fall to review ongoing management and any new findings from tests.  - Arrange a medical therapy management (MTM) consultation to discuss potential weight loss medications, considering the patient's interest and insurance coverage.  - Continue current medication regimen, including the use of inhalers, as they are beneficial for managing symptoms.      It is my great pleasure to participate in this delightful patient's care.    LICHA Phillip [facility] Cardiology     The longitudinal plan of care for the diagnosis(es)/condition(s) as documented were addressed during this visit. Due to the added complexity in care, I will continue to support Markie in the subsequent management and with ongoing continuity of care.      40 total minutes was spent today including chart review, precharting, history and exam, post visit documentation, and reviewing studies as  outlined above.   Orders this visit:  Orders Placed This Encounter   Procedures     Hemoglobin A1c     TSH with free T4 reflex     NT-proBNP     Lipid panel reflex to direct LDL Non-fasting     CBC with platelets     Basic metabolic panel     Follow-Up with Cardiology     Med Therapy Management Referral     CBC with platelets differential     Orders Placed This Encounter   Medications     spironolactone (ALDACTONE) 25 MG tablet     Sig: Take 0.5 tablets (12.5 mg) by mouth daily.     Dispense:  45 tablet     Refill:  3     torsemide (DEMADEX) 20 MG tablet     Sig: Take 2 tablets (40 mg) by mouth every morning AND 2 tablets (40 mg) daily (with lunch).     Dispense:  360 tablet     Refill:  3     potassium chloride ER (K-TAB) 20 MEQ CR tablet     Sig: Take 1 tablet (20 mEq) by mouth daily.     Dispense:  90 tablet     Refill:  3     Medications Discontinued During This Encounter   Medication Reason     sacubitril-valsartan (ENTRESTO) 24-26 MG per tablet      torsemide (DEMADEX) 20 MG tablet Reorder (No AVS)     spironolactone (ALDACTONE) 25 MG tablet      potassium chloride ER (K-TAB) 20 MEQ CR tablet        Encounter Diagnoses   Name Primary?     Chronic systolic heart failure (H)      Chronic atrial fibrillation (H) Yes     Elevated glucose      Acute on chronic systolic heart failure (H)      Acute on chronic heart failure with preserved ejection fraction (H)      Heart failure with preserved ejection fraction, NYHA class II (H)      Dyslipidemia      Class 3 severe obesity with serious comorbidity and body mass index (BMI) of 50.0 to 59.9 in adult, unspecified obesity type (H)        Current Medications:  Current Outpatient Medications   Medication Sig Dispense Refill     albuterol (PROAIR HFA/PROVENTIL HFA/VENTOLIN HFA) 108 (90 Base) MCG/ACT inhaler Inhale 2 puffs into the lungs every 4 hours as needed for shortness of breath or wheezing 18 g 11     FLUoxetine (PROZAC) 40 MG capsule Take 1 capsule (40 mg) by mouth  daily 90 capsule 3     fluticasone (FLONASE) 50 MCG/ACT nasal spray Spray 1 spray into both nostrils daily 9.9 mL 3     Fluticasone-Umeclidin-Vilanterol (TRELEGY ELLIPTA) 200-62.5-25 MCG/ACT oral inhaler Inhale 1 puff into the lungs daily 60 each 11     metoprolol succinate ER (TOPROL XL) 200 MG 24 hr tablet Take 1 tablet (200 mg) by mouth daily. 90 tablet 3     multivitamin w/minerals (THERA-VIT-M) tablet Take 1 tablet by mouth daily Century 55+       potassium chloride ER (K-TAB) 20 MEQ CR tablet Take 1 tablet (20 mEq) by mouth daily. 90 tablet 3     rosuvastatin (CRESTOR) 40 MG tablet Take 1 tablet (40 mg) by mouth at bedtime. 90 tablet 2     sacubitril-valsartan (ENTRESTO) 24-26 MG per tablet Take 1 tablet by mouth 2 times daily. 180 tablet 3     spironolactone (ALDACTONE) 25 MG tablet Take 0.5 tablets (12.5 mg) by mouth daily. 45 tablet 3     torsemide (DEMADEX) 20 MG tablet Take 2 tablets (40 mg) by mouth every morning AND 2 tablets (40 mg) daily (with lunch). 360 tablet 3     traZODone (DESYREL) 100 MG tablet Take 1 tablet (100 mg) by mouth nightly as needed for sleep 90 tablet 3     warfarin ANTICOAGULANT (COUMADIN ANTICOAGULANT) 5 MG tablet Take 2.5 mg on Mondays and Fridays, and 5 mg all other days, or as directed by Coumadin nurse. 90 tablet 1       Allergies Reviewed and Updated:  Allergies   Allergen Reactions     Lisinopril Cough     Pt had cough     Seasonal Allergies      Zolpidem Tartrate Other (See Comments)     Parasomnia with sleep walking, injuries, delusions.  May have been in DTs at the same time.       Review of Systems:  Skin:  not assessed     Eyes:  Positive for visual blurring  ENT:  not assessed    Respiratory:  Positive for dyspnea on exertion  Cardiovascular:  Negative, palpitations, chest pain, dizziness, lightheadedness    Gastroenterology: not assessed    Genitourinary:  not assessed    Musculoskeletal:  not assessed    Neurologic:  not assessed    Psychiatric:  Positive for  anxiety, depression  Heme/Lymph/Imm:  Positive for allergies  Endocrine:  Negative       Pertinent Past Medical, Surgical and Family History Reviewed and noted above.     CC  Andrea Lockett MD  [address]  [place] [address]    Thank you for allowing me to participate in the care of your patient.      Sincerely,     LICHA Sequeira [facility]  cc:   Andrea Lockett MD  [address]  [place] [address]

## 2025-05-09 ENCOUNTER — ORDERS ONLY (AUTO-RELEASED) (OUTPATIENT)
Dept: CARDIOLOGY | Facility: CLINIC | Age: 75
End: 2025-05-09
Payer: COMMERCIAL

## 2025-05-09 ENCOUNTER — RESULTS FOLLOW-UP (OUTPATIENT)
Dept: CARDIOLOGY | Facility: CLINIC | Age: 75
End: 2025-05-09
Payer: COMMERCIAL

## 2025-05-09 DIAGNOSIS — I48.20 CHRONIC ATRIAL FIBRILLATION (H): ICD-10-CM

## 2025-05-09 DIAGNOSIS — I48.20 CHRONIC ATRIAL FIBRILLATION (H): Primary | ICD-10-CM

## 2025-05-12 NOTE — TELEPHONE ENCOUNTER
Health Call Center    Phone Message    May a detailed message be left on voicemail: yes     Reason for Call: Patient returning a call from Friday, stated the vm was not clear but it was Celeste for an Echo. Pt stated if a call back could be made after 2 pm. Please call patient back.     Action Taken: Other: Cardio     Travel Screening: Not Applicable     Date of Service:         Thank you!  [facility]

## 2025-05-12 NOTE — TELEPHONE ENCOUNTER
Health Call Center    Phone Message    May a detailed message be left on voicemail: yes     Reason for Call: Other: Patient calling again as they have not heard back from anyone on the care team in regards to a call they received about an echo. Please call patient back to further discuss.     Action Taken: Other: Cardiology    Travel Screening: Not Applicable     Thank you!  [facility]

## 2025-05-13 NOTE — RESULT ENCOUNTER NOTE
"Spoke with patient, he just received Ziopatch monitor yesterday and his grandson came over and helped him put it on. He is wearing it until next Monday and then will mail it back. We discussed lab results and recommendations from PILI Avalos: \"No cause of fatigue or sob other than hgba1 is slightly worse, but he shouldn't feel particularly poorly because of that.  He should still get the echo and meet with pharmacy for wt loss medications and we'll go from there.\"    Pt would like to wait for the Ziopatch results and then when we call with those, decide on the Echo. He is scheduled with pharmacy to discuss weight loss medications in about a month. FYI to PILI Avalos on patient's wishes to hold off until we get Ziopatch results in a few weeks.     Future Appointments   Date Time Provider Department Center   5/19/2025 11:45 AM [facility]   6/13/2025  2:00 PM Chen Flood [facility]   9/16/2025 10:30 AM [facility]   9/16/2025 11:15 AM Andrea Lockett MD [facility]        "

## 2025-05-19 ENCOUNTER — LAB (OUTPATIENT)
Dept: LAB | Facility: CLINIC | Age: 75
End: 2025-05-19
Payer: COMMERCIAL

## 2025-05-19 ENCOUNTER — RESULTS FOLLOW-UP (OUTPATIENT)
Dept: ANTICOAGULATION | Facility: CLINIC | Age: 75
End: 2025-05-19

## 2025-05-19 ENCOUNTER — ANTICOAGULATION THERAPY VISIT (OUTPATIENT)
Dept: ANTICOAGULATION | Facility: CLINIC | Age: 75
End: 2025-05-19

## 2025-05-19 DIAGNOSIS — I48.20 CHRONIC ATRIAL FIBRILLATION (H): ICD-10-CM

## 2025-05-19 DIAGNOSIS — Z79.01 LONG TERM CURRENT USE OF ANTICOAGULANT THERAPY: ICD-10-CM

## 2025-05-19 DIAGNOSIS — Z79.01 LONG TERM CURRENT USE OF ANTICOAGULANT THERAPY: Primary | ICD-10-CM

## 2025-05-19 LAB — INR BLD: 2.4 (ref 0.9–1.1)

## 2025-05-19 PROCEDURE — 85610 PROTHROMBIN TIME: CPT

## 2025-05-19 PROCEDURE — 36416 COLLJ CAPILLARY BLOOD SPEC: CPT

## 2025-05-19 NOTE — PROGRESS NOTES
ANTICOAGULATION MANAGEMENT     Markie Shepard 75 year old male is on warfarin with therapeutic INR result. (Goal INR 2.0-3.0)    Recent labs: (last 7 days)     05/19/25  1156   INR 2.4*       ASSESSMENT     Source(s): Chart Review and Patient/Caregiver Call     Warfarin doses taken: Warfarin taken as instructed  Diet: No new diet changes identified  Medication/supplement changes: None noted  New illness, injury, or hospitalization: No  Signs or symptoms of bleeding or clotting: No  Previous result: Therapeutic last 2(+) visits  Additional findings: None       PLAN     Recommended plan for no diet, medication or health factor changes affecting INR     Dosing Instructions: Continue your current warfarin dose with next INR in 6 weeks       Summary  As of 5/19/2025      Full warfarin instructions:  2.5 mg every Mon, Fri; 5 mg all other days   Next INR check:  6/30/2025               Telephone call with Markie who verbalizes understanding and agrees to plan    Lab visit scheduled    Education provided: Goal range and lab monitoring: goal range and significance of current result and Importance of therapeutic range    Plan made per [facility] anticoagulation protocol    Gunnar Brody RN  5/19/2025  Anticoagulation Clinic  [employer] for routing messages: [facility] patient phone line: [phone]        _______________________________________________________________________     Anticoagulation Episode Summary       Current INR goal:  2.0-3.0   TTR:  76.3% (1 y)   Target end date:  Indefinite   Send INR reminders to:  [facility]    Indications    Long-term (current) use of anticoagulants [Z79.01] [Z79.01]  Chronic atrial fibrillation (H) [I48.20]             Comments:  --             Anticoagulation Care Providers       Provider Role Specialty Phone number    Mali Patel MD Referring Internal Medicine [phone]

## 2025-05-22 LAB — CV ZIO PRELIM RESULTS: NORMAL

## 2025-05-28 NOTE — RESULT ENCOUNTER NOTE
[facility]      Wen Barker PA-C to [facility] Heart Core Nurse 5/27/25  4:47 PM  Result Note  Team pls help get echo arranged.   HR well controlled in afib,  VT only 8 beats, will follow.  Wen Barker PA-C 5/27/2025 4:47 PM    Note: I spoke to Markie today and conveyed the above recommendations.He verbalized understanding of conversation and all questions were answered. Orders placed for complete echo and I will have scheduling reach out and set up this echo.     Isabel BUTTN, RN  10:26 AM   Nurse line M-F 8am-4pm   [phone]

## 2025-06-16 ENCOUNTER — TELEPHONE (OUTPATIENT)
Dept: CARDIOLOGY | Facility: CLINIC | Age: 75
End: 2025-06-16
Payer: COMMERCIAL

## 2025-06-16 DIAGNOSIS — J44.9 CHRONIC OBSTRUCTIVE PULMONARY DISEASE, UNSPECIFIED COPD TYPE (H): ICD-10-CM

## 2025-06-16 NOTE — TELEPHONE ENCOUNTER
Called patient to let him know of Mounjaro approval. Patient is planning to  Mounjaro this Friday and is aware of the co-pay. Answered all patient questions.

## 2025-06-16 NOTE — TELEPHONE ENCOUNTER
Hello team,     Forwarding encounter as a request to initiate prior authorization for Mounjaro 2.5 mg weekly and subsequent dose titrations. Please let me know if you need any additional details or information.    Thanks,     Chen Flood, PharmD  Medication Therapy Management Pharmacist  [facility]

## 2025-06-16 NOTE — TELEPHONE ENCOUNTER
Prior Authorization Approval    Medication: MOUNJARO 2.5 MG/0.5ML SC SOAJ  Authorization Effective Date: 5/17/2025  Authorization Expiration Date: 6/16/2026  Approved Dose/Quantity: 2ml per 28 days  Reference #: Key: [id number]   Insurance Company: [employer] - Phone [phone] Fax [phone]  Expected CoPay: $ 261.51  CoPay Card Available: No    Financial Assistance Needed: no  Which Pharmacy is filling the prescription: [id number] - [facility] [address]  Pharmacy Notified: yes  Patient Notified: yes    No Copay assistance available for Mounjaro with Medicare Insurance

## 2025-06-16 NOTE — TELEPHONE ENCOUNTER
PA Initiation    Medication: MOUNJARO 2.5 MG/0.5ML SC SOAJ  Insurance Company: [employer] - Phone [phone] Fax [phone]  Pharmacy Filling the Rx: [id number] - [facility] [address]  Filling Pharmacy Phone: [phone]  Filling Pharmacy Fax: [phone]  Start Date: 6/16/2025    Key: [id number]

## 2025-06-17 RX ORDER — ALBUTEROL SULFATE 90 UG/1
2 INHALANT RESPIRATORY (INHALATION) EVERY 4 HOURS PRN
Qty: 8.5 G | Refills: 0 | Status: SHIPPED | OUTPATIENT
Start: 2025-06-17

## 2025-06-17 NOTE — TELEPHONE ENCOUNTER
Medication requested: albuterol (PROAIR HFA/PROVENTIL HFA/VENTOLIN HFA) 108 (90 Base) MCG/ACT inhaler   Last office visit: 7/9/2024  [facility] appointments: none  Medication last refilled: 7/9/2024; 18 g + 11 refills  Last qualifying labs:      7/9/2024  11:24 AM   ACT and ATAQ Scores    ACT TOTAL SCORE (Goal Greater than or Equal to 20) 13      Prescription approved per [facility] Refill Protocol.    AUNG Marie, RN  06/17/25, 11:11 AM

## 2025-06-23 NOTE — TELEPHONE ENCOUNTER
Patient has Medicare Advantage through [employer]    Jardiance/Farxiga  --$47/mo.  --lf/when total drug costs exceed $5,030, price will increase to a 25% coinsurance, equivalent to $141/mo.    Myriam Salomon  Pharmacy Technician/Liaison, Discharge Pharmacy   [phone] (voice or text)  [email or web address]     Negative for fever.  Genitourinary:  Negative for hematuria.  Musculoskeletal:  Negative for back pain.      Urinalysis  UA - Dipstick  Results for orders placed or performed in visit on 06/23/25   AMB POC URINALYSIS DIP STICK AUTO W/O MICRO    Collection Time: 06/23/25  2:56 PM   Result Value Ref Range    Color (UA POC)      Clarity (UA POC)      Glucose, Urine, POC Negative Negative mg/dL    Bilirubin, Urine, POC Negative Negative    KETONES, Urine, POC Negative Negative mg/dL    Specific Gravity, Urine, POC 1.020 1.001 - 1.035    Blood (UA POC) Small     pH, Urine, POC 5.5 4.6 - 8.0    Protein, Urine, POC Negative Negative mg/dL    Urobilinogen, POC 0.2 mg/dL <1.1 mg/dL    Nitrite, Urine, POC Negative Negative    Leukocyte Esterase, Urine, POC Negative Negative   Results for orders placed or performed in visit on 08/17/23   AMB POC URINALYSIS DIP STICK AUTO W/O MICRO    Collection Time: 08/18/23 10:47 AM   Result Value Ref Range    Color, Urine, POC Yellow     Clarity, Urine, POC Clear     Glucose, Urine, POC Negative Negative    Bilirubin, Urine, POC Negative Negative    Ketones, Urine, POC Negative Negative    Specific Gravity, Urine, POC 1.010 1.001 - 1.035    Blood, Urine, POC Negative Negative    pH, Urine, POC 5.0 4.6 - 8.0    Protein, Urine, POC Negative Negative    Urobilinogen, POC 0.2 mg/dL     Nitrite, Urine, POC Negative Negative    Leukocyte Esterase, Urine, POC Moderate Negative       PHYSICAL EXAM    General appearance - well appearing and in no distress  Mental status - alert, oriented to person, place, and time  Neck - supple, no significant adenopathy  Chest/Lung-  Quiet, even and easy respiratory effort without use of accessory muscles  Skin - normal coloration and turgor, no rashes        Assessment and Plan    ICD-10-CM    1. OAB (overactive bladder)  N32.81 AMB POC URINALYSIS DIP STICK AUTO W/O MICRO      2. Urge incontinence of urine  N39.41 AMB POC URINALYSIS DIP STICK AUTO W/O MICRO

## 2025-06-30 ENCOUNTER — ANTICOAGULATION THERAPY VISIT (OUTPATIENT)
Dept: ANTICOAGULATION | Facility: CLINIC | Age: 75
End: 2025-06-30

## 2025-06-30 ENCOUNTER — RESULTS FOLLOW-UP (OUTPATIENT)
Dept: ANTICOAGULATION | Facility: CLINIC | Age: 75
End: 2025-06-30

## 2025-06-30 ENCOUNTER — LAB (OUTPATIENT)
Dept: LAB | Facility: CLINIC | Age: 75
End: 2025-06-30
Payer: COMMERCIAL

## 2025-06-30 DIAGNOSIS — I48.20 CHRONIC ATRIAL FIBRILLATION (H): ICD-10-CM

## 2025-06-30 DIAGNOSIS — Z79.01 LONG TERM CURRENT USE OF ANTICOAGULANT THERAPY: ICD-10-CM

## 2025-06-30 DIAGNOSIS — Z79.01 LONG TERM CURRENT USE OF ANTICOAGULANT THERAPY: Primary | ICD-10-CM

## 2025-06-30 LAB — INR BLD: 2.4 (ref 0.9–1.1)

## 2025-06-30 PROCEDURE — 36415 COLL VENOUS BLD VENIPUNCTURE: CPT

## 2025-06-30 PROCEDURE — 85610 PROTHROMBIN TIME: CPT

## 2025-06-30 NOTE — PROGRESS NOTES
ANTICOAGULATION MANAGEMENT     Markie Shepard 75 year old male is on warfarin with therapeutic INR result. (Goal INR 2.0-3.0)    Recent labs: (last 7 days)     06/30/25  1142   INR 2.4*       ASSESSMENT     Source(s): Chart Review and Patient/Caregiver Call     Warfarin doses taken: Warfarin taken as instructed  Diet: No new diet changes identified  Medication/supplement changes: Started Mounjaro one week ago. Concurrent use of TIRZEPATIDE and WARFARIN may result in altered absorption of warfarin.  New illness, injury, or hospitalization: No  Signs or symptoms of bleeding or clotting: No  Previous result: Therapeutic last 2(+) visits  Additional findings: writer informed Markie of potential interaction between warfarin and mounjaro-Markie still prefers a recheck in 6 weeks        PLAN     Recommended plan for ongoing change(s) affecting INR     Dosing Instructions: Continue your current warfarin dose with next INR in 6 weeks       Summary  As of 6/30/2025      Full warfarin instructions:  2.5 mg every Mon, Fri; 5 mg all other days   Next INR check:  8/11/2025               Telephone call with Markie who agrees to plan and repeated back plan correctly    Lab visit scheduled    Education provided: Goal range and lab monitoring: goal range and significance of current result and Importance of following up at instructed interval  Interaction IS anticipated between warfarin and Mounjaro  Contact [phone] with any changes, questions or concerns.     Plan made per [facility] anticoagulation protocol    TONYA GARY RN  6/30/2025  Anticoagulation Clinic  [employer] for routing messages: p [facility] patient phone line: [phone]        _______________________________________________________________________     Anticoagulation Episode Summary       Current INR goal:  2.0-3.0   TTR:  76.3% (1 y)   Target end date:  Indefinite   Send INR reminders to:  [facility]    Indications    Long-term (current) use of  anticoagulants [Z79.01] [Z79.01]  Chronic atrial fibrillation (H) [I48.20]             Comments:  --             Anticoagulation Care Providers       Provider Role Specialty Phone number    Mali Patel MD [facility] Internal Medicine [phone]

## 2025-07-14 DIAGNOSIS — J44.9 CHRONIC OBSTRUCTIVE PULMONARY DISEASE, UNSPECIFIED COPD TYPE (H): ICD-10-CM

## 2025-07-15 RX ORDER — ALBUTEROL SULFATE 90 UG/1
2 INHALANT RESPIRATORY (INHALATION) EVERY 4 HOURS PRN
Qty: 8.5 G | Refills: 2 | Status: SHIPPED | OUTPATIENT
Start: 2025-07-15

## 2025-07-15 RX ORDER — FLUTICASONE FUROATE, UMECLIDINIUM BROMIDE AND VILANTEROL TRIFENATATE 200; 62.5; 25 UG/1; UG/1; UG/1
1 POWDER RESPIRATORY (INHALATION) DAILY
Qty: 60 EACH | Refills: 2 | Status: SHIPPED | OUTPATIENT
Start: 2025-07-15

## 2025-07-15 NOTE — TELEPHONE ENCOUNTER
Medication requested: albuterol (PROAIR HFA/PROVENTIL HFA/VENTOLIN HFA) 108 (90 Base) MCG/ACT inhaler   Last office visit: 7/9/2024  [facility] appointments: none  Medication last refilled: 6/17/2025; 8.5 g + 0 refills    Medication requested: Fluticasone-Umeclidin-Vilanterol (TRELEGY ELLIPTA) 200-62.5-25 MCG/ACT oral inhaler   Medication last refilled: 7/9/2024; 60 each + 11 refills  Last qualifying labs:      7/9/2024  11:24 AM   ACT and ATAQ Scores    ACT TOTAL SCORE (Goal Greater than or Equal to 20) 13      Routing refill request to provider for review/approval because:  Drug not on the G refill protocol     Routing encounter to FD staff to call and schedule pt for annual wellness visit.    AUNG Marie, RN  07/15/25, 2:29 PM

## 2025-07-18 ENCOUNTER — HOSPITAL ENCOUNTER (OUTPATIENT)
Dept: CARDIOLOGY | Facility: CLINIC | Age: 75
Discharge: HOME | End: 2025-07-18
Attending: PHYSICIAN ASSISTANT | Admitting: PHYSICIAN ASSISTANT
Payer: COMMERCIAL

## 2025-07-18 DIAGNOSIS — I48.20 CHRONIC ATRIAL FIBRILLATION (H): ICD-10-CM

## 2025-07-18 LAB — LVEF ECHO: NORMAL

## 2025-07-18 PROCEDURE — 93306 TTE W/DOPPLER COMPLETE: CPT | Mod: 26 | Performed by: INTERNAL MEDICINE

## 2025-07-18 PROCEDURE — 255N000002 HC RX 255 OP 636: Performed by: PHYSICIAN ASSISTANT

## 2025-07-18 PROCEDURE — 999N000208 ECHOCARDIOGRAM COMPLETE

## 2025-07-18 RX ADMIN — HUMAN ALBUMIN MICROSPHERES AND PERFLUTREN 9 ML (DILUTED): 10; .22 INJECTION, SOLUTION INTRAVENOUS at 14:38

## 2025-07-21 DIAGNOSIS — F33.9 CHRONIC RECURRENT MAJOR DEPRESSIVE DISORDER: ICD-10-CM

## 2025-07-22 RX ORDER — FLUOXETINE HYDROCHLORIDE 40 MG/1
40 CAPSULE ORAL DAILY
Qty: 30 CAPSULE | Refills: 0 | Status: SHIPPED | OUTPATIENT
Start: 2025-07-22

## 2025-07-22 NOTE — TELEPHONE ENCOUNTER
Medication requested: FLUoxetine (PROZAC) 40 MG capsule   Last office visit: 7/9/24  [facility] appointments: 8/5/25  Medication last refilled: 7/9/25; 90 + 3 refills  Last qualifying labs:    7/9/2024  11:24 AM   PHQ-9 / YAAKOV-7 Scores 8/2015 to present    YAAKOV-7 Score DocFlow 3    PHQ-9 Score DocFlow 4      Medication is being filled for 1 time refill only due to:  Patient needs to be seen because it has been more than one year since last visit.    Henry HORAN, RN  07/22/25 1:36 PM

## 2025-08-05 ENCOUNTER — OFFICE VISIT (OUTPATIENT)
Dept: FAMILY MEDICINE | Facility: CLINIC | Age: 75
End: 2025-08-05
Payer: COMMERCIAL

## 2025-08-05 VITALS
WEIGHT: 315 LBS | SYSTOLIC BLOOD PRESSURE: 87 MMHG | BODY MASS INDEX: 44.1 KG/M2 | OXYGEN SATURATION: 94 % | HEART RATE: 67 BPM | DIASTOLIC BLOOD PRESSURE: 59 MMHG | HEIGHT: 71 IN | TEMPERATURE: 97.8 F

## 2025-08-05 DIAGNOSIS — I25.10 CORONARY ARTERY DISEASE INVOLVING NATIVE CORONARY ARTERY OF NATIVE HEART WITHOUT ANGINA PECTORIS: ICD-10-CM

## 2025-08-05 DIAGNOSIS — I95.9 HYPOTENSION, UNSPECIFIED HYPOTENSION TYPE: ICD-10-CM

## 2025-08-05 DIAGNOSIS — F33.9 CHRONIC RECURRENT MAJOR DEPRESSIVE DISORDER: ICD-10-CM

## 2025-08-05 DIAGNOSIS — E11.9 TYPE 2 DIABETES MELLITUS WITHOUT COMPLICATION, WITHOUT LONG-TERM CURRENT USE OF INSULIN (H): ICD-10-CM

## 2025-08-05 DIAGNOSIS — I48.20 CHRONIC ATRIAL FIBRILLATION (H): ICD-10-CM

## 2025-08-05 DIAGNOSIS — G47.00 INSOMNIA, UNSPECIFIED TYPE: ICD-10-CM

## 2025-08-05 DIAGNOSIS — I50.20 HEART FAILURE WITH REDUCED EJECTION FRACTION (H): ICD-10-CM

## 2025-08-05 DIAGNOSIS — J44.9 CHRONIC OBSTRUCTIVE PULMONARY DISEASE, UNSPECIFIED COPD TYPE (H): Primary | ICD-10-CM

## 2025-08-05 DIAGNOSIS — I50.33 ACUTE ON CHRONIC HEART FAILURE WITH PRESERVED EJECTION FRACTION (H): ICD-10-CM

## 2025-08-05 RX ORDER — FLUTICASONE FUROATE, UMECLIDINIUM BROMIDE AND VILANTEROL TRIFENATATE 200; 62.5; 25 UG/1; UG/1; UG/1
1 POWDER RESPIRATORY (INHALATION) DAILY
Qty: 180 EACH | Refills: 3 | Status: SHIPPED | OUTPATIENT
Start: 2025-08-05

## 2025-08-05 RX ORDER — ALBUTEROL SULFATE 90 UG/1
2 INHALANT RESPIRATORY (INHALATION) EVERY 4 HOURS PRN
Qty: 54 G | Refills: 3 | Status: SHIPPED | OUTPATIENT
Start: 2025-08-05

## 2025-08-05 RX ORDER — WARFARIN SODIUM 5 MG/1
TABLET ORAL
Qty: 90 TABLET | Refills: 1 | Status: SHIPPED | OUTPATIENT
Start: 2025-08-05

## 2025-08-05 RX ORDER — TORSEMIDE 20 MG/1
TABLET ORAL
Qty: 360 TABLET | Refills: 3 | Status: CANCELLED | OUTPATIENT
Start: 2025-08-05

## 2025-08-05 RX ORDER — FLUOXETINE HYDROCHLORIDE 40 MG/1
40 CAPSULE ORAL DAILY
Qty: 90 CAPSULE | Refills: 3 | Status: SHIPPED | OUTPATIENT
Start: 2025-08-05

## 2025-08-05 RX ORDER — TRAZODONE HYDROCHLORIDE 100 MG/1
100 TABLET ORAL
Qty: 90 TABLET | Refills: 3 | Status: SHIPPED | OUTPATIENT
Start: 2025-08-05

## 2025-08-05 ASSESSMENT — PATIENT HEALTH QUESTIONNAIRE - PHQ9: SUM OF ALL RESPONSES TO PHQ QUESTIONS 1-9: 5

## 2025-08-11 ENCOUNTER — LAB (OUTPATIENT)
Dept: LAB | Facility: CLINIC | Age: 75
End: 2025-08-11
Payer: COMMERCIAL

## 2025-08-11 ENCOUNTER — ANTICOAGULATION THERAPY VISIT (OUTPATIENT)
Dept: ANTICOAGULATION | Facility: CLINIC | Age: 75
End: 2025-08-11

## 2025-08-11 DIAGNOSIS — Z79.01 LONG TERM CURRENT USE OF ANTICOAGULANT THERAPY: ICD-10-CM

## 2025-08-11 DIAGNOSIS — I48.20 CHRONIC ATRIAL FIBRILLATION (H): ICD-10-CM

## 2025-08-11 LAB — INR BLD: 2.5 (ref 0.9–1.1)

## 2025-08-11 PROCEDURE — 36416 COLLJ CAPILLARY BLOOD SPEC: CPT

## 2025-08-11 PROCEDURE — 85610 PROTHROMBIN TIME: CPT

## 2025-08-14 DIAGNOSIS — E78.5 DYSLIPIDEMIA: ICD-10-CM

## 2025-08-14 RX ORDER — ROSUVASTATIN CALCIUM 40 MG/1
40 TABLET, COATED ORAL AT BEDTIME
Qty: 90 TABLET | Refills: 3 | Status: SHIPPED | OUTPATIENT
Start: 2025-08-14

## 2025-08-14 RX ORDER — ROSUVASTATIN CALCIUM 40 MG/1
40 TABLET, COATED ORAL AT BEDTIME
Qty: 90 TABLET | Refills: 2 | Status: CANCELLED | OUTPATIENT
Start: 2025-08-14